# Patient Record
Sex: MALE | Race: WHITE | NOT HISPANIC OR LATINO | URBAN - METROPOLITAN AREA
[De-identification: names, ages, dates, MRNs, and addresses within clinical notes are randomized per-mention and may not be internally consistent; named-entity substitution may affect disease eponyms.]

---

## 2017-07-11 ENCOUNTER — ON CAMPUS - OUTPATIENT (AMBULATORY)
Dept: URBAN - METROPOLITAN AREA HOSPITAL 85 | Facility: HOSPITAL | Age: 57
End: 2017-07-11
Payer: COMMERCIAL

## 2017-07-11 ENCOUNTER — HOSPITAL ENCOUNTER (OUTPATIENT)
Dept: PREOP | Facility: HOSPITAL | Age: 57
Setting detail: HOSPITAL OUTPATIENT SURGERY
Discharge: HOME | End: 2017-07-11
Attending: INTERNAL MEDICINE | Admitting: INTERNAL MEDICINE

## 2017-07-11 DIAGNOSIS — D12.0 BENIGN NEOPLASM OF CECUM: ICD-10-CM

## 2017-07-11 DIAGNOSIS — Z12.11 ENCOUNTER FOR SCREENING FOR MALIGNANT NEOPLASM OF COLON: ICD-10-CM

## 2017-07-11 DIAGNOSIS — D12.2 BENIGN NEOPLASM OF ASCENDING COLON: ICD-10-CM

## 2017-07-11 DIAGNOSIS — K63.5 POLYP OF COLON: ICD-10-CM

## 2017-07-11 PROCEDURE — 45385 COLONOSCOPY W/LESION REMOVAL: CPT | Performed by: NURSE PRACTITIONER

## 2017-07-18 ENCOUNTER — HOSPITAL ENCOUNTER (OUTPATIENT)
Dept: GENERAL RADIOLOGY | Facility: HOSPITAL | Age: 57
Discharge: HOME | End: 2017-07-18
Attending: INTERNAL MEDICINE | Admitting: INTERNAL MEDICINE

## 2017-07-25 ENCOUNTER — HOSPITAL ENCOUNTER (OUTPATIENT)
Dept: CARDIOLOGY | Facility: HOSPITAL | Age: 57
Discharge: HOME | End: 2017-07-25
Attending: INTERNAL MEDICINE | Admitting: INTERNAL MEDICINE

## 2017-07-25 LAB
ABO + RH BLD: NORMAL
ABO + RH BLD: NORMAL
BILIRUB UR QL STRIP: NEGATIVE MG/DL
CASTS URNS QL MICRO: ABNORMAL /[LPF]
COLOR UR: YELLOW
CONV BACTERIA IN URINE MICRO: NEGATIVE
CONV CLARITY OF URINE: CLEAR
CONV HYALINE CASTS IN URINE MICRO: 0 /[LPF] (ref 0–5)
CONV PROTEIN IN URINE BY AUTOMATED TEST STRIP: 100 MG/DL
CONV SMALL ROUND CELLS: ABNORMAL /[HPF]
CONV UROBILINOGEN IN URINE BY AUTOMATED TEST STRIP: 0.2 MG/DL
CULTURE INDICATED?: ABNORMAL
GLUCOSE UR QL: NEGATIVE MG/DL
HGB UR QL STRIP: NEGATIVE
KETONES UR QL STRIP: NEGATIVE MG/DL
LEUKOCYTE ESTERASE UR QL STRIP: NEGATIVE
NITRITE UR QL STRIP: NEGATIVE
PH UR STRIP.AUTO: 8.5 [PH] (ref 4.5–8)
PSA SERPL-MCNC: 0.64 NG/ML (ref 0–4)
RBC #/AREA URNS HPF: 2 /[HPF] (ref 0–3)
SP GR UR: 1.01 (ref 1–1.03)
SPERM URNS QL MICRO: ABNORMAL /[HPF]
SQUAMOUS SPT QL MICRO: 0 /[HPF] (ref 0–5)
UNIDENT CRYS URNS QL MICRO: ABNORMAL /[HPF]
WBC #/AREA URNS HPF: 1 /[HPF] (ref 0–5)
YEAST SPEC QL WET PREP: ABNORMAL /[HPF]

## 2017-07-26 LAB
EBV AB TO VIRAL CAPSID AG, IGG: ABNORMAL
EBV AB TO VIRAL CAPSID AG, IGM: NORMAL
HBV SURFACE AB SER QL: REACTIVE
HBV SURFACE AB SER RIA-ACNC: ABNORMAL
HBV SURFACE AG SERPL QL IA: NONREACTIVE
HCV AB SER DONR QL: NORMAL
HCV AB SER DONR QL: NORMAL
HIV1+2 AB SERPL QL IA: NORMAL
T PALLIDUM IGG SER QL: NONREACTIVE

## 2017-07-27 ENCOUNTER — HOSPITAL ENCOUNTER (OUTPATIENT)
Dept: CARDIOLOGY | Facility: HOSPITAL | Age: 57
Discharge: HOME | End: 2017-07-27
Attending: INTERNAL MEDICINE | Admitting: INTERNAL MEDICINE

## 2017-07-28 LAB
M TB TUBERC IFN-G BLD QL: NORMAL

## 2017-07-30 LAB
ALPRAZ SERPL-MCNC: NORMAL NG/ML
AMOBARBITAL SERPL-MCNC: NORMAL UG/ML
AMPHETAMINES SERPL QL SCN: NEGATIVE
AMPHETAMINES SPEC QL: NORMAL
AMPHETAMINES UR QL: NORMAL
BARBITURATES SERPLBLD QL: NEGATIVE
BENZODIAZ SERPL SCN-MCNC: NEGATIVE NG/ML
BUTABARBITAL SERPL-MCNC: NORMAL UG/ML
BUTALBITAL SERPL-MCNC: NORMAL UG/ML
BZE SERPL-MCNC: NORMAL NG/ML
BZE UR QL: NEGATIVE
CANNABINOIDS SERPL-MCNC: NEGATIVE NG/ML
CARBOXYTHC SPEC-MCNC: NORMAL NG/ML
COCAETHYLENE SERPL-MCNC: NORMAL MG/ML
COCAINE SPEC-MCNC: NORMAL NG/ML
CODEINE UR QL: NORMAL
DESALKYLFLURAZ BLD CFM-MCNC: NORMAL NG/ML
EME SERPL-MCNC: NORMAL NG/ML
HYDROCODONE SERPL-MCNC: NORMAL NG/ML
HYDROMORPHONE SERPL-MCNC: NORMAL NG/ML
LORAZEPAM SPEC-MCNC: NORMAL NG/ML
MDA SERPLBLD-MCNC: NORMAL NG/ML
MDMA SERPLBLD-MCNC: NORMAL NG/ML
METHADONE BLD QL SCN: NEGATIVE
METHADONE SPEC QL: NORMAL
MORPHINE SERPLBLD-MCNC: NORMAL NG/ML
NORDIAZEPAM SERPL-MCNC: NORMAL NG/ML
NORPROPOXYPH SPEC-MCNC: NORMAL UG/ML
OPIATES UR QL SCN: NEGATIVE
OXAZEPAM SPEC-MCNC: NORMAL UG/ML
OXYCODONE SERPL-MCNC: NORMAL NG/ML
PCP SPEC QL: NORMAL
PCP SPEC-MCNC: NEGATIVE NG/ML
PENTOBARB SERPL-MCNC: NORMAL UG/ML
PHENOBARB SERPL-MCNC: NORMAL UG/ML
PROPOXYPHENE SCREEN, SER/BLD: NEGATIVE
SECOBARBITAL UR QL: NORMAL
THC CONFIRM: NORMAL

## 2017-08-31 ENCOUNTER — HOSPITAL ENCOUNTER (OUTPATIENT)
Dept: CT IMAGING | Facility: HOSPITAL | Age: 57
Discharge: HOME | End: 2017-08-31
Attending: INTERNAL MEDICINE | Admitting: INTERNAL MEDICINE

## 2017-08-31 LAB — CREAT BLDA-MCNC: 5.3 MG/DL (ref 0.6–1.3)

## 2017-10-24 ENCOUNTER — HOSPITAL ENCOUNTER (OUTPATIENT)
Dept: LAB | Facility: HOSPITAL | Age: 57
Discharge: HOME | End: 2017-10-24
Attending: INTERNAL MEDICINE | Admitting: INTERNAL MEDICINE

## 2018-01-25 ENCOUNTER — ON CAMPUS - OUTPATIENT (AMBULATORY)
Dept: URBAN - METROPOLITAN AREA HOSPITAL 85 | Facility: HOSPITAL | Age: 58
End: 2018-01-25
Payer: COMMERCIAL

## 2018-01-25 ENCOUNTER — HOSPITAL ENCOUNTER (OUTPATIENT)
Dept: PREOP | Facility: HOSPITAL | Age: 58
Setting detail: HOSPITAL OUTPATIENT SURGERY
Discharge: HOME | End: 2018-01-25
Attending: INTERNAL MEDICINE | Admitting: INTERNAL MEDICINE

## 2018-01-25 DIAGNOSIS — R13.10 DYSPHAGIA, UNSPECIFIED: ICD-10-CM

## 2018-01-25 DIAGNOSIS — R11.2 NAUSEA WITH VOMITING, UNSPECIFIED: ICD-10-CM

## 2018-01-25 DIAGNOSIS — K44.9 DIAPHRAGMATIC HERNIA WITHOUT OBSTRUCTION OR GANGRENE: ICD-10-CM

## 2018-01-25 PROCEDURE — 43450 DILATE ESOPHAGUS 1/MULT PASS: CPT | Performed by: INTERNAL MEDICINE

## 2018-01-25 PROCEDURE — 43235 EGD DIAGNOSTIC BRUSH WASH: CPT | Performed by: INTERNAL MEDICINE

## 2018-12-12 ENCOUNTER — HOSPITAL ENCOUNTER (OUTPATIENT)
Dept: GENERAL RADIOLOGY | Facility: HOSPITAL | Age: 58
Discharge: HOME | End: 2018-12-12
Attending: NURSE PRACTITIONER | Admitting: NURSE PRACTITIONER

## 2019-08-22 ENCOUNTER — HOSPITAL ENCOUNTER (INPATIENT)
Facility: HOSPITAL | Age: 59
LOS: 2 days | Discharge: HOME | End: 2019-08-24
Attending: INTERNAL MEDICINE | Admitting: HOSPITALIST

## 2019-08-22 ENCOUNTER — HOSPITAL ENCOUNTER (INPATIENT)
Dept: CARDIOLOGY | Facility: HOSPITAL | Age: 59
Discharge: HOME | End: 2019-08-22

## 2019-08-22 ENCOUNTER — APPOINTMENT (OUTPATIENT)
Dept: GENERAL RADIOLOGY | Facility: HOSPITAL | Age: 59
End: 2019-08-22

## 2019-08-22 VITALS
DIASTOLIC BLOOD PRESSURE: 77 MMHG | BODY MASS INDEX: 30.61 KG/M2 | HEIGHT: 72 IN | WEIGHT: 226 LBS | SYSTOLIC BLOOD PRESSURE: 116 MMHG

## 2019-08-22 DIAGNOSIS — I21.3 ST ELEVATION MYOCARDIAL INFARCTION (STEMI), UNSPECIFIED ARTERY (HCC): ICD-10-CM

## 2019-08-22 DIAGNOSIS — R07.9 CHEST PAIN IN ADULT: Primary | ICD-10-CM

## 2019-08-22 DIAGNOSIS — I21.3 ACUTE ST ELEVATION MYOCARDIAL INFARCTION (STEMI), UNSPECIFIED ARTERY (HCC): ICD-10-CM

## 2019-08-22 DIAGNOSIS — N18.6 ESRD (END STAGE RENAL DISEASE) (HCC): Chronic | ICD-10-CM

## 2019-08-22 PROBLEM — I10 HTN (HYPERTENSION): Chronic | Status: ACTIVE | Noted: 2019-08-22

## 2019-08-22 PROBLEM — Z72.0 TOBACCO ABUSE: Chronic | Status: ACTIVE | Noted: 2019-08-22

## 2019-08-22 PROBLEM — I73.9 PVD (PERIPHERAL VASCULAR DISEASE): Chronic | Status: ACTIVE | Noted: 2019-08-22

## 2019-08-22 LAB
ACT BLD: 186 SECONDS (ref 89–137)
ACT BLD: 257 SECONDS (ref 89–137)
BH CV ECHO MEAS - % IVS THICK: 58 %
BH CV ECHO MEAS - % LVPW THICK: 34.2 %
BH CV ECHO MEAS - AO MAX PG (FULL): 1.5 MMHG
BH CV ECHO MEAS - AO MAX PG: 8.6 MMHG
BH CV ECHO MEAS - AO MEAN PG (FULL): 1.6 MMHG
BH CV ECHO MEAS - AO MEAN PG: 5.3 MMHG
BH CV ECHO MEAS - AO ROOT AREA: 12.9 CM^2
BH CV ECHO MEAS - AO ROOT DIAM: 4.1 CM
BH CV ECHO MEAS - AO V2 MAX: 146.5 CM/SEC
BH CV ECHO MEAS - AO V2 MEAN: 110.5 CM/SEC
BH CV ECHO MEAS - AO V2 VTI: 27.1 CM
BH CV ECHO MEAS - AVA(I,A): 5.2 CM^2
BH CV ECHO MEAS - AVA(I,D): 5.2 CM^2
BH CV ECHO MEAS - AVA(V,A): 5 CM^2
BH CV ECHO MEAS - AVA(V,D): 5 CM^2
BH CV ECHO MEAS - EDV(CUBED): 255 ML
BH CV ECHO MEAS - EDV(MOD-SP2): 121.9 ML
BH CV ECHO MEAS - EDV(MOD-SP4): 114.9 ML
BH CV ECHO MEAS - EDV(TEICH): 204.2 ML
BH CV ECHO MEAS - EF(CUBED): 71.1 %
BH CV ECHO MEAS - EF(MOD-SP2): 75.8 %
BH CV ECHO MEAS - EF(MOD-SP4): 59.1 %
BH CV ECHO MEAS - EF(TEICH): 61.7 %
BH CV ECHO MEAS - ESV(CUBED): 73.7 ML
BH CV ECHO MEAS - ESV(MOD-SP2): 29.4 ML
BH CV ECHO MEAS - ESV(MOD-SP4): 47 ML
BH CV ECHO MEAS - ESV(TEICH): 78.3 ML
BH CV ECHO MEAS - FS: 33.9 %
BH CV ECHO MEAS - IVS/LVPW: 0.77
BH CV ECHO MEAS - IVSD: 1 CM
BH CV ECHO MEAS - IVSS: 1.6 CM
BH CV ECHO MEAS - LV MASS(C)D: 333.4 GRAMS
BH CV ECHO MEAS - LV MASS(C)S: 301.6 GRAMS
BH CV ECHO MEAS - LV MAX PG: 7.1 MMHG
BH CV ECHO MEAS - LV MEAN PG: 3.7 MMHG
BH CV ECHO MEAS - LV V1 MAX: 133 CM/SEC
BH CV ECHO MEAS - LV V1 MEAN: 86.5 CM/SEC
BH CV ECHO MEAS - LV V1 VTI: 25.5 CM
BH CV ECHO MEAS - LVIDD: 6.3 CM
BH CV ECHO MEAS - LVIDS: 4.2 CM
BH CV ECHO MEAS - LVOT AREA: 5.5 CM^2
BH CV ECHO MEAS - LVOT DIAM: 2.7 CM
BH CV ECHO MEAS - LVPWD: 1.3 CM
BH CV ECHO MEAS - LVPWS: 1.8 CM
BH CV ECHO MEAS - MV A MAX VEL: 83 CM/SEC
BH CV ECHO MEAS - MV DEC SLOPE: 398.7 CM/SEC^2
BH CV ECHO MEAS - MV DEC TIME: 0.23 SEC
BH CV ECHO MEAS - MV E MAX VEL: 45.8 CM/SEC
BH CV ECHO MEAS - MV E/A: 0.55
BH CV ECHO MEAS - MV MAX PG: 3.6 MMHG
BH CV ECHO MEAS - MV MEAN PG: 1.7 MMHG
BH CV ECHO MEAS - MV V2 MAX: 95.1 CM/SEC
BH CV ECHO MEAS - MV V2 MEAN: 62 CM/SEC
BH CV ECHO MEAS - MV V2 VTI: 22.5 CM
BH CV ECHO MEAS - MVA(VTI): 6.2 CM^2
BH CV ECHO MEAS - RVDD: 2.4 CM
BH CV ECHO MEAS - SV(AO): 350.9 ML
BH CV ECHO MEAS - SV(CUBED): 181.3 ML
BH CV ECHO MEAS - SV(LVOT): 140.4 ML
BH CV ECHO MEAS - SV(MOD-SP2): 92.5 ML
BH CV ECHO MEAS - SV(MOD-SP4): 67.9 ML
BH CV ECHO MEAS - SV(TEICH): 125.9 ML
CA-I BLDA-SCNC: 1.06 MMOL/L (ref 1.15–1.33)
CREAT BLDA-MCNC: 8.4 MG/DL (ref 0.6–1.3)
D-LACTATE SERPL-SCNC: 0.5 MMOL/L (ref 0.5–2)
GLUCOSE BLDC GLUCOMTR-MCNC: 130 MG/DL (ref 74–100)
HCT VFR BLDA CALC: 29 % (ref 38–51)
HGB BLDA-MCNC: 10 G/DL (ref 12–17)
POTASSIUM BLDA-SCNC: 3.9 MMOL/L (ref 3.5–4.5)
POTASSIUM BLDA-SCNC: <1.5 MMOL/L (ref 3.5–4.5)
SODIUM BLDA-SCNC: 140 MMOL/L (ref 138–146)
TROPONIN I SERPL-MCNC: 0.03 NG/ML (ref 0–0.03)

## 2019-08-22 PROCEDURE — 25010000002 HEPARIN (PORCINE) PER 1000 UNITS

## 2019-08-22 PROCEDURE — C1894 INTRO/SHEATH, NON-LASER: HCPCS | Performed by: INTERNAL MEDICINE

## 2019-08-22 PROCEDURE — 94799 UNLISTED PULMONARY SVC/PX: CPT

## 2019-08-22 PROCEDURE — C1887 CATHETER, GUIDING: HCPCS | Performed by: INTERNAL MEDICINE

## 2019-08-22 PROCEDURE — 85018 HEMOGLOBIN: CPT

## 2019-08-22 PROCEDURE — 84132 ASSAY OF SERUM POTASSIUM: CPT

## 2019-08-22 PROCEDURE — 25010000002 FENTANYL CITRATE (PF) 100 MCG/2ML SOLUTION: Performed by: EMERGENCY MEDICINE

## 2019-08-22 PROCEDURE — 84484 ASSAY OF TROPONIN QUANT: CPT | Performed by: NURSE PRACTITIONER

## 2019-08-22 PROCEDURE — 82565 ASSAY OF CREATININE: CPT

## 2019-08-22 PROCEDURE — 83605 ASSAY OF LACTIC ACID: CPT

## 2019-08-22 PROCEDURE — 94760 N-INVAS EAR/PLS OXIMETRY 1: CPT

## 2019-08-22 PROCEDURE — 4A023N7 MEASUREMENT OF CARDIAC SAMPLING AND PRESSURE, LEFT HEART, PERCUTANEOUS APPROACH: ICD-10-PCS | Performed by: INTERNAL MEDICINE

## 2019-08-22 PROCEDURE — 93005 ELECTROCARDIOGRAM TRACING: CPT

## 2019-08-22 PROCEDURE — 99223 1ST HOSP IP/OBS HIGH 75: CPT | Performed by: INTERNAL MEDICINE

## 2019-08-22 PROCEDURE — 93306 TTE W/DOPPLER COMPLETE: CPT | Performed by: INTERNAL MEDICINE

## 2019-08-22 PROCEDURE — 93567 NJX CAR CTH SPRVLV AORTGRPHY: CPT | Performed by: INTERNAL MEDICINE

## 2019-08-22 PROCEDURE — B2151ZZ FLUOROSCOPY OF LEFT HEART USING LOW OSMOLAR CONTRAST: ICD-10-PCS | Performed by: INTERNAL MEDICINE

## 2019-08-22 PROCEDURE — 25010000002 ONDANSETRON PER 1 MG: Performed by: EMERGENCY MEDICINE

## 2019-08-22 PROCEDURE — 25010000002 HYDROMORPHONE PER 4 MG: Performed by: INTERNAL MEDICINE

## 2019-08-22 PROCEDURE — C1769 GUIDE WIRE: HCPCS | Performed by: INTERNAL MEDICINE

## 2019-08-22 PROCEDURE — B3101ZZ FLUOROSCOPY OF THORACIC AORTA USING LOW OSMOLAR CONTRAST: ICD-10-PCS | Performed by: INTERNAL MEDICINE

## 2019-08-22 PROCEDURE — 25010000002 KETOROLAC TROMETHAMINE PER 15 MG: Performed by: INTERNAL MEDICINE

## 2019-08-22 PROCEDURE — 93458 L HRT ARTERY/VENTRICLE ANGIO: CPT | Performed by: INTERNAL MEDICINE

## 2019-08-22 PROCEDURE — 80051 ELECTROLYTE PANEL: CPT

## 2019-08-22 PROCEDURE — 99283 EMERGENCY DEPT VISIT LOW MDM: CPT

## 2019-08-22 PROCEDURE — 25010000002 MORPHINE PER 10 MG: Performed by: INTERNAL MEDICINE

## 2019-08-22 PROCEDURE — 71045 X-RAY EXAM CHEST 1 VIEW: CPT

## 2019-08-22 PROCEDURE — B2111ZZ FLUOROSCOPY OF MULTIPLE CORONARY ARTERIES USING LOW OSMOLAR CONTRAST: ICD-10-PCS | Performed by: INTERNAL MEDICINE

## 2019-08-22 PROCEDURE — 85347 COAGULATION TIME ACTIVATED: CPT

## 2019-08-22 PROCEDURE — 25010000002 EPTIFIBATIDE 20 MG/10ML SOLUTION: Performed by: EMERGENCY MEDICINE

## 2019-08-22 PROCEDURE — 93306 TTE W/DOPPLER COMPLETE: CPT

## 2019-08-22 PROCEDURE — 82330 ASSAY OF CALCIUM: CPT

## 2019-08-22 PROCEDURE — 25010000002 HEPARIN (PORCINE) PER 1000 UNITS: Performed by: INTERNAL MEDICINE

## 2019-08-22 PROCEDURE — 94640 AIRWAY INHALATION TREATMENT: CPT

## 2019-08-22 PROCEDURE — 25010000002 METHYLPREDNISOLONE PER 125 MG: Performed by: INTERNAL MEDICINE

## 2019-08-22 PROCEDURE — 25010000002 EPTIFIBATIDE PER 5 MG: Performed by: EMERGENCY MEDICINE

## 2019-08-22 RX ORDER — LIDOCAINE 50 MG/G
1 PATCH TOPICAL
Status: DISCONTINUED | OUTPATIENT
Start: 2019-08-22 | End: 2019-08-24 | Stop reason: HOSPADM

## 2019-08-22 RX ORDER — ONDANSETRON 2 MG/ML
INJECTION INTRAMUSCULAR; INTRAVENOUS
Status: DISPENSED
Start: 2019-08-22 | End: 2019-08-23

## 2019-08-22 RX ORDER — EPTIFIBATIDE 0.75 MG/ML
INJECTION, SOLUTION INTRAVENOUS
Status: DISPENSED
Start: 2019-08-22 | End: 2019-08-23

## 2019-08-22 RX ORDER — LIDOCAINE HYDROCHLORIDE 20 MG/ML
INJECTION, SOLUTION INFILTRATION; PERINEURAL AS NEEDED
Status: DISCONTINUED | OUTPATIENT
Start: 2019-08-22 | End: 2019-08-22 | Stop reason: HOSPADM

## 2019-08-22 RX ORDER — COLCHICINE 0.6 MG/1
1.2 TABLET ORAL DAILY
Status: DISCONTINUED | OUTPATIENT
Start: 2019-08-22 | End: 2019-08-22

## 2019-08-22 RX ORDER — SODIUM BICARBONATE 650 MG/1
650 TABLET ORAL 2 TIMES DAILY
COMMUNITY

## 2019-08-22 RX ORDER — SODIUM CHLORIDE 9 MG/ML
INJECTION, SOLUTION INTRAVENOUS
Status: COMPLETED | OUTPATIENT
Start: 2019-08-22 | End: 2019-08-22

## 2019-08-22 RX ORDER — IPRATROPIUM BROMIDE AND ALBUTEROL SULFATE 2.5; .5 MG/3ML; MG/3ML
3 SOLUTION RESPIRATORY (INHALATION)
Status: DISCONTINUED | OUTPATIENT
Start: 2019-08-22 | End: 2019-08-22 | Stop reason: SDUPTHER

## 2019-08-22 RX ORDER — FENTANYL CITRATE 50 UG/ML
INJECTION, SOLUTION INTRAMUSCULAR; INTRAVENOUS
Status: COMPLETED | OUTPATIENT
Start: 2019-08-22 | End: 2019-08-22

## 2019-08-22 RX ORDER — ATROPINE SULFATE 0.1 MG/ML
INJECTION INTRAVENOUS
Status: DISPENSED
Start: 2019-08-22 | End: 2019-08-23

## 2019-08-22 RX ORDER — EPTIFIBATIDE 20 MG/10ML
INJECTION INTRAVENOUS
Status: COMPLETED | OUTPATIENT
Start: 2019-08-22 | End: 2019-08-22

## 2019-08-22 RX ORDER — COLCHICINE 0.6 MG/1
1.2 TABLET ORAL ONCE
Status: COMPLETED | OUTPATIENT
Start: 2019-08-22 | End: 2019-08-22

## 2019-08-22 RX ORDER — SODIUM CHLORIDE 0.9 % (FLUSH) 0.9 %
3 SYRINGE (ML) INJECTION EVERY 12 HOURS SCHEDULED
Status: DISCONTINUED | OUTPATIENT
Start: 2019-08-22 | End: 2019-08-24 | Stop reason: HOSPADM

## 2019-08-22 RX ORDER — ATROPINE SULFATE 1 MG/ML
.5-1 INJECTION, SOLUTION INTRAMUSCULAR; INTRAVENOUS; SUBCUTANEOUS
Status: DISCONTINUED | OUTPATIENT
Start: 2019-08-22 | End: 2019-08-24 | Stop reason: HOSPADM

## 2019-08-22 RX ORDER — METHYLPREDNISOLONE SODIUM SUCCINATE 125 MG/2ML
INJECTION, POWDER, LYOPHILIZED, FOR SOLUTION INTRAMUSCULAR; INTRAVENOUS AS NEEDED
Status: DISCONTINUED | OUTPATIENT
Start: 2019-08-22 | End: 2019-08-22 | Stop reason: HOSPADM

## 2019-08-22 RX ORDER — HEPARIN SODIUM 1000 [USP'U]/ML
INJECTION, SOLUTION INTRAVENOUS; SUBCUTANEOUS
Status: COMPLETED
Start: 2019-08-22 | End: 2019-08-22

## 2019-08-22 RX ORDER — EPTIFIBATIDE 0.75 MG/ML
INJECTION, SOLUTION INTRAVENOUS CONTINUOUS PRN
Status: DISCONTINUED | OUTPATIENT
Start: 2019-08-22 | End: 2019-08-22 | Stop reason: HOSPADM

## 2019-08-22 RX ORDER — ASPIRIN 81 MG/1
TABLET, CHEWABLE ORAL
Status: COMPLETED
Start: 2019-08-22 | End: 2019-08-22

## 2019-08-22 RX ORDER — IPRATROPIUM BROMIDE AND ALBUTEROL SULFATE 2.5; .5 MG/3ML; MG/3ML
3 SOLUTION RESPIRATORY (INHALATION)
Status: DISCONTINUED | OUTPATIENT
Start: 2019-08-22 | End: 2019-08-23

## 2019-08-22 RX ORDER — HYDROMORPHONE HCL 110MG/55ML
1 PATIENT CONTROLLED ANALGESIA SYRINGE INTRAVENOUS ONCE
Status: COMPLETED | OUTPATIENT
Start: 2019-08-22 | End: 2019-08-22

## 2019-08-22 RX ORDER — NITROGLYCERIN 20 MG/100ML
INJECTION INTRAVENOUS
Status: DISPENSED
Start: 2019-08-22 | End: 2019-08-23

## 2019-08-22 RX ORDER — ONDANSETRON 2 MG/ML
4 INJECTION INTRAMUSCULAR; INTRAVENOUS EVERY 6 HOURS PRN
Status: DISCONTINUED | OUTPATIENT
Start: 2019-08-22 | End: 2019-08-24 | Stop reason: HOSPADM

## 2019-08-22 RX ORDER — SODIUM CHLORIDE 0.9 % (FLUSH) 0.9 %
3-10 SYRINGE (ML) INJECTION AS NEEDED
Status: DISCONTINUED | OUTPATIENT
Start: 2019-08-22 | End: 2019-08-24 | Stop reason: HOSPADM

## 2019-08-22 RX ORDER — HEPARIN SODIUM 5000 [USP'U]/ML
INJECTION, SOLUTION INTRAVENOUS; SUBCUTANEOUS
Status: COMPLETED | OUTPATIENT
Start: 2019-08-22 | End: 2019-08-22

## 2019-08-22 RX ORDER — ONDANSETRON 2 MG/ML
INJECTION INTRAMUSCULAR; INTRAVENOUS
Status: COMPLETED | OUTPATIENT
Start: 2019-08-22 | End: 2019-08-22

## 2019-08-22 RX ORDER — EPTIFIBATIDE 0.75 MG/ML
INJECTION, SOLUTION INTRAVENOUS
Status: COMPLETED | OUTPATIENT
Start: 2019-08-22 | End: 2019-08-22

## 2019-08-22 RX ORDER — NITROGLYCERIN 20 MG/100ML
INJECTION INTRAVENOUS
Status: COMPLETED | OUTPATIENT
Start: 2019-08-22 | End: 2019-08-22

## 2019-08-22 RX ORDER — MORPHINE SULFATE 4 MG/ML
INJECTION, SOLUTION INTRAMUSCULAR; INTRAVENOUS AS NEEDED
Status: DISCONTINUED | OUTPATIENT
Start: 2019-08-22 | End: 2019-08-22 | Stop reason: HOSPADM

## 2019-08-22 RX ORDER — KETOROLAC TROMETHAMINE 15 MG/ML
15 INJECTION, SOLUTION INTRAMUSCULAR; INTRAVENOUS ONCE
Status: COMPLETED | OUTPATIENT
Start: 2019-08-22 | End: 2019-08-22

## 2019-08-22 RX ORDER — FENTANYL CITRATE 50 UG/ML
INJECTION, SOLUTION INTRAMUSCULAR; INTRAVENOUS
Status: DISPENSED
Start: 2019-08-22 | End: 2019-08-23

## 2019-08-22 RX ORDER — ASPIRIN 81 MG/1
TABLET, CHEWABLE ORAL
Status: COMPLETED | OUTPATIENT
Start: 2019-08-22 | End: 2019-08-22

## 2019-08-22 RX ORDER — HEPARIN SODIUM 1000 [USP'U]/ML
INJECTION, SOLUTION INTRAVENOUS; SUBCUTANEOUS AS NEEDED
Status: DISCONTINUED | OUTPATIENT
Start: 2019-08-22 | End: 2019-08-22 | Stop reason: HOSPADM

## 2019-08-22 RX ORDER — HYDROMORPHONE HCL 110MG/55ML
PATIENT CONTROLLED ANALGESIA SYRINGE INTRAVENOUS
Status: DISPENSED
Start: 2019-08-22 | End: 2019-08-23

## 2019-08-22 RX ORDER — SODIUM BICARBONATE 650 MG/1
650 TABLET ORAL 2 TIMES DAILY
Status: DISCONTINUED | OUTPATIENT
Start: 2019-08-22 | End: 2019-08-24 | Stop reason: HOSPADM

## 2019-08-22 RX ORDER — SODIUM CHLORIDE 9 MG/ML
250 INJECTION, SOLUTION INTRAVENOUS ONCE AS NEEDED
Status: DISCONTINUED | OUTPATIENT
Start: 2019-08-22 | End: 2019-08-24 | Stop reason: HOSPADM

## 2019-08-22 RX ORDER — FOLIC ACID/VIT B COMPLEX AND C 0.8 MG
1 TABLET ORAL DAILY
COMMUNITY

## 2019-08-22 RX ORDER — SODIUM CHLORIDE 9 MG/ML
100 INJECTION, SOLUTION INTRAVENOUS CONTINUOUS
Status: DISCONTINUED | OUTPATIENT
Start: 2019-08-22 | End: 2019-08-23

## 2019-08-22 RX ADMIN — ASPIRIN 81 MG 324 MG: 81 TABLET ORAL at 13:22

## 2019-08-22 RX ADMIN — FENTANYL CITRATE 50 MCG: 50 INJECTION, SOLUTION INTRAMUSCULAR; INTRAVENOUS at 13:28

## 2019-08-22 RX ADMIN — SODIUM CHLORIDE 500 ML/HR: 0.9 INJECTION, SOLUTION INTRAVENOUS at 13:31

## 2019-08-22 RX ADMIN — COLCHICINE 1.2 MG: 0.6 TABLET, FILM COATED ORAL at 15:13

## 2019-08-22 RX ADMIN — HYDROMORPHONE HYDROCHLORIDE 1 MG: 2 INJECTION, SOLUTION INTRAMUSCULAR; INTRAVENOUS; SUBCUTANEOUS at 15:13

## 2019-08-22 RX ADMIN — IPRATROPIUM BROMIDE AND ALBUTEROL SULFATE 3 ML: .5; 3 SOLUTION RESPIRATORY (INHALATION) at 19:16

## 2019-08-22 RX ADMIN — SODIUM CHLORIDE 100 ML/HR: 900 INJECTION, SOLUTION INTRAVENOUS at 20:00

## 2019-08-22 RX ADMIN — EPTIFIBATIDE 18540 MCG: 2 INJECTION, SOLUTION INTRAVENOUS at 13:27

## 2019-08-22 RX ADMIN — LIDOCAINE 1 PATCH: 50 PATCH CUTANEOUS at 18:11

## 2019-08-22 RX ADMIN — NITROGLYCERIN 20 MCG/MIN: 20 INJECTION INTRAVENOUS at 13:25

## 2019-08-22 RX ADMIN — ONDANSETRON 4 MG: 2 INJECTION INTRAMUSCULAR; INTRAVENOUS at 13:23

## 2019-08-22 RX ADMIN — HEPARIN SODIUM 4000 ML: 5000 INJECTION, SOLUTION INTRAVENOUS; SUBCUTANEOUS at 13:25

## 2019-08-22 RX ADMIN — EPTIFIBATIDE 1 MCG/KG/MIN: 0.75 INJECTION, SOLUTION INTRAVENOUS at 13:28

## 2019-08-22 RX ADMIN — ASPIRIN 81 MG 324 MG: 81 TABLET ORAL at 13:25

## 2019-08-22 RX ADMIN — HEPARIN SODIUM 4000 UNITS: 5000 INJECTION, SOLUTION INTRAVENOUS; SUBCUTANEOUS at 13:25

## 2019-08-22 RX ADMIN — IPRATROPIUM BROMIDE AND ALBUTEROL SULFATE 3 ML: .5; 3 SOLUTION RESPIRATORY (INHALATION) at 15:26

## 2019-08-22 RX ADMIN — KETOROLAC TROMETHAMINE 15 MG: 15 INJECTION, SOLUTION INTRAMUSCULAR; INTRAVENOUS at 17:22

## 2019-08-22 RX ADMIN — HEPARIN SODIUM 4000 UNITS: 1000 INJECTION, SOLUTION INTRAVENOUS; SUBCUTANEOUS at 13:25

## 2019-08-22 RX ADMIN — Medication 3 ML: at 21:05

## 2019-08-22 RX ADMIN — SODIUM BICARBONATE 650 MG TABLET 650 MG: at 21:06

## 2019-08-22 NOTE — CONSULTS
Cardiology Consult Note    Patient Identification:  Name: Jose Miguel Martinez  Age: 59 y.o.  Sex: male  :  1960  MRN: [id number]             Requesting Physician : ER physician Dr. Tomas    Reason for Consultation / Chief Complaint : Chest pain, ST elevation    History of Present Illness:      This is a 59-year-old with PMH of    ESRD on hemodialysis, Dr. fernandez  Testicular cancer diagnosed at 44 and right testicle removal  Migraine headache  Hypertension  Peripheral vascular disease  Family history negative for premature CAD   Cigarette smoker    Presented to the emergency room with severe 10/10 substernal chest pain writhing in pain cannot lie down on the table was found to have ST elevation in multiple leads therefore interventional cardiology was consulted.  Patient is in severe distress and is a poor historian at the current time has been having severe 10/10 substernal chest pain occurring at rest, has shortness of breath.  Denies any recent trauma or surgery.  Patient recently had some exposure to chemical fumes in his car where he had Scotch cath placed.  Denies any fever chills.  Patient has history of fatigue and arthralgias.    Assessment:      Unstable angina with ST elevations history of possible acute ST elevation MI versus early repolarization versus pericarditis  ST elevation probably due to early repolarization  ESRD  Hypertension  Cigarette smoker    Recommendations / Plan:    Patient's ST elevation could be due to early repolarization versus pericarditis versus evolving MI, therefore he was taken emergently to cath possible PCI and found to have normal coronaries.  Patient also underwent aortic root injection there was no aneurysm or dissections visualized.  Will admit to intensivist  Discussed with Dr. Cerrato  Consult nephrology  Telemetry  We will check an echocardiogram for pericardial effusion  Monitor electrolytes and correct electrolytes  Discussed with family  We will follow          Diagnosis Plan   1. Chest pain in adult     2. Acute ST elevation myocardial infarction (STEMI), unspecified artery (CMS/[facility])                  Past Medical History:  Past Medical History:   Diagnosis Date   • CKD (chronic kidney disease) requiring chronic dialysis (CMS/[facility])    • COPD (chronic obstructive pulmonary disease) (CMS/[facility])    • Hypertension      Past Surgical History:  Past Surgical History:   Procedure Laterality Date   • ARTERIOVENOUS FISTULA REPAIR     • ARTERIOVENOUS FISTULA/SHUNT SURGERY     • LYMPH NODE BIOPSY  2002   • ORCHIECTOMY        Allergies:  No Known Allergies  Home Meds:  Medications Prior to Admission   Medication Sig Dispense Refill Last Dose   • b complex-vitamin c-folic acid (NEPHRO-MARK) 0.8 MG tablet tablet Take 1 tablet by mouth Daily.      • sodium bicarbonate 650 MG tablet Take 650 mg by mouth 2 (Two) Times a Day.        Current Meds:     Current Facility-Administered Medications:   •  atropine injection 0.5-1 mg, 0.5-1 mg, Intravenous, Q5 Min PRN, Juanpablo Garcia MD  •  Atropine Sulfate 1 MG/10ML injection  - ADS Override Pull, , , ,   •  enoxaparin (LOVENOX) syringe 30 mg, 30 mg, Subcutaneous, Daily, Evie Tijerina APRN  •  HYDROmorphone (DILAUDID) 2 MG/ML injection  - ADS Override Pull, , , ,   •  ipratropium-albuterol (DUO-NEB) nebulizer solution 3 mL, 3 mL, Nebulization, 4x Daily - RT, Evie Tijerina APRN, 3 mL at 08/22/19 1916  •  lidocaine (LIDODERM) 5 % 1 patch, 1 patch, Transdermal, Q24H, Antonio Garcia MD, 1 patch at 08/22/19 1811  •  ondansetron (ZOFRAN) injection 4 mg, 4 mg, Intravenous, Q6H PRN, Evie Tijerina APRN  •  Pharmacy to Dose enoxaparin (LOVENOX), , Does not apply, Continuous PRN, Evie Tijerina APRN  •  sodium bicarbonate tablet 650 mg, 650 mg, Oral, BID, Juanpablo Garcia MD  •  sodium chloride 0.9 % flush 3 mL, 3 mL, Intravenous, Q12H, Evie Tijerina APRN  •  sodium chloride 0.9 % flush 3-10 mL, 3-10 mL, Intravenous, PRN,  "Evie Tijernia, APRN  •  sodium chloride 0.9 % infusion 250 mL, 250 mL, Intravenous, Once PRN, Juanpablo Garcia MD  •  sodium chloride 0.9 % infusion, 100 mL/hr, Intravenous, Continuous, Juanpablo Garcia MD, Last Rate: 100 mL/hr at 19 172, 100 mL/hr at 19 172  Social History:   Social History     Tobacco Use   • Smoking status: Former Smoker     Packs/day: 1.00     Start date:      Last attempt to quit: 2019     Years since quittin.0   Substance Use Topics   • Alcohol use: No     Frequency: Never      Family History:  History reviewed. No pertinent family history.     Review of Systems : ROS  Comprehensive review of systems were obtained and found to be negative other than HPI      Constitutional:  Temp:  [98.2 °F (36.8 °C)] 98.2 °F (36.8 °C)  Heart Rate:  [65-82] 72  Resp:  [18-22] 18  BP: ()/(58-77) 116/77  Arterial Line BP: (119-158)/(61-80) 143/77    Physical Exam   /77   Pulse 72   Temp 98.2 °F (36.8 °C) (Oral)   Resp 18   Ht 182.9 cm (72\")   Wt 103 kg (226 lb 3.1 oz)   SpO2 100%   BMI 30.68 kg/m²   General:  Appears in no acute distress  Eyes: Conjunctivae is clear and sclera is anicteric  HEENT:  No JVD. Thyroid not visibly enlarged. No mucosal pallor or cyanosis  Respiratory: Nonlabored breathing, clear to auscultation  Cardiovascular: S1,S2, Regular rate and rhythm. No murmur, rub or gallop auscultated.   Gastrointestinal: Abdomen soft, flat, non tender.    Musculoskeletal:  No abnormal movements  Extremities: No digital clubbing or cyanosis  Skin: Color pink. Skin warm and dry to touch. No rashes  No xanthoma  Neuro: Alert and awake, no lateralizing deficits appreciated              Cardiographics  ECG: EKG tracing was  personally reviewed by me  ECG 12 Lead   Preliminary Result   HEART RATE= 63  bpm   RR Interval= 952  ms   MT Interval= 195  ms   P Horizontal Axis= 34  deg   P Front Axis= 47  deg   QRSD Interval= 92  ms   QT Interval= 454  " ms   QRS Axis= 22  deg   T Wave Axis= 37  deg   - ABNORMAL ECG -   Sinus rhythm   Probable inferior infarct, acute   Anterior infarct, possibly acute   Electronically Signed By:    Date and Time of Study: 2019-08-22 13:18:51          Telemetry:    Echocardiogram:   Results for orders placed during the hospital encounter of 08/22/19   Adult Transthoracic Echo Complete W/ Cont if Necessary Per Protocol    Narrative · The left ventricular cavity is mildly dilated.  · Left atrial cavity size is moderately dilated.  · Left ventricular wall thickness is consistent with severe concentric   hypertrophy.  · Mild aortic valve stenosis is present.  · Mild mitral valve regurgitation is present       Normal LV size and contractility EF of 60 to 65% severe concentric LVH  Normal RV size  Moderate left atrial enlargement  Aortic valve, mitral valve, tricuspid valve appears structurally normal,   mild aortic, mitral regurgitation seen.  Trace tricuspid regurgitation   seen no signs of increased right ventricular pressure  No pericardial effusion seen.  Proximal aorta appears normal in size.         Imaging  Chest X-ray:   Imaging Results (last 24 hours)     Procedure Component Value Units Date/Time    XR Chest 1 View [[id number]] Collected:  08/22/19 1338     Updated:  08/22/19 1340    Narrative:       DATE OF EXAM:  8/22/2019 1:24 PM     PROCEDURE:  XR CHEST 1 VW-     INDICATIONS:  Chest pain. Preoperative evaluation.       COMPARISON:  PA and lateral chest 12/12/2018.     TECHNIQUE:   Single radiographic view of the chest was obtained.     FINDINGS:  Pacer pads project over the right hemithorax and left lower chest. No  acute airspace disease. Heart size is within normal limits. Pulmonary  vasculature fusion is normal. No pleural effusion, pneumothorax or acute  osseous abnormalities.       Impression:       No acute chest findings.     Electronically Signed By-Dr. Latrice Sam MD On:8/22/2019 1:38 PM  This report was finalized  on [id number] by Dr. Latrice Sam MD.          Lab Review: I have reviewed the labs  Results from last 7 days   Lab Units 08/22/19  1753   TROPONIN I ng/mL 0.030         Results from last 7 days   Lab Units 08/22/19  1330   CREATININE mg/dL 8.40*     @[id number]@  Results from last 7 days   Lab Units 08/22/19  1345   HEMOGLOBIN, POC g/dL 10.0*   HEMATOCRIT POC % 29*                 Juanpablo Garcia MD  8/22/2019, 7:32 PM      EMR Dragon/Transcription:   Dictated utilizing Dragon dictation

## 2019-08-22 NOTE — CONSULTS
Chap intro'd self to pt's wife in ED. Pt being rushed to Cath lab and chap accompanied wife. Wife was upset because her father  here at hospital 5 yrs ago today and  rushed to hospital with chest pains on the same day. Chap sat with wife in cath waiting room and stepped away when she met someone she knew. She had no other needs and was thankful chap was nearby.

## 2019-08-22 NOTE — CONSULTS
NEPHROLOGY CONSULTATION-----[facility]    [facility]  [phone]  Mary Gonzalez MD    Patient Care Team:  Kiah Beltran MD as PCP - General    CC/REASON FOR CONSULTATION: ESRD S/P POSSIBLE STEMI  PHYSICIAN REQUESTING CONSULTATION: DR. HAMEED    History of Present Illness     HPI    Patient is a 59 y.o. WM, who is my outpatient HD patient, whom I was asked to see in consultation for evaluation and management of ESRD. Patient was admitted with  STEMI after presenting to ER with CP.    No NSAIDs or recent IV dye exposure, outside of heart cath today. No known h/o hepatitis, TB, rheumatic fever, jaundice, SLE, bleeding/bruising disorders.    No edema or fluid retention.  +Compliance with home meds. No herbal med use.      Review of Systems   Constitutional: Positive for activity change and fatigue. Negative for appetite change, chills, diaphoresis, fever and unexpected weight change.   HENT: Negative for congestion, dental problem, drooling, ear discharge, ear pain, facial swelling, hearing loss, mouth sores, nosebleeds, postnasal drip, rhinorrhea, sinus pressure, sinus pain, sneezing, sore throat, tinnitus, trouble swallowing and voice change.    Eyes: Negative for photophobia, pain, discharge, redness, itching and visual disturbance.   Respiratory: Negative for apnea, cough, choking, chest tightness, shortness of breath, wheezing and stridor.    Cardiovascular: Positive for chest pain. Negative for palpitations and leg swelling.   Gastrointestinal: Negative for abdominal distention, abdominal pain, anal bleeding, blood in stool, constipation, diarrhea, nausea, rectal pain and vomiting.   Endocrine: Negative for cold intolerance, heat intolerance, polydipsia, polyphagia and polyuria.   Genitourinary: Negative for decreased urine volume, difficulty urinating, dysuria, enuresis, flank pain, frequency, genital sores, hematuria and urgency.   Musculoskeletal: Positive  for arthralgias. Negative for back pain, gait problem, joint swelling, myalgias, neck pain and neck stiffness.   Skin: Negative for color change, pallor, rash and wound.   Allergic/Immunologic: Negative for environmental allergies, food allergies and immunocompromised state.   Neurological: Negative for dizziness, tremors, seizures, syncope, facial asymmetry, speech difficulty, weakness, light-headedness, numbness and headaches.   Hematological: Negative for adenopathy. Does not bruise/bleed easily.   Psychiatric/Behavioral: Negative for agitation, behavioral problems, confusion, decreased concentration, dysphoric mood, hallucinations, self-injury, sleep disturbance and suicidal ideas. The patient is not nervous/anxious and is not hyperactive.           Past Medical History:   Diagnosis Date   • CKD (chronic kidney disease) requiring chronic dialysis (CMS/[facility])    • COPD (chronic obstructive pulmonary disease) (CMS/[facility])    • Hypertension        Past Surgical History:   Procedure Laterality Date   • ARTERIOVENOUS FISTULA REPAIR     • ARTERIOVENOUS FISTULA/SHUNT SURGERY     • LYMPH NODE BIOPSY     • ORCHIECTOMY         History reviewed. No pertinent family history.    Social History     Tobacco Use   • Smoking status: Former Smoker     Packs/day: 1.00     Start date:      Last attempt to quit: 2019     Years since quittin.0   Substance Use Topics   • Alcohol use: No     Frequency: Never   • Drug use: No       Home Meds:   Medications Prior to Admission   Medication Sig Dispense Refill Last Dose   • b complex-vitamin c-folic acid (NEPHRO-MARK) 0.8 MG tablet tablet Take 1 tablet by mouth Daily.      • sodium bicarbonate 650 MG tablet Take 650 mg by mouth 2 (Two) Times a Day.          Scheduled Meds:    enoxaparin 30 mg Subcutaneous Daily   HYDROmorphone      ipratropium-albuterol 3 mL Nebulization 4x Daily - RT   lidocaine 1 patch Transdermal Q24H   sodium chloride 3 mL Intravenous Q12H       Continuous  Infusions:    Pharmacy to Dose enoxaparin (LOVENOX)        PRN Meds:  ondansetron  •  Pharmacy to Dose enoxaparin (LOVENOX)  •  sodium chloride    Allergies:  Patient has no known allergies.    OBJECTIVE    Vital Signs  Temp:  [98.2 °F (36.8 °C)] 98.2 °F (36.8 °C)  Heart Rate:  [65-82] 82  Resp:  [20-22] 20  BP: ()/(58-77) 116/77    No intake/output data recorded.  No intake/output data recorded.    Physical Exam:  General Appearance: alert, appears stated age and cooperative  Head: normocephalic, without obvious abnormality and atraumatic  Eyes: conjunctivae and sclerae normal and no icterus  Neck: supple and no JVD  Lungs: clear to auscultation and respirations regular  Heart: regular rhythm & normal rate and normal S1, S2 +EM  Chest Wall: no abnormalities observed  Abdomen: normal bowel sounds and soft non-tender  Extremities: moves extremities well, no edema, no cyanosis and no redness  Skin: no bleeding, bruising or rash  Neurologic: Alert, and oriented. No focal deficits    Results Review:    I reviewed the patient's new clinical results.    WBC No results found for: WBC   HGB Hemoglobin   Date Value Ref Range Status   08/22/2019 10.0 (L) 12.0 - 17.0 g/dL Final      HCT Hematocrit   Date Value Ref Range Status   08/22/2019 29 (L) 38 - 51 % Final      Platlets No results found for: LABPLAT   MCV No results found for: MCV       Sodium No results found for: NA   Potassium No results found for: K   Chloride No results found for: CL   CO2 No results found for: CO2   BUN No results found for: BUN   Creatinine Creatinine   Date Value Ref Range Status   08/22/2019 8.40 (H) 0.60 - 1.30 mg/dL Final     Comment:     Serial Number: [id number]:  [id number]      Calcium No results found for: CALCIUM   PO4 No results found for: CAPO4   Albumin No results found for: ALBUMIN   Magnesium No results found for: MG   Uric Acid No results found for: URICACID       Imaging Results (last 72 hours)     Procedure Component Value  Units Date/Time    XR Chest 1 View [[id number]] Collected:  08/22/19 1338     Updated:  08/22/19 1340    Narrative:       DATE OF EXAM:  8/22/2019 1:24 PM     PROCEDURE:  XR CHEST 1 VW-     INDICATIONS:  Chest pain. Preoperative evaluation.       COMPARISON:  PA and lateral chest 12/12/2018.     TECHNIQUE:   Single radiographic view of the chest was obtained.     FINDINGS:  Pacer pads project over the right hemithorax and left lower chest. No  acute airspace disease. Heart size is within normal limits. Pulmonary  vasculature fusion is normal. No pleural effusion, pneumothorax or acute  osseous abnormalities.       Impression:       No acute chest findings.     Electronically Signed By-Dr. Latrice Sam MD On:8/22/2019 1:38 PM  This report was finalized on [id number] by Dr. Latrice Sam MD.            Results for orders placed during the hospital encounter of 08/22/19   XR Chest 1 View    Narrative DATE OF EXAM:  8/22/2019 1:24 PM     PROCEDURE:  XR CHEST 1 VW-     INDICATIONS:  Chest pain. Preoperative evaluation.       COMPARISON:  PA and lateral chest 12/12/2018.     TECHNIQUE:   Single radiographic view of the chest was obtained.     FINDINGS:  Pacer pads project over the right hemithorax and left lower chest. No  acute airspace disease. Heart size is within normal limits. Pulmonary  vasculature fusion is normal. No pleural effusion, pneumothorax or acute  osseous abnormalities.       Impression No acute chest findings.     Electronically Signed By-Dr. Latrice Sam MD On:8/22/2019 1:38 PM  This report was finalized on [id number] by Dr. Latrice Sam MD.              ASSESSMENT / PLAN      Chest pain in adult    STEMI (ST elevation myocardial infarction) (CMS/[facility])    ESRD (end stage renal disease) (CMS/[facility])    HTN (hypertension)    Tobacco abuse      1. ESRD--------HD today to clear IV contrast. Usual HD on [facility]    2. CAD S/P STEMI------S/P heart cath today with no blockages. Likely pericarditis per      3. HTN WITH ESRD-------BP okay    4. ANEMIA OF ESRD-------No EPO given MI. H/H stable    5. SECONDARY HYPERPARATHYROIDISM-------Follow [facility]    6. RF ASSOCIATED HYPERPHOSPHATEMIA------Follow [facility]. Home binders    7. COPD/TOBACCO ABUSE------Oxygen, nebs. Counseled on smoking cessation      I discussed the patients findings and my recommendations with patient, family and nursing staff    Will follow along closely. Thank you for allowing us to see this patient in renal consultation.    [facility]  [phone]  MD Mary Herrera MD  08/22/19  4:11 PM

## 2019-08-22 NOTE — ED PROVIDER NOTES
Subjective   59-year-old complaining of chest pain of increasing severity since about 11:00.  The patient reports that he has had diaphoresis and shortness of breath.  He states that he thought sometimes it was worse when he took a deep breath.  He states he was exposed to chemicals from a new car that he bought 2 days ago.  He states he has not had much coughing.  He reports no fever or chills.  He reports no weakness in his legs.  He states that he had dialysis yesterday as regularly scheduled.  He reports no recent fever or chills.  He states that he had a stress test in the past that he thinks was 3 or 4 years ago that was negative he states he is never had a cardiac catheterization.  He denies pain between his shoulder blades in his upper back            Review of Systems   Constitutional: Positive for activity change and fatigue.   Cardiovascular: Positive for chest pain. Negative for palpitations and leg swelling.   Gastrointestinal: Negative for abdominal distention.   Genitourinary: Positive for decreased urine volume and difficulty urinating.   Hematological: Does not bruise/bleed easily.   All other systems reviewed and are negative.      No past medical history on file.    No Known Allergies    No past surgical history on file.    No family history on file.    Social History     Socioeconomic History   • Marital status: Single     Spouse name: Not on file   • Number of children: Not on file   • Years of education: Not on file   • Highest education level: Not on file           Objective   Physical Exam  Alert Elana Coma Scale 15 diaphoretic, in severe distress secondary to pain   HEENT: Pupils equal and reactive to light. Conjunctivae are not injected. normal tympanic membranes. Oropharynx and nares are normal.   Neck: Supple. Midline trachea. No JVD. No goiter.   Chest: Clear and equal breath sounds bilaterally regular rate and rhythm without murmur or rub.   Abdomen: Positive bowel sounds nontender  nondistended. No rebound or peritoneal signs. No CVA tenderness.   Extremities no clubbing cyanosis or edema motor sensory exam is normal the full range of motion is intact   skin: Warm and dry, no rashes or petechia.   Lymphatic: No regional lymphadenopathy. No calf pain, swelling or Delroy's sign    Procedures           ED Course          Labs Reviewed   POCT CREATININE - Abnormal; Notable for the following components:       Result Value    Creatinine 8.40 (*)     All other components within normal limits   ELECTROLYTES + H&H - Abnormal; Notable for the following components:    Ionized Calcium 1.06 (*)     Glucose 130 (*)     Hematocrit 29 (*)     Hemoglobin 10.0 (*)     All other components within normal limits   POC LACTATE - Normal   POCT CHEM 8   POCT POTASSIUM     Medications   heparin (porcine) injection (7,000 Units Intravenous Given 8/22/19 1345)   lidocaine (XYLOCAINE) 2% injection (10 mL Infiltration Given 8/22/19 1345)   eptifibatide (INTEGRILIN) 75 MG/100ML infusion (1 mcg/kg/min × 103 kg Intravenous Canceled Entry 8/22/19 1347)   heparin (porcine) 1000 UNIT/ML injection  - ADS Override Pull (4,000 Units Intravenous (Continuous Infusion) Given 8/22/19 1325)   aspirin 81 MG chewable tablet  - ADS Override Pull (324 mg Oral Given 8/22/19 1322)   ondansetron (ZOFRAN) injection (4 mg Intravenous Given 8/22/19 1323)   aspirin chewable tablet (324 mg Oral Given 8/22/19 1325)   heparin (porcine) 5000 UNIT/ML injection (4,000 mL Intravenous Given 8/22/19 1325)   fentaNYL citrate (PF) (SUBLIMAZE) injection (50 mcg  Given 8/22/19 1328)   sodium chloride 0.9 % infusion (500 mL/hr Intravenous New Bag 8/22/19 1331)   nitroglycerin 50 mg/250 mL (0.2 mg/mL) infusion (20 mcg/min Intravenous New Bag 8/22/19 1325)   Eptifibatide (INTEGRILIN) injection (18,540 mcg Intravenous Given 8/22/19 1327)   eptifibatide (INTEGRILIN) 75 MG/100ML infusion (1 mcg/kg/min × 103 kg Intravenous New Bag 8/22/19 1328)     Xr Chest 1  View    Result Date: 8/22/2019  No acute chest findings.  Electronically Signed By-Dr. Latrice Sam MD On:8/22/2019 1:38 PM This report was finalized on [id number] by Dr. Latrice Sam MD.            MDM  Number of Diagnoses or Management Options     Amount and/or Complexity of Data Reviewed  Clinical lab tests: reviewed  Tests in the radiology section of CPT®: reviewed  Tests in the medicine section of CPT®: reviewed  Obtain history from someone other than the patient: yes  Review and summarize past medical records: yes  Discuss the patient with other providers: yes  Independent visualization of images, tracings, or specimens: yes    Risk of Complications, Morbidity, and/or Mortality  Presenting problems: high  Diagnostic procedures: high  Management options: high  General comments: Patient had a EKG from the urgent care center that appeared to show inferolateral ST elevation.  Stat EKG was performed here which confirmed inferolateral ST segment elevation.  Patient was placed under STEMI protocol orders.  The patient's potassium was determined by i-STAT testing.  Initial value was incorrect and was not contaminated by IV fluids.  The cardiologist was notified within 5 minutes of arrival, with the procedure being done by back-up cardiologist Dr. Garcia    Patient Progress  Patient progress: improved        Final diagnoses:   Chest pain in adult   Acute ST elevation myocardial infarction (STEMI), unspecified artery (CMS/HCC)            Antoine Tomas MD  08/22/19 2016

## 2019-08-22 NOTE — H&P
"Pulmonary/ Critical Care/ sleep medicine ADMISSION H&P Note        Patient Name:  Jose Miguel Martinez    :  1960    Medical Record:  [id number]    Primary Care Physician     Kiah Beltran MD HOPI  Jose Miguel Martinez is a 59 y.o. male with a PMH sig for ESRD on HD, HTN, PVD, tobacco abuse, and past testicular cancer presented to the ED today with complaints of chest pain that started yesterday, but worsened today.  He also had associated SOA and diaphoresis.  EKG showed ST elevation in ED.  Code STEMI was initiated and cardiology was consulted.  He was taken to the cardiac catheterization lab where he was found to have no significant blockages.  He will be admitted to the ICU for additional care and monitoring.  Colchicine is added by cardiology for possible pericarditis.  The patient is hemodynamically stable.  He is on supplemental oxygen.  He reports continued \"sharp\" generalized chest pain.  He denies use of oxygen at home.  He is a current smoker of 1 pack per week and is trying to quit.  His last HD treatment was yesterday.      Review of Systems    Constitutional:  Denies fever or chills   Eyes:  Denies change in visual acuity   HENT:  Denies nasal congestion or sore throat   Respiratory:  Denies cough + shortness of breath   Cardiovascular:  + chest pain No edema   GI:  Denies abdominal pain, nausea, vomiting, bloody stools or diarrhea   :  Denies dysuria   Musculoskeletal:  Denies back pain or joint pain   Integument:  Denies rash   Neurologic:  Denies headache, focal weakness or sensory changes   Endocrine:  Denies polyuria or polydipsia   Lymphatic:  Denies swollen glands   Psychiatric:  Denies depression or anxiety     Medical History    As above listed in HPI    Surgical History    No past surgical history on file.     Family History    Non-contributory     Social History    Social History     Tobacco Use   • Smoking status: Not on file   Substance Use Topics   • Alcohol use: Not on file    "     Allergies    No Known Allergies    Medications    Scheduled Meds:   Continuous Infusions:  eptifibatide      PRN Meds:.•  eptifibatide  •  heparin (porcine)  •  lidocaine  •  methylPREDNISolone sodium succinate  •  Morphine sulfate (PF)      Physical Exam    tMax 24 hrs:  Temp (24hrs), Av.2 °F (36.8 °C), Min:98.2 °F (36.8 °C), Max:98.2 °F (36.8 °C)    Vitals Ranges:  Temp:  [98.2 °F (36.8 °C)] 98.2 °F (36.8 °C)  Heart Rate:  [65-66] 65  Resp:  [22] 22  BP: (96)/(58) 96/58  Intake and Output Last 3 Shifts:  No intake/output data recorded.    Constitutional:  Well developed, well nourished, no acute distress, non-toxic appearance   Eyes:  PERRL, conjunctiva normal   HENT:  Atraumatic, external ears normal, nose normal, oropharynx moist, no pharyngeal exudates. Neck- supple   Respiratory:  No respiratory distress, normal breath sounds, no rales, no wheezing   Cardiovascular:  Normal rate, normal rhythm, no murmurs, no gallops, no rubs   GI:  Soft, non-distended, normal bowel sounds, nontender, no organomegaly, no mass, no rebound, no guarding   :  No costovertebral angle tenderness   Musculoskeletal:  No edema, no tenderness, no deformities. Back- no tenderness  Integument:  Warm, dry, no rash.  Two band-aids on face  Lymphatic:  No lymphadenopathy noted   Neurologic:  Alert & oriented x 3, CN 2-12 normal, normal motor function, normal sensory function, no focal deficits noted   Psychiatric:  Speech and behavior appropriate     labs    Lab Results (last 24 hours)     Procedure Component Value Units Date/Time    POC Potassium [[id number]]  (Abnormal) Collected:  19 1333     Updated:  19 1411     Potassium <1.5 mmol/L      Comment: Serial Number: [id number]:  [id number]       POCT Electrolytes +HGB +HCT [[id number]]  (Abnormal) Collected:  19 1345    Specimen:  Blood Updated:  19 1352     Sodium 140 mmol/L      Potassium 3.9 mmol/L      Ionized Calcium 1.06 mmol/L      Comment: Serial  Number: [id number]:  [id number]        Glucose 130 mg/dL      Hematocrit 29 %      Hemoglobin 10.0 g/dL     POC Lactate [[id number]]  (Normal) Collected:  08/22/19 1345    Specimen:  Blood Updated:  08/22/19 1352     Lactate 0.5 mmol/L      Comment: Serial Number: [id number]:  [id number]       POC Creatinine [[id number]]  (Abnormal) Collected:  08/22/19 1330    Specimen:  Blood Updated:  08/22/19 1340     Creatinine 8.40 mg/dL      Comment: Serial Number: [id number]:  [id number]        GFR MDRD  --     Comment: Serial Number: [id number]:  [id number]        GFR MDRD Non  --     Comment: Serial Number: [id number]:  [id number]             Imaging & Other Studies    Imaging Results (last 72 hours)     Procedure Component Value Units Date/Time    XR Chest 1 View [[id number]] Collected:  08/22/19 1338     Updated:  08/22/19 1340    Narrative:       DATE OF EXAM:  8/22/2019 1:24 PM     PROCEDURE:  XR CHEST 1 VW-     INDICATIONS:  Chest pain. Preoperative evaluation.       COMPARISON:  PA and lateral chest 12/12/2018.     TECHNIQUE:   Single radiographic view of the chest was obtained.     FINDINGS:  Pacer pads project over the right hemithorax and left lower chest. No  acute airspace disease. Heart size is within normal limits. Pulmonary  vasculature fusion is normal. No pleural effusion, pneumothorax or acute  osseous abnormalities.       Impression:       No acute chest findings.     Electronically Signed By-Dr. Latrice Sam MD On:8/22/2019 1:38 PM  This report was finalized on [id number] by Dr. Latrice Sam MD.          Assessment      Chest pain in adult    STEMI (ST elevation myocardial infarction) (CMS/HCC)    ESRD (end stage renal disease) (CMS/[facility])    HTN (hypertension)    Tobacco abuse  Possible Pericarditis  Probable COPD    Plan    -admit to ICU  -cardiology consulted, s/p cardiac catheterization without need for intervention   -colchicine added by cardiology for pericarditis  As  well as IV Solumedrol  -consult to nephrology for management of HD  -serial troponin  -ECHO ordered  -labs in AM to include lipid panel  -cont with supplemental oxygen as needed for sats 92%  -encouraged continued efforts at smoking cessation   -follow up in outpatient pulmonary clinic for PFT  -add duo-nebs  -lidoderm patch   -dilaudid 1 mg x 1    DVT ppy:  -SCDs  -Lovenox     Diet - advance as tolerated    See orders. The plan was discussed with the patient and/or family.   Note scribed by me for Dr. Garcia    Attending physician statement:    Total critical care time spent is 32 minutes which does not include any time for procedures.  Critical care time is exclusive of time spent by the nurse practitioner.  Above note scribed by nurse practitioner for me and later reviewed by me for accuracy . I've examined the patient and reviewed all labs and images.  I have directly participated in the evaluation and management of this patient.  Antonio Garcia MD

## 2019-08-23 LAB
ACT BLD: 153 SECONDS (ref 89–137)
ALBUMIN SERPL-MCNC: 3.2 G/DL (ref 3.5–4.8)
ALBUMIN/GLOB SERPL: 0.9 G/DL (ref 1–1.7)
ALP SERPL-CCNC: 55 U/L (ref 32–91)
ALT SERPL W P-5'-P-CCNC: 18 U/L (ref 17–63)
ANION GAP SERPL CALCULATED.3IONS-SCNC: 19.4 MMOL/L (ref 5–15)
ARTICHOKE IGE QN: 86 MG/DL (ref 0–100)
AST SERPL-CCNC: 21 U/L (ref 15–41)
BILIRUB SERPL-MCNC: 0.6 MG/DL (ref 0.3–1.2)
BUN BLD-MCNC: 28 MG/DL (ref 8–20)
BUN/CREAT SERPL: 4.8 (ref 6.2–20.3)
CA-I SERPL ISE-MCNC: 1.23 MMOL/L (ref 1.2–1.3)
CALCIUM SPEC-SCNC: 9.2 MG/DL (ref 8.9–10.3)
CHLORIDE SERPL-SCNC: 99 MMOL/L (ref 101–111)
CHOLEST SERPL-MCNC: 141 MG/DL
CO2 SERPL-SCNC: 24 MMOL/L (ref 22–32)
CREAT BLD-MCNC: 5.8 MG/DL (ref 0.7–1.2)
D DIMER PPP FEU-MCNC: 0.61 MCGFEU/ML (ref 0.17–0.59)
DEPRECATED RDW RBC AUTO: 46.8 FL (ref 37–54)
ERYTHROCYTE [DISTWIDTH] IN BLOOD BY AUTOMATED COUNT: 14.5 % (ref 12.3–15.4)
GFR SERPL CREATININE-BSD FRML MDRD: 10 ML/MIN/1.73
GFR SERPL CREATININE-BSD FRML MDRD: ABNORMAL ML/MIN/{1.73_M2}
GLOBULIN UR ELPH-MCNC: 3.5 GM/DL (ref 2.5–3.8)
GLUCOSE BLD-MCNC: 144 MG/DL (ref 65–99)
GLUCOSE BLDC GLUCOMTR-MCNC: 93 MG/DL (ref 70–105)
HCT VFR BLD AUTO: 31.8 % (ref 37.5–51)
HDLC SERPL QL: 2.94
HDLC SERPL-MCNC: 48 MG/DL
HGB BLD-MCNC: 10.9 G/DL (ref 13–17.7)
LDLC/HDLC SERPL: 1.75 {RATIO}
LYMPHOCYTES # BLD MANUAL: 0.59 10*3/MM3 (ref 0.7–3.1)
LYMPHOCYTES NFR BLD MANUAL: 3 % (ref 19.6–45.3)
LYMPHOCYTES NFR BLD MANUAL: 8 % (ref 5–12)
MAGNESIUM SERPL-MCNC: 2.1 MG/DL (ref 1.8–2.5)
MCH RBC QN AUTO: 31.1 PG (ref 26.6–33)
MCHC RBC AUTO-ENTMCNC: 34.1 G/DL (ref 31.5–35.7)
MCV RBC AUTO: 91.2 FL (ref 79–97)
MONOCYTES # BLD AUTO: 1.57 10*3/MM3 (ref 0.1–0.9)
MYELOCYTES NFR BLD MANUAL: 1 % (ref 0–0)
NEUTROPHILS # BLD AUTO: 17.25 10*3/MM3 (ref 1.7–7)
NEUTROPHILS NFR BLD MANUAL: 74 % (ref 42.7–76)
NEUTS BAND NFR BLD MANUAL: 14 % (ref 0–5)
PHOSPHATE SERPL-MCNC: 4.3 MG/DL (ref 2.4–4.7)
PLAT MORPH BLD: NORMAL
PLATELET # BLD AUTO: 155 10*3/MM3 (ref 140–450)
PMV BLD AUTO: 7.5 FL (ref 6–12)
POTASSIUM BLD-SCNC: 5.4 MMOL/L (ref 3.6–5.1)
PROT SERPL-MCNC: 6.7 G/DL (ref 6.1–7.9)
RBC # BLD AUTO: 3.49 10*6/MM3 (ref 4.14–5.8)
RBC MORPH BLD: NORMAL
SCAN SLIDE: NORMAL
SODIUM BLD-SCNC: 137 MMOL/L (ref 136–144)
TRIGL SERPL-MCNC: 46 MG/DL
TROPONIN I SERPL-MCNC: 0.04 NG/ML (ref 0–0.03)
TROPONIN I SERPL-MCNC: 0.05 NG/ML (ref 0–0.03)
TSH SERPL DL<=0.05 MIU/L-ACNC: 1 MIU/ML (ref 0.34–5.6)
URATE SERPL-MCNC: 3.2 MG/DL (ref 4.8–8.7)
VLDLC SERPL-MCNC: 9.2 MG/DL
WBC MORPH BLD: NORMAL
WBC NRBC COR # BLD: 19.6 10*3/MM3 (ref 3.4–10.8)

## 2019-08-23 PROCEDURE — 83735 ASSAY OF MAGNESIUM: CPT | Performed by: NURSE PRACTITIONER

## 2019-08-23 PROCEDURE — 80053 COMPREHEN METABOLIC PANEL: CPT | Performed by: INTERNAL MEDICINE

## 2019-08-23 PROCEDURE — 84550 ASSAY OF BLOOD/URIC ACID: CPT | Performed by: INTERNAL MEDICINE

## 2019-08-23 PROCEDURE — 93005 ELECTROCARDIOGRAM TRACING: CPT | Performed by: INTERNAL MEDICINE

## 2019-08-23 PROCEDURE — 82330 ASSAY OF CALCIUM: CPT | Performed by: INTERNAL MEDICINE

## 2019-08-23 PROCEDURE — 85025 COMPLETE CBC W/AUTO DIFF WBC: CPT | Performed by: NURSE PRACTITIONER

## 2019-08-23 PROCEDURE — 84484 ASSAY OF TROPONIN QUANT: CPT | Performed by: NURSE PRACTITIONER

## 2019-08-23 PROCEDURE — 99232 SBSQ HOSP IP/OBS MODERATE 35: CPT | Performed by: INTERNAL MEDICINE

## 2019-08-23 PROCEDURE — 82962 GLUCOSE BLOOD TEST: CPT

## 2019-08-23 PROCEDURE — 86706 HEP B SURFACE ANTIBODY: CPT | Performed by: INTERNAL MEDICINE

## 2019-08-23 PROCEDURE — 94799 UNLISTED PULMONARY SVC/PX: CPT

## 2019-08-23 PROCEDURE — 80061 LIPID PANEL: CPT | Performed by: NURSE PRACTITIONER

## 2019-08-23 PROCEDURE — 87340 HEPATITIS B SURFACE AG IA: CPT | Performed by: INTERNAL MEDICINE

## 2019-08-23 PROCEDURE — 25010000002 ENOXAPARIN PER 10 MG: Performed by: NURSE PRACTITIONER

## 2019-08-23 PROCEDURE — 99221 1ST HOSP IP/OBS SF/LOW 40: CPT | Performed by: HOSPITALIST

## 2019-08-23 PROCEDURE — 84443 ASSAY THYROID STIM HORMONE: CPT | Performed by: INTERNAL MEDICINE

## 2019-08-23 PROCEDURE — 84100 ASSAY OF PHOSPHORUS: CPT | Performed by: NURSE PRACTITIONER

## 2019-08-23 PROCEDURE — 85007 BL SMEAR W/DIFF WBC COUNT: CPT | Performed by: NURSE PRACTITIONER

## 2019-08-23 PROCEDURE — 5A1D70Z PERFORMANCE OF URINARY FILTRATION, INTERMITTENT, LESS THAN 6 HOURS PER DAY: ICD-10-PCS | Performed by: HOSPITALIST

## 2019-08-23 PROCEDURE — 85379 FIBRIN DEGRADATION QUANT: CPT | Performed by: INTERNAL MEDICINE

## 2019-08-23 RX ORDER — CALCIUM ACETATE 667 MG/1
2001 CAPSULE ORAL
Status: DISCONTINUED | OUTPATIENT
Start: 2019-08-24 | End: 2019-08-24 | Stop reason: HOSPADM

## 2019-08-23 RX ORDER — IPRATROPIUM BROMIDE AND ALBUTEROL SULFATE 2.5; .5 MG/3ML; MG/3ML
3 SOLUTION RESPIRATORY (INHALATION)
Status: DISCONTINUED | OUTPATIENT
Start: 2019-08-23 | End: 2019-08-24 | Stop reason: HOSPADM

## 2019-08-23 RX ORDER — CLONIDINE HYDROCHLORIDE 0.1 MG/1
0.1 TABLET ORAL EVERY 12 HOURS SCHEDULED
Status: DISCONTINUED | OUTPATIENT
Start: 2019-08-24 | End: 2019-08-24 | Stop reason: HOSPADM

## 2019-08-23 RX ORDER — ALBUMIN (HUMAN) 12.5 G/50ML
12.5 SOLUTION INTRAVENOUS AS NEEDED
Status: DISCONTINUED | OUTPATIENT
Start: 2019-08-23 | End: 2019-08-24 | Stop reason: HOSPADM

## 2019-08-23 RX ORDER — HYDRALAZINE HYDROCHLORIDE 25 MG/1
100 TABLET, FILM COATED ORAL 2 TIMES DAILY
Status: DISCONTINUED | OUTPATIENT
Start: 2019-08-24 | End: 2019-08-24 | Stop reason: HOSPADM

## 2019-08-23 RX ORDER — ACETAMINOPHEN 325 MG/1
650 TABLET ORAL EVERY 6 HOURS PRN
Status: DISCONTINUED | OUTPATIENT
Start: 2019-08-23 | End: 2019-08-24 | Stop reason: HOSPADM

## 2019-08-23 RX ORDER — CALCIUM ACETATE 667 MG/1
1334 CAPSULE ORAL 2 TIMES DAILY WITH MEALS
Status: DISCONTINUED | OUTPATIENT
Start: 2019-08-24 | End: 2019-08-24 | Stop reason: HOSPADM

## 2019-08-23 RX ORDER — HYDRALAZINE HYDROCHLORIDE 50 MG/1
100 TABLET, FILM COATED ORAL 2 TIMES DAILY
COMMUNITY

## 2019-08-23 RX ORDER — IPRATROPIUM BROMIDE AND ALBUTEROL SULFATE 2.5; .5 MG/3ML; MG/3ML
3 SOLUTION RESPIRATORY (INHALATION) EVERY 4 HOURS PRN
Status: DISCONTINUED | OUTPATIENT
Start: 2019-08-23 | End: 2019-08-24 | Stop reason: HOSPADM

## 2019-08-23 RX ORDER — CLONIDINE HYDROCHLORIDE 0.1 MG/1
0.1 TABLET ORAL 2 TIMES DAILY
COMMUNITY

## 2019-08-23 RX ADMIN — ACETAMINOPHEN 650 MG: 325 TABLET ORAL at 22:43

## 2019-08-23 RX ADMIN — IPRATROPIUM BROMIDE AND ALBUTEROL SULFATE 3 ML: .5; 3 SOLUTION RESPIRATORY (INHALATION) at 12:06

## 2019-08-23 RX ADMIN — SODIUM BICARBONATE 650 MG TABLET 650 MG: at 21:24

## 2019-08-23 RX ADMIN — ENOXAPARIN SODIUM 30 MG: 30 INJECTION SUBCUTANEOUS at 16:27

## 2019-08-23 RX ADMIN — SODIUM BICARBONATE 650 MG TABLET 650 MG: at 08:08

## 2019-08-23 RX ADMIN — IPRATROPIUM BROMIDE AND ALBUTEROL SULFATE 3 ML: .5; 3 SOLUTION RESPIRATORY (INHALATION) at 07:48

## 2019-08-23 RX ADMIN — Medication 3 ML: at 21:24

## 2019-08-23 RX ADMIN — LIDOCAINE 1 PATCH: 50 PATCH CUTANEOUS at 08:08

## 2019-08-23 RX ADMIN — IPRATROPIUM BROMIDE AND ALBUTEROL SULFATE 3 ML: .5; 3 SOLUTION RESPIRATORY (INHALATION) at 19:56

## 2019-08-23 RX ADMIN — SODIUM CHLORIDE 100 ML/HR: 900 INJECTION, SOLUTION INTRAVENOUS at 05:18

## 2019-08-23 NOTE — PROGRESS NOTES
Cardiology Progress Note    Patient Identification:  Name: Jose Miguel Martinez  Age: 59 y.o.  Sex: male  :  1960  MRN: [id number]                 Follow Up / Chief Complaint:   Chief Complaint   Patient presents with   • Chest Pain       Interval History: Patient had cardiac cath 2019 which was normal.       Subjective: Chest pain is better but continues to have 3/10 pleuritic chest       Reason for Consultation / Chief Complaint : Chest pain, ST elevation     History of Present Illness:       This is a 59-year-old with PMH of     ESRD on hemodialysis, Dr. fernandez  Testicular cancer diagnosed at 44 and right testicle removal  Migraine headache  Hypertension  Peripheral vascular disease  Family history negative for premature CAD   Cigarette smoker     Presented to the emergency room with severe 10/10 substernal chest pain writhing in pain cannot lie down on the table was found to have ST elevation in multiple leads therefore interventional cardiology was consulted.  Patient is in severe distress and is a poor historian at the current time has been having severe 10/10 substernal chest pain occurring at rest, has shortness of breath.  Denies any recent trauma or surgery.  Patient recently had some exposure to chemical fumes in his car where he had Scotch cath placed.  Denies any fever chills.  Patient has history of fatigue and arthralgias.     Assessment:       Chest pain, normal cardiac cath 2019  ST elevation probably due to early repolarization  ESRD  Hypertension  Cigarette smoker     Recommendations / Plan:    Telemetry is revealing sinus rhythm  Patient continues to have pleuritic chest pain  Will check d-dimer  Discussed with nephrology  Discussed with Dr. Cerrato, intensivist  Echo reveals normal LV systolic function no pericardial effusion  Discussed with family  We will follow    Past Medical History:  Past Medical History:   Diagnosis Date   • CKD (chronic kidney disease) requiring chronic  "dialysis (CMS/[facility])    • COPD (chronic obstructive pulmonary disease) (CMS/[facility])    • Hypertension      Past Surgical History:  Past Surgical History:   Procedure Laterality Date   • ARTERIOVENOUS FISTULA REPAIR     • ARTERIOVENOUS FISTULA/SHUNT SURGERY     • LYMPH NODE BIOPSY     • ORCHIECTOMY          Social History:   Social History     Tobacco Use   • Smoking status: Former Smoker     Packs/day: 1.00     Start date:      Last attempt to quit: 2019     Years since quittin.0   Substance Use Topics   • Alcohol use: No     Frequency: Never      Family History:  History reviewed. No pertinent family history.       Allergies:  No Known Allergies  Scheduled Meds:    enoxaparin 30 mg Daily   ipratropium-albuterol 3 mL 4x Daily - RT   lidocaine 1 patch Q24H   sodium bicarbonate 650 mg BID   sodium chloride 3 mL Q12H           INTAKE AND OUTPUT:    Intake/Output Summary (Last 24 hours) at 2019 09  Last data filed at 2019 0518  Gross per 24 hour   Intake 2744 ml   Output 3000 ml   Net -256 ml       Review of Systems:   REVIEW OF SYSTEMS    Constitutional:  Denies fever or chills   Eyes:  Denies blurred vision  HENT:  Denies nasal congestion or sore throat   Respiratory: Denies any shortness of breath or cough or hemoptysis  Cardiovascular:  c/o chest pain , no edema   GI:  Denies abdominal pain, nausea, vomiting, bloody stools or diarrhea   : Denies any hematuria  Musculoskeletal:  Denies back pain or joint pain   Neurologic: Denies any dizziness, syncope. denies lateralizing deficits  Psychiatric:  Denies depression or anxiety         Constitutional:  Temp:  [98.1 °F (36.7 °C)-98.7 °F (37.1 °C)] 98.3 °F (36.8 °C)  Heart Rate:  [65-88] 71  Resp:  [13-25] 20  BP: ()/(58-84) 128/65  Arterial Line BP: (119-158)/(61-80) 143/77    Physical Exam   /65   Pulse 71   Temp 98.3 °F (36.8 °C) (Oral)   Resp 20   Ht 182.9 cm (72\")   Wt 103 kg (227 lb 1.2 oz)   SpO2 99%   BMI 30.80 kg/m² "   General:  Appears in no acute distress  Eyes: Sclera is anicteric,  conjunctiva is clear   HEENT:  No JVD.   Respiratory: Nonlabored breathing, CTA  Cardiovascular: S1,S2 Regular rate and rhythm. No murmur, rub or gallop auscultated.   Gastrointestinal: Abdomen soft, flat, non tender. Bowel sounds present.   Musculoskeletal:  No abnormal movements  Extremities: No digital clubbing or cyanosis  Skin: Color pink. Skin warm and dry to touch. No rashes  No xanthoma  Neuro: Alert and awake, no lateralizing deficits appreciated        Cardiographics  Telemetry: Sinus rhythm    ECG:   ECG 12 Lead   Final Result   HEART RATE= 63  bpm   RR Interval= 952  ms   VT Interval= 195  ms   P Horizontal Axis= 34  deg   P Front Axis= 47  deg   QRSD Interval= 92  ms   QT Interval= 454  ms   QRS Axis= 22  deg   T Wave Axis= 37  deg   - ABNORMAL ECG -   Sinus rhythm   Probable inferior infarct, acute   Anterior infarct, possibly acute   interolateral STEMI ACUTE   Electronically Signed By: Antoine Tomas ([facility]) 23-Aug-2019 06:43:22   Date and Time of Study: 2019-08-22 13:18:51      ECG 12 Lead   Preliminary Result   HEART RATE= 78  bpm   RR Interval= 768  ms   VT Interval= 170  ms   P Horizontal Axis= 61  deg   P Front Axis= 38  deg   QRSD Interval= 89  ms   QT Interval= 393  ms   QRS Axis= 13  deg   T Wave Axis= 30  deg   - ABNORMAL ECG -   Sinus rhythm   Left ventricular hypertrophy   Extensive anterior infarct, acute (LAD)   Electronically Signed By:    Date and Time of Study: 2019-08-23 03:34:33        I have personally reviewed EKG    Echocardiogram: Results for orders placed during the hospital encounter of 08/22/19   Adult Transthoracic Echo Complete W/ Cont if Necessary Per Protocol    Narrative · The left ventricular cavity is mildly dilated.  · Left atrial cavity size is moderately dilated.  · Left ventricular wall thickness is consistent with severe concentric   hypertrophy.  · Mild aortic valve stenosis is present.  · Mild  mitral valve regurgitation is present       Normal LV size and contractility EF of 60 to 65% severe concentric LVH  Normal RV size  Moderate left atrial enlargement  Aortic valve, mitral valve, tricuspid valve appears structurally normal,   mild aortic, mitral regurgitation seen.  Trace tricuspid regurgitation   seen no signs of increased right ventricular pressure  No pericardial effusion seen.  Proximal aorta appears normal in size.         Lab Review   I have reviewed the labs  Results from last 7 days   Lab Units 08/23/19  0603 08/22/19  2328 08/22/19  1753   TROPONIN I ng/mL 0.050* 0.040* 0.030     Results from last 7 days   Lab Units 08/23/19  0603   MAGNESIUM mg/dL 2.1     Results from last 7 days   Lab Units 08/23/19  0603   SODIUM mmol/L 137   POTASSIUM mmol/L 5.4*   BUN mg/dL 28*   CREATININE mg/dL 5.80*   CALCIUM mg/dL 9.2     @LABRCNTIPbnp@  Results from last 7 days   Lab Units 08/23/19  0603 08/22/19  1345   WBC 10*3/mm3 19.60*  --    HEMOGLOBIN g/dL 10.9*  --    HEMOGLOBIN, POC g/dL  --  10.0*   HEMATOCRIT % 31.8*  --    HEMATOCRIT POC %  --  29*   PLATELETS 10*3/mm3 155  --            RADIOLOGY:  Imaging Results (last 24 hours)     Procedure Component Value Units Date/Time    XR Chest 1 View [[id number]] Collected:  08/22/19 1338     Updated:  08/22/19 1340    Narrative:       DATE OF EXAM:  8/22/2019 1:24 PM     PROCEDURE:  XR CHEST 1 VW-     INDICATIONS:  Chest pain. Preoperative evaluation.       COMPARISON:  PA and lateral chest 12/12/2018.     TECHNIQUE:   Single radiographic view of the chest was obtained.     FINDINGS:  Pacer pads project over the right hemithorax and left lower chest. No  acute airspace disease. Heart size is within normal limits. Pulmonary  vasculature fusion is normal. No pleural effusion, pneumothorax or acute  osseous abnormalities.       Impression:       No acute chest findings.     Electronically Signed By-Dr. Latrice Sam MD On:8/22/2019 1:38 PM  This report was  "finalized on [id number] by Dr. Latrice Sam MD.                  )8/23/2019  Juanpablo Garcia MD      EMR [facility]/Transcription:   \"Dictated utilizing Dragon dictation\".   "

## 2019-08-23 NOTE — PROGRESS NOTES
"NEPHROLOGY PROGRESS NOTE    [facility]  [phone]  Mary Gonzalez MD      Patient Care Team:  Kiah Beltran MD as PCP - General      Provider:  Mary Gonzalez MD  Patient Name: Jose Miguel Martinez  :  1960    SUBJECTIVE:    Still with some pain on inspiration. No angina. No palpitations. Mild SOB    Medication:    enoxaparin 30 mg Subcutaneous Daily   ipratropium-albuterol 3 mL Nebulization 4x Daily - RT   lidocaine 1 patch Transdermal Q24H   sodium bicarbonate 650 mg Oral BID   sodium chloride 3 mL Intravenous Q12H       Pharmacy to Dose enoxaparin (LOVENOX)     sodium chloride 100 mL/hr Last Rate: 100 mL/hr (19)       OBJECTIVE    Vital Sign Min/Max for last 24 hours  Temp  Min: 98.1 °F (36.7 °C)  Max: 98.7 °F (37.1 °C)   BP  Min: 96/58  Max: 150/77   Pulse  Min: 65  Max: 88   Resp  Min: 13  Max: 24   SpO2  Min: 93 %  Max: 100 %   No Data Recorded   Weight  Min: 99.6 kg (219 lb 9.3 oz)  Max: 103 kg (228 lb)     Flowsheet Rows      First Filed Value   Admission Height  182.9 cm (72\") Documented at 2019 1312   Admission Weight  103 kg (228 lb) Documented at 2019 1312          I/O this shift:  In: 2744 [P.O.:240; I.V.:2504]  Out: 3000 [Other:3000]  No intake/output data recorded.    Physical Exam:  General Appearance: alert, appears stated age and cooperative  Head: normocephalic, without obvious abnormality and atraumatic  Eyes: conjunctivae and sclerae normal and no icterus  Neck: supple and no JVD  Lungs: +FEW SCATTERED RHONHCI  Heart: regular rhythm & normal rate and normal S1, S2 +JOSE A  Chest: Wall no abnormalities observed  Abdomen: normal bowel sounds and soft non-tender  Extremities: moves extremities well, +TRACE EDEMA, no cyanosis and no redness  Skin: no bleeding, bruising or rash, turgor normal, color normal and no leasions noted  Neurologic: Alert, and oriented. No focal deficits    Labs:    WBC WBC   Date Value Ref Range Status "   08/23/2019 19.60 (H) 3.40 - 10.80 10*3/mm3 Preliminary      HGB Hemoglobin   Date Value Ref Range Status   08/23/2019 10.9 (L) 13.0 - 17.7 g/dL Preliminary   08/22/2019 10.0 (L) 12.0 - 17.0 g/dL Final      HCT Hematocrit   Date Value Ref Range Status   08/23/2019 31.8 (L) 37.5 - 51.0 % Preliminary   08/22/2019 29 (L) 38 - 51 % Final      Platlets No results found for: LABPLAT   MCV MCV   Date Value Ref Range Status   08/23/2019 91.2 79.0 - 97.0 fL Preliminary          Sodium No results found for: NA   Potassium No results found for: K   Chloride No results found for: CL   CO2 No results found for: CO2   BUN No results found for: BUN   Creatinine Creatinine   Date Value Ref Range Status   08/22/2019 8.40 (H) 0.60 - 1.30 mg/dL Final     Comment:     Serial Number: [id number]:  [id number]      Calcium No results found for: CALCIUM   PO4 No components found for: PO4   Albumin No results found for: ALBUMIN   Magnesium No results found for: MG   Uric Acid No components found for: URIC ACID     Imaging Results (last 72 hours)     Procedure Component Value Units Date/Time    XR Chest 1 View [[id number]] Collected:  08/22/19 1338     Updated:  08/22/19 1340    Narrative:       DATE OF EXAM:  8/22/2019 1:24 PM     PROCEDURE:  XR CHEST 1 VW-     INDICATIONS:  Chest pain. Preoperative evaluation.       COMPARISON:  PA and lateral chest 12/12/2018.     TECHNIQUE:   Single radiographic view of the chest was obtained.     FINDINGS:  Pacer pads project over the right hemithorax and left lower chest. No  acute airspace disease. Heart size is within normal limits. Pulmonary  vasculature fusion is normal. No pleural effusion, pneumothorax or acute  osseous abnormalities.       Impression:       No acute chest findings.     Electronically Signed By-Dr. Latrice Sam MD On:8/22/2019 1:38 PM  This report was finalized on [id number] by Dr. Latrice Sam MD.          Results for orders placed during the hospital encounter of  08/22/19   XR Chest 1 View    Narrative DATE OF EXAM:  8/22/2019 1:24 PM     PROCEDURE:  XR CHEST 1 VW-     INDICATIONS:  Chest pain. Preoperative evaluation.       COMPARISON:  PA and lateral chest 12/12/2018.     TECHNIQUE:   Single radiographic view of the chest was obtained.     FINDINGS:  Pacer pads project over the right hemithorax and left lower chest. No  acute airspace disease. Heart size is within normal limits. Pulmonary  vasculature fusion is normal. No pleural effusion, pneumothorax or acute  osseous abnormalities.       Impression No acute chest findings.     Electronically Signed By-Dr. Latrice Sam MD On:8/22/2019 1:38 PM  This report was finalized on [id number] by Dr. Latrice Sam MD.              ASSESSMENT / PLAN      Chest pain in adult    STEMI (ST elevation myocardial infarction) (CMS/[facility])    ESRD (end stage renal disease) (CMS/[facility])    HTN (hypertension)    Tobacco abuse    1. ESRD--------HD today per usual M-W-F outpatient schedule     2. CAD S/P STEMI------S/P heart cath  with no blockages. Likely pericarditis vs. Pneumonitis, per .      3. HTN WITH ESRD-------BP okay     4. ANEMIA OF ESRD-------No EPO given MI. H/H stable     5. SECONDARY HYPERPARATHYROIDISM-------Follow [facility]     6. RF ASSOCIATED HYPERPHOSPHATEMIA------Follow [facility]os. Home binders     7. COPD/TOBACCO ABUSE------Oxygen, nebs. Counseled on smoking cessation        Mary Gonzalez MD  [facility]  [phone]  08/23/19  6:45 AM

## 2019-08-23 NOTE — PROGRESS NOTES
"KPA/PULM/CC PROGRESS NOTE       PATIENT IDENTIFICATION    Name: Jose Miguel Martinez Age: 59 y.o. Sex: male :  1960 MRN: [id number]    SUBJECTIVE      Feels much better today.  Chest pain has significantly improved.  Going to get hemodialysis today  OBJECTIVE    /65   Pulse 71   Temp 98.3 °F (36.8 °C) (Oral)   Resp 20   Ht 182.9 cm (72\")   Wt 103 kg (227 lb 1.2 oz)   SpO2 99%   BMI 30.80 kg/m²   Intake/Output last 3 shifts:  I/O last 3 completed shifts:  In: 2744 [P.O.:240; I.V.:2504]  Out: 3000 [Other:3000]  Intake/Output this shift:  No intake/output data recorded.    General Appearance:  Alert, cooperative, no distress, appears stated age  Head:  Normocephalic, without obvious abnormality, atraumatic  Eyes:  PERRL, conjunctiva/corneas clear, EOM's intact     Neck:  Supple,  no adenopathy;      Lungs:   Clear to auscultation bilaterally, respirations unlabored, no pleural rub  Chest wall:  No tenderness  Heart:  Regular rate and rhythm, S1 and S2 normal, no murmur, rub   or gallop  Abdomen:  Soft, non-tender, bowel sounds active all four quadrants,  no masses, no hepatomegaly, no splenomegaly  Extremities:  Extremities normal, no cyanosis or edema  Pulses: 2+ and symmetric all extremities  Skin:  No rashes or lesions  Neurologic:   Alert and oriented, no focal deficits    Scheduled Meds:  enoxaparin 30 mg Subcutaneous Daily   ipratropium-albuterol 3 mL Nebulization 4x Daily - RT   lidocaine 1 patch Transdermal Q24H   sodium bicarbonate 650 mg Oral BID   sodium chloride 3 mL Intravenous Q12H       Continuous Infusions:  Pharmacy to Dose enoxaparin (LOVENOX)     sodium chloride 100 mL/hr Last Rate: 100 mL/hr (19 0518)       PRN Meds:•  atropine  •  ondansetron  •  Pharmacy to Dose enoxaparin (LOVENOX)  •  sodium chloride  •  sodium chloride     Data Review:  Lab Results (last 24 hours)     Procedure Component Value Units Date/Time    Troponin [[id number]]  (Abnormal) Collected:  19 0603 "    Specimen:  Blood Updated:  08/23/19 0838     Troponin I 0.050 ng/mL     Narrative:       Troponin I Reference Range:    0.00-0.03  Negative.  Repeat testing in 4-6 hours if clinically indicated.    0.04-0.29  Suspicious for myocardial injury. Serial measurements and clinical  correlation may be necessary to confirm or exclude diagnosis of acute  coronary syndrome.  Repeat testing in 4-6 hours if indicated.     >0.29 Consistent with myocardial injury.  Recommend clinical and laboratory correlation.     Results my be falsely decreased if patient taking Biotin.     POC Activated Clotting Time [[id number]]  (Abnormal) Collected:  08/22/19 1723    Specimen:  Blood Updated:  08/23/19 0739     Activated Clotting Time  153 Seconds      Comment: Serial Number: [id number]:  [id number]       Calcium, Ionized [[id number]]  (Normal) Collected:  08/23/19 0603    Specimen:  Blood Updated:  08/23/19 0724     Ionized Calcium 1.23 mmol/L     Scan Slide [[id number]] Collected:  08/23/19 0603    Specimen:  Blood Updated:  08/23/19 0656     Scan Slide --     Comment: See Manual Differential Results       Manual Differential [[id number]]  (Abnormal) Collected:  08/23/19 0603    Specimen:  Blood Updated:  08/23/19 0656     Neutrophil % 74.0 %      Lymphocyte % 3.0 %      Monocyte % 8.0 %      Bands %  14.0 %      Myelocyte % 1.0 %      Neutrophils Absolute 17.25 10*3/mm3      Lymphocytes Absolute 0.59 10*3/mm3      Monocytes Absolute 1.57 10*3/mm3      RBC Morphology Normal     WBC Morphology Normal     Platelet Morphology Normal    Narrative:       Slide Reviewed    CBC & Differential [[id number]] Collected:  08/23/19 0603    Specimen:  Blood Updated:  08/23/19 0656    Narrative:       The following orders were created for panel order CBC & Differential.  Procedure                               Abnormality         Status                     ---------                               -----------         ------                     CBC Auto  Differential[[id number]]        Abnormal            Final result                 Please view results for these tests on the individual orders.    CBC Auto Differential [[id number]]  (Abnormal) Collected:  08/23/19 0603    Specimen:  Blood Updated:  08/23/19 0656     WBC 19.60 10*3/mm3      RBC 3.49 10*6/mm3      Hemoglobin 10.9 g/dL      Hematocrit 31.8 %      MCV 91.2 fL      MCH 31.1 pg      MCHC 34.1 g/dL      RDW 14.5 %      RDW-SD 46.8 fl      MPV 7.5 fL      Platelets 155 10*3/mm3     Narrative:       The previously reported component NRBC is no longer being reported.    Phosphorus [[id number]]  (Normal) Collected:  08/23/19 0603    Specimen:  Blood Updated:  08/23/19 0650     Phosphorus 4.3 mg/dL     Comprehensive Metabolic Panel [[id number]]  (Abnormal) Collected:  08/23/19 0603    Specimen:  Blood Updated:  08/23/19 0650     Glucose 144 mg/dL      BUN 28 mg/dL      Creatinine 5.80 mg/dL      Sodium 137 mmol/L      Potassium 5.4 mmol/L      Chloride 99 mmol/L      CO2 24.0 mmol/L      Calcium 9.2 mg/dL      Total Protein 6.7 g/dL      Albumin 3.20 g/dL      ALT (SGPT) 18 U/L      AST (SGOT) 21 U/L      Alkaline Phosphatase 55 U/L      Total Bilirubin 0.6 mg/dL      eGFR Non African Amer 10 mL/min/1.73      Comment: <15 Indicative of kidney failure.        eGFR   Amer --     Comment: <15 Indicative of kidney failure.        Globulin 3.5 gm/dL      A/G Ratio 0.9 g/dL      BUN/Creatinine Ratio 4.8     Anion Gap 19.4 mmol/L     TSH [[id number]]  (Normal) Collected:  08/23/19 0603    Specimen:  Blood Updated:  08/23/19 0650     TSH 1.000 mIU/mL      Comment: Results may be falsely decreased if patient taking Biotin.       Uric Acid [[id number]]  (Abnormal) Collected:  08/23/19 0603    Specimen:  Blood Updated:  08/23/19 0650     Uric Acid 3.2 mg/dL     Magnesium [[id number]]  (Normal) Collected:  08/23/19 0603    Specimen:  Blood Updated:  08/23/19 0650     Magnesium 2.1 mg/dL     Lipid Panel [[id number]]  Collected:  08/23/19 0603    Specimen:  Blood Updated:  08/23/19 0643     Total Cholesterol 141 mg/dL      Triglycerides 46 mg/dL      HDL Cholesterol 48 mg/dL      LDL Cholesterol  86 mg/dL      VLDL Cholesterol 9.2 mg/dL      LDL/HDL Ratio 1.75     Chol/HDL Ratio 2.94    Narrative:       The following guidelines have been recommended by the NCEP for Total Cholesterol, Total Triglycerides, LDL Cholesterol, and HDL Cholesterols    Total Cholesterol  Desirable:        <200 mg/dL  Borderline High:  200-239 mg/dL  High:             > or = 240 mg/dL    Total Triglyceride  Normal:           <150 mg/dL  Borderline High:  150-199 mg/dL  High:             200-499 mg/dL  Very High:        > or = 500 mg/dL    HDL Cholesterol  Low HDL:          <40 mg/dL  Normal:           40-60 mg/dL  Desirable:        >60 mg/dL    LDL Cholesterol  Optimal:          <100 mg/dL  Low Risk:         100-129 mg/dL  Borderline High:  130-159 mg/dL  High:             160-189 mg/dL  Very High:        > or = 190 mg/dL    The following ratios of LDL to HDL and Total cholesterol to HDL are for information only:    LDL/HDL Ratio  Desirable:        <5  Optimal:          < or = 3.5    Total Cholesterol/HDL Ratio  Low Risk:         3.3-4.4  Average Risk:     4.4-7.1  Medium Risk:      7.1-11  High Risk:        >11       Troponin [[id number]]  (Abnormal) Collected:  08/22/19 2328    Specimen:  Blood Updated:  08/23/19 0022     Troponin I 0.040 ng/mL     Narrative:       Troponin I Reference Range:    0.00-0.03  Negative.  Repeat testing in 4-6 hours if clinically indicated.    0.04-0.29  Suspicious for myocardial injury. Serial measurements and clinical  correlation may be necessary to confirm or exclude diagnosis of acute  coronary syndrome.  Repeat testing in 4-6 hours if indicated.     >0.29 Consistent with myocardial injury.  Recommend clinical and laboratory correlation.     Results my be falsely decreased if patient taking Biotin.     Troponin  [[id number]]  (Normal) Collected:  08/22/19 1753    Specimen:  Blood Updated:  08/22/19 1859     Troponin I 0.030 ng/mL     Narrative:       Troponin I Reference Range:    0.00-0.03  Negative.  Repeat testing in 4-6 hours if clinically indicated.    0.04-0.29  Suspicious for myocardial injury. Serial measurements and clinical  correlation may be necessary to confirm or exclude diagnosis of acute  coronary syndrome.  Repeat testing in 4-6 hours if indicated.     >0.29 Consistent with myocardial injury.  Recommend clinical and laboratory correlation.     Results my be falsely decreased if patient taking Biotin.     POC Activated Clotting Time [[id number]]  (Abnormal) Collected:  08/22/19 1550    Specimen:  Blood Updated:  08/22/19 1827     Activated Clotting Time  186 Seconds      Comment: Serial Number: [id number]:  [id number]       POC Activated Clotting Time [[id number]]  (Abnormal) Collected:  08/22/19 1401    Specimen:  Blood Updated:  08/22/19 1433     Activated Clotting Time  257 Seconds      Comment: Serial Number: [id number]:  [id number]       POC Potassium [[id number]]  (Abnormal) Collected:  08/22/19 1333     Updated:  08/22/19 1411     Potassium <1.5 mmol/L      Comment: Serial Number: [id number]:  [id number]       POCT Electrolytes +HGB +HCT [[id number]]  (Abnormal) Collected:  08/22/19 1345    Specimen:  Blood Updated:  08/22/19 1352     Sodium 140 mmol/L      Potassium 3.9 mmol/L      Ionized Calcium 1.06 mmol/L      Comment: Serial Number: [id number]:  [id number]        Glucose 130 mg/dL      Hematocrit 29 %      Hemoglobin 10.0 g/dL     POC Lactate [[id number]]  (Normal) Collected:  08/22/19 1345    Specimen:  Blood Updated:  08/22/19 1352     Lactate 0.5 mmol/L      Comment: Serial Number: [id number]:  [id number]       POC Creatinine [[id number]]  (Abnormal) Collected:  08/22/19 1330    Specimen:  Blood Updated:  08/22/19 1340     Creatinine 8.40 mg/dL      Comment: Serial Number: [id number]:  [id number]         GFR MDRD  --     Comment: Serial Number: [id number]:  [id number]        GFR MDRD Non  --     Comment: Serial Number: [id number]:  [id number]                Imaging:  Imaging Results (last 72 hours)     Procedure Component Value Units Date/Time    XR Chest 1 View [[id number]] Collected:  08/22/19 1338     Updated:  08/22/19 1340    Narrative:       DATE OF EXAM:  8/22/2019 1:24 PM     PROCEDURE:  XR CHEST 1 VW-     INDICATIONS:  Chest pain. Preoperative evaluation.       COMPARISON:  PA and lateral chest 12/12/2018.     TECHNIQUE:   Single radiographic view of the chest was obtained.     FINDINGS:  Pacer pads project over the right hemithorax and left lower chest. No  acute airspace disease. Heart size is within normal limits. Pulmonary  vasculature fusion is normal. No pleural effusion, pneumothorax or acute  osseous abnormalities.       Impression:       No acute chest findings.     Electronically Signed By-Dr. Latrice Sam MD On:8/22/2019 1:38 PM  This report was finalized on [id number] by Dr. Latrice Sam MD.            ASSESSMENT/PLAN:     Chest pain in adult    STEMI (ST elevation myocardial infarction) (CMS/HCC)    ESRD (end stage renal disease) (CMS/[facility])    HTN (hypertension)    Tobacco abuse  ASSESSMENT      Chest pain in adult    STEMI (ST elevation myocardial infarction) (CMS/HCC)-is post cardiac catheterization negative for any thrombi    ESRD (end stage renal disease) (CMS/HCC)    HTN (hypertension)    Tobacco abuse  Possible Pericarditis versus costochondritis  Probable COPD     PLAN      -cardiology consulted, s/p cardiac catheterization without need for intervention.   -colchicine added by cardiology for pericarditis  As well as IV Solumedrol.  Only single dose as needed and not on any maintenance medications  -consult to nephrology for management of HD  -serial troponin  -ECHO reviewed and no evidence of pericardial effusion.  -cont with supplemental oxygen as  needed for sats 92%  -encouraged continued efforts at smoking cessation   -follow up in outpatient pulmonary clinic for PFT  -add duo-nebs  -lidoderm patch   -dilaudid 1 mg x 1, Toradol 15 mg IV x1  -Check d-dimer.  If significantly elevated will order CT chest PE protocol even though clinically less likely for PE.  Wells score less than 2.      DVT ppy:  -SCDs  -Lovenox      Diet - advance as tolerated      Possible transfer out of ICU if okay with cardiology.  We will continue to follow.

## 2019-08-23 NOTE — PLAN OF CARE
Problem: Patient Care Overview  Goal: Plan of Care Review  Outcome: Ongoing (interventions implemented as appropriate)   08/23/19 1633   Coping/Psychosocial   Plan of Care Reviewed With patient;spouse   Plan of Care Review   Progress improving     Goal: Individualization and Mutuality  Outcome: Ongoing (interventions implemented as appropriate)   08/23/19 1633   Individualization   Patient Specific Goals (Include Timeframe) Chest pain to be resolved in the next 24 hours.   Patient Specific Interventions Slow deep breaths, repositioning, decreasing anxiety.     Goal: Discharge Needs Assessment  Outcome: Ongoing (interventions implemented as appropriate)   08/23/19 1633   Discharge Needs Assessment   Readmission Within the Last 30 Days no previous admission in last 30 days   Patient/Family Anticipates Transition to home   Patient/Family Anticipated Services at Transition none   Transportation Concerns car, none   Transportation Anticipated family or friend will provide   Anticipated Changes Related to Illness none   Equipment Needed After Discharge none   Disability   Equipment Currently Used at Home none     Goal: Interprofessional Rounds/Family Conf  Outcome: Ongoing (interventions implemented as appropriate)   08/23/19 1633   Interdisciplinary Rounds/Family Conf   Participants nursing;patient;physician       Problem: Skin Injury Risk (Adult)  Intervention: Promote/Optimize Nutrition   08/23/19 1633   Nutrition Interventions   Oral Nutrition Promotion physical activity promoted;rest periods promoted     Intervention: Prevent/Manage Excess Moisture   08/23/19 1633   Skin Interventions   Skin Protection pulse oximeter probe site changed;sacral silicone foam dressing in place   Hygiene Care   Perineal Care absorbent pad changed     Intervention: Maintain Head of Bed Elevation Less Than 30 Degrees as Tolerated   08/23/19 1633   Positioning   Head of Bed (HOB) HOB at 30-45 degrees     Intervention: Prevent/Minimize  Shear/Friction Injuries   08/23/19 1633   Positioning   Positioning/Transfer Devices in use;pillows   Skin Interventions   Pressure Reduction Devices pressure-redistributing mattress utilized     Intervention: Prevent or Minimize Pressure   08/23/19 1633   Positioning   Body Position turned   Skin Interventions   Pressure Reduction Techniques frequent weight shift encouraged       Goal: Identify Related Risk Factors and Signs and Symptoms  Outcome: Ongoing (interventions implemented as appropriate)   08/23/19 1633   Skin Injury Risk (Adult)   Related Risk Factors (Skin Injury Risk) critical care admission     Goal: Skin Health and Integrity  Outcome: Ongoing (interventions implemented as appropriate)   08/23/19 1633   Skin Injury Risk (Adult)   Skin Health and Integrity achieves outcome

## 2019-08-23 NOTE — CONSULTS
"    Referring Provider: Kiah Beltran MD  Reason for Consultation:  medical management    Patient Care Team:  Kiah Beltran MD as PCP - General    Chief complaint chest pain    Subjective .     History of present illness:    Patient is a 59-year-old gentleman with a history of end-stage renal disease on hemodialysis, hypertension of chronic kidney disease, peripheral vascular disease, tobacco abuse and past testicular cancer presented to the ED today with complaints of chest pain that started a day prior to admission, but worsened the day of admission associated with SOA and diaphoresis.  EKG showed ST elevation in ED.  Code STEMI was initiated and cardiology was consulted.  He was taken to the cardiac catheterization lab where he was found to have no significant blockages.  He was admitted to the ICU for additional care and monitoring.  Colchicine was added by cardiology for possible pericarditis.  The patient is hemodynamically stable.  He is on supplemental oxygen.  He reports continued \"sharp\" generalized chest pain.  He denies use of oxygen at home.  He is a current smoker of 1 pack per week and is trying to quit.  His last HD treatment was the day prior to admission.      Hospital course:  The patient has done well since his heart cath.  He is currently undergoing hemodialysis.  I spoke with his son at the bedside.  Review of Systems:  12 point review of systems was reviewed and was negative except as above, although the patient is drowsy.      History  Past Medical History:   Diagnosis Date   • CKD (chronic kidney disease) requiring chronic dialysis (CMS/[facility])    • COPD (chronic obstructive pulmonary disease) (CMS/[facility])    • Hypertension    ,   Past Surgical History:   Procedure Laterality Date   • ARTERIOVENOUS FISTULA REPAIR     • ARTERIOVENOUS FISTULA/SHUNT SURGERY     • CARDIAC CATHETERIZATION N/A 8/22/2019    Procedure: Left Heart Cath;  Surgeon: Juanpablo Garcia MD;  Location: [facility]" INVASIVE LOCATION;  Service: Cardiology   • CARDIAC CATHETERIZATION N/A 2019    Procedure: Aortic root aortogram;  Surgeon: Juanpablo Garcia MD;  Location: [facility] INVASIVE LOCATION;  Service: Cardiology   • LYMPH NODE BIOPSY     • ORCHIECTOMY     , History reviewed. No pertinent family history.,   Social History     Tobacco Use   • Smoking status: Former Smoker     Packs/day: 1.00     Start date:      Last attempt to quit: 2019     Years since quittin.0   Substance Use Topics   • Alcohol use: No     Frequency: Never   • Drug use: No    and Allergies:  Patient has no known allergies.    Objective     Vital Signs   Temp:  [98.1 °F (36.7 °C)-98.7 °F (37.1 °C)] 98.3 °F (36.8 °C)  Heart Rate:  [] 86  Resp:  [17-25] 21  BP: (112-168)/(65-97) 151/78  Arterial Line BP: (133-143)/(73-77) 143/77    Physical Exam:  Well-developed well-nourished gentleman lying in bed undergoing hemodialysis in no acute distress; lungs clear to auscultation bilaterally; CV regular rate and rhythm; abdomen soft and nontender; extremities with no edema or calf tenderness; right groin soft with no ecchymosis and clean dry dressing in place not removed.    Results Review:  Imaging Results (last 24 hours)     ** No results found for the last 24 hours. **        Lab Results (last 24 hours)     Procedure Component Value Units Date/Time    D-dimer, Quantitative [[id number]]  (Abnormal) Collected:  19 0955    Specimen:  Blood Updated:  19 1059     D-Dimer, Quantitative 0.61 MCGFEU/mL     Narrative:       Reference Range  --------------------------------------------------------------------     < 0.50   Negative Predictive Value  0.50-0.59   Indeterminate    >= 0.60   Probable VTE             A very low percentage of patients with DVT may yield D-Dimer results   below the cut-off of 0.50 MCGFEU/mL.  This is known to be more   prevalent in patients with distal DVT.             Results of this test should  always be interpreted in conjunction with   the patient's medical history, clinical presentation and other   findings.  Clinical diagnosis should not be based on the result of   [place] D-Dimer alone.    Troponin [[id number]]  (Abnormal) Collected:  08/23/19 0603    Specimen:  Blood Updated:  08/23/19 0838     Troponin I 0.050 ng/mL     Narrative:       Troponin I Reference Range:    0.00-0.03  Negative.  Repeat testing in 4-6 hours if clinically indicated.    0.04-0.29  Suspicious for myocardial injury. Serial measurements and clinical  correlation may be necessary to confirm or exclude diagnosis of acute  coronary syndrome.  Repeat testing in 4-6 hours if indicated.     >0.29 Consistent with myocardial injury.  Recommend clinical and laboratory correlation.     Results my be falsely decreased if patient taking Biotin.     POC Activated Clotting Time [[id number]]  (Abnormal) Collected:  08/22/19 1723    Specimen:  Blood Updated:  08/23/19 0739     Activated Clotting Time  153 Seconds      Comment: Serial Number: [id number]:  [id number]       Calcium, Ionized [[id number]]  (Normal) Collected:  08/23/19 0603    Specimen:  Blood Updated:  08/23/19 0724     Ionized Calcium 1.23 mmol/L     Scan Slide [[id number]] Collected:  08/23/19 0603    Specimen:  Blood Updated:  08/23/19 0656     Scan Slide --     Comment: See Manual Differential Results       Manual Differential [[id number]]  (Abnormal) Collected:  08/23/19 0603    Specimen:  Blood Updated:  08/23/19 0656     Neutrophil % 74.0 %      Lymphocyte % 3.0 %      Monocyte % 8.0 %      Bands %  14.0 %      Myelocyte % 1.0 %      Neutrophils Absolute 17.25 10*3/mm3      Lymphocytes Absolute 0.59 10*3/mm3      Monocytes Absolute 1.57 10*3/mm3      RBC Morphology Normal     WBC Morphology Normal     Platelet Morphology Normal    Narrative:       Slide Reviewed    CBC & Differential [[id number]] Collected:  08/23/19 0603    Specimen:  Blood Updated:  08/23/19 0656    Narrative:        The following orders were created for panel order CBC & Differential.  Procedure                               Abnormality         Status                     ---------                               -----------         ------                     CBC Auto Differential[[id number]]        Abnormal            Final result                 Please view results for these tests on the individual orders.    CBC Auto Differential [[id number]]  (Abnormal) Collected:  08/23/19 0603    Specimen:  Blood Updated:  08/23/19 0656     WBC 19.60 10*3/mm3      RBC 3.49 10*6/mm3      Hemoglobin 10.9 g/dL      Hematocrit 31.8 %      MCV 91.2 fL      MCH 31.1 pg      MCHC 34.1 g/dL      RDW 14.5 %      RDW-SD 46.8 fl      MPV 7.5 fL      Platelets 155 10*3/mm3     Narrative:       The previously reported component NRBC is no longer being reported.    Phosphorus [[id number]]  (Normal) Collected:  08/23/19 0603    Specimen:  Blood Updated:  08/23/19 0650     Phosphorus 4.3 mg/dL     Comprehensive Metabolic Panel [[id number]]  (Abnormal) Collected:  08/23/19 0603    Specimen:  Blood Updated:  08/23/19 0650     Glucose 144 mg/dL      BUN 28 mg/dL      Creatinine 5.80 mg/dL      Sodium 137 mmol/L      Potassium 5.4 mmol/L      Chloride 99 mmol/L      CO2 24.0 mmol/L      Calcium 9.2 mg/dL      Total Protein 6.7 g/dL      Albumin 3.20 g/dL      ALT (SGPT) 18 U/L      AST (SGOT) 21 U/L      Alkaline Phosphatase 55 U/L      Total Bilirubin 0.6 mg/dL      eGFR Non African Amer 10 mL/min/1.73      Comment: <15 Indicative of kidney failure.        eGFR   Amer --     Comment: <15 Indicative of kidney failure.        Globulin 3.5 gm/dL      A/G Ratio 0.9 g/dL      BUN/Creatinine Ratio 4.8     Anion Gap 19.4 mmol/L     TSH [[id number]]  (Normal) Collected:  08/23/19 0603    Specimen:  Blood Updated:  08/23/19 0650     TSH 1.000 mIU/mL      Comment: Results may be falsely decreased if patient taking Biotin.       Uric Acid [[id number]]  (Abnormal)  Collected:  08/23/19 0603    Specimen:  Blood Updated:  08/23/19 0650     Uric Acid 3.2 mg/dL     Magnesium [[id number]]  (Normal) Collected:  08/23/19 0603    Specimen:  Blood Updated:  08/23/19 0650     Magnesium 2.1 mg/dL     Lipid Panel [[id number]] Collected:  08/23/19 0603    Specimen:  Blood Updated:  08/23/19 0643     Total Cholesterol 141 mg/dL      Triglycerides 46 mg/dL      HDL Cholesterol 48 mg/dL      LDL Cholesterol  86 mg/dL      VLDL Cholesterol 9.2 mg/dL      LDL/HDL Ratio 1.75     Chol/HDL Ratio 2.94    Narrative:       The following guidelines have been recommended by the NCEP for Total Cholesterol, Total Triglycerides, LDL Cholesterol, and HDL Cholesterols    Total Cholesterol  Desirable:        <200 mg/dL  Borderline High:  200-239 mg/dL  High:             > or = 240 mg/dL    Total Triglyceride  Normal:           <150 mg/dL  Borderline High:  150-199 mg/dL  High:             200-499 mg/dL  Very High:        > or = 500 mg/dL    HDL Cholesterol  Low HDL:          <40 mg/dL  Normal:           40-60 mg/dL  Desirable:        >60 mg/dL    LDL Cholesterol  Optimal:          <100 mg/dL  Low Risk:         100-129 mg/dL  Borderline High:  130-159 mg/dL  High:             160-189 mg/dL  Very High:        > or = 190 mg/dL    The following ratios of LDL to HDL and Total cholesterol to HDL are for information only:    LDL/HDL Ratio  Desirable:        <5  Optimal:          < or = 3.5    Total Cholesterol/HDL Ratio  Low Risk:         3.3-4.4  Average Risk:     4.4-7.1  Medium Risk:      7.1-11  High Risk:        >11       Troponin [[id number]]  (Abnormal) Collected:  08/22/19 2328    Specimen:  Blood Updated:  08/23/19 0022     Troponin I 0.040 ng/mL     Narrative:       Troponin I Reference Range:    0.00-0.03  Negative.  Repeat testing in 4-6 hours if clinically indicated.    0.04-0.29  Suspicious for myocardial injury. Serial measurements and clinical  correlation may be necessary to confirm or exclude  diagnosis of acute  coronary syndrome.  Repeat testing in 4-6 hours if indicated.     >0.29 Consistent with myocardial injury.  Recommend clinical and laboratory correlation.     Results my be falsely decreased if patient taking Biotin.     Troponin [[id number]]  (Normal) Collected:  08/22/19 1753    Specimen:  Blood Updated:  08/22/19 1859     Troponin I 0.030 ng/mL     Narrative:       Troponin I Reference Range:    0.00-0.03  Negative.  Repeat testing in 4-6 hours if clinically indicated.    0.04-0.29  Suspicious for myocardial injury. Serial measurements and clinical  correlation may be necessary to confirm or exclude diagnosis of acute  coronary syndrome.  Repeat testing in 4-6 hours if indicated.     >0.29 Consistent with myocardial injury.  Recommend clinical and laboratory correlation.     Results my be falsely decreased if patient taking Biotin.     POC Activated Clotting Time [[id number]]  (Abnormal) Collected:  08/22/19 1550    Specimen:  Blood Updated:  08/22/19 1827     Activated Clotting Time  186 Seconds      Comment: Serial Number: [id number]:  [id number]           ECG 12 Lead   Final Result   HEART RATE= 63  bpm   RR Interval= 952  ms   TN Interval= 195  ms   P Horizontal Axis= 34  deg   P Front Axis= 47  deg   QRSD Interval= 92  ms   QT Interval= 454  ms   QRS Axis= 22  deg   T Wave Axis= 37  deg   - ABNORMAL ECG -   Sinus rhythm   Probable inferior infarct, acute   Anterior infarct, possibly acute   interolateral STEMI ACUTE   Electronically Signed By: Antoine Tomas ([place]) 23-Aug-2019 06:43:22   Date and Time of Study: 2019-08-22 13:18:51      ECG 12 Lead   Preliminary Result   HEART RATE= 78  bpm   RR Interval= 768  ms   TN Interval= 170  ms   P Horizontal Axis= 61  deg   P Front Axis= 38  deg   QRSD Interval= 89  ms   QT Interval= 393  ms   QRS Axis= 13  deg   T Wave Axis= 30  deg   - ABNORMAL ECG -   Sinus rhythm   Left ventricular hypertrophy   Extensive anterior infarct, acute (LAD)    Electronically Signed By:    Date and Time of Study: 2019-08-23 03:34:33            Assessment/Plan       Chest pain in adult    STEMI (ST elevation myocardial infarction) (CMS/[facility])    ESRD (end stage renal disease) (CMS/[facility])    HTN (hypertension)    Tobacco abuse      ST elevation myocardial infarction status post cardiac cath which showed no occlusive coronary disease  -Cardiology ordered steroids and colchicine for possible pericarditis    ESRD  -Nephrology consulting for dialysis    Hypertension of CKD  -No current home hypertensive medications     Tobacco abuse  -Smoking cessation counseling    DVT prophylaxis  -SCD's    Chantelle Her MD  08/23/19  5:00 PM

## 2019-08-23 NOTE — PLAN OF CARE
Problem: Patient Care Overview  Goal: Plan of Care Review  Outcome: Ongoing (interventions implemented as appropriate)      Problem: Skin Injury Risk (Adult)  Goal: Identify Related Risk Factors and Signs and Symptoms  Outcome: Ongoing (interventions implemented as appropriate)    Goal: Skin Health and Integrity  Outcome: Ongoing (interventions implemented as appropriate)

## 2019-08-24 VITALS
RESPIRATION RATE: 18 BRPM | WEIGHT: 225.53 LBS | SYSTOLIC BLOOD PRESSURE: 144 MMHG | TEMPERATURE: 98.6 F | HEART RATE: 89 BPM | OXYGEN SATURATION: 98 % | BODY MASS INDEX: 30.55 KG/M2 | DIASTOLIC BLOOD PRESSURE: 73 MMHG | HEIGHT: 72 IN

## 2019-08-24 PROBLEM — R07.9 CHEST PAIN IN ADULT: Status: RESOLVED | Noted: 2019-08-22 | Resolved: 2019-08-24

## 2019-08-24 LAB
ALBUMIN SERPL-MCNC: 2.9 G/DL (ref 3.5–4.8)
ALBUMIN/GLOB SERPL: 0.9 G/DL (ref 1–1.7)
ALP SERPL-CCNC: 53 U/L (ref 32–91)
ALT SERPL W P-5'-P-CCNC: 15 U/L (ref 17–63)
ANION GAP SERPL CALCULATED.3IONS-SCNC: 18.4 MMOL/L (ref 5–15)
AST SERPL-CCNC: 16 U/L (ref 15–41)
BASOPHILS # BLD AUTO: 0 10*3/MM3 (ref 0–0.2)
BASOPHILS NFR BLD AUTO: 0.4 % (ref 0–1.5)
BILIRUB SERPL-MCNC: 0.8 MG/DL (ref 0.3–1.2)
BUN BLD-MCNC: 33 MG/DL (ref 8–20)
BUN/CREAT SERPL: 5.7 (ref 6.2–20.3)
CA-I SERPL ISE-MCNC: 1.22 MMOL/L (ref 1.2–1.3)
CALCIUM SPEC-SCNC: 9.1 MG/DL (ref 8.9–10.3)
CHLORIDE SERPL-SCNC: 97 MMOL/L (ref 101–111)
CO2 SERPL-SCNC: 26 MMOL/L (ref 22–32)
CREAT BLD-MCNC: 5.8 MG/DL (ref 0.7–1.2)
DEPRECATED RDW RBC AUTO: 48.6 FL (ref 37–54)
EOSINOPHIL # BLD AUTO: 0 10*3/MM3 (ref 0–0.4)
EOSINOPHIL NFR BLD AUTO: 0.1 % (ref 0.3–6.2)
ERYTHROCYTE [DISTWIDTH] IN BLOOD BY AUTOMATED COUNT: 14.7 % (ref 12.3–15.4)
GFR SERPL CREATININE-BSD FRML MDRD: 10 ML/MIN/1.73
GFR SERPL CREATININE-BSD FRML MDRD: ABNORMAL ML/MIN/{1.73_M2}
GLOBULIN UR ELPH-MCNC: 3.1 GM/DL (ref 2.5–3.8)
GLUCOSE BLD-MCNC: 90 MG/DL (ref 65–99)
HCT VFR BLD AUTO: 29.7 % (ref 37.5–51)
HGB BLD-MCNC: 10.1 G/DL (ref 13–17.7)
LYMPHOCYTES # BLD AUTO: 1.2 10*3/MM3 (ref 0.7–3.1)
LYMPHOCYTES NFR BLD AUTO: 11.1 % (ref 19.6–45.3)
MAGNESIUM SERPL-MCNC: 2.1 MG/DL (ref 1.8–2.5)
MCH RBC QN AUTO: 31.6 PG (ref 26.6–33)
MCHC RBC AUTO-ENTMCNC: 33.9 G/DL (ref 31.5–35.7)
MCV RBC AUTO: 93.3 FL (ref 79–97)
MONOCYTES # BLD AUTO: 0.9 10*3/MM3 (ref 0.1–0.9)
MONOCYTES NFR BLD AUTO: 8.2 % (ref 5–12)
NEUTROPHILS # BLD AUTO: 8.9 10*3/MM3 (ref 1.7–7)
NEUTROPHILS NFR BLD AUTO: 80.2 % (ref 42.7–76)
NRBC BLD AUTO-RTO: 0 /100 WBC (ref 0–0.2)
PHOSPHATE SERPL-MCNC: 5.5 MG/DL (ref 2.4–4.7)
PLATELET # BLD AUTO: 154 10*3/MM3 (ref 140–450)
PMV BLD AUTO: 7.9 FL (ref 6–12)
POTASSIUM BLD-SCNC: 4.4 MMOL/L (ref 3.6–5.1)
PROT SERPL-MCNC: 6 G/DL (ref 6.1–7.9)
RBC # BLD AUTO: 3.18 10*6/MM3 (ref 4.14–5.8)
SODIUM BLD-SCNC: 137 MMOL/L (ref 136–144)
WBC NRBC COR # BLD: 11 10*3/MM3 (ref 3.4–10.8)

## 2019-08-24 PROCEDURE — 94799 UNLISTED PULMONARY SVC/PX: CPT

## 2019-08-24 PROCEDURE — 84100 ASSAY OF PHOSPHORUS: CPT | Performed by: NURSE PRACTITIONER

## 2019-08-24 PROCEDURE — 80053 COMPREHEN METABOLIC PANEL: CPT | Performed by: INTERNAL MEDICINE

## 2019-08-24 PROCEDURE — 82330 ASSAY OF CALCIUM: CPT | Performed by: INTERNAL MEDICINE

## 2019-08-24 PROCEDURE — 85025 COMPLETE CBC W/AUTO DIFF WBC: CPT | Performed by: NURSE PRACTITIONER

## 2019-08-24 PROCEDURE — 99238 HOSP IP/OBS DSCHRG MGMT 30/<: CPT | Performed by: HOSPITALIST

## 2019-08-24 PROCEDURE — 83735 ASSAY OF MAGNESIUM: CPT | Performed by: NURSE PRACTITIONER

## 2019-08-24 RX ADMIN — IPRATROPIUM BROMIDE AND ALBUTEROL SULFATE 3 ML: .5; 3 SOLUTION RESPIRATORY (INHALATION) at 06:27

## 2019-08-24 RX ADMIN — CLONIDINE HYDROCHLORIDE 0.1 MG: 0.1 TABLET ORAL at 09:23

## 2019-08-24 RX ADMIN — SODIUM BICARBONATE 650 MG TABLET 650 MG: at 09:23

## 2019-08-24 RX ADMIN — Medication 3 ML: at 09:23

## 2019-08-24 RX ADMIN — CLONIDINE HYDROCHLORIDE 0.1 MG: 0.1 TABLET ORAL at 00:05

## 2019-08-24 RX ADMIN — HYDRALAZINE HYDROCHLORIDE 100 MG: 25 TABLET, FILM COATED ORAL at 09:23

## 2019-08-24 RX ADMIN — CALCIUM ACETATE 1334 MG: 667 CAPSULE ORAL at 09:23

## 2019-08-24 RX ADMIN — HYDRALAZINE HYDROCHLORIDE 100 MG: 25 TABLET, FILM COATED ORAL at 00:05

## 2019-08-24 NOTE — PLAN OF CARE
Problem: Patient Care Overview  Goal: Plan of Care Review  Outcome: Ongoing (interventions implemented as appropriate)   08/24/19 0351   Coping/Psychosocial   Plan of Care Reviewed With patient;spouse   OTHER   Outcome Summary Patient was transferred to me from [facility]. Patient complained of neck pain when he was first transferred but a dose of tylenol took care of the pain. Patients meds had not been gone over so home meds were entered except for Sansipar. Patient could not recall doasge and wife was unavailible. Will have day shift nurse call [facility]. Patient keeps taking off O2 even though stats were in the 90's. Has been educated on keeping it on but he said he will put it on when he needs it. Will continue to monitor.       Problem: Skin Injury Risk (Adult)  Goal: Skin Health and Integrity  Outcome: Ongoing (interventions implemented as appropriate)   08/24/19 5161   Skin Injury Risk (Adult)   Skin Health and Integrity making progress toward outcome   Patient is very capable of moving himself from side to side and to his back.  He continuously moves.

## 2019-08-24 NOTE — DISCHARGE SUMMARY
"Date of Admission: 8/22/2019    Date of Discharge:  8/24/2019    Length of stay:  LOS: 2 days     Discharge Diagnosis:   Chest pain in adult    STEMI (ST elevation myocardial infarction) (CMS/[facility])    ESRD (end stage renal disease) (CMS/[facility])    HTN (hypertension)    Tobacco abuse        ST elevation myocardial infarction status post cardiac cath which showed no occlusive coronary disease  -Cardiology ordered steroids and colchicine for possible pericarditis     ESRD  -Nephrology consulting for dialysis     Hypertension of CKD  -No current home hypertensive medications      Tobacco abuse  -Smoking cessation counseling    Presenting Problem/History of Present Illness  Active Hospital Problems    Diagnosis  POA   • **STEMI (ST elevation myocardial infarction) (CMS/[facility]) [I21.3]  Yes     Priority: High   • ESRD (end stage renal disease) (CMS/[facility]) [N18.6]  Yes   • HTN (hypertension) [I10]  Yes   • Tobacco abuse [Z72.0]  Yes      Resolved Hospital Problems    Diagnosis Date Resolved POA   • Chest pain in adult [R07.9] 08/24/2019 Yes     Priority: High        History of present illness:    Patient is a 59-year-old gentleman with a history of end-stage renal disease on hemodialysis, hypertension of chronic kidney disease, peripheral vascular disease, tobacco abuse and past testicular cancer presented to the ED today with complaints of chest pain that started a day prior to admission, but worsened the day of admission associated with SOA and diaphoresis.  EKG showed ST elevation in ED.  Code STEMI was initiated and cardiology was consulted.  He was taken to the cardiac catheterization lab where he was found to have no significant blockages.  He was admitted to the ICU for additional care and monitoring.  Colchicine was added by cardiology for possible pericarditis.  The patient is hemodynamically stable.  He is on supplemental oxygen.  He reports continued \"sharp\" generalized chest pain.  He denies use of oxygen at home.  He " is a current smoker of 1 pack per week and is trying to quit.  His last HD treatment was the day prior to admission.       Hospital course:  The patient has done well since his heart cath.    He underwent hemodialysis the evening of 2019.  The patient reported no further complaints and was anxious to be discharged.   Past Medical History:     Past Medical History:   Diagnosis Date   • CKD (chronic kidney disease) requiring chronic dialysis (CMS/[facility])    • COPD (chronic obstructive pulmonary disease) (CMS/[facility])    • Hypertension        Past Surgical History:     Past Surgical History:   Procedure Laterality Date   • ARTERIOVENOUS FISTULA REPAIR     • ARTERIOVENOUS FISTULA/SHUNT SURGERY     • CARDIAC CATHETERIZATION N/A 2019    Procedure: Left Heart Cath;  Surgeon: Juanpablo Garcia MD;  Location:  [facility] CATH INVASIVE LOCATION;  Service: Cardiology   • CARDIAC CATHETERIZATION N/A 2019    Procedure: Aortic root aortogram;  Surgeon: Juanapblo Garcia MD;  Location: [facility] CATH INVASIVE LOCATION;  Service: Cardiology   • LYMPH NODE BIOPSY     • ORCHIECTOMY         Social History:   Social History     Socioeconomic History   • Marital status: Single     Spouse name: Not on file   • Number of children: Not on file   • Years of education: Not on file   • Highest education level: Not on file   Tobacco Use   • Smoking status: Former Smoker     Packs/day: 1.00     Start date:      Last attempt to quit: 2019     Years since quittin.0   Substance and Sexual Activity   • Alcohol use: No     Frequency: Never   • Drug use: No   • Sexual activity: Yes     Partners: Female       Procedures Performed    Procedure(s):  Left Heart Cath  Aortic root aortogram  -------------------       Consults:   Consults     Date and Time Order Name Status Description    2019 1429 Inpatient Nephrology Consult            Pertinent Test Results:     Lab Results (last 72 hours)     Procedure Component  Value Units Date/Time    Calcium, Ionized [[id number]]  (Normal) Collected:  08/24/19 0412    Specimen:  Blood Updated:  08/24/19 0535     Ionized Calcium 1.22 mmol/L     Phosphorus [[id number]]  (Abnormal) Collected:  08/24/19 0412    Specimen:  Blood Updated:  08/24/19 0523     Phosphorus 5.5 mg/dL     Comprehensive Metabolic Panel [[id number]]  (Abnormal) Collected:  08/24/19 0412    Specimen:  Blood Updated:  08/24/19 0522     Glucose 90 mg/dL      BUN 33 mg/dL      Creatinine 5.80 mg/dL      Sodium 137 mmol/L      Potassium 4.4 mmol/L      Chloride 97 mmol/L      CO2 26.0 mmol/L      Calcium 9.1 mg/dL      Total Protein 6.0 g/dL      Albumin 2.90 g/dL      ALT (SGPT) 15 U/L      AST (SGOT) 16 U/L      Alkaline Phosphatase 53 U/L      Total Bilirubin 0.8 mg/dL      eGFR Non African Amer 10 mL/min/1.73      Comment: <15 Indicative of kidney failure.        eGFR   Amer --     Comment: <15 Indicative of kidney failure.        Globulin 3.1 gm/dL      A/G Ratio 0.9 g/dL      BUN/Creatinine Ratio 5.7     Anion Gap 18.4 mmol/L     Magnesium [[id number]]  (Normal) Collected:  08/24/19 0412    Specimen:  Blood Updated:  08/24/19 0522     Magnesium 2.1 mg/dL     CBC & Differential [[id number]] Collected:  08/24/19 0412    Specimen:  Blood Updated:  08/24/19 0501    Narrative:       The following orders were created for panel order CBC & Differential.  Procedure                               Abnormality         Status                     ---------                               -----------         ------                     CBC Auto Differential[[id number]]        Abnormal            Final result                 Please view results for these tests on the individual orders.    CBC Auto Differential [[id number]]  (Abnormal) Collected:  08/24/19 0412    Specimen:  Blood Updated:  08/24/19 0501     WBC 11.00 10*3/mm3      RBC 3.18 10*6/mm3      Hemoglobin 10.1 g/dL      Hematocrit 29.7 %      MCV 93.3 fL      MCH 31.6 pg       MCHC 33.9 g/dL      RDW 14.7 %      RDW-SD 48.6 fl      MPV 7.9 fL      Platelets 154 10*3/mm3      Neutrophil % 80.2 %      Lymphocyte % 11.1 %      Monocyte % 8.2 %      Eosinophil % 0.1 %      Basophil % 0.4 %      Neutrophils, Absolute 8.90 10*3/mm3      Lymphocytes, Absolute 1.20 10*3/mm3      Monocytes, Absolute 0.90 10*3/mm3      Eosinophils, Absolute 0.00 10*3/mm3      Basophils, Absolute 0.00 10*3/mm3      nRBC 0.0 /100 WBC     Hepatitis B Surface Antibody [[id number]] Collected:  08/23/19 0603    Specimen:  Blood Updated:  08/23/19 1720    Hepatitis B Surface Antigen [[id number]] Collected:  08/23/19 0603    Specimen:  Blood Updated:  08/23/19 1720    POC Glucose Once [[id number]]  (Normal) Collected:  08/23/19 1715    Specimen:  Blood Updated:  08/23/19 1716     Glucose 93 mg/dL      Comment: Serial Number: [id number]:  [id number]       D-dimer, Quantitative [[id number]]  (Abnormal) Collected:  08/23/19 0955    Specimen:  Blood Updated:  08/23/19 1059     D-Dimer, Quantitative 0.61 MCGFEU/mL     Narrative:       Reference Range  --------------------------------------------------------------------     < 0.50   Negative Predictive Value  0.50-0.59   Indeterminate    >= 0.60   Probable VTE             A very low percentage of patients with DVT may yield D-Dimer results   below the cut-off of 0.50 MCGFEU/mL.  This is known to be more   prevalent in patients with distal DVT.             Results of this test should always be interpreted in conjunction with   the patient's medical history, clinical presentation and other   findings.  Clinical diagnosis should not be based on the result of   IN[place]OVANCE D-Dimer alone.    Troponin [[id number]]  (Abnormal) Collected:  08/23/19 0603    Specimen:  Blood Updated:  08/23/19 0838     Troponin I 0.050 ng/mL     Narrative:       Troponin I Reference Range:    0.00-0.03  Negative.  Repeat testing in 4-6 hours if clinically indicated.    0.04-0.29  Suspicious for myocardial  injury. Serial measurements and clinical  correlation may be necessary to confirm or exclude diagnosis of acute  coronary syndrome.  Repeat testing in 4-6 hours if indicated.     >0.29 Consistent with myocardial injury.  Recommend clinical and laboratory correlation.     Results my be falsely decreased if patient taking Biotin.     POC Activated Clotting Time [[id number]]  (Abnormal) Collected:  08/22/19 1723    Specimen:  Blood Updated:  08/23/19 0739     Activated Clotting Time  153 Seconds      Comment: Serial Number: [id number]:  [id number]       Calcium, Ionized [[id number]]  (Normal) Collected:  08/23/19 0603    Specimen:  Blood Updated:  08/23/19 0724     Ionized Calcium 1.23 mmol/L     Scan Slide [[id number]] Collected:  08/23/19 0603    Specimen:  Blood Updated:  08/23/19 0656     Scan Slide --     Comment: See Manual Differential Results       Manual Differential [[id number]]  (Abnormal) Collected:  08/23/19 0603    Specimen:  Blood Updated:  08/23/19 0656     Neutrophil % 74.0 %      Lymphocyte % 3.0 %      Monocyte % 8.0 %      Bands %  14.0 %      Myelocyte % 1.0 %      Neutrophils Absolute 17.25 10*3/mm3      Lymphocytes Absolute 0.59 10*3/mm3      Monocytes Absolute 1.57 10*3/mm3      RBC Morphology Normal     WBC Morphology Normal     Platelet Morphology Normal    Narrative:       Slide Reviewed    CBC & Differential [[id number]] Collected:  08/23/19 0603    Specimen:  Blood Updated:  08/23/19 0656    Narrative:       The following orders were created for panel order CBC & Differential.  Procedure                               Abnormality         Status                     ---------                               -----------         ------                     CBC Auto Differential[[id number]]        Abnormal            Final result                 Please view results for these tests on the individual orders.    CBC Auto Differential [[id number]]  (Abnormal) Collected:  08/23/19 0603    Specimen:  Blood  Updated:  08/23/19 0656     WBC 19.60 10*3/mm3      RBC 3.49 10*6/mm3      Hemoglobin 10.9 g/dL      Hematocrit 31.8 %      MCV 91.2 fL      MCH 31.1 pg      MCHC 34.1 g/dL      RDW 14.5 %      RDW-SD 46.8 fl      MPV 7.5 fL      Platelets 155 10*3/mm3     Narrative:       The previously reported component NRBC is no longer being reported.    Phosphorus [[id number]]  (Normal) Collected:  08/23/19 0603    Specimen:  Blood Updated:  08/23/19 0650     Phosphorus 4.3 mg/dL     Comprehensive Metabolic Panel [[id number]]  (Abnormal) Collected:  08/23/19 0603    Specimen:  Blood Updated:  08/23/19 0650     Glucose 144 mg/dL      BUN 28 mg/dL      Creatinine 5.80 mg/dL      Sodium 137 mmol/L      Potassium 5.4 mmol/L      Chloride 99 mmol/L      CO2 24.0 mmol/L      Calcium 9.2 mg/dL      Total Protein 6.7 g/dL      Albumin 3.20 g/dL      ALT (SGPT) 18 U/L      AST (SGOT) 21 U/L      Alkaline Phosphatase 55 U/L      Total Bilirubin 0.6 mg/dL      eGFR Non African Amer 10 mL/min/1.73      Comment: <15 Indicative of kidney failure.        eGFR   Amer --     Comment: <15 Indicative of kidney failure.        Globulin 3.5 gm/dL      A/G Ratio 0.9 g/dL      BUN/Creatinine Ratio 4.8     Anion Gap 19.4 mmol/L     TSH [[id number]]  (Normal) Collected:  08/23/19 0603    Specimen:  Blood Updated:  08/23/19 0650     TSH 1.000 mIU/mL      Comment: Results may be falsely decreased if patient taking Biotin.       Uric Acid [[id number]]  (Abnormal) Collected:  08/23/19 0603    Specimen:  Blood Updated:  08/23/19 0650     Uric Acid 3.2 mg/dL     Magnesium [[id number]]  (Normal) Collected:  08/23/19 0603    Specimen:  Blood Updated:  08/23/19 0650     Magnesium 2.1 mg/dL     Lipid Panel [[id number]] Collected:  08/23/19 0603    Specimen:  Blood Updated:  08/23/19 0643     Total Cholesterol 141 mg/dL      Triglycerides 46 mg/dL      HDL Cholesterol 48 mg/dL      LDL Cholesterol  86 mg/dL      VLDL Cholesterol 9.2 mg/dL      LDL/HDL Ratio  1.75     Chol/HDL Ratio 2.94    Narrative:       The following guidelines have been recommended by the NCEP for Total Cholesterol, Total Triglycerides, LDL Cholesterol, and HDL Cholesterols    Total Cholesterol  Desirable:        <200 mg/dL  Borderline High:  200-239 mg/dL  High:             > or = 240 mg/dL    Total Triglyceride  Normal:           <150 mg/dL  Borderline High:  150-199 mg/dL  High:             200-499 mg/dL  Very High:        > or = 500 mg/dL    HDL Cholesterol  Low HDL:          <40 mg/dL  Normal:           40-60 mg/dL  Desirable:        >60 mg/dL    LDL Cholesterol  Optimal:          <100 mg/dL  Low Risk:         100-129 mg/dL  Borderline High:  130-159 mg/dL  High:             160-189 mg/dL  Very High:        > or = 190 mg/dL    The following ratios of LDL to HDL and Total cholesterol to HDL are for information only:    LDL/HDL Ratio  Desirable:        <5  Optimal:          < or = 3.5    Total Cholesterol/HDL Ratio  Low Risk:         3.3-4.4  Average Risk:     4.4-7.1  Medium Risk:      7.1-11  High Risk:        >11       Troponin [[id number]]  (Abnormal) Collected:  08/22/19 2328    Specimen:  Blood Updated:  08/23/19 0022     Troponin I 0.040 ng/mL     Narrative:       Troponin I Reference Range:    0.00-0.03  Negative.  Repeat testing in 4-6 hours if clinically indicated.    0.04-0.29  Suspicious for myocardial injury. Serial measurements and clinical  correlation may be necessary to confirm or exclude diagnosis of acute  coronary syndrome.  Repeat testing in 4-6 hours if indicated.     >0.29 Consistent with myocardial injury.  Recommend clinical and laboratory correlation.     Results my be falsely decreased if patient taking Biotin.     Troponin [[id number]]  (Normal) Collected:  08/22/19 1753    Specimen:  Blood Updated:  08/22/19 1859     Troponin I 0.030 ng/mL     Narrative:       Troponin I Reference Range:    0.00-0.03  Negative.  Repeat testing in 4-6 hours if clinically  indicated.    0.04-0.29  Suspicious for myocardial injury. Serial measurements and clinical  correlation may be necessary to confirm or exclude diagnosis of acute  coronary syndrome.  Repeat testing in 4-6 hours if indicated.     >0.29 Consistent with myocardial injury.  Recommend clinical and laboratory correlation.     Results my be falsely decreased if patient taking Biotin.     POC Activated Clotting Time [[id number]]  (Abnormal) Collected:  08/22/19 1550    Specimen:  Blood Updated:  08/22/19 1827     Activated Clotting Time  186 Seconds      Comment: Serial Number: [id number]:  [id number]       POC Activated Clotting Time [[id number]]  (Abnormal) Collected:  08/22/19 1401    Specimen:  Blood Updated:  08/22/19 1433     Activated Clotting Time  257 Seconds      Comment: Serial Number: [id number]:  [id number]       POC Potassium [[id number]]  (Abnormal) Collected:  08/22/19 1333     Updated:  08/22/19 1411     Potassium <1.5 mmol/L      Comment: Serial Number: [id number]:  [id number]       POCT Electrolytes +HGB +HCT [[id number]]  (Abnormal) Collected:  08/22/19 1345    Specimen:  Blood Updated:  08/22/19 1352     Sodium 140 mmol/L      Potassium 3.9 mmol/L      Ionized Calcium 1.06 mmol/L      Comment: Serial Number: [id number]:  [id number]        Glucose 130 mg/dL      Hematocrit 29 %      Hemoglobin 10.0 g/dL     POC Lactate [[id number]]  (Normal) Collected:  08/22/19 1345    Specimen:  Blood Updated:  08/22/19 1352     Lactate 0.5 mmol/L      Comment: Serial Number: [id number]:  [id number]       POC Creatinine [[id number]]  (Abnormal) Collected:  08/22/19 1330    Specimen:  Blood Updated:  08/22/19 1340     Creatinine 8.40 mg/dL      Comment: Serial Number: [id number]:  [id number]        GFR MDRD  --     Comment: Serial Number: [id number]:  [id number]        GFR MDRD Non  --     Comment: Serial Number: [id number]:  [id number]                No results found for: BLOODCX   No results found  for: URINECX   No results found for: WOUNDCX   No results found for: RESPCX   No results found for: STOOLCX   No results found for: STOOLCXY   No results found for: MRSACX   No results found for: VRECX   No results found for: CRECX   No components found for: AFBSTAINCX   No results found for: AFBCX   No results found for: AFBCXBLD   No results found for: FUNGUSCX   No components found for: GMSSTAIN   No results found for: KOHPREP   No results found for: ANACX   No results found for: BODYFLDCX   No results found for: CSFCX   No results found for: CULTURE   No results found for: THROATCX   No results found for: THROATCXBS   No results found for: ICECX   No results found for: DICECX   No results found for: GCCX      Results for orders placed during the hospital encounter of 08/22/19   Adult Transthoracic Echo Complete W/ Cont if Necessary Per Protocol    Narrative · The left ventricular cavity is mildly dilated.  · Left atrial cavity size is moderately dilated.  · Left ventricular wall thickness is consistent with severe concentric   hypertrophy.  · Mild aortic valve stenosis is present.  · Mild mitral valve regurgitation is present       Normal LV size and contractility EF of 60 to 65% severe concentric LVH  Normal RV size  Moderate left atrial enlargement  Aortic valve, mitral valve, tricuspid valve appears structurally normal,   mild aortic, mitral regurgitation seen.  Trace tricuspid regurgitation   seen no signs of increased right ventricular pressure  No pericardial effusion seen.  Proximal aorta appears normal in size.         Imaging Results (all)     Procedure Component Value Units Date/Time    XR Chest 1 View [[id number]] Collected:  08/22/19 1338     Updated:  08/22/19 1340    Narrative:       DATE OF EXAM:  8/22/2019 1:24 PM     PROCEDURE:  XR CHEST 1 VW-     INDICATIONS:  Chest pain. Preoperative evaluation.       COMPARISON:  PA and lateral chest 12/12/2018.     TECHNIQUE:   Single radiographic view of the  chest was obtained.     FINDINGS:  Pacer pads project over the right hemithorax and left lower chest. No  acute airspace disease. Heart size is within normal limits. Pulmonary  vasculature fusion is normal. No pleural effusion, pneumothorax or acute  osseous abnormalities.       Impression:       No acute chest findings.     Electronically Signed By-Dr. Latrice Sam MD On:8/22/2019 1:38 PM  This report was finalized on [id number] by Dr. Latrice Sam MD.            Condition on Discharge:  stable    Vital Signs  Temp:  [98.3 °F (36.8 °C)-99.1 °F (37.3 °C)] 98.6 °F (37 °C)  Heart Rate:  [] 89  Resp:  [14-24] 18  BP: (112-168)/(73-97) 144/73    Physical Exam:  Well-developed well-nourished gentleman awake in no acute distress; lungs clear to auscultation bilaterally; CV regular rate and rhythm; abdomen soft and nontender; extremities with no edema or calf tenderness; right groin soft with no ecchymosis and clean dry dressing in place not removed.      Discharge Disposition  Home or Self Care    Discharge Medications     Discharge Medications      Continue These Medications      Instructions Start Date   b complex-vitamin c-folic acid 0.8 MG tablet tablet   1 tablet, Oral, Daily      calcium acetate 667 MG capsule  Commonly known as:  PHOSLO   1,334 mg, Oral, 2 Times Daily With Meals, Breakfast and lunch       calcium acetate 667 MG capsule  Commonly known as:  PHOSLO   2,001 mg, Oral, Daily With Dinner      CloNIDine 0.1 MG tablet  Commonly known as:  CATAPRES   0.1 mg, Oral, 2 Times Daily      hydrALAZINE 50 MG tablet  Commonly known as:  APRESOLINE   100 mg, Oral, 2 Times Daily      sodium bicarbonate 650 MG tablet   650 mg, Oral, 2 Times Daily             Discharge Diet:   Diet Instructions     Diet: Renal      Discharge Diet:  Renal          Activity at Discharge:   Activity Instructions     Activity as Tolerated            Follow-up Appointments  No future appointments.  Additional Instructions for  the Follow-ups that You Need to Schedule     Ambulatory Referral to Cardiac Rehab   As directed      Call MD With Problems / Concerns   As directed      Instructions: Call [phone] or email [email or web address] for problems or concerns.    Order Comments:  Instructions: Call [phone] or email [email or web address] for problems or concerns.          Discharge Follow-up with PCP   As directed       Currently Documented PCP:    Kiah Beltran MD    PCP Phone Number:    [phone]     Follow Up Details:  1 week               Test Results Pending at Discharge   Order Current Status    Hepatitis B Surface Antibody In process    Hepatitis B Surface Antigen In process           Risk for Readmission (LACE) Score: 9 (8/24/2019  6:00 AM)          Chantelle Her MD  08/24/19  11:40 AM    Time: Discharge time 30 minutes

## 2019-08-24 NOTE — PROGRESS NOTES
"NEPHROLOGY PROGRESS NOTE    [facility]  [phone]  Mary Gonzalez MD      Patient Care Team:  Kiah Beltran MD as PCP - General      Provider:  Mary Gonzalez MD  Patient Name: Jose Miguel Martinez  :  1960    SUBJECTIVE:    Still with some pain on inspiration but with significant improvement from yesterday. Anxious to go home.   Medication:    calcium acetate 1,334 mg Oral BID With Meals   calcium acetate 2,001 mg Oral Daily With Dinner   CloNIDine 0.1 mg Oral Q12H   enoxaparin 30 mg Subcutaneous Daily   hydrALAZINE 100 mg Oral BID   ipratropium-albuterol 3 mL Nebulization Q6H While Awake - RT   lidocaine 1 patch Transdermal Q24H   sodium bicarbonate 650 mg Oral BID   sodium chloride 3 mL Intravenous Q12H          OBJECTIVE    Vital Sign Min/Max for last 24 hours  Temp  Min: 98.2 °F (36.8 °C)  Max: 99.1 °F (37.3 °C)   BP  Min: 112/97  Max: 168/75   Pulse  Min: 71  Max: 110   Resp  Min: 14  Max: 24   SpO2  Min: 91 %  Max: 100 %   No Data Recorded   Weight  Min: 102 kg (225 lb 8.5 oz)  Max: 102 kg (225 lb 15.5 oz)     Flowsheet Rows      First Filed Value   Admission Height  182.9 cm (72\") Documented at 2019 1312   Admission Weight  103 kg (228 lb) Documented at 2019 1312          No intake/output data recorded.  I/O last 3 completed shifts:  In: 3464 [P.O.:960; I.V.:2504]  Out: 5000 [Other:5000]    Physical Exam:  General Appearance: alert, appears stated age and cooperative  Head: normocephalic, without obvious abnormality and atraumatic  Eyes: conjunctivae and sclerae normal and no icterus  Neck: supple and no JVD  Lungs: +FEW SCATTERED RHONHCI  Heart: regular rhythm & normal rate and normal S1, S2 +JOSE A  Chest: Wall no abnormalities observed  Abdomen: normal bowel sounds and soft non-tender  Extremities: moves extremities well, +TRACE EDEMA, no cyanosis and no redness  Skin: no bleeding, bruising or rash, turgor normal, color normal and no leasions " noted  Neurologic: Alert, and oriented. No focal deficits    Labs:    WBC WBC   Date Value Ref Range Status   08/24/2019 11.00 (H) 3.40 - 10.80 10*3/mm3 Final   08/23/2019 19.60 (H) 3.40 - 10.80 10*3/mm3 Final      HGB Hemoglobin   Date Value Ref Range Status   08/24/2019 10.1 (L) 13.0 - 17.7 g/dL Final   08/23/2019 10.9 (L) 13.0 - 17.7 g/dL Final   08/22/2019 10.0 (L) 12.0 - 17.0 g/dL Final      HCT Hematocrit   Date Value Ref Range Status   08/24/2019 29.7 (L) 37.5 - 51.0 % Final   08/23/2019 31.8 (L) 37.5 - 51.0 % Final   08/22/2019 29 (L) 38 - 51 % Final      Platlets No results found for: LABPLAT   MCV MCV   Date Value Ref Range Status   08/24/2019 93.3 79.0 - 97.0 fL Final   08/23/2019 91.2 79.0 - 97.0 fL Final          Sodium Sodium   Date Value Ref Range Status   08/24/2019 137 136 - 144 mmol/L Final   08/23/2019 137 136 - 144 mmol/L Final      Potassium Potassium   Date Value Ref Range Status   08/24/2019 4.4 3.6 - 5.1 mmol/L Final   08/23/2019 5.4 (H) 3.6 - 5.1 mmol/L Final      Chloride Chloride   Date Value Ref Range Status   08/24/2019 97 (L) 101 - 111 mmol/L Final   08/23/2019 99 (L) 101 - 111 mmol/L Final      CO2 CO2   Date Value Ref Range Status   08/24/2019 26.0 22.0 - 32.0 mmol/L Final   08/23/2019 24.0 22.0 - 32.0 mmol/L Final      BUN BUN   Date Value Ref Range Status   08/24/2019 33 (H) 8 - 20 mg/dL Final   08/23/2019 28 (H) 8 - 20 mg/dL Final      Creatinine Creatinine   Date Value Ref Range Status   08/24/2019 5.80 (H) 0.70 - 1.20 mg/dL Final   08/23/2019 5.80 (H) 0.70 - 1.20 mg/dL Final   08/22/2019 8.40 (H) 0.60 - 1.30 mg/dL Final     Comment:     Serial Number: [id number]:  [id number]      Calcium Calcium   Date Value Ref Range Status   08/24/2019 9.1 8.9 - 10.3 mg/dL Final   08/23/2019 9.2 8.9 - 10.3 mg/dL Final      PO4 No components found for: PO4   Albumin Albumin   Date Value Ref Range Status   08/24/2019 2.90 (L) 3.50 - 4.80 g/dL Final   08/23/2019 3.20 (L) 3.50 - 4.80 g/dL Final       Magnesium Magnesium   Date Value Ref Range Status   08/24/2019 2.1 1.8 - 2.5 mg/dL Final   08/23/2019 2.1 1.8 - 2.5 mg/dL Final      Uric Acid No components found for: URIC ACID     Imaging Results (last 72 hours)     Procedure Component Value Units Date/Time    XR Chest 1 View [[id number]] Collected:  08/22/19 1338     Updated:  08/22/19 1340    Narrative:       DATE OF EXAM:  8/22/2019 1:24 PM     PROCEDURE:  XR CHEST 1 VW-     INDICATIONS:  Chest pain. Preoperative evaluation.       COMPARISON:  PA and lateral chest 12/12/2018.     TECHNIQUE:   Single radiographic view of the chest was obtained.     FINDINGS:  Pacer pads project over the right hemithorax and left lower chest. No  acute airspace disease. Heart size is within normal limits. Pulmonary  vasculature fusion is normal. No pleural effusion, pneumothorax or acute  osseous abnormalities.       Impression:       No acute chest findings.     Electronically Signed By-Dr. Latrice Sam MD On:8/22/2019 1:38 PM  This report was finalized on [id number] by Dr. Latrice Sam MD.          Results for orders placed during the hospital encounter of 08/22/19   XR Chest 1 View    Narrative DATE OF EXAM:  8/22/2019 1:24 PM     PROCEDURE:  XR CHEST 1 VW-     INDICATIONS:  Chest pain. Preoperative evaluation.       COMPARISON:  PA and lateral chest 12/12/2018.     TECHNIQUE:   Single radiographic view of the chest was obtained.     FINDINGS:  Pacer pads project over the right hemithorax and left lower chest. No  acute airspace disease. Heart size is within normal limits. Pulmonary  vasculature fusion is normal. No pleural effusion, pneumothorax or acute  osseous abnormalities.       Impression No acute chest findings.     Electronically Signed By-Dr. Latrice Sam MD On:8/22/2019 1:38 PM  This report was finalized on [id number] by Dr. Latrice Sam MD.              ASSESSMENT / PLAN      Chest pain in adult    STEMI (ST elevation myocardial infarction)  (CMS/[facility])    ESRD (end stage renal disease) (CMS/[facility])    HTN (hypertension)    Tobacco abuse    1. ESRD--------HD per usual M-W-F outpatient schedule     2. CAD S/P STEMI------S/P heart cath  with no blockages. Likely pericarditis vs. Pneumonitis, per .      3. HTN WITH ESRD-------BP okay     4. ANEMIA OF ESRD-------No EPO given MI. H/H stable     5. SECONDARY HYPERPARATHYROIDISM-------Follow Phos     6. RF ASSOCIATED HYPERPHOSPHATEMIA------Follow Phos. Home binders     7. COPD/TOBACCO ABUSE------Oxygen, nebs. Counseled on smoking cessation      OK TO D/C FROM RENAL STANDPOINT. WE WILL FOLLOW HIM AT OUTPATIENT HEMODIALYSIS THIS WEEK.      Mary Gonzalez MD  [facility]  [phone]  08/24/19  7:47 AM

## 2019-08-25 ENCOUNTER — READMISSION MANAGEMENT (OUTPATIENT)
Dept: CALL CENTER | Facility: HOSPITAL | Age: 59
End: 2019-08-25

## 2019-08-25 NOTE — OUTREACH NOTE
Prep Survey      Responses   Facility patient discharged from?  [facility]   Is patient eligible?  Yes   Discharge diagnosis  STEMI,  end stage renal disease   Does the patient have one of the following disease processes/diagnoses(primary or secondary)?  Acute MI (STEMI,NSTEMI)   Does the patient have Home health ordered?  No   Is there a DME ordered?  No   Prep survey completed?  Yes          Maria Handy RN

## 2019-08-26 ENCOUNTER — READMISSION MANAGEMENT (OUTPATIENT)
Dept: CALL CENTER | Facility: HOSPITAL | Age: 59
End: 2019-08-26

## 2019-08-26 ENCOUNTER — TRANSCRIBE ORDERS (OUTPATIENT)
Dept: ADMINISTRATIVE | Facility: HOSPITAL | Age: 59
End: 2019-08-26

## 2019-08-26 ENCOUNTER — TELEPHONE (OUTPATIENT)
Dept: CARDIOLOGY | Facility: CLINIC | Age: 59
End: 2019-08-26

## 2019-08-26 DIAGNOSIS — R06.02 SHORTNESS OF BREATH: Primary | ICD-10-CM

## 2019-08-26 DIAGNOSIS — R79.89 ELEVATED D-DIMER: ICD-10-CM

## 2019-08-26 LAB
HBV SURFACE AB SER RIA-ACNC: ABNORMAL
HBV SURFACE AB SER RIA-ACNC: REACTIVE
HBV SURFACE AG SERPL QL IA: NORMAL

## 2019-08-26 NOTE — OUTREACH NOTE
AMI Week 1 Survey      Responses   Facility patient discharged from?  [facility]   Does the patient have one of the following disease processes/diagnoses(primary or secondary)?  Acute MI (STEMI,NSTEMI)   Is there a successful TCM telephone encounter documented?  No   Week 1 attempt successful?  Yes   Call start time  1016   Call end time  1033   Discharge diagnosis  STEMI,  end stage renal disease   Is patient permission given to speak with other caregiver?  Yes   List who call center can speak with  Louann, spouse   Person spoke with today (if not patient) and relationship  Louann, spouse   Meds reviewed with patient/caregiver?  Yes   Is the patient having any side effects they believe may be caused by any medication additions or changes?  No   Does the patient have all prescriptions related to this admission filled (includes statins,anticoagulants,HTN meds,anti-arrhythmia meds)  N/A   Is the patient taking all medications as directed (includes completed medication regime)?  Yes   Does the patient have a primary care provider?   Yes   Does the patient have an appointment with their PCP,cardiologist,or clinic within 7 days of discharge?  Yes   Comments regarding PCP  Wife states that patient will see  PCP NP tomorrow. Wife states that patient no longer see Dr Beltran.    Has the patient kept scheduled appointments due by today?  N/A   Has home health visited the patient within 72 hours of discharge?  N/A   Psychosocial issues?  No   Did the patient receive a copy of their discharge instructions?  Yes   Nursing interventions  Reviewed instructions with patient   What is the patient's perception of their health status since discharge?  Same   Nursing interventions  Nurse provided patient education   Is the patient/caregiver able to teach back signs and symptoms of when to call for help immediately:  Sudden chest discomfort, Sudden discomfort in arms, back, neck or jaw, Shortness of breath at any time, Sudden sweating or  clammy skin, Nausea or vomiting, Dizziness or lightheadedness, Irregular or rapid heart rate   Nursing interventions  Nurse provided patient education   Is the pateint /caregiver able to teach back the importance of cardiac rehab?  Yes   Nursing interventions  Provided education on importance of cardiac rehab   Is the patient/caregiver able to teach back lifestyle changes to help prevent MIs  Heart healthy diet, Maintaining a healthy weight, Reducing stress   Is the patient/caregiver able to teach back ways to prevent a second heart attack:  Take medications, Follow up with MD, Manage risk factors, Participate in Cardiac Rehab   Is the patient/caregiver able to teach back the hierarchy of who to call/visit for symptoms/problems? PCP, Specialist, Home health nurse, Urgent Care, ED, 911  Yes   Week 1 call completed?  Yes          Richa Lagos RN

## 2019-08-26 NOTE — TELEPHONE ENCOUNTER
Pt called in and left a message on the [employer] @ 2186.    Seen in ER.   Had questions.    Called and spoke with wife. She said Dr. CAMPBELL ordered a chest CT to rule out PE.     She will call tomorrow.

## 2019-08-27 ENCOUNTER — HOSPITAL ENCOUNTER (OUTPATIENT)
Dept: CT IMAGING | Facility: HOSPITAL | Age: 59
Discharge: HOME | End: 2019-08-27
Admitting: INTERNAL MEDICINE

## 2019-08-27 ENCOUNTER — HOSPITAL ENCOUNTER (OUTPATIENT)
Dept: CARDIOLOGY | Facility: HOSPITAL | Age: 59
Discharge: HOME | End: 2019-08-27

## 2019-08-27 DIAGNOSIS — R79.89 ELEVATED D-DIMER: ICD-10-CM

## 2019-08-27 DIAGNOSIS — R06.02 SHORTNESS OF BREATH: ICD-10-CM

## 2019-08-27 LAB

## 2019-08-27 PROCEDURE — 0 IOPAMIDOL PER 1 ML: Performed by: INTERNAL MEDICINE

## 2019-08-27 PROCEDURE — 93970 EXTREMITY STUDY: CPT

## 2019-08-27 PROCEDURE — 71275 CT ANGIOGRAPHY CHEST: CPT

## 2019-08-27 RX ADMIN — IOPAMIDOL 75 ML: 755 INJECTION, SOLUTION INTRAVENOUS at 17:45

## 2019-08-28 ENCOUNTER — TELEPHONE (OUTPATIENT)
Dept: CARDIOLOGY | Facility: CLINIC | Age: 59
End: 2019-08-28

## 2019-08-28 ENCOUNTER — TELEPHONE (OUTPATIENT)
Dept: CARDIAC REHAB | Facility: HOSPITAL | Age: 59
End: 2019-08-28

## 2019-08-30 ENCOUNTER — TELEPHONE (OUTPATIENT)
Dept: CARDIAC REHAB | Facility: HOSPITAL | Age: 59
End: 2019-08-30

## 2019-09-03 NOTE — TELEPHONE ENCOUNTER
[facility]ann from [facility] called back , she is pt's wife.   I told her the message and she said yes she got the clearance.

## 2019-09-04 ENCOUNTER — READMISSION MANAGEMENT (OUTPATIENT)
Dept: CALL CENTER | Facility: HOSPITAL | Age: 59
End: 2019-09-04

## 2019-09-04 NOTE — OUTREACH NOTE
AMI Week 2 Survey      Responses   Facility patient discharged from?  [facility]   Does the patient have one of the following disease processes/diagnoses(primary or secondary)?  Acute MI (STEMI,NSTEMI)   Week 2 attempt successful?  No   Rescheduled  Revoked   Revoke  Decline to participate [NO ANSWER, LEFT VM]          Shivani Lee LPN

## 2019-09-05 ENCOUNTER — TRANSCRIBE ORDERS (OUTPATIENT)
Dept: ADMINISTRATIVE | Facility: HOSPITAL | Age: 59
End: 2019-09-05

## 2019-09-05 ENCOUNTER — HOSPITAL ENCOUNTER (OUTPATIENT)
Dept: RESPIRATORY THERAPY | Facility: HOSPITAL | Age: 59
Discharge: HOME | End: 2019-09-05
Admitting: INTERNAL MEDICINE

## 2019-09-05 ENCOUNTER — LAB (OUTPATIENT)
Dept: LAB | Facility: HOSPITAL | Age: 59
End: 2019-09-05

## 2019-09-05 DIAGNOSIS — I10 ESSENTIAL HYPERTENSION, BENIGN: ICD-10-CM

## 2019-09-05 DIAGNOSIS — Z00.00 ROUTINE GENERAL MEDICAL EXAMINATION AT A HEALTH CARE FACILITY: ICD-10-CM

## 2019-09-05 DIAGNOSIS — Z01.818 PRE-TRANSPLANT EVALUATION FOR CHRONIC KIDNEY DISEASE: Primary | ICD-10-CM

## 2019-09-05 DIAGNOSIS — Z01.818 PRE-TRANSPLANT EVALUATION FOR CHRONIC KIDNEY DISEASE: ICD-10-CM

## 2019-09-05 DIAGNOSIS — Z01.818 PREOP EXAMINATION: ICD-10-CM

## 2019-09-05 DIAGNOSIS — I10 ESSENTIAL HYPERTENSION, BENIGN: Primary | ICD-10-CM

## 2019-09-05 DIAGNOSIS — Z76.82 KIDNEY TRANSPLANT CANDIDATE: ICD-10-CM

## 2019-09-05 LAB
BACTERIA UR QL AUTO: ABNORMAL /HPF
BILIRUB UR QL STRIP: NEGATIVE
CLARITY UR: CLEAR
COHGB MFR BLD: 5.2 % (ref 0–3)
COLOR UR: YELLOW
CREAT UR-MCNC: 103.9 MG/DL
GLUCOSE UR STRIP-MCNC: NEGATIVE MG/DL
HGB UR QL STRIP.AUTO: NEGATIVE
HYALINE CASTS UR QL AUTO: ABNORMAL /LPF
KETONES UR QL STRIP: NEGATIVE
LEUKOCYTE ESTERASE UR QL STRIP.AUTO: NEGATIVE
NITRITE UR QL STRIP: NEGATIVE
PH UR STRIP.AUTO: 8.5 [PH] (ref 5–8)
PROT UR QL STRIP: ABNORMAL
PROT UR-MCNC: <2 MG/DL
PROT/CREAT UR: NORMAL MG/G{CREAT}
RBC # UR: ABNORMAL /HPF
REF LAB TEST METHOD: ABNORMAL
SP GR UR STRIP: 1.01 (ref 1–1.03)
SQUAMOUS #/AREA URNS HPF: ABNORMAL /HPF
UROBILINOGEN UR QL STRIP: ABNORMAL
WBC UR QL AUTO: ABNORMAL /HPF

## 2019-09-05 PROCEDURE — 94060 EVALUATION OF WHEEZING: CPT

## 2019-09-05 PROCEDURE — 82570 ASSAY OF URINE CREATININE: CPT

## 2019-09-05 PROCEDURE — 82375 ASSAY CARBOXYHB QUANT: CPT

## 2019-09-05 PROCEDURE — 81001 URINALYSIS AUTO W/SCOPE: CPT

## 2019-09-05 PROCEDURE — 94729 DIFFUSING CAPACITY: CPT

## 2019-09-05 PROCEDURE — 36415 COLL VENOUS BLD VENIPUNCTURE: CPT

## 2019-09-05 PROCEDURE — 94727 GAS DIL/WSHOT DETER LNG VOL: CPT

## 2019-09-05 PROCEDURE — 84156 ASSAY OF PROTEIN URINE: CPT

## 2019-09-05 RX ORDER — ALBUTEROL SULFATE 2.5 MG/3ML
2.5 SOLUTION RESPIRATORY (INHALATION) ONCE
Status: COMPLETED | OUTPATIENT
Start: 2019-09-05 | End: 2019-09-05

## 2019-09-05 RX ADMIN — ALBUTEROL SULFATE 2.5 MG: 2.5 SOLUTION RESPIRATORY (INHALATION) at 16:55

## 2019-09-09 ENCOUNTER — APPOINTMENT (OUTPATIENT)
Dept: GENERAL RADIOLOGY | Facility: HOSPITAL | Age: 59
End: 2019-09-09

## 2019-09-09 ENCOUNTER — HOSPITAL ENCOUNTER (OUTPATIENT)
Facility: HOSPITAL | Age: 59
Setting detail: OBSERVATION
Discharge: HOME | End: 2019-09-10
Attending: EMERGENCY MEDICINE | Admitting: INTERNAL MEDICINE

## 2019-09-09 DIAGNOSIS — R07.1 CHEST PAIN ON BREATHING: Primary | ICD-10-CM

## 2019-09-09 DIAGNOSIS — N18.5 CHRONIC RENAL FAILURE, STAGE 5 (HCC): ICD-10-CM

## 2019-09-09 LAB
ALBUMIN SERPL-MCNC: 3.1 G/DL (ref 3.5–4.8)
ALBUMIN/GLOB SERPL: 0.7 G/DL (ref 1–1.7)
ALP SERPL-CCNC: 65 U/L (ref 32–91)
ALT SERPL W P-5'-P-CCNC: 38 U/L (ref 17–63)
ANION GAP SERPL CALCULATED.3IONS-SCNC: 19.4 MMOL/L (ref 5–15)
AST SERPL-CCNC: 25 U/L (ref 15–41)
BILIRUB SERPL-MCNC: 0.8 MG/DL (ref 0.3–1.2)
BUN BLD-MCNC: 37 MG/DL (ref 8–20)
BUN/CREAT SERPL: 5.4 (ref 6.2–20.3)
CALCIUM SPEC-SCNC: 8.5 MG/DL (ref 8.9–10.3)
CHLORIDE SERPL-SCNC: 95 MMOL/L (ref 101–111)
CO2 SERPL-SCNC: 28 MMOL/L (ref 22–32)
CREAT BLD-MCNC: 6.8 MG/DL (ref 0.7–1.2)
DEPRECATED RDW RBC AUTO: 47.7 FL (ref 37–54)
ERYTHROCYTE [DISTWIDTH] IN BLOOD BY AUTOMATED COUNT: 14.6 % (ref 12.3–15.4)
GFR SERPL CREATININE-BSD FRML MDRD: 8 ML/MIN/1.73
GFR SERPL CREATININE-BSD FRML MDRD: ABNORMAL ML/MIN/{1.73_M2}
GLOBULIN UR ELPH-MCNC: 4.4 GM/DL (ref 2.5–3.8)
GLUCOSE BLD-MCNC: 129 MG/DL (ref 65–99)
HCT VFR BLD AUTO: 30.7 % (ref 37.5–51)
HGB BLD-MCNC: 10.3 G/DL (ref 13–17.7)
LIPASE SERPL-CCNC: 30 U/L (ref 22–51)
LYMPHOCYTES # BLD MANUAL: 0.61 10*3/MM3 (ref 0.7–3.1)
LYMPHOCYTES NFR BLD MANUAL: 5 % (ref 19.6–45.3)
LYMPHOCYTES NFR BLD MANUAL: 6 % (ref 5–12)
MCH RBC QN AUTO: 30.8 PG (ref 26.6–33)
MCHC RBC AUTO-ENTMCNC: 33.4 G/DL (ref 31.5–35.7)
MCV RBC AUTO: 92.2 FL (ref 79–97)
METAMYELOCYTES NFR BLD MANUAL: 3 % (ref 0–0)
MONOCYTES # BLD AUTO: 0.73 10*3/MM3 (ref 0.1–0.9)
NEUTROPHILS # BLD AUTO: 10.49 10*3/MM3 (ref 1.7–7)
NEUTROPHILS NFR BLD MANUAL: 83 % (ref 42.7–76)
NEUTS BAND NFR BLD MANUAL: 3 % (ref 0–5)
PLAT MORPH BLD: NORMAL
PLATELET # BLD AUTO: 342 10*3/MM3 (ref 140–450)
PMV BLD AUTO: 7 FL (ref 6–12)
POTASSIUM BLD-SCNC: 5.4 MMOL/L (ref 3.6–5.1)
PROT SERPL-MCNC: 7.5 G/DL (ref 6.1–7.9)
RBC # BLD AUTO: 3.33 10*6/MM3 (ref 4.14–5.8)
RBC MORPH BLD: NORMAL
SCAN SLIDE: NORMAL
SODIUM BLD-SCNC: 137 MMOL/L (ref 136–144)
TROPONIN I SERPL-MCNC: 0.05 NG/ML (ref 0–0.03)
WBC MORPH BLD: NORMAL
WBC NRBC COR # BLD: 12.2 10*3/MM3 (ref 3.4–10.8)

## 2019-09-09 PROCEDURE — 83690 ASSAY OF LIPASE: CPT | Performed by: EMERGENCY MEDICINE

## 2019-09-09 PROCEDURE — 80053 COMPREHEN METABOLIC PANEL: CPT | Performed by: EMERGENCY MEDICINE

## 2019-09-09 PROCEDURE — 25010000002 KETOROLAC TROMETHAMINE PER 15 MG: Performed by: EMERGENCY MEDICINE

## 2019-09-09 PROCEDURE — 85025 COMPLETE CBC W/AUTO DIFF WBC: CPT | Performed by: EMERGENCY MEDICINE

## 2019-09-09 PROCEDURE — 94640 AIRWAY INHALATION TREATMENT: CPT

## 2019-09-09 PROCEDURE — 94799 UNLISTED PULMONARY SVC/PX: CPT

## 2019-09-09 PROCEDURE — 71045 X-RAY EXAM CHEST 1 VIEW: CPT

## 2019-09-09 PROCEDURE — 99285 EMERGENCY DEPT VISIT HI MDM: CPT

## 2019-09-09 PROCEDURE — 84484 ASSAY OF TROPONIN QUANT: CPT | Performed by: EMERGENCY MEDICINE

## 2019-09-09 PROCEDURE — 85007 BL SMEAR W/DIFF WBC COUNT: CPT | Performed by: EMERGENCY MEDICINE

## 2019-09-09 PROCEDURE — 93005 ELECTROCARDIOGRAM TRACING: CPT | Performed by: EMERGENCY MEDICINE

## 2019-09-09 PROCEDURE — 94760 N-INVAS EAR/PLS OXIMETRY 1: CPT

## 2019-09-09 PROCEDURE — G0378 HOSPITAL OBSERVATION PER HR: HCPCS

## 2019-09-09 PROCEDURE — 99220 PR INITIAL OBSERVATION CARE/DAY 70 MINUTES: CPT | Performed by: NURSE PRACTITIONER

## 2019-09-09 PROCEDURE — 96374 THER/PROPH/DIAG INJ IV PUSH: CPT

## 2019-09-09 PROCEDURE — 93005 ELECTROCARDIOGRAM TRACING: CPT

## 2019-09-09 RX ORDER — LIDOCAINE 50 MG/G
1 PATCH TOPICAL
Status: DISCONTINUED | OUTPATIENT
Start: 2019-09-09 | End: 2019-09-10 | Stop reason: HOSPADM

## 2019-09-09 RX ORDER — KETOROLAC TROMETHAMINE 15 MG/ML
7.5 INJECTION, SOLUTION INTRAMUSCULAR; INTRAVENOUS ONCE
Status: COMPLETED | OUTPATIENT
Start: 2019-09-09 | End: 2019-09-09

## 2019-09-09 RX ORDER — IPRATROPIUM BROMIDE AND ALBUTEROL SULFATE 2.5; .5 MG/3ML; MG/3ML
3 SOLUTION RESPIRATORY (INHALATION) ONCE
Status: COMPLETED | OUTPATIENT
Start: 2019-09-09 | End: 2019-09-09

## 2019-09-09 RX ORDER — ALBUTEROL SULFATE 90 UG/1
2 AEROSOL, METERED RESPIRATORY (INHALATION) EVERY 4 HOURS PRN
COMMUNITY

## 2019-09-09 RX ORDER — CINACALCET 30 MG/1
30 TABLET, FILM COATED ORAL DAILY
COMMUNITY

## 2019-09-09 RX ADMIN — LIDOCAINE 1 PATCH: 50 PATCH CUTANEOUS at 19:32

## 2019-09-09 RX ADMIN — KETOROLAC TROMETHAMINE 7.5 MG: 15 INJECTION, SOLUTION INTRAMUSCULAR; INTRAVENOUS at 21:12

## 2019-09-09 RX ADMIN — IPRATROPIUM BROMIDE AND ALBUTEROL SULFATE 3 ML: .5; 3 SOLUTION RESPIRATORY (INHALATION) at 20:24

## 2019-09-09 NOTE — ED NOTES
Patient with inspiratory and expiratory wheezes noted and complaining of SOB. Placed on 2 LPM O2 NC. MD updated. New order given for a neb treatment. Respiratory notified at 1956.      Chastity Mattson RN  09/09/19 1957

## 2019-09-09 NOTE — ED NOTES
Patient was here two weeks ago. He had a heart cath and chest xray were negative. Patient's SOA has increased over past day. Unable to carry on a conversation.  Hurts to cough.  When he does cough it is not productive.      Jena Henriquez RN  09/09/19 2008

## 2019-09-10 VITALS
HEIGHT: 72 IN | HEART RATE: 96 BPM | SYSTOLIC BLOOD PRESSURE: 158 MMHG | BODY MASS INDEX: 30.52 KG/M2 | TEMPERATURE: 98.3 F | RESPIRATION RATE: 17 BRPM | DIASTOLIC BLOOD PRESSURE: 94 MMHG | WEIGHT: 225.31 LBS | OXYGEN SATURATION: 93 %

## 2019-09-10 LAB
ANION GAP SERPL CALCULATED.3IONS-SCNC: 17.6 MMOL/L (ref 5–15)
BASOPHILS # BLD AUTO: 0 10*3/MM3 (ref 0–0.2)
BASOPHILS NFR BLD AUTO: 0.2 % (ref 0–1.5)
BUN BLD-MCNC: 49 MG/DL (ref 8–20)
BUN/CREAT SERPL: 6.3 (ref 6.2–20.3)
CALCIUM SPEC-SCNC: 8.5 MG/DL (ref 8.9–10.3)
CHLORIDE SERPL-SCNC: 98 MMOL/L (ref 101–111)
CO2 SERPL-SCNC: 28 MMOL/L (ref 22–32)
CREAT BLD-MCNC: 7.8 MG/DL (ref 0.7–1.2)
DEPRECATED RDW RBC AUTO: 48.6 FL (ref 37–54)
EOSINOPHIL # BLD AUTO: 0 10*3/MM3 (ref 0–0.4)
EOSINOPHIL NFR BLD AUTO: 0.1 % (ref 0.3–6.2)
ERYTHROCYTE [DISTWIDTH] IN BLOOD BY AUTOMATED COUNT: 14.5 % (ref 12.3–15.4)
GFR SERPL CREATININE-BSD FRML MDRD: 7 ML/MIN/1.73
GFR SERPL CREATININE-BSD FRML MDRD: ABNORMAL ML/MIN/{1.73_M2}
GLUCOSE BLD-MCNC: 128 MG/DL (ref 65–99)
HCT VFR BLD AUTO: 28.5 % (ref 37.5–51)
HGB BLD-MCNC: 9.6 G/DL (ref 13–17.7)
LYMPHOCYTES # BLD AUTO: 0.9 10*3/MM3 (ref 0.7–3.1)
LYMPHOCYTES NFR BLD AUTO: 8.8 % (ref 19.6–45.3)
MAGNESIUM SERPL-MCNC: 2.1 MG/DL (ref 1.8–2.5)
MCH RBC QN AUTO: 31.7 PG (ref 26.6–33)
MCHC RBC AUTO-ENTMCNC: 33.5 G/DL (ref 31.5–35.7)
MCV RBC AUTO: 94.5 FL (ref 79–97)
MONOCYTES # BLD AUTO: 1.3 10*3/MM3 (ref 0.1–0.9)
MONOCYTES NFR BLD AUTO: 12.7 % (ref 5–12)
NEUTROPHILS # BLD AUTO: 8 10*3/MM3 (ref 1.7–7)
NEUTROPHILS NFR BLD AUTO: 78.2 % (ref 42.7–76)
NRBC BLD AUTO-RTO: 0 /100 WBC (ref 0–0.2)
PHOSPHATE SERPL-MCNC: 6.3 MG/DL (ref 2.4–4.7)
PLATELET # BLD AUTO: 286 10*3/MM3 (ref 140–450)
PMV BLD AUTO: 6.8 FL (ref 6–12)
POTASSIUM BLD-SCNC: 4.6 MMOL/L (ref 3.6–5.1)
RBC # BLD AUTO: 3.02 10*6/MM3 (ref 4.14–5.8)
SODIUM BLD-SCNC: 139 MMOL/L (ref 136–144)
TROPONIN I SERPL-MCNC: 0.04 NG/ML (ref 0–0.03)
WBC NRBC COR # BLD: 10.2 10*3/MM3 (ref 3.4–10.8)

## 2019-09-10 PROCEDURE — 84484 ASSAY OF TROPONIN QUANT: CPT | Performed by: NURSE PRACTITIONER

## 2019-09-10 PROCEDURE — G0378 HOSPITAL OBSERVATION PER HR: HCPCS

## 2019-09-10 PROCEDURE — 85025 COMPLETE CBC W/AUTO DIFF WBC: CPT | Performed by: NURSE PRACTITIONER

## 2019-09-10 PROCEDURE — 80048 BASIC METABOLIC PNL TOTAL CA: CPT | Performed by: NURSE PRACTITIONER

## 2019-09-10 PROCEDURE — 96376 TX/PRO/DX INJ SAME DRUG ADON: CPT

## 2019-09-10 PROCEDURE — 84100 ASSAY OF PHOSPHORUS: CPT | Performed by: NURSE PRACTITIONER

## 2019-09-10 PROCEDURE — 99217 PR OBSERVATION CARE DISCHARGE MANAGEMENT: CPT | Performed by: INTERNAL MEDICINE

## 2019-09-10 PROCEDURE — 25010000002 KETOROLAC TROMETHAMINE PER 15 MG: Performed by: NURSE PRACTITIONER

## 2019-09-10 PROCEDURE — 83735 ASSAY OF MAGNESIUM: CPT | Performed by: NURSE PRACTITIONER

## 2019-09-10 RX ORDER — ACETAMINOPHEN 325 MG/1
650 TABLET ORAL EVERY 4 HOURS PRN
Status: DISCONTINUED | OUTPATIENT
Start: 2019-09-10 | End: 2019-09-10 | Stop reason: HOSPADM

## 2019-09-10 RX ORDER — CALCIUM ACETATE 667 MG/1
1334 CAPSULE ORAL 2 TIMES DAILY WITH MEALS
Status: DISCONTINUED | OUTPATIENT
Start: 2019-09-10 | End: 2019-09-10

## 2019-09-10 RX ORDER — CALCIUM ACETATE 667 MG/1
2001 CAPSULE ORAL
Status: DISCONTINUED | OUTPATIENT
Start: 2019-09-10 | End: 2019-09-10 | Stop reason: HOSPADM

## 2019-09-10 RX ORDER — ALBUTEROL SULFATE 2.5 MG/3ML
2.5 SOLUTION RESPIRATORY (INHALATION) EVERY 4 HOURS PRN
Status: DISCONTINUED | OUTPATIENT
Start: 2019-09-10 | End: 2019-09-10 | Stop reason: HOSPADM

## 2019-09-10 RX ORDER — CINACALCET 30 MG/1
30 TABLET, FILM COATED ORAL DAILY
Status: DISCONTINUED | OUTPATIENT
Start: 2019-09-10 | End: 2019-09-10 | Stop reason: HOSPADM

## 2019-09-10 RX ORDER — ACETAMINOPHEN 160 MG/5ML
650 SOLUTION ORAL EVERY 4 HOURS PRN
Status: DISCONTINUED | OUTPATIENT
Start: 2019-09-10 | End: 2019-09-10 | Stop reason: HOSPADM

## 2019-09-10 RX ORDER — HYDRALAZINE HYDROCHLORIDE 25 MG/1
100 TABLET, FILM COATED ORAL 2 TIMES DAILY
Status: DISCONTINUED | OUTPATIENT
Start: 2019-09-10 | End: 2019-09-10 | Stop reason: HOSPADM

## 2019-09-10 RX ORDER — SODIUM BICARBONATE 650 MG/1
650 TABLET ORAL 2 TIMES DAILY
Status: DISCONTINUED | OUTPATIENT
Start: 2019-09-10 | End: 2019-09-10 | Stop reason: HOSPADM

## 2019-09-10 RX ORDER — SODIUM CHLORIDE 0.9 % (FLUSH) 0.9 %
10 SYRINGE (ML) INJECTION AS NEEDED
Status: DISCONTINUED | OUTPATIENT
Start: 2019-09-10 | End: 2019-09-10 | Stop reason: HOSPADM

## 2019-09-10 RX ORDER — ALBUMIN (HUMAN) 12.5 G/50ML
12.5 SOLUTION INTRAVENOUS AS NEEDED
Status: CANCELLED | OUTPATIENT
Start: 2019-09-10 | End: 2019-09-11

## 2019-09-10 RX ORDER — CLONIDINE HYDROCHLORIDE 0.1 MG/1
0.1 TABLET ORAL EVERY 12 HOURS SCHEDULED
Status: DISCONTINUED | OUTPATIENT
Start: 2019-09-10 | End: 2019-09-10 | Stop reason: HOSPADM

## 2019-09-10 RX ORDER — MELOXICAM 7.5 MG/1
7.5 TABLET ORAL
Qty: 9 TABLET | Refills: 0 | Status: SHIPPED | OUTPATIENT
Start: 2019-09-11 | End: 2019-09-20

## 2019-09-10 RX ORDER — KETOROLAC TROMETHAMINE 15 MG/ML
7.5 INJECTION, SOLUTION INTRAMUSCULAR; INTRAVENOUS ONCE
Status: COMPLETED | OUTPATIENT
Start: 2019-09-10 | End: 2019-09-10

## 2019-09-10 RX ORDER — ACETAMINOPHEN 650 MG/1
650 SUPPOSITORY RECTAL EVERY 4 HOURS PRN
Status: DISCONTINUED | OUTPATIENT
Start: 2019-09-10 | End: 2019-09-10 | Stop reason: HOSPADM

## 2019-09-10 RX ORDER — SODIUM CHLORIDE 0.9 % (FLUSH) 0.9 %
10 SYRINGE (ML) INJECTION EVERY 12 HOURS SCHEDULED
Status: DISCONTINUED | OUTPATIENT
Start: 2019-09-10 | End: 2019-09-10 | Stop reason: HOSPADM

## 2019-09-10 RX ORDER — MELOXICAM 15 MG/1
7.5 TABLET ORAL
Status: DISCONTINUED | OUTPATIENT
Start: 2019-09-10 | End: 2019-09-10 | Stop reason: HOSPADM

## 2019-09-10 RX ADMIN — Medication 10 ML: at 09:32

## 2019-09-10 RX ADMIN — HYDRALAZINE HYDROCHLORIDE 50 MG: 25 TABLET, FILM COATED ORAL at 09:30

## 2019-09-10 RX ADMIN — CLONIDINE HYDROCHLORIDE 0.1 MG: 0.1 TABLET ORAL at 09:27

## 2019-09-10 RX ADMIN — MELOXICAM 7.5 MG: 15 TABLET ORAL at 09:27

## 2019-09-10 RX ADMIN — KETOROLAC TROMETHAMINE 7.5 MG: 15 INJECTION, SOLUTION INTRAMUSCULAR; INTRAVENOUS at 03:01

## 2019-09-10 RX ADMIN — SODIUM BICARBONATE 650 MG TABLET 650 MG: at 09:27

## 2019-09-10 NOTE — PLAN OF CARE
Problem: Patient Care Overview  Goal: Plan of Care Review  Outcome: Ongoing (interventions implemented as appropriate)   09/09/19 2340 09/10/19 8013   Plan of Care Review   Progress --  no change   Coping/Psychosocial   Plan of Care Reviewed With patient --

## 2019-09-10 NOTE — ED NOTES
Patient reports he finished dialysis around 1100 today.      Chastity Mattson, LASHELL  09/09/19 2000

## 2019-09-10 NOTE — PLAN OF CARE
Problem: Patient Care Overview  Goal: Discharge Needs Assessment  Outcome: Ongoing (interventions implemented as appropriate)   09/10/19 0017 09/10/19 0426   Discharge Needs Assessment   Patient/Family Anticipates Transition to --  home   Disability   Equipment Currently Used at Home none --

## 2019-09-10 NOTE — DISCHARGE SUMMARY
Date of Admission: 9/9/2019    Date of Discharge:  9/10/2019    Length of stay:  LOS: 0 days     Admission Diagnosis:   Chest pain     ESRD    Discharge Diagnosis:   Chest pain likely pleuritic   - initial troponin 0.05-> 0.04, unchanged from previous   - ok for mobic x10 days per nephrology     ST elevation previous admission likely d/t early repolarization  - normal cardiac cath 8/22/19     ESRD on HD MWF  - resume regular schedule  - Scr 6.8  - cot home Phoslo, sodium bicarbonate, and sensipar     Hypertension of CKD  - cont home hydralazine     Asthma not in exacerbation  - cont home inhaler  - pt reports recent PFT as outpatient   - encouraged continued smoking cessation     Hospital Course:  Patient is a 59 y.o. male presented to the ED 9/9/19 with complaints of chest pain and dyspnea which he reports has been going on for 2 weeks.  Denies radiation of pain or syncope.  The patient was recently discharged 8/24/19 following evaluation for ? STEMI. Patient underwent cardiac catheterization at that time which was normal. He was treated for likely pleurisy. He was doing well on steroids. He will be started on mobic for 10 days per nephrology. He reports resolution of pain with toradol given earlier. No further pain at this time.   Encouraged close follow up with Dr. Beltran and Dr. Gonzalez.        Procedures Performed:  Consults:   Consults     Date and Time Order Name Status Description    9/10/2019 0222 Inpatient Nephrology Consult Completed           Vital Signs:  Temp:  [97.9 °F (36.6 °C)-100.1 °F (37.8 °C)] 98.3 °F (36.8 °C)  Heart Rate:  [] 96  Resp:  [16-27] 17  BP: (100-162)/(53-94) 158/94      Physical Exam:  Physical Exam   Cardiovascular: Normal rate, regular rhythm and normal heart sounds.   No murmur heard.  Pulmonary/Chest: Effort normal. No respiratory distress. He has wheezes. He has no rales. He exhibits no tenderness.   Abdominal: Soft. Bowel sounds are normal.             Pertinent Test  Results:   Lab Results (last 72 hours)     Procedure Component Value Units Date/Time    Troponin [[id number]]  (Abnormal) Collected:  09/10/19 0522    Specimen:  Blood Updated:  09/10/19 0713     Troponin I 0.040 ng/mL     Narrative:       Troponin I Reference Range:    0.00-0.03  Negative.  Repeat testing in 4-6 hours if clinically indicated.    0.04-0.29  Suspicious for myocardial injury. Serial measurements and clinical  correlation may be necessary to confirm or exclude diagnosis of acute  coronary syndrome.  Repeat testing in 4-6 hours if indicated.     >0.29 Consistent with myocardial injury.  Recommend clinical and laboratory correlation.     Results my be falsely decreased if patient taking Biotin.     Basic Metabolic Panel [[id number]]  (Abnormal) Collected:  09/10/19 0448    Specimen:  Blood Updated:  09/10/19 0551     Glucose 128 mg/dL      BUN 49 mg/dL      Creatinine 7.80 mg/dL      Sodium 139 mmol/L      Potassium 4.6 mmol/L      Chloride 98 mmol/L      CO2 28.0 mmol/L      Calcium 8.5 mg/dL      eGFR   Amer --     Comment: <15 Indicative of kidney failure.        eGFR Non African Amer 7 mL/min/1.73      Comment: <15 Indicative of kidney failure.        BUN/Creatinine Ratio 6.3     Anion Gap 17.6 mmol/L     Magnesium [[id number]]  (Normal) Collected:  09/10/19 0448    Specimen:  Blood Updated:  09/10/19 0551     Magnesium 2.1 mg/dL     Phosphorus [[id number]]  (Abnormal) Collected:  09/10/19 0448    Specimen:  Blood Updated:  09/10/19 0551     Phosphorus 6.3 mg/dL     CBC & Differential [[id number]] Collected:  09/10/19 0448    Specimen:  Blood Updated:  09/10/19 0529    Narrative:       The following orders were created for panel order CBC & Differential.  Procedure                               Abnormality         Status                     ---------                               -----------         ------                     CBC Auto Differential[[id number]]        Abnormal            Final result                  Please view results for these tests on the individual orders.    CBC Auto Differential [[id number]]  (Abnormal) Collected:  09/10/19 0448    Specimen:  Blood Updated:  09/10/19 0529     WBC 10.20 10*3/mm3      RBC 3.02 10*6/mm3      Hemoglobin 9.6 g/dL      Hematocrit 28.5 %      MCV 94.5 fL      MCH 31.7 pg      MCHC 33.5 g/dL      RDW 14.5 %      RDW-SD 48.6 fl      MPV 6.8 fL      Platelets 286 10*3/mm3      Neutrophil % 78.2 %      Lymphocyte % 8.8 %      Monocyte % 12.7 %      Eosinophil % 0.1 %      Basophil % 0.2 %      Neutrophils, Absolute 8.00 10*3/mm3      Lymphocytes, Absolute 0.90 10*3/mm3      Monocytes, Absolute 1.30 10*3/mm3      Eosinophils, Absolute 0.00 10*3/mm3      Basophils, Absolute 0.00 10*3/mm3      nRBC 0.0 /100 WBC     Troponin [[id number]]  (Abnormal) Collected:  09/09/19 1915    Specimen:  Blood Updated:  09/09/19 2019     Troponin I 0.050 ng/mL     Narrative:       Troponin I Reference Range:    0.00-0.03  Negative.  Repeat testing in 4-6 hours if clinically indicated.    0.04-0.29  Suspicious for myocardial injury. Serial measurements and clinical  correlation may be necessary to confirm or exclude diagnosis of acute  coronary syndrome.  Repeat testing in 4-6 hours if indicated.     >0.29 Consistent with myocardial injury.  Recommend clinical and laboratory correlation.     Results my be falsely decreased if patient taking Biotin.     Scan Slide [[id number]] Collected:  09/09/19 1915    Specimen:  Blood Updated:  09/09/19 1958     Scan Slide --     Comment: See Manual Differential Results       Manual Differential [[id number]]  (Abnormal) Collected:  09/09/19 1915    Specimen:  Blood Updated:  09/09/19 1958     Neutrophil % 83.0 %      Lymphocyte % 5.0 %      Monocyte % 6.0 %      Bands %  3.0 %      Metamyelocyte % 3.0 %      Neutrophils Absolute 10.49 10*3/mm3      Lymphocytes Absolute 0.61 10*3/mm3      Monocytes Absolute 0.73 10*3/mm3      RBC Morphology Normal     WBC  Morphology Normal     Platelet Morphology Normal    Narrative:       Slide Reviewed    CBC & Differential [[id number]] Collected:  09/09/19 1915    Specimen:  Blood Updated:  09/09/19 1958    Narrative:       The following orders were created for panel order CBC & Differential.  Procedure                               Abnormality         Status                     ---------                               -----------         ------                     CBC Auto Differential[[id number]]        Abnormal            Final result                 Please view results for these tests on the individual orders.    CBC Auto Differential [[id number]]  (Abnormal) Collected:  09/09/19 1915    Specimen:  Blood Updated:  09/09/19 1958     WBC 12.20 10*3/mm3      RBC 3.33 10*6/mm3      Hemoglobin 10.3 g/dL      Hematocrit 30.7 %      MCV 92.2 fL      MCH 30.8 pg      MCHC 33.4 g/dL      RDW 14.6 %      RDW-SD 47.7 fl      MPV 7.0 fL      Platelets 342 10*3/mm3     Comprehensive Metabolic Panel [[id number]]  (Abnormal) Collected:  09/09/19 1915    Specimen:  Blood Updated:  09/09/19 1950     Glucose 129 mg/dL      BUN 37 mg/dL      Creatinine 6.80 mg/dL      Sodium 137 mmol/L      Potassium 5.4 mmol/L      Chloride 95 mmol/L      CO2 28.0 mmol/L      Calcium 8.5 mg/dL      Total Protein 7.5 g/dL      Albumin 3.10 g/dL      ALT (SGPT) 38 U/L      AST (SGOT) 25 U/L      Alkaline Phosphatase 65 U/L      Total Bilirubin 0.8 mg/dL      eGFR Non African Amer 8 mL/min/1.73      Comment: <15 Indicative of kidney failure.        eGFR   Amer --     Comment: <15 Indicative of kidney failure.        Globulin 4.4 gm/dL      A/G Ratio 0.7 g/dL      BUN/Creatinine Ratio 5.4     Anion Gap 19.4 mmol/L     Lipase [[id number]]  (Normal) Collected:  09/09/19 1915    Specimen:  Blood Updated:  09/09/19 1948     Lipase 30 U/L               Results for orders placed during the hospital encounter of 08/22/19   Adult Transthoracic Echo Complete W/ Cont if  Necessary Per Protocol    Narrative · The left ventricular cavity is mildly dilated.  · Left atrial cavity size is moderately dilated.  · Left ventricular wall thickness is consistent with severe concentric   hypertrophy.  · Mild aortic valve stenosis is present.  · Mild mitral valve regurgitation is present       Normal LV size and contractility EF of 60 to 65% severe concentric LVH  Normal RV size  Moderate left atrial enlargement  Aortic valve, mitral valve, tricuspid valve appears structurally normal,   mild aortic, mitral regurgitation seen.  Trace tricuspid regurgitation   seen no signs of increased right ventricular pressure  No pericardial effusion seen.  Proximal aorta appears normal in size.         Imaging Results (all)     Procedure Component Value Units Date/Time    XR Chest 1 View [[id number]] Collected:  09/09/19 2006     Updated:  09/09/19 2009    Narrative:       Examination: XR CHEST 1 VW-     Date of Exam: 9/9/2019 7:31 PM     Indication: pain.       Comparison: None available.     Technique: 08/22/2019     FINDINGS:  There is cardiomegaly. Mediastinal and hilar contours appear stable.  There is no vascular congestion, airspace consolidation, or pleural  effusion. No bone lesion is seen.       Impression:          1. Cardiomegaly, with no evidence of active lung disease.     Electronically Signed By-Sophia Ross On:9/9/2019 8:06 PM  This report was finalized on [id number] by  Sophia Ross, .                Discharge Disposition:  Home or Self Care    Discharge Medications:     Discharge Medications      New Medications      Instructions Start Date   meloxicam 7.5 MG tablet  Commonly known as:  MOBIC   7.5 mg, Oral, Daily With Breakfast   Start Date:  9/11/2019        Continue These Medications      Instructions Start Date   albuterol sulfate  (90 Base) MCG/ACT inhaler  Commonly known as:  PROVENTIL HFA;VENTOLIN HFA;PROAIR HFA   2 puffs, Inhalation, Every 4 Hours PRN      b  complex-vitamin c-folic acid 0.8 MG tablet tablet   1 tablet, Oral, Daily      calcium acetate 667 MG capsule  Commonly known as:  PHOSLO   1,334 mg, Oral, 2 Times Daily With Meals, Breakfast and lunch       calcium acetate 667 MG capsule  Commonly known as:  PHOSLO   2,001 mg, Oral, Daily With Dinner      CloNIDine 0.1 MG tablet  Commonly known as:  CATAPRES   0.1 mg, Oral, 2 Times Daily      hydrALAZINE 50 MG tablet  Commonly known as:  APRESOLINE   100 mg, Oral, 2 Times Daily      SENSIPAR 30 MG tablet  Generic drug:  cinacalcet   30 mg, Oral, Daily      sodium bicarbonate 650 MG tablet   650 mg, Oral, 2 Times Daily             Discharge Diet:       Activity at Discharge:       Follow-up Appointments:  Future Appointments   Date Time Provider Department Center   9/12/2019  2:40 PM Juanpablo Garcia MD [facility]   9/17/2019 11:00 AM Kiah Beltran MD [facility] None         Test Results Pending at Discharge:  none    Condition on Discharge:    Stable      Risk for Readmission (LACE): Score: 5 (9/10/2019  6:01 AM)          Ho Malin DO  09/10/19  1:44 PM

## 2019-09-10 NOTE — PLAN OF CARE
Problem: Patient Care Overview  Goal: Individualization and Mutuality  Outcome: Ongoing (interventions implemented as appropriate)   09/10/19 5224   Individualization   Patient Specific Interventions patient was SOB, patient would not keep O2 on, patient has pain in chest

## 2019-09-10 NOTE — DISCHARGE INSTR - APPOINTMENTS
Follow up with Dr. Beltran - PCP  Tuesday, September 17 at 11am   [id number], [place] [address] [phone]

## 2019-09-10 NOTE — H&P
[facility] HOSPITALIST     Kiah Beltran MD    CHIEF COMPLAINT:     Chief Complaint   Patient presents with   • Shortness of Breath           HISTORY OF PRESENT ILLNESS:       The patient is a 59-year-old male with a PMH of end-stage renal disease on hemodialysis, HTN of chronic kidney disease, peripheral vascular disease, tobacco abuse and past testicular cancer.  The patient presented to the ED 9/9/19 with complaints of chest pain and dyspnea which he reports has been going on for 2 weeks.  Denies radiation of pain or syncope.  The patient was recently discharged 8/24/19 following treatment for a STEMI.  EKG in the ED negative for acute changes.  The patient is pain free and stable at this time.     In the ED the patient received a lidoderm patch, prednisone, duonebs, and toradol.  Initial troponin 0.05, WBC 12.2, K 5.4, BUN 37, Scr 6.8.  The patient had dialysis on 9/9/19.    SH: Former smoker.  Denies use of alcohol or recreation drugs    Nephrology: Lisa   Cardiology: Jose          Past Medical History:   Diagnosis Date   • CKD (chronic kidney disease) requiring chronic dialysis (CMS/[facility])    • COPD (chronic obstructive pulmonary disease) (CMS/[facility])    • Hypertension      Past Surgical History:   Procedure Laterality Date   • ARTERIOVENOUS FISTULA REPAIR     • ARTERIOVENOUS FISTULA/SHUNT SURGERY     • CARDIAC CATHETERIZATION N/A 8/22/2019    Procedure: Left Heart Cath;  Surgeon: Juanpablo Garcia MD;  Location: [facility] CATH INVASIVE LOCATION;  Service: Cardiology   • CARDIAC CATHETERIZATION N/A 8/22/2019    Procedure: Aortic root aortogram;  Surgeon: Juanpablo Garcia MD;  Location: [facility] CATH INVASIVE LOCATION;  Service: Cardiology   • LYMPH NODE BIOPSY  2002   • ORCHIECTOMY       History reviewed. No pertinent family history.  Social History     Tobacco Use   • Smoking status: Former Smoker     Packs/day: 1.00     Start date: 1975     Last attempt to quit: 8/8/2019  "    Years since quittin.0   • Smokeless tobacco: Never Used   Substance Use Topics   • Alcohol use: No     Frequency: Never   • Drug use: No       (Not in a hospital admission)  Allergies:  Patient has no known allergies.      There is no immunization history on file for this patient.        REVIEW OF SYSTEMS:     Review of Systems   Cardiovascular: Positive for chest pain. Negative for palpitations and syncope.   Respiratory: Positive for shortness of breath.    Gastrointestinal: Negative for nausea and vomiting.   All other systems reviewed and are negative.      Vital Signs  Temp:  [100.1 °F (37.8 °C)] 100.1 °F (37.8 °C)  Heart Rate:  [] 98  Resp:  [20-27] 23  BP: (100-127)/(53-72) 121/67    Flowsheet Rows      First Filed Value   Admission Height  182.9 cm (72\") Documented at 2019   Admission Weight  102 kg (225 lb 5 oz) Documented at 2019           Physical Exam:    Physical Exam   Constitutional: He is oriented to person, place, and time. He appears well-developed.   HENT:   Head: Normocephalic.   Eyes: Conjunctivae are normal. Pupils are equal, round, and reactive to light.   Neck: Normal range of motion. Neck supple.   Cardiovascular: Normal rate, regular rhythm and normal heart sounds.   Pulmonary/Chest: Effort normal and breath sounds normal.   Abdominal: Soft. Bowel sounds are normal.   Musculoskeletal: Normal range of motion.   Neurological: He is alert and oriented to person, place, and time.   Skin: Skin is warm and dry. Capillary refill takes less than 2 seconds.   Psychiatric: He has a normal mood and affect. His behavior is normal. Thought content normal.          Debilities:  Age appropriate      Results Review:    I reviewed the patient's new clinical results.  Lab Results (most recent)     Procedure Component Value Units Date/Time    Troponin [[id number]]  (Abnormal) Collected:  19    Specimen:  Blood Updated:  19     Troponin I 0.050 ng/mL     " Narrative:       Troponin I Reference Range:    0.00-0.03  Negative.  Repeat testing in 4-6 hours if clinically indicated.    0.04-0.29  Suspicious for myocardial injury. Serial measurements and clinical  correlation may be necessary to confirm or exclude diagnosis of acute  coronary syndrome.  Repeat testing in 4-6 hours if indicated.     >0.29 Consistent with myocardial injury.  Recommend clinical and laboratory correlation.     Results my be falsely decreased if patient taking Biotin.     Scan Slide [[id number]] Collected:  09/09/19 1915    Specimen:  Blood Updated:  09/09/19 1958     Scan Slide --     Comment: See Manual Differential Results       Manual Differential [[id number]]  (Abnormal) Collected:  09/09/19 1915    Specimen:  Blood Updated:  09/09/19 1958     Neutrophil % 83.0 %      Lymphocyte % 5.0 %      Monocyte % 6.0 %      Bands %  3.0 %      Metamyelocyte % 3.0 %      Neutrophils Absolute 10.49 10*3/mm3      Lymphocytes Absolute 0.61 10*3/mm3      Monocytes Absolute 0.73 10*3/mm3      RBC Morphology Normal     WBC Morphology Normal     Platelet Morphology Normal    Narrative:       Slide Reviewed    CBC & Differential [[id number]] Collected:  09/09/19 1915    Specimen:  Blood Updated:  09/09/19 1958    Narrative:       The following orders were created for panel order CBC & Differential.  Procedure                               Abnormality         Status                     ---------                               -----------         ------                     CBC Auto Differential[[id number]]        Abnormal            Final result                 Please view results for these tests on the individual orders.    CBC Auto Differential [[id number]]  (Abnormal) Collected:  09/09/19 1915    Specimen:  Blood Updated:  09/09/19 1958     WBC 12.20 10*3/mm3      RBC 3.33 10*6/mm3      Hemoglobin 10.3 g/dL      Hematocrit 30.7 %      MCV 92.2 fL      MCH 30.8 pg      MCHC 33.4 g/dL      RDW 14.6 %      RDW-SD 47.7  fl      MPV 7.0 fL      Platelets 342 10*3/mm3     Comprehensive Metabolic Panel [[id number]]  (Abnormal) Collected:  09/09/19 1915    Specimen:  Blood Updated:  09/09/19 1950     Glucose 129 mg/dL      BUN 37 mg/dL      Creatinine 6.80 mg/dL      Sodium 137 mmol/L      Potassium 5.4 mmol/L      Chloride 95 mmol/L      CO2 28.0 mmol/L      Calcium 8.5 mg/dL      Total Protein 7.5 g/dL      Albumin 3.10 g/dL      ALT (SGPT) 38 U/L      AST (SGOT) 25 U/L      Alkaline Phosphatase 65 U/L      Total Bilirubin 0.8 mg/dL      eGFR Non African Amer 8 mL/min/1.73      Comment: <15 Indicative of kidney failure.        eGFR   Amer --     Comment: <15 Indicative of kidney failure.        Globulin 4.4 gm/dL      A/G Ratio 0.7 g/dL      BUN/Creatinine Ratio 5.4     Anion Gap 19.4 mmol/L     Lipase [[id number]]  (Normal) Collected:  09/09/19 1915    Specimen:  Blood Updated:  09/09/19 1948     Lipase 30 U/L           Imaging Results (most recent)     Procedure Component Value Units Date/Time    XR Chest 1 View [[id number]] Collected:  09/09/19 2006     Updated:  09/09/19 2009    Narrative:       Examination: XR CHEST 1 VW-     Date of Exam: 9/9/2019 7:31 PM     Indication: pain.       Comparison: None available.     Technique: 08/22/2019     FINDINGS:  There is cardiomegaly. Mediastinal and hilar contours appear stable.  There is no vascular congestion, airspace consolidation, or pleural  effusion. No bone lesion is seen.       Impression:          1. Cardiomegaly, with no evidence of active lung disease.     Electronically Signed By-Sophia Ross On:9/9/2019 8:06 PM  This report was finalized on [id number] by  Sophia Ross, .        reviewed    ECG/EMG Results (most recent)     Procedure Component Value Units Date/Time    ECG 12 Lead [[id number]] Collected:  09/09/19 1755     Updated:  09/09/19 1801    Narrative:       HEART RATE= 113  bpm  RR Interval= 532  ms  MD Interval= 157  ms  P Horizontal Axis= -2  deg  P Front  Axis= 25  deg  QRSD Interval= 80  ms  QT Interval= 310  ms  QRS Axis= 35  deg  T Wave Axis= 20  deg  - ABNORMAL ECG -  Sinus tachycardia  Nonspecific T abnormalities, lateral leads  Electronically Signed By:   Date and Time of Study: 2019-09-09 17:55:18        reviewed    Results for orders placed during the hospital encounter of 08/27/19   Duplex venous lower extremity bilateral CAR    Narrative · Normal bilateral lower extremity venous duplex scan.          Results for orders placed during the hospital encounter of 08/22/19   Adult Transthoracic Echo Complete W/ Cont if Necessary Per Protocol    Narrative · The left ventricular cavity is mildly dilated.  · Left atrial cavity size is moderately dilated.  · Left ventricular wall thickness is consistent with severe concentric   hypertrophy.  · Mild aortic valve stenosis is present.  · Mild mitral valve regurgitation is present       Normal LV size and contractility EF of 60 to 65% severe concentric LVH  Normal RV size  Moderate left atrial enlargement  Aortic valve, mitral valve, tricuspid valve appears structurally normal,   mild aortic, mitral regurgitation seen.  Trace tricuspid regurgitation   seen no signs of increased right ventricular pressure  No pericardial effusion seen.  Proximal aorta appears normal in size.         XR Chest 1 View  Narrative: Examination: XR CHEST 1 VW-     Date of Exam: 9/9/2019 7:31 PM     Indication: pain.       Comparison: None available.     Technique: 08/22/2019     FINDINGS:  There is cardiomegaly. Mediastinal and hilar contours appear stable.  There is no vascular congestion, airspace consolidation, or pleural  effusion. No bone lesion is seen.     Impression:    1. Cardiomegaly, with no evidence of active lung disease.     Electronically Signed By-Sophia Ross On:9/9/2019 8:06 PM  This report was finalized on [id number] by  Sophia Ross, .      Assessment/Plan       Chest pain on breathing      Plan    Chest pain  - initial  troponin 0.05  - serial troponin  - toradol  - NTG prn for chest pain  - lidoderm patch and prednisone per ED  - consider cardiology consult    CAD s/p STEMI 8/2019    ESRD  -Nephrology consulting for dialysis  - HD MWF  - Scr 6.8  - cot home Phoslo, sodium bicarbonate, and sensipar  - monitor bmp     Hypertension of CKD  - cont home hydralazine    Asthma not in exacerbation  - cont home inhaler     DVT prophylaxis  -SCD's         Disposition     The patient is stable at this time     I discussed the patients findings and my recommendations with patient and he is agreeable with the plan.     Jazmine Penny, APRN  09/09/19  11:29 PM

## 2019-09-10 NOTE — ED PROVIDER NOTES
Subjective   History of Present Illness  Context: 59-year-old male presents with chest pain.  He describes as anterior chest radiating towards his neck.  He states it became more severe last night.  He has had admission last month for similar pain was felt to have possible pericarditis.  He was placed on prednisone by his nephrologist for 4 days ago.  He complains of increased pain with cough or deep breath.  He states is better if he is sitting up.  He has had no fever.  No vomiting diarrhea.  Location: Chest  Quality: Sharp  Duration: 2 days  Timing: Constant  Severity: Moderate severe   Modifying factors: Worse with cough deep breath  Associated signs and symptoms:    Review of Systems   Constitutional: Negative for fever and unexpected weight change.   HENT: Negative for congestion and sore throat.    Eyes: Negative for pain.   Respiratory: Positive for cough. Negative for shortness of breath.    Cardiovascular: Positive for chest pain and leg swelling.   Gastrointestinal: Negative for abdominal pain, diarrhea and vomiting.   Endocrine: Negative for polydipsia.   Genitourinary:        Chronic renal failure had dialysis earlier today   Musculoskeletal: Negative for back pain.   Skin: Negative for rash.   Neurological: Negative for dizziness, weakness and headaches.   Psychiatric/Behavioral: Negative for confusion.       Past Medical History:   Diagnosis Date   • CKD (chronic kidney disease) requiring chronic dialysis (CMS/[facility])    • COPD (chronic obstructive pulmonary disease) (CMS/[facility])    • Hypertension        No Known Allergies    Past Surgical History:   Procedure Laterality Date   • ARTERIOVENOUS FISTULA REPAIR     • ARTERIOVENOUS FISTULA/SHUNT SURGERY     • CARDIAC CATHETERIZATION N/A 8/22/2019    Procedure: Left Heart Cath;  Surgeon: Juanpablo Garcia MD;  Location: [facility] CATH INVASIVE LOCATION;  Service: Cardiology   • CARDIAC CATHETERIZATION N/A 8/22/2019    Procedure: Aortic root aortogram;   Surgeon: Juanpablo Garcia MD;  Location: [facility] INVASIVE LOCATION;  Service: Cardiology   • LYMPH NODE BIOPSY     • ORCHIECTOMY         History reviewed. No pertinent family history.    Social History     Socioeconomic History   • Marital status: Single     Spouse name: Not on file   • Number of children: Not on file   • Years of education: Not on file   • Highest education level: Not on file   Tobacco Use   • Smoking status: Former Smoker     Packs/day: 1.00     Start date:      Last attempt to quit: 2019     Years since quittin.0   Substance and Sexual Activity   • Alcohol use: No     Frequency: Never   • Drug use: No   • Sexual activity: Yes     Partners: Female     Prior to Admission medications    Medication Sig Start Date End Date Taking? Authorizing Provider   albuterol sulfate  (90 Base) MCG/ACT inhaler Inhale 2 puffs Every 4 (Four) Hours As Needed for Wheezing.   Yes Gunnar Pineda MD   cinacalcet (SENSIPAR) 30 MG tablet Take 30 mg by mouth Daily.   Yes Gunnar Pineda MD   b complex-vitamin c-folic acid (NEPHRO-MARK) 0.8 MG tablet tablet Take 1 tablet by mouth Daily.    Gunnar Pineda MD   calcium acetate (PHOSLO) 667 MG capsule Take 1,334 mg by mouth 2 (Two) Times a Day With Meals. Breakfast and lunch    Gunnar Pineda MD   calcium acetate (PHOSLO) 667 MG capsule Take 2,001 mg by mouth Daily With Dinner.    Gunnar Pineda MD   CloNIDine (CATAPRES) 0.1 MG tablet Take 0.1 mg by mouth 2 (Two) Times a Day.    Gunnar Pineda MD   hydrALAZINE (APRESOLINE) 50 MG tablet Take 100 mg by mouth 2 (Two) Times a Day.    Gunnar Pineda MD   sodium bicarbonate 650 MG tablet Take 650 mg by mouth 2 (Two) Times a Day.    Gunnar Pineda MD           Objective   Physical Exam  59 y.o. male Generally well-developed well-nourished,  Pupils equal round react to light.  Extraocular muscles intact, sclera nonicteric  Oropharynx clear,  mucous membranes moist,   neck supple no masses or JVD  Cardiovascular regular rate and rhythm without murmur gallop rub appreciated,  Respiratory lungs clear with equal breath sounds bilaterally he complains of pain started in the lower sternal region radiating towards his neck.  He does not complain of pain with palpation he does complain of pain with movement deep breath or cough though  Abdomen soft and nontender without mass rebound or guarding  Extremities without tenderness trace symmetric edema  Neurologic without obvious focal findings noted motor sensory grossly intact extremities  Psychiatric cooperative  Dermatologic no significant rash or bruising noted    Procedures           ED Course      Results for orders placed or performed during the hospital encounter of 09/09/19   Comprehensive Metabolic Panel   Result Value Ref Range    Glucose 129 (H) 65 - 99 mg/dL    BUN 37 (H) 8 - 20 mg/dL    Creatinine 6.80 (H) 0.70 - 1.20 mg/dL    Sodium 137 136 - 144 mmol/L    Potassium 5.4 (H) 3.6 - 5.1 mmol/L    Chloride 95 (L) 101 - 111 mmol/L    CO2 28.0 22.0 - 32.0 mmol/L    Calcium 8.5 (L) 8.9 - 10.3 mg/dL    Total Protein 7.5 6.1 - 7.9 g/dL    Albumin 3.10 (L) 3.50 - 4.80 g/dL    ALT (SGPT) 38 17 - 63 U/L    AST (SGOT) 25 15 - 41 U/L    Alkaline Phosphatase 65 32 - 91 U/L    Total Bilirubin 0.8 0.3 - 1.2 mg/dL    eGFR Non African Amer 8 (L) >60 mL/min/1.73    eGFR  African Amer      Globulin 4.4 (H) 2.5 - 3.8 gm/dL    A/G Ratio 0.7 (L) 1.0 - 1.7 g/dL    BUN/Creatinine Ratio 5.4 (L) 6.2 - 20.3    Anion Gap 19.4 (H) 5.0 - 15.0 mmol/L   Lipase   Result Value Ref Range    Lipase 30 22 - 51 U/L   Troponin   Result Value Ref Range    Troponin I 0.050 (H) 0.000 - 0.030 ng/mL   CBC Auto Differential   Result Value Ref Range    WBC 12.20 (H) 3.40 - 10.80 10*3/mm3    RBC 3.33 (L) 4.14 - 5.80 10*6/mm3    Hemoglobin 10.3 (L) 13.0 - 17.7 g/dL    Hematocrit 30.7 (L) 37.5 - 51.0 %    MCV 92.2 79.0 - 97.0 fL    MCH 30.8 26.6 -  "33.0 pg    MCHC 33.4 31.5 - 35.7 g/dL    RDW 14.6 12.3 - 15.4 %    RDW-SD 47.7 37.0 - 54.0 fl    MPV 7.0 6.0 - 12.0 fL    Platelets 342 140 - 450 10*3/mm3   Scan Slide   Result Value Ref Range    Scan Slide     Manual Differential   Result Value Ref Range    Neutrophil % 83.0 (H) 42.7 - 76.0 %    Lymphocyte % 5.0 (L) 19.6 - 45.3 %    Monocyte % 6.0 5.0 - 12.0 %    Bands %  3.0 0.0 - 5.0 %    Metamyelocyte % 3.0 (H) 0.0 - 0.0 %    Neutrophils Absolute 10.49 (H) 1.70 - 7.00 10*3/mm3    Lymphocytes Absolute 0.61 (L) 0.70 - 3.10 10*3/mm3    Monocytes Absolute 0.73 0.10 - 0.90 10*3/mm3    RBC Morphology Normal Normal    WBC Morphology Normal Normal    Platelet Morphology Normal Normal     Xr Chest 1 View    Result Date: 9/9/2019   1. Cardiomegaly, with no evidence of active lung disease.  Electronically Signed By-Sophia Ross On:9/9/2019 8:06 PM This report was finalized on [id number] by  Sophia Ross, .    Medications   lidocaine (LIDODERM) 5 % 1 patch (1 patch Transdermal Medication Applied 9/9/19 1932)   ipratropium-albuterol (DUO-NEB) nebulizer solution 3 mL (3 mL Nebulization Given 9/9/19 2024)   ketorolac (TORADOL) injection 7.5 mg (7.5 mg Intravenous Given 9/9/19 2112)     /66   Pulse 102   Temp 100.1 °F (37.8 °C) (Oral)   Resp 23   Ht 182.9 cm (72\")   Wt 102 kg (225 lb 5 oz)   SpO2 92%   BMI 30.56 kg/m²               [facility]  Chart review: Patient cardiac catheterization August 22 that   was normal.  He had unremarkable echocardiogram also.  Comorbidity: As per past history presumed pericarditis versus coronary spasm  Differential: Chest wall pain, pleurisy, pericarditis  My EKG interpretation: Sinus rhythm LVH with nonspecific T wave abnormality laterally.  Compared to previous marked ST elevation has resolved  Lab: Troponin 0.05, white count 12.2 with hemoglobin 10.3, comprehensive metabolic panel BUN 37 creatinine 6.8 potassium 5.4.  Radiology: I reviewed chest x-ray.  No acute cardiopulmonary " abnormality noted stable cardiomegaly  Discussion/treatment: Patient had lidocaine patch placed.  He states this gave him the best relief previously.  He was still having discomfort with this.  He was given Toradol 7.5 mg.  He has gotten relief with this previously.  He states previously narcotics gave him no improvement.  At this time his pain appears to be chest wall or pleuritic in nature.  It is worse with cough or movement.  This could be pericarditis also.  He does not have any EKG changes at this time though.  Patient's findings were discussed with him and his wife.  He was discussed with the nurse practitioner the hospice.  He will brought in for observation repeat enzymes.    Final diagnoses:   Chest pain on breathing   Chronic renal failure, stage 5 (CMS/HCC)              Clifford Ryan MD  09/09/19 0818

## 2019-09-10 NOTE — CONSULTS
NEPHROLOGY CONSULTATION-----[facility]    [facility]  [phone]  Mary Gonzalez MD    Patient Care Team:  Kiah Beltran MD as PCP - General    CC/REASON FOR CONSULTATION: ESRD  PHYSICIAN REQUESTING CONSULTATION:     History of Present Illness     HPI    Patient is a 59 y.o. WM, who is my outpatient HD patient, whom I was asked to see in consultation for evaluation and management of ESRD. Patient was admitted with SOB and CP.   Recently admitted with extensive work up and thought to have a little pneumonitis vs. Pericarditis. S/P Medrol DosePak as an outpatient, but as soon as that was done pain on inspiration returned. No NSAIDs or recent IV dye expsoure outside of recent CT PE protocol (negative) and cath. No known h/o hepatitis, TB, rheumatic fever, jaundice, SLE, bleeding/bruising disorders.  No urinary sx. No edema or fluid retention.  +Compliance with home meds.  No herbal med use.      Review of Systems   Constitutional: Positive for activity change, appetite change and fatigue. Negative for chills, diaphoresis, fever and unexpected weight change.   HENT: Positive for congestion. Negative for dental problem, drooling, ear discharge, ear pain, facial swelling, hearing loss, mouth sores, nosebleeds, postnasal drip, rhinorrhea, sinus pressure, sinus pain, sneezing, sore throat, tinnitus, trouble swallowing and voice change.    Eyes: Negative for photophobia, pain, discharge, redness, itching and visual disturbance.   Respiratory: Positive for cough and shortness of breath. Negative for apnea, choking, chest tightness, wheezing and stridor.    Cardiovascular: Positive for chest pain. Negative for palpitations and leg swelling.   Gastrointestinal: Negative for abdominal distention, abdominal pain, anal bleeding, blood in stool, constipation, diarrhea, nausea, rectal pain and vomiting.   Endocrine: Negative for cold intolerance, heat intolerance,  polydipsia, polyphagia and polyuria.   Genitourinary: Negative for decreased urine volume, difficulty urinating, dysuria, enuresis, flank pain, frequency, genital sores, hematuria and urgency.   Musculoskeletal: Negative for arthralgias, back pain, gait problem, joint swelling, myalgias, neck pain and neck stiffness.   Skin: Negative for color change, pallor, rash and wound.   Allergic/Immunologic: Negative for environmental allergies, food allergies and immunocompromised state.   Neurological: Positive for weakness. Negative for dizziness, tremors, seizures, syncope, facial asymmetry, speech difficulty, light-headedness, numbness and headaches.   Hematological: Negative for adenopathy. Does not bruise/bleed easily.   Psychiatric/Behavioral: Negative for agitation, behavioral problems, confusion, decreased concentration, dysphoric mood, hallucinations, self-injury, sleep disturbance and suicidal ideas. The patient is not nervous/anxious and is not hyperactive.           Past Medical History:   Diagnosis Date   • CKD (chronic kidney disease) requiring chronic dialysis (CMS/[facility])    • COPD (chronic obstructive pulmonary disease) (CMS/[facility])    • Hypertension        Past Surgical History:   Procedure Laterality Date   • ARTERIOVENOUS FISTULA REPAIR     • ARTERIOVENOUS FISTULA/SHUNT SURGERY     • CARDIAC CATHETERIZATION N/A 2019    Procedure: Left Heart Cath;  Surgeon: Juanpablo Garcia MD;  Location: [facility] CATH INVASIVE LOCATION;  Service: Cardiology   • CARDIAC CATHETERIZATION N/A 2019    Procedure: Aortic root aortogram;  Surgeon: Juanpablo Garcia MD;  Location: [facility] CATH INVASIVE LOCATION;  Service: Cardiology   • LYMPH NODE BIOPSY     • ORCHIECTOMY         History reviewed. No pertinent family history.    Social History     Tobacco Use   • Smoking status: Former Smoker     Packs/day: 1.00     Start date:      Last attempt to quit: 2019     Years since quittin.0   •  Smokeless tobacco: Never Used   Substance Use Topics   • Alcohol use: No     Frequency: Never   • Drug use: No       Home Meds:   Medications Prior to Admission   Medication Sig Dispense Refill Last Dose   • albuterol sulfate  (90 Base) MCG/ACT inhaler Inhale 2 puffs Every 4 (Four) Hours As Needed for Wheezing.      • cinacalcet (SENSIPAR) 30 MG tablet Take 30 mg by mouth Daily.      • b complex-vitamin c-folic acid (NEPHRO-MARK) 0.8 MG tablet tablet Take 1 tablet by mouth Daily.      • calcium acetate (PHOSLO) 667 MG capsule Take 1,334 mg by mouth 2 (Two) Times a Day With Meals. Breakfast and lunch      • calcium acetate (PHOSLO) 667 MG capsule Take 2,001 mg by mouth Daily With Dinner.      • CloNIDine (CATAPRES) 0.1 MG tablet Take 0.1 mg by mouth 2 (Two) Times a Day.      • hydrALAZINE (APRESOLINE) 50 MG tablet Take 100 mg by mouth 2 (Two) Times a Day.      • sodium bicarbonate 650 MG tablet Take 650 mg by mouth 2 (Two) Times a Day.          Scheduled Meds:    calcium acetate 1,334 mg Oral BID With Meals   calcium acetate 2,001 mg Oral Daily With Dinner   cinacalcet 30 mg Oral Daily   CloNIDine 0.1 mg Oral Q12H   hydrALAZINE 100 mg Oral BID   lidocaine 1 patch Transdermal Q24H   sodium bicarbonate 650 mg Oral BID   sodium chloride 10 mL Intravenous Q12H       Continuous Infusions:       PRN Meds:  •  acetaminophen **OR** acetaminophen **OR** acetaminophen  •  albuterol  •  sodium chloride    Allergies:  Patient has no known allergies.    OBJECTIVE    Vital Signs  Temp:  [98.6 °F (37 °C)-100.1 °F (37.8 °C)] 99 °F (37.2 °C)  Heart Rate:  [] 89  Resp:  [16-27] 18  BP: (100-133)/(53-77) 133/77    No intake/output data recorded.  No intake/output data recorded.    Physical Exam:  General Appearance: alert, appears stated age and cooperative  Head: normocephalic, without obvious abnormality and atraumatic  Eyes: conjunctivae and sclerae normal and no icterus  Neck: supple and no JVD  Lungs: +FEW SCATTERED  RHONCHI  Heart: regular rhythm & normal rate and normal S1, S2 +JOSE A  Chest Wall: no abnormalities observed  Abdomen: normal bowel sounds and soft non-tender  Extremities: moves extremities well, no edema, no cyanosis and no redness  Skin: no bleeding, bruising or rash  Neurologic: Alert, and oriented. No focal deficits    Results Review:    I reviewed the patient's new clinical results.    WBC WBC   Date Value Ref Range Status   09/10/2019 10.20 3.40 - 10.80 10*3/mm3 Final   09/09/2019 12.20 (H) 3.40 - 10.80 10*3/mm3 Final      HGB Hemoglobin   Date Value Ref Range Status   09/10/2019 9.6 (L) 13.0 - 17.7 g/dL Final   09/09/2019 10.3 (L) 13.0 - 17.7 g/dL Final      HCT Hematocrit   Date Value Ref Range Status   09/10/2019 28.5 (L) 37.5 - 51.0 % Final   09/09/2019 30.7 (L) 37.5 - 51.0 % Final      Platlets No results found for: LABPLAT   MCV MCV   Date Value Ref Range Status   09/10/2019 94.5 79.0 - 97.0 fL Final   09/09/2019 92.2 79.0 - 97.0 fL Final          Sodium Sodium   Date Value Ref Range Status   09/10/2019 139 136 - 144 mmol/L Final   09/09/2019 137 136 - 144 mmol/L Final      Potassium Potassium   Date Value Ref Range Status   09/10/2019 4.6 3.6 - 5.1 mmol/L Final   09/09/2019 5.4 (H) 3.6 - 5.1 mmol/L Final      Chloride Chloride   Date Value Ref Range Status   09/10/2019 98 (L) 101 - 111 mmol/L Final   09/09/2019 95 (L) 101 - 111 mmol/L Final      CO2 CO2   Date Value Ref Range Status   09/10/2019 28.0 22.0 - 32.0 mmol/L Final   09/09/2019 28.0 22.0 - 32.0 mmol/L Final      BUN BUN   Date Value Ref Range Status   09/10/2019 49 (H) 8 - 20 mg/dL Final   09/09/2019 37 (H) 8 - 20 mg/dL Final      Creatinine Creatinine   Date Value Ref Range Status   09/10/2019 7.80 (H) 0.70 - 1.20 mg/dL Final   09/09/2019 6.80 (H) 0.70 - 1.20 mg/dL Final      Calcium Calcium   Date Value Ref Range Status   09/10/2019 8.5 (L) 8.9 - 10.3 mg/dL Final   09/09/2019 8.5 (L) 8.9 - 10.3 mg/dL Final      PO4 No results found for: CAPO4    Albumin Albumin   Date Value Ref Range Status   09/09/2019 3.10 (L) 3.50 - 4.80 g/dL Final      Magnesium Magnesium   Date Value Ref Range Status   09/10/2019 2.1 1.8 - 2.5 mg/dL Final      Uric Acid No results found for: URICACID       Imaging Results (last 72 hours)     Procedure Component Value Units Date/Time    XR Chest 1 View [[id number]] Collected:  09/09/19 2006     Updated:  09/09/19 2009    Narrative:       Examination: XR CHEST 1 VW-     Date of Exam: 9/9/2019 7:31 PM     Indication: pain.       Comparison: None available.     Technique: 08/22/2019     FINDINGS:  There is cardiomegaly. Mediastinal and hilar contours appear stable.  There is no vascular congestion, airspace consolidation, or pleural  effusion. No bone lesion is seen.       Impression:          1. Cardiomegaly, with no evidence of active lung disease.     Electronically Signed ByIvory Ross On:9/9/2019 8:06 PM  This report was finalized on [id number] by  Sophia Ross .            Results for orders placed during the hospital encounter of 09/09/19   XR Chest 1 View    Narrative Examination: XR CHEST 1 VW-     Date of Exam: 9/9/2019 7:31 PM     Indication: pain.       Comparison: None available.     Technique: 08/22/2019     FINDINGS:  There is cardiomegaly. Mediastinal and hilar contours appear stable.  There is no vascular congestion, airspace consolidation, or pleural  effusion. No bone lesion is seen.       Impression    1. Cardiomegaly, with no evidence of active lung disease.     Electronically Signed ByIvory Ross On:9/9/2019 8:06 PM  This report was finalized on [id number] by  Sophia Ross .         Results for orders placed during the hospital encounter of 08/27/19   Duplex venous lower extremity bilateral CAR    Narrative · Normal bilateral lower extremity venous duplex scan.          ASSESSMENT / PLAN      Chest pain on breathing      1. ESRD-------S/P HD yesterday per usual MWF outpatient schedule    2.  HYPERKALEMIA------Resolved    3. PLEURITIC PAIN------Likely pleurisy vs. Pericarditis. Will given 10 days of daily Mobic at low dose. Counseled patient on importance of taking with food and to watch for dark, tarry stools. Recently negative CT PE protocol, cardiac cath, and bilateral LE venous dopplers. Patient seems to get relief of pain from steroids and NSAIDs    4. HTN WITH ESRD-----Avoid hypotension. Back down BP meds    5. ANEMIA OF ESRD------H/H stable. EPO/Venofer with HD as needed    6. SECONDARY HYPERPARATHYROIDISM------Follow Phos    7. RF ASSOCIATED HYPERPHOSPHATEMIA------Recent increase in Phoslo. Follow Phos    8. COPD/TOBACCO USE      I discussed the patients findings and my recommendations with patient and nursing staff    Will follow along closely. Thank you for allowing us to see this patient in renal consultation.    [facility]  [phone]  MD Mary Herrera MD  09/10/19  7:04 AM

## 2019-09-10 NOTE — PROGRESS NOTES
Discharge Planning Assessment   Peter     Patient Name: Jose Miguel Martinez  MRN: [id number]  Today's Date: 9/10/2019    Admit Date: 9/9/2019    Discharge Needs Assessment     Row Name 09/10/19 1635       Living Environment    Lives With  spouse;child(froylan), dependent    Current Living Arrangements  home/apartment/condo    Primary Care Provided by  self    Provides Primary Care For  child(froylan)    Family Caregiver if Needed  none    Able to Return to Prior Arrangements  yes       Resource/Environmental Concerns    Resource/Environmental Concerns  none       Transition Planning    Patient/Family Anticipates Transition to  home with family    Transportation Anticipated  car, drives self;family or friend will provide       Discharge Needs Assessment    Concerns to be Addressed  no discharge needs identified    Equipment Currently Used at Home  none    Anticipated Changes Related to Illness  none    Equipment Needed After Discharge  none    Patient's Choice of Community Agency(s)  [facility] current    Discharge Coordination/Progress  Home with family        Discharge Plan     Row Name 09/10/19 1637       Plan    Plan  Home with family    Final Discharge Disposition Code  01 - home or self-care             Expected Discharge Date and Time     Expected Discharge Date Expected Discharge Time    Sep 10, 2019                    Faustino Montero RN

## 2019-09-10 NOTE — NURSING NOTE
Patient wife called and wanted Dr. Ngerete to be consulted to see patient before discharge. RN placed a call out to Dr. Malin to see if we can get an order. Patient walked up to nurses station, RN discussed situation with patient. Patient states he has an appointment with him in 2 weeks. RN told patient if a consult did get put in, we would not know specific time of doctor coming to see patient. Patient decided to go ahead and go home.

## 2019-09-12 ENCOUNTER — OFFICE VISIT (OUTPATIENT)
Dept: CARDIOLOGY | Facility: CLINIC | Age: 59
End: 2019-09-12

## 2019-09-12 VITALS
BODY MASS INDEX: 31.15 KG/M2 | DIASTOLIC BLOOD PRESSURE: 77 MMHG | HEIGHT: 72 IN | WEIGHT: 230 LBS | SYSTOLIC BLOOD PRESSURE: 106 MMHG | OXYGEN SATURATION: 96 % | HEART RATE: 97 BPM

## 2019-09-12 DIAGNOSIS — N18.6 ESRD (END STAGE RENAL DISEASE) (HCC): ICD-10-CM

## 2019-09-12 DIAGNOSIS — N28.89 HYPERTENSION SECONDARY TO OTHER RENAL DISORDERS: ICD-10-CM

## 2019-09-12 DIAGNOSIS — Z01.810 PREOPERATIVE CARDIOVASCULAR EXAMINATION: Primary | ICD-10-CM

## 2019-09-12 DIAGNOSIS — I15.1 HYPERTENSION SECONDARY TO OTHER RENAL DISORDERS: ICD-10-CM

## 2019-09-12 PROCEDURE — 99213 OFFICE O/P EST LOW 20 MIN: CPT | Performed by: INTERNAL MEDICINE

## 2019-09-12 NOTE — PROGRESS NOTES
Subjective:     Encounter Date:09/12/2019      Patient ID: Jose Miguel Martinez is a 59 y.o. male.    Chief Complaint: Hospital follow-up, needs cardiovascular evaluation for kidney transplant surgery  History of Present Illness See below      Past Medical History:  Past Medical History:   Diagnosis Date   • CKD (chronic kidney disease) requiring chronic dialysis (CMS/[facility])    • COPD (chronic obstructive pulmonary disease) (CMS/[facility])    • Hypertension    • Kidney failure      Past Surgical History:  Past Surgical History:   Procedure Laterality Date   • ARTERIOVENOUS FISTULA REPAIR     • ARTERIOVENOUS FISTULA/SHUNT SURGERY     • CARDIAC CATHETERIZATION N/A 8/22/2019    Procedure: Left Heart Cath;  Surgeon: Juanpablo Garcia MD;  Location:  [facility] CATH INVASIVE LOCATION;  Service: Cardiology   • CARDIAC CATHETERIZATION N/A 8/22/2019    Procedure: Aortic root aortogram;  Surgeon: Juanpablo Garcia MD;  Location:  [facility] CATH INVASIVE LOCATION;  Service: Cardiology   • LYMPH NODE BIOPSY  2002   • ORCHIECTOMY        Allergies:  No Known Allergies  Home Meds:  Current Meds:     Current Outpatient Medications:   •  albuterol sulfate  (90 Base) MCG/ACT inhaler, Inhale 2 puffs Every 4 (Four) Hours As Needed for Wheezing., Disp: , Rfl:   •  b complex-vitamin c-folic acid (NEPHRO-MARK) 0.8 MG tablet tablet, Take 1 tablet by mouth Daily., Disp: , Rfl:   •  calcium acetate (PHOSLO) 667 MG capsule, Take 1,334 mg by mouth 2 (Two) Times a Day With Meals. Breakfast and lunch, Disp: , Rfl:   •  calcium acetate (PHOSLO) 667 MG capsule, Take 2,001 mg by mouth Daily With Dinner., Disp: , Rfl:   •  cinacalcet (SENSIPAR) 30 MG tablet, Take 30 mg by mouth Daily., Disp: , Rfl:   •  CloNIDine (CATAPRES) 0.1 MG tablet, Take 0.1 mg by mouth 2 (Two) Times a Day., Disp: , Rfl:   •  hydrALAZINE (APRESOLINE) 50 MG tablet, Take 100 mg by mouth 2 (Two) Times a Day., Disp: , Rfl:   •  meloxicam (MOBIC) 7.5 MG tablet, Take 1 tablet  "by mouth Daily With Breakfast for 9 doses., Disp: 9 tablet, Rfl: 0  •  sodium bicarbonate 650 MG tablet, Take 650 mg by mouth 2 (Two) Times a Day., Disp: , Rfl:   Social History:   Social History     Tobacco Use   • Smoking status: Former Smoker     Packs/day: 1.00     Start date:      Last attempt to quit: 2019     Years since quittin.0   • Smokeless tobacco: Never Used   Substance Use Topics   • Alcohol use: No     Frequency: Never      Family History:  Family History   Problem Relation Age of Onset   • Heart attack Mother         The following portions of the patient's history were reviewed and updated as appropriate: allergies, current medications, past family history, past medical history, past social history, past surgical history and problem list.    Review of Systems   Cardiovascular: Positive for palpitations. Negative for chest pain and leg swelling.   Respiratory: Positive for shortness of breath.    Neurological: Positive for dizziness. Negative for numbness.       Procedures       Objective:     Physical Exam   /77 (BP Location: Right arm, Patient Position: Sitting, Cuff Size: Adult)   Pulse 97   Ht 182.9 cm (72\")   Wt 104 kg (230 lb)   SpO2 96%   BMI 31.19 kg/m²   General:  Appears in no acute distress  Eyes: Sclera is anicteric,  conjunctiva is clear   HEENT:  No JVD. Thyroid not visibly enlarged. No mucosal pallor or cyanosis  Respiratory: Respirations regular and unlabored at rest.  Bilaterally good breath sounds, with good air entry in all fields. No crackles, rubs or wheezes auscultated  Cardiovascular: S1,S2 Regular rate and rhythm. No murmur, rub or gallop auscultated.No pretibial pitting edema  Gastrointestinal: Abdomen soft, flat, non tender. Bowel sounds present. No hepatosplenomegaly. No ascites  Musculoskeletal:  No abnormal movements  Extremities: No digital clubbing or cyanosis  Skin: Color pink. Skin warm and dry to touch. No rashes  No xanthoma  Neuro: Alert and " awake, no lateralizing deficits appreciated    Lab Review:       Assessment:         No diagnosis found.     Interval History: Patient had cardiac cath 8/22/2019 which was normal.       Chief Complaint : Preop clearance for kidney transplant     History of Present Illness:       This is a 59-year-old with PMH of     CAD, chest pain, abnormal EKG, normal cardiac cath 8/22/2019  ESRD on hemodialysis, Dr. fernadnez  Testicular cancer diagnosed at 44 and right testicle removal  Migraine headache  Hypertension  Peripheral vascular disease  Family history negative for premature CAD   Cigarette smoker     Here for hospital follow-up.  Patient needs preoperative cardia vascular evaluation for kidney transplant surgery.  Patient recently presented to ER with severe substernal chest pain was found to have ST elevation in multiple leads underwent cardiac cath which was normal he is here for follow-up.  Patient is having some shortness of breath intermittently which he thinks is due to exposure to fumes from chemicals used in cleaning his car.  Patient recently had some exposure to chemical fumes in his car where he had Scotch cath placed.  Denies any fever chills.  Patient has history of fatigue and arthralgias.     Assessment:       Preoperative cardiovascular evaluation for kidney test  Chest pain, normal cardiac cath 8/22/2019  ST elevation probably due to early repolarization  ESRD  Hypertension  Cigarette smoker     Recommendations / Plan:      Reviewed normal cardiac cath results with patient  Okay from cardiovascular standpoint for kidney transfer  Follow-up with nephrology  Follow-up with me as needed  Echo reveals normal LV systolic function no pericardial effusion        Plan:

## 2019-09-25 PROCEDURE — 93010 ELECTROCARDIOGRAM REPORT: CPT | Performed by: INTERNAL MEDICINE

## 2019-12-31 ENCOUNTER — APPOINTMENT (OUTPATIENT)
Dept: INTERVENTIONAL RADIOLOGY/VASCULAR | Facility: HOSPITAL | Age: 59
End: 2019-12-31

## 2020-01-02 ENCOUNTER — HOSPITAL ENCOUNTER (OUTPATIENT)
Dept: INTERVENTIONAL RADIOLOGY/VASCULAR | Facility: HOSPITAL | Age: 60
Discharge: HOME | End: 2020-01-02
Admitting: RADIOLOGY

## 2020-01-02 VITALS
RESPIRATION RATE: 11 BRPM | WEIGHT: 228 LBS | DIASTOLIC BLOOD PRESSURE: 94 MMHG | SYSTOLIC BLOOD PRESSURE: 151 MMHG | HEART RATE: 65 BPM | OXYGEN SATURATION: 99 % | HEIGHT: 72 IN | BODY MASS INDEX: 30.88 KG/M2

## 2020-01-02 DIAGNOSIS — R94.4 NONSPECIFIC ABNORMAL RESULTS OF KIDNEY FUNCTION STUDY: ICD-10-CM

## 2020-01-02 PROCEDURE — C1894 INTRO/SHEATH, NON-LASER: HCPCS

## 2020-01-02 PROCEDURE — C1769 GUIDE WIRE: HCPCS

## 2020-01-02 PROCEDURE — C1725 CATH, TRANSLUMIN NON-LASER: HCPCS

## 2020-01-02 PROCEDURE — C1887 CATHETER, GUIDING: HCPCS

## 2020-01-02 PROCEDURE — 25010000002 FENTANYL CITRATE (PF) 100 MCG/2ML SOLUTION: Performed by: RADIOLOGY

## 2020-01-02 PROCEDURE — 0 IOPAMIDOL PER 1 ML: Performed by: INTERNAL MEDICINE

## 2020-01-02 RX ORDER — FENTANYL CITRATE 50 UG/ML
INJECTION, SOLUTION INTRAMUSCULAR; INTRAVENOUS
Status: COMPLETED | OUTPATIENT
Start: 2020-01-02 | End: 2020-01-02

## 2020-01-02 RX ADMIN — IOPAMIDOL 32 ML: 755 INJECTION, SOLUTION INTRAVENOUS at 15:02

## 2020-01-02 RX ADMIN — FENTANYL CITRATE 100 MCG: 50 INJECTION, SOLUTION INTRAMUSCULAR; INTRAVENOUS at 14:53

## 2020-01-02 NOTE — NURSING NOTE
Hemostasis achieved and local dressing of 4x4, betadine, and tegaderm applied to LUE fistula. Dressing is dry and intact.

## 2020-01-02 NOTE — NURSING NOTE
LUE fistula prepped in sterile fashion using chlorhexidine scrub and then draped in sterile fashion.

## 2020-01-02 NOTE — NURSING NOTE
Pt discharged home in stable condition on room air. Pt is alert and oriented x3. Pt dressing to LUE is dry and intact. Pt educated on discharge instructions and provided paper copy to take home. Wife will be providing transportation home due to pt receiving narcotic pain medication during procedure. Pt ambulated from dept without complication.

## 2020-01-02 NOTE — NURSING NOTE
Pt ambulated with staff into IR procedure room and placed self onto IR exam table in supine position. Pt. Provided warm blanket for comfort.

## 2020-01-02 NOTE — NURSING NOTE
Dr. Cannon locates narrowing in fistula that will require angioplasty. Pt updated by Dr. Cannon. 1438 Butterfly needle access discontinued and local light pressure being held at site for hemostasis by TIM Dawn. 1440 Hemostasis achieved.

## 2020-01-02 NOTE — NURSING NOTE
Dr. Cannon has obtained successful access to pt's LUE fistula; procedure of fistula intervention with angioplasty to begin.

## 2020-01-02 NOTE — NURSING NOTE
Dr. Cannon successfully obtained access, using butterfly needle, to pt's LUE fistula; imaging for fistulagram beginning.

## 2020-01-21 ENCOUNTER — LAB (OUTPATIENT)
Dept: LAB | Facility: HOSPITAL | Age: 60
End: 2020-01-21

## 2020-01-21 ENCOUNTER — TRANSCRIBE ORDERS (OUTPATIENT)
Dept: ADMINISTRATIVE | Facility: HOSPITAL | Age: 60
End: 2020-01-21

## 2020-01-21 DIAGNOSIS — Z01.83 ENCOUNTER FOR BLOOD TYPING: ICD-10-CM

## 2020-01-21 DIAGNOSIS — Z01.83 ENCOUNTER FOR BLOOD TYPING: Primary | ICD-10-CM

## 2020-01-21 LAB
ABO GROUP BLD: NORMAL
BLD GP AB SCN SERPL QL: NEGATIVE
RH BLD: NEGATIVE
T&S EXPIRATION DATE: NORMAL

## 2020-01-21 PROCEDURE — 86901 BLOOD TYPING SEROLOGIC RH(D): CPT | Performed by: TRANSPLANT SURGERY

## 2020-01-21 PROCEDURE — 86900 BLOOD TYPING SEROLOGIC ABO: CPT

## 2020-01-21 PROCEDURE — 36415 COLL VENOUS BLD VENIPUNCTURE: CPT

## 2020-01-21 PROCEDURE — 86901 BLOOD TYPING SEROLOGIC RH(D): CPT

## 2020-01-21 PROCEDURE — 86900 BLOOD TYPING SEROLOGIC ABO: CPT | Performed by: TRANSPLANT SURGERY

## 2020-01-21 PROCEDURE — 86850 RBC ANTIBODY SCREEN: CPT | Performed by: TRANSPLANT SURGERY

## 2020-04-01 ENCOUNTER — TRANSCRIBE ORDERS (OUTPATIENT)
Dept: ADMINISTRATIVE | Facility: HOSPITAL | Age: 60
End: 2020-04-01

## 2020-04-01 DIAGNOSIS — R06.00 DYSPNEA, UNSPECIFIED TYPE: Primary | ICD-10-CM

## 2020-06-15 ENCOUNTER — APPOINTMENT (OUTPATIENT)
Dept: RESPIRATORY THERAPY | Facility: HOSPITAL | Age: 60
End: 2020-06-15

## 2020-06-16 ENCOUNTER — APPOINTMENT (OUTPATIENT)
Dept: RESPIRATORY THERAPY | Facility: HOSPITAL | Age: 60
End: 2020-06-16

## 2020-07-13 ENCOUNTER — APPOINTMENT (OUTPATIENT)
Dept: RESPIRATORY THERAPY | Facility: HOSPITAL | Age: 60
End: 2020-07-13

## 2020-07-15 ENCOUNTER — APPOINTMENT (OUTPATIENT)
Dept: RESPIRATORY THERAPY | Facility: HOSPITAL | Age: 60
End: 2020-07-15

## 2020-07-21 ENCOUNTER — APPOINTMENT (OUTPATIENT)
Dept: RESPIRATORY THERAPY | Facility: HOSPITAL | Age: 60
End: 2020-07-21

## 2020-08-27 ENCOUNTER — TRANSCRIBE ORDERS (OUTPATIENT)
Dept: LAB | Facility: HOSPITAL | Age: 60
End: 2020-08-27

## 2020-08-27 DIAGNOSIS — Z01.818 OTHER SPECIFIED PRE-OPERATIVE EXAMINATION: Primary | ICD-10-CM

## 2020-08-29 ENCOUNTER — LAB (OUTPATIENT)
Dept: LAB | Facility: HOSPITAL | Age: 60
End: 2020-08-29

## 2020-08-29 DIAGNOSIS — Z01.818 OTHER SPECIFIED PRE-OPERATIVE EXAMINATION: ICD-10-CM

## 2020-08-29 PROCEDURE — U0004 COV-19 TEST NON-CDC HGH THRU: HCPCS

## 2020-08-29 PROCEDURE — U0002 COVID-19 LAB TEST NON-CDC: HCPCS

## 2020-08-29 PROCEDURE — C9803 HOPD COVID-19 SPEC COLLECT: HCPCS

## 2020-08-31 LAB
REF LAB TEST METHOD: NORMAL
SARS-COV-2 RNA RESP QL NAA+PROBE: NOT DETECTED

## 2020-09-01 ENCOUNTER — HOSPITAL ENCOUNTER (OUTPATIENT)
Dept: RESPIRATORY THERAPY | Facility: HOSPITAL | Age: 60
Discharge: HOME | End: 2020-09-01
Admitting: INTERNAL MEDICINE

## 2020-09-01 DIAGNOSIS — R06.00 DYSPNEA, UNSPECIFIED TYPE: ICD-10-CM

## 2020-09-01 PROCEDURE — 94726 PLETHYSMOGRAPHY LUNG VOLUMES: CPT

## 2020-09-01 PROCEDURE — A9270 NON-COVERED ITEM OR SERVICE: HCPCS | Performed by: INTERNAL MEDICINE

## 2020-09-01 PROCEDURE — 94729 DIFFUSING CAPACITY: CPT

## 2020-09-01 PROCEDURE — 94060 EVALUATION OF WHEEZING: CPT

## 2020-09-01 PROCEDURE — 63710000001 ALBUTEROL SULFATE HFA 108 (90 BASE) MCG/ACT AEROSOL SOLUTION: Performed by: INTERNAL MEDICINE

## 2020-09-01 RX ORDER — ALBUTEROL SULFATE 90 UG/1
2 AEROSOL, METERED RESPIRATORY (INHALATION) ONCE
Status: COMPLETED | OUTPATIENT
Start: 2020-09-01 | End: 2020-09-01

## 2020-09-01 RX ADMIN — ALBUTEROL SULFATE 2 PUFF: 90 AEROSOL, METERED RESPIRATORY (INHALATION) at 10:58

## 2020-10-22 ENCOUNTER — TRANSCRIBE ORDERS (OUTPATIENT)
Dept: ADMINISTRATIVE | Facility: HOSPITAL | Age: 60
End: 2020-10-22

## 2020-10-22 ENCOUNTER — LAB (OUTPATIENT)
Dept: LAB | Facility: HOSPITAL | Age: 60
End: 2020-10-22

## 2020-10-22 DIAGNOSIS — Z01.818 PREOP TESTING: ICD-10-CM

## 2020-10-22 DIAGNOSIS — Z01.818 PREOP TESTING: Primary | ICD-10-CM

## 2020-10-22 PROCEDURE — U0004 COV-19 TEST NON-CDC HGH THRU: HCPCS

## 2020-10-22 PROCEDURE — C9803 HOPD COVID-19 SPEC COLLECT: HCPCS

## 2020-10-23 LAB — SARS-COV-2 RNA RESP QL NAA+PROBE: NOT DETECTED

## 2020-10-31 ENCOUNTER — HOSPITAL ENCOUNTER (EMERGENCY)
Facility: HOSPITAL | Age: 60
End: 2020-10-31

## 2020-10-31 ENCOUNTER — TRANSCRIBE ORDERS (OUTPATIENT)
Dept: ADMINISTRATIVE | Facility: HOSPITAL | Age: 60
End: 2020-10-31

## 2020-10-31 DIAGNOSIS — I10 ESSENTIAL HYPERTENSION: Primary | ICD-10-CM

## 2020-10-31 DIAGNOSIS — J44.9 OBSTRUCTIVE CHRONIC BRONCHITIS WITHOUT EXACERBATION (HCC): ICD-10-CM

## 2020-10-31 DIAGNOSIS — N18.6 END STAGE RENAL DISEASE (HCC): ICD-10-CM

## 2020-10-31 DIAGNOSIS — F17.210 CIGARETTE SMOKER: ICD-10-CM

## 2020-10-31 DIAGNOSIS — Z76.82 AWAITING ORGAN TRANSPLANT: ICD-10-CM

## 2020-11-03 ENCOUNTER — HOSPITAL ENCOUNTER (OUTPATIENT)
Dept: CARDIOLOGY | Facility: HOSPITAL | Age: 60
Discharge: HOME | End: 2020-11-03
Admitting: NURSE PRACTITIONER

## 2020-11-03 DIAGNOSIS — N18.6 END STAGE RENAL DISEASE (HCC): ICD-10-CM

## 2020-11-03 DIAGNOSIS — I10 ESSENTIAL HYPERTENSION: ICD-10-CM

## 2020-11-03 DIAGNOSIS — F17.210 CIGARETTE SMOKER: ICD-10-CM

## 2020-11-03 DIAGNOSIS — J44.9 OBSTRUCTIVE CHRONIC BRONCHITIS WITHOUT EXACERBATION (HCC): ICD-10-CM

## 2020-11-03 DIAGNOSIS — Z76.82 AWAITING ORGAN TRANSPLANT: ICD-10-CM

## 2020-11-03 PROCEDURE — 93005 ELECTROCARDIOGRAM TRACING: CPT | Performed by: NURSE PRACTITIONER

## 2020-11-03 PROCEDURE — 93010 ELECTROCARDIOGRAM REPORT: CPT | Performed by: INTERNAL MEDICINE

## 2020-11-05 ENCOUNTER — ON CAMPUS - OUTPATIENT (AMBULATORY)
Dept: URBAN - METROPOLITAN AREA HOSPITAL 2 | Facility: HOSPITAL | Age: 60
End: 2020-11-05

## 2020-11-05 ENCOUNTER — OFFICE (AMBULATORY)
Dept: URBAN - METROPOLITAN AREA PATHOLOGY 4 | Facility: PATHOLOGY | Age: 60
End: 2020-11-05
Payer: MEDICARE

## 2020-11-05 VITALS
OXYGEN SATURATION: 98 % | DIASTOLIC BLOOD PRESSURE: 90 MMHG | RESPIRATION RATE: 16 BRPM | SYSTOLIC BLOOD PRESSURE: 145 MMHG | RESPIRATION RATE: 17 BRPM | WEIGHT: 231 LBS | OXYGEN SATURATION: 97 % | HEART RATE: 62 BPM | SYSTOLIC BLOOD PRESSURE: 128 MMHG | HEART RATE: 68 BPM | SYSTOLIC BLOOD PRESSURE: 115 MMHG | RESPIRATION RATE: 18 BRPM | SYSTOLIC BLOOD PRESSURE: 151 MMHG | HEART RATE: 69 BPM | HEIGHT: 72 IN | DIASTOLIC BLOOD PRESSURE: 83 MMHG | HEART RATE: 67 BPM | DIASTOLIC BLOOD PRESSURE: 75 MMHG | OXYGEN SATURATION: 99 % | SYSTOLIC BLOOD PRESSURE: 143 MMHG | HEART RATE: 64 BPM | HEART RATE: 66 BPM | TEMPERATURE: 97.7 F | DIASTOLIC BLOOD PRESSURE: 69 MMHG | RESPIRATION RATE: 19 BRPM | OXYGEN SATURATION: 100 % | DIASTOLIC BLOOD PRESSURE: 58 MMHG | SYSTOLIC BLOOD PRESSURE: 146 MMHG | SYSTOLIC BLOOD PRESSURE: 144 MMHG | HEART RATE: 72 BPM | SYSTOLIC BLOOD PRESSURE: 147 MMHG | HEART RATE: 71 BPM

## 2020-11-05 DIAGNOSIS — K64.4 RESIDUAL HEMORRHOIDAL SKIN TAGS: ICD-10-CM

## 2020-11-05 DIAGNOSIS — Z86.010 PERSONAL HISTORY OF COLONIC POLYPS: ICD-10-CM

## 2020-11-05 DIAGNOSIS — K63.5 POLYP OF COLON: ICD-10-CM

## 2020-11-05 DIAGNOSIS — K57.30 DIVERTICULOSIS OF LARGE INTESTINE WITHOUT PERFORATION OR ABS: ICD-10-CM

## 2020-11-05 LAB
GI HISTOLOGY: A. UNSPECIFIED: (no result)
GI HISTOLOGY: PDF REPORT: (no result)

## 2020-11-05 PROCEDURE — 88305 TISSUE EXAM BY PATHOLOGIST: CPT | Mod: 33 | Performed by: INTERNAL MEDICINE

## 2020-11-05 PROCEDURE — 45380 COLONOSCOPY AND BIOPSY: CPT | Mod: PT | Performed by: INTERNAL MEDICINE

## 2020-11-08 ENCOUNTER — APPOINTMENT (OUTPATIENT)
Dept: GENERAL RADIOLOGY | Facility: HOSPITAL | Age: 60
End: 2020-11-08

## 2020-11-08 ENCOUNTER — HOSPITAL ENCOUNTER (EMERGENCY)
Facility: HOSPITAL | Age: 60
Discharge: HOME | End: 2020-11-08
Admitting: EMERGENCY MEDICINE

## 2020-11-08 VITALS
TEMPERATURE: 98 F | WEIGHT: 227.51 LBS | HEART RATE: 70 BPM | DIASTOLIC BLOOD PRESSURE: 74 MMHG | RESPIRATION RATE: 18 BRPM | HEIGHT: 72 IN | OXYGEN SATURATION: 98 % | BODY MASS INDEX: 30.82 KG/M2 | SYSTOLIC BLOOD PRESSURE: 136 MMHG

## 2020-11-08 DIAGNOSIS — M25.562 LEFT KNEE PAIN, UNSPECIFIED CHRONICITY: Primary | ICD-10-CM

## 2020-11-08 LAB — QT INTERVAL: 412 MS

## 2020-11-08 PROCEDURE — 73562 X-RAY EXAM OF KNEE 3: CPT

## 2020-11-08 PROCEDURE — 99283 EMERGENCY DEPT VISIT LOW MDM: CPT

## 2020-11-08 RX ORDER — HYDROCODONE BITARTRATE AND ACETAMINOPHEN 5; 325 MG/1; MG/1
1 TABLET ORAL EVERY 6 HOURS PRN
Qty: 12 TABLET | Refills: 0 | Status: SHIPPED | OUTPATIENT
Start: 2020-11-08

## 2020-11-08 RX ORDER — HYDROCODONE BITARTRATE AND ACETAMINOPHEN 7.5; 325 MG/1; MG/1
1 TABLET ORAL ONCE
Status: COMPLETED | OUTPATIENT
Start: 2020-11-08 | End: 2020-11-08

## 2020-11-08 RX ADMIN — HYDROCODONE BITARTRATE AND ACETAMINOPHEN 1 TABLET: 7.5; 325 TABLET ORAL at 07:27

## 2020-11-08 NOTE — DISCHARGE INSTRUCTIONS
Take Norco as needed for pain.  Do not mix with other pain medication.  Rest, elevate, apply ice to left knee as needed for pain and swelling.  Apply 20 minutes every 2-3 hours while awake  Wear knee brace as needed for stability and comfort.  Use crutches for ambulation.    Follow-up with primary care as needed for new or worsening concerns as well as blood pressure check in the next 4 weeks    Return to the ER for new or worsening symptoms.

## 2020-11-08 NOTE — ED PROVIDER NOTES
Subjective   Chief Complaint: Knee pain    Patient is a 60-year-old  male with history of CKD, COPD, hypertension presents the ER with chief complaint of left knee pain for 2 days.  Patient states he is currently admitted to house doing electrical work, states that he felt his knee pop multiple times.  Patient states that he was ambulatory after crawling out from under the house and able to bear weight.  Patient states as the day went on yesterday the pain slowly became worse and he developed some swelling of the superior aspect of the left knee.  Patient states he had increased pain with range of motion and bending his left knee, describes the pain as a constant sharp abdominal pain that he rates 10/10.  He denies any palliative factors.  Patient states he has taken no medication at home for pain relief.  Patient has no other complaints.    Location: Left knee    Quality: Throbbing, stabbing    Duration: 2 days    Timing: Constant    Severity: Moderate    Associated Symptoms: Swelling    PCP: Kiah Beltran      History provided by:  Patient      Review of Systems   Constitutional: Negative for fever.   HENT: Negative for sore throat and trouble swallowing.    Respiratory: Negative for shortness of breath and wheezing.    Cardiovascular: Negative for chest pain.   Gastrointestinal: Negative for abdominal pain.   Genitourinary: Negative for dysuria.   Musculoskeletal: Positive for arthralgias and joint swelling.        Knee pain   Skin: Negative for rash.   Neurological: Negative for headaches.   Psychiatric/Behavioral: Negative for behavioral problems.   All other systems reviewed and are negative.      Past Medical History:   Diagnosis Date   • CKD (chronic kidney disease) requiring chronic dialysis (CMS/[facility])    • COPD (chronic obstructive pulmonary disease) (CMS/[facility])    • Hypertension    • Kidney failure        No Known Allergies    Past Surgical History:   Procedure Laterality Date   • ARTERIOVENOUS  FISTULA REPAIR     • ARTERIOVENOUS FISTULA/SHUNT SURGERY     • CARDIAC CATHETERIZATION N/A 2019    Procedure: Left Heart Cath;  Surgeon: Juanpablo Garcia MD;  Location: [facility] CATH INVASIVE LOCATION;  Service: Cardiology   • CARDIAC CATHETERIZATION N/A 2019    Procedure: Aortic root aortogram;  Surgeon: Juanpablo Garcia MD;  Location: [facility] CATH INVASIVE LOCATION;  Service: Cardiology   • LYMPH NODE BIOPSY     • ORCHIECTOMY         Family History   Problem Relation Age of Onset   • Heart attack Mother        Social History     Socioeconomic History   • Marital status:      Spouse name: Not on file   • Number of children: Not on file   • Years of education: Not on file   • Highest education level: Not on file   Tobacco Use   • Smoking status: Former Smoker     Packs/day: 1.00     Start date:      Quit date: 2019     Years since quittin.2   • Smokeless tobacco: Never Used   Substance and Sexual Activity   • Alcohol use: No     Frequency: Never   • Drug use: No   • Sexual activity: Yes     Partners: Female           Objective   Physical Exam  Vitals signs and nursing note reviewed.   Constitutional:       General: He is not in acute distress.     Appearance: He is not diaphoretic.   HENT:      Head: Normocephalic and atraumatic.   Cardiovascular:      Rate and Rhythm: Normal rate and regular rhythm.      Pulses: Normal pulses.      Heart sounds: Normal heart sounds. No murmur.   Pulmonary:      Effort: Pulmonary effort is normal.      Breath sounds: Normal breath sounds. No wheezing.   Musculoskeletal:         General: Swelling and tenderness present.      Right lower leg: No edema.      Left lower leg: No edema.      Comments: Distal pulses present 2+ bilaterally  Sensation to soft touch intact  Motor strength 5/5 bilateral lower extremities  Tenderness palpation in the superior aspect of left knee with obvious soft tissue swelling  No erythema, no warmth  Patient  "refuses to flex left knee due to pain  No tenderness to outpatient left thigh, left lower leg or ankle   Skin:     General: Skin is warm.      Capillary Refill: Capillary refill takes less than 2 seconds.      Findings: No erythema or lesion.   Neurological:      General: No focal deficit present.      Mental Status: He is alert and oriented to person, place, and time.   Psychiatric:         Mood and Affect: Mood normal.         Behavior: Behavior normal.         Procedures           ED Course  ED Course as of Nov 08 0851   Sun Nov 08, 2020   0840 Patient was reevaluated, reports some improvement of pain, XR negative for acute osseous injury. Patient given knee immobilizer and cructches    [MM]      ED Course User Index  [MM] Richa Pool PA    /82 (BP Location: Left arm, Patient Position: Sitting)   Pulse 67   Temp 98.1 °F (36.7 °C) (Oral)   Resp 15   Ht 182.9 cm (72\")   Wt 103 kg (227 lb 8.2 oz)   SpO2 99%   BMI 30.86 kg/m²   Labs Reviewed - No data to display  Medications   HYDROcodone-acetaminophen (NORCO) 7.5-325 MG per tablet 1 tablet (1 tablet Oral Given 11/8/20 0727)     Xr Knee 3 View Left    Result Date: 11/8/2020  1.No acute fracture or traumatic malalignment identified. 2.Suprapatellar joint effusion.  Electronically Signed By-DR. Gt Al MD On:11/8/2020 8:07 AM This report was finalized on [id number] by DR. Gt Al MD.                                           MDM  Number of Diagnoses or Management Options  Left knee pain, unspecified chronicity:   Diagnosis management comments: MEDICAL DECISION  Epic Chart Review:  Comorbidities: HTN, CKD, COPD  Differentials: Fracture, contusion, septic arthritis, strain, sprain; this list is not all inclusive and does not constitute the entirety of considered causes  Radiology interpretation:  Images reviewed by me and interpreted by radiologist,   X-ray left knee shows no acute fracture or traumatic malalignment, suprapatellar " "joint effusion  Lab interpretation: Not warranted    While in the ED appropriate PPE was worn during exam and throughout all encounters with the patient.  Patient had the above evaluation.  Patient was given Norco for pain.  Patient has mild to moderate swelling superior aspect of left knee with no overlying erythema or warmth.  Patient reports pain with flexion of the left knee, patient refuses to allow me to fully flex or manipulate knee due to pain.  X-ray negative for any acute osseous abnormalities.  Patient patient's history of trauma, crawling around on his knee and hearing a \"pop\" suspect possible ligamentous injury.  Recommended follow-up with orthopedic specialist for further management and evaluation, possible MRI of the left knee.  Patient was given contact information for multiple orthopedic specialist in the area.  Patient was placed in knee immobilizer and crutches, patient was neurovascularly intact distally post knee immobilizer placement.  Inspect is queried.  Patient discharged with prescription for Norco, patient will follow up with PCP and orthopedic specialist.    Discharge plan and instructions were discussed with the patient who verbalized understanding and is in agreement with the plan, all questions were answered at this time.  Patient is aware of signs symptoms that would require immediate return to the emergency room.  Patient understands importance of following up with primary care provider for further evaluation and worsening concerns as well as blood pressure recheck in the next 4 weeks.    Patient remained afebrile, nontoxic-appearing, no acute respiratory distress throughout entire emergency room stay.  Patient was discharged in improved stable condition.         Amount and/or Complexity of Data Reviewed  Tests in the radiology section of CPT®: reviewed and ordered    Patient Progress  Patient progress: stable      Final diagnoses:   Left knee pain, unspecified chronicity          "   Richa Pool PA  11/08/20 0880

## 2020-11-08 NOTE — ED NOTES
Pt c/o left knee pain.  Pt sts he twisted it yesterday.       Kierra Noriega, LAYLAN  11/08/20 0717

## 2021-05-12 ENCOUNTER — LAB (OUTPATIENT)
Dept: LAB | Facility: HOSPITAL | Age: 61
End: 2021-05-12

## 2021-05-12 ENCOUNTER — TRANSCRIBE ORDERS (OUTPATIENT)
Dept: LAB | Facility: HOSPITAL | Age: 61
End: 2021-05-12

## 2021-05-12 DIAGNOSIS — Z94.0 KIDNEY REPLACED BY TRANSPLANT: ICD-10-CM

## 2021-05-12 DIAGNOSIS — D64.9 ANEMIA, UNSPECIFIED TYPE: ICD-10-CM

## 2021-05-12 DIAGNOSIS — R39.9 GENITOURINARY SYMPTOMS: ICD-10-CM

## 2021-05-12 DIAGNOSIS — R79.89 HYPOURICEMIA: Primary | ICD-10-CM

## 2021-05-12 DIAGNOSIS — R79.89 HYPOURICEMIA: ICD-10-CM

## 2021-05-12 LAB
ALBUMIN SERPL-MCNC: 4.2 G/DL (ref 3.5–5.2)
ANION GAP SERPL CALCULATED.3IONS-SCNC: 9.4 MMOL/L (ref 5–15)
BILIRUB UR QL STRIP: NEGATIVE
BUN SERPL-MCNC: 18 MG/DL (ref 6–20)
BUN/CREAT SERPL: 8.3 (ref 7–25)
CALCIUM SPEC-SCNC: 9.8 MG/DL (ref 8.6–10.5)
CHLORIDE SERPL-SCNC: 111 MMOL/L (ref 98–107)
CLARITY UR: ABNORMAL
CO2 SERPL-SCNC: 17.6 MMOL/L (ref 22–29)
COLOR UR: YELLOW
CREAT SERPL-MCNC: 2.17 MG/DL (ref 0.76–1.27)
CREAT UR-MCNC: 152.9 MG/DL
DEPRECATED RDW RBC AUTO: 45.2 FL (ref 37–54)
EOSINOPHIL # BLD MANUAL: 0.05 10*3/MM3 (ref 0–0.4)
EOSINOPHIL NFR BLD MANUAL: 3 % (ref 0.3–6.2)
ERYTHROCYTE [DISTWIDTH] IN BLOOD BY AUTOMATED COUNT: 13 % (ref 12.3–15.4)
GFR SERPL CREATININE-BSD FRML MDRD: 31 ML/MIN/1.73
GLUCOSE SERPL-MCNC: 121 MG/DL (ref 65–99)
GLUCOSE UR STRIP-MCNC: ABNORMAL MG/DL
HCT VFR BLD AUTO: 35.5 % (ref 37.5–51)
HGB BLD-MCNC: 11.2 G/DL (ref 13–17.7)
HGB UR QL STRIP.AUTO: NEGATIVE
KETONES UR QL STRIP: NEGATIVE
LEUKOCYTE ESTERASE UR QL STRIP.AUTO: NEGATIVE
LYMPHOCYTES # BLD MANUAL: 0.39 10*3/MM3 (ref 0.7–3.1)
LYMPHOCYTES NFR BLD MANUAL: 23 % (ref 19.6–45.3)
LYMPHOCYTES NFR BLD MANUAL: 25 % (ref 5–12)
MAGNESIUM SERPL-MCNC: 1.7 MG/DL (ref 1.6–2.6)
MCH RBC QN AUTO: 30.2 PG (ref 26.6–33)
MCHC RBC AUTO-ENTMCNC: 31.5 G/DL (ref 31.5–35.7)
MCV RBC AUTO: 95.7 FL (ref 79–97)
MONOCYTES # BLD AUTO: 0.42 10*3/MM3 (ref 0.1–0.9)
NEUTROPHILS # BLD AUTO: 0.83 10*3/MM3 (ref 1.7–7)
NEUTROPHILS NFR BLD MANUAL: 49 % (ref 42.7–76)
NITRITE UR QL STRIP: NEGATIVE
PH UR STRIP.AUTO: 5.5 [PH] (ref 5–8)
PHOSPHATE SERPL-MCNC: 1.6 MG/DL (ref 2.5–4.5)
PLAT MORPH BLD: NORMAL
PLATELET # BLD AUTO: 197 10*3/MM3 (ref 140–450)
PMV BLD AUTO: 10.1 FL (ref 6–12)
POTASSIUM SERPL-SCNC: 4.8 MMOL/L (ref 3.5–5.2)
PROT UR QL STRIP: ABNORMAL
PROT UR-MCNC: 26 MG/DL
PROT/CREAT UR: 170 MG/G CREA (ref 0–200)
RBC # BLD AUTO: 3.71 10*6/MM3 (ref 4.14–5.8)
RBC MORPH BLD: NORMAL
SODIUM SERPL-SCNC: 138 MMOL/L (ref 136–145)
SP GR UR STRIP: 1.02 (ref 1–1.03)
UROBILINOGEN UR QL STRIP: ABNORMAL
WBC # BLD AUTO: 1.69 10*3/MM3 (ref 3.4–10.8)
WBC MORPH BLD: NORMAL

## 2021-05-12 PROCEDURE — 83735 ASSAY OF MAGNESIUM: CPT

## 2021-05-12 PROCEDURE — 80069 RENAL FUNCTION PANEL: CPT

## 2021-05-12 PROCEDURE — 87799 DETECT AGENT NOS DNA QUANT: CPT

## 2021-05-12 PROCEDURE — 80197 ASSAY OF TACROLIMUS: CPT

## 2021-05-12 PROCEDURE — 36415 COLL VENOUS BLD VENIPUNCTURE: CPT

## 2021-05-12 PROCEDURE — 85007 BL SMEAR W/DIFF WBC COUNT: CPT

## 2021-05-12 PROCEDURE — 81003 URINALYSIS AUTO W/O SCOPE: CPT

## 2021-05-12 PROCEDURE — 84156 ASSAY OF PROTEIN URINE: CPT

## 2021-05-12 PROCEDURE — 82570 ASSAY OF URINE CREATININE: CPT

## 2021-05-12 PROCEDURE — 85025 COMPLETE CBC W/AUTO DIFF WBC: CPT

## 2021-05-15 LAB
CMV DNA SERPL NAA+PROBE-ACNC: NEGATIVE IU/ML
CMV DNA SERPL NAA+PROBE-LOG IU: NORMAL {LOG_IU}/ML
TACROLIMUS BLD LC/MS/MS-MCNC: 13.4 NG/ML (ref 2–20)

## 2021-05-17 ENCOUNTER — LAB (OUTPATIENT)
Dept: LAB | Facility: HOSPITAL | Age: 61
End: 2021-05-17

## 2021-05-17 DIAGNOSIS — D64.9 ANEMIA, UNSPECIFIED TYPE: ICD-10-CM

## 2021-05-17 DIAGNOSIS — R39.9 GENITOURINARY SYMPTOMS: ICD-10-CM

## 2021-05-17 DIAGNOSIS — R79.89 HYPOURICEMIA: ICD-10-CM

## 2021-05-17 DIAGNOSIS — Z94.0 KIDNEY REPLACED BY TRANSPLANT: ICD-10-CM

## 2021-05-17 LAB
ALBUMIN SERPL-MCNC: 3.8 G/DL (ref 3.5–5.2)
ANION GAP SERPL CALCULATED.3IONS-SCNC: 10.1 MMOL/L (ref 5–15)
BILIRUB UR QL STRIP: NEGATIVE
BKV DNA # SERPL NAA+PROBE: NEGATIVE COPIES/ML
BKV DNA SERPL NAA+PROBE-LOG#: NORMAL {LOG_COPIES}/ML
BUN SERPL-MCNC: 24 MG/DL (ref 6–20)
BUN/CREAT SERPL: 10.1 (ref 7–25)
CALCIUM SPEC-SCNC: 9.6 MG/DL (ref 8.6–10.5)
CHLORIDE SERPL-SCNC: 110 MMOL/L (ref 98–107)
CLARITY UR: CLEAR
CO2 SERPL-SCNC: 17.9 MMOL/L (ref 22–29)
COLOR UR: YELLOW
CREAT SERPL-MCNC: 2.37 MG/DL (ref 0.76–1.27)
CREAT UR-MCNC: 99.5 MG/DL
DEPRECATED RDW RBC AUTO: 42.7 FL (ref 37–54)
ERYTHROCYTE [DISTWIDTH] IN BLOOD BY AUTOMATED COUNT: 12.7 % (ref 12.3–15.4)
GFR SERPL CREATININE-BSD FRML MDRD: 28 ML/MIN/1.73
GLUCOSE SERPL-MCNC: 149 MG/DL (ref 65–99)
GLUCOSE UR STRIP-MCNC: ABNORMAL MG/DL
HCT VFR BLD AUTO: 36.4 % (ref 37.5–51)
HGB BLD-MCNC: 11.9 G/DL (ref 13–17.7)
HGB UR QL STRIP.AUTO: NEGATIVE
KETONES UR QL STRIP: NEGATIVE
LEUKOCYTE ESTERASE UR QL STRIP.AUTO: NEGATIVE
LYMPHOCYTES # BLD MANUAL: 0.24 10*3/MM3 (ref 0.7–3.1)
LYMPHOCYTES NFR BLD MANUAL: 18.1 % (ref 5–12)
LYMPHOCYTES NFR BLD MANUAL: 8.5 % (ref 19.6–45.3)
MAGNESIUM SERPL-MCNC: 1.8 MG/DL (ref 1.6–2.6)
MCH RBC QN AUTO: 30.5 PG (ref 26.6–33)
MCHC RBC AUTO-ENTMCNC: 32.7 G/DL (ref 31.5–35.7)
MCV RBC AUTO: 93.3 FL (ref 79–97)
MONOCYTES # BLD AUTO: 0.51 10*3/MM3 (ref 0.1–0.9)
NEUTROPHILS # BLD AUTO: 2.07 10*3/MM3 (ref 1.7–7)
NEUTROPHILS NFR BLD MANUAL: 73.4 % (ref 42.7–76)
NITRITE UR QL STRIP: NEGATIVE
NRBC BLD AUTO-RTO: 0 /100 WBC (ref 0–0.2)
PH UR STRIP.AUTO: 6 [PH] (ref 5–8)
PHOSPHATE SERPL-MCNC: 2.2 MG/DL (ref 2.5–4.5)
PLAT MORPH BLD: NORMAL
PLATELET # BLD AUTO: 166 10*3/MM3 (ref 140–450)
PMV BLD AUTO: 10.3 FL (ref 6–12)
POTASSIUM SERPL-SCNC: 5 MMOL/L (ref 3.5–5.2)
PROT UR QL STRIP: ABNORMAL
PROT UR-MCNC: 19 MG/DL
PROT/CREAT UR: 191 MG/G CREA (ref 0–200)
RBC # BLD AUTO: 3.9 10*6/MM3 (ref 4.14–5.8)
RBC MORPH BLD: NORMAL
SODIUM SERPL-SCNC: 138 MMOL/L (ref 136–145)
SP GR UR STRIP: 1.02 (ref 1–1.03)
UROBILINOGEN UR QL STRIP: ABNORMAL
WBC # BLD AUTO: 2.82 10*3/MM3 (ref 3.4–10.8)
WBC MORPH BLD: NORMAL

## 2021-05-17 PROCEDURE — 82570 ASSAY OF URINE CREATININE: CPT

## 2021-05-17 PROCEDURE — 85025 COMPLETE CBC W/AUTO DIFF WBC: CPT

## 2021-05-17 PROCEDURE — 85007 BL SMEAR W/DIFF WBC COUNT: CPT

## 2021-05-17 PROCEDURE — 80069 RENAL FUNCTION PANEL: CPT

## 2021-05-17 PROCEDURE — 84156 ASSAY OF PROTEIN URINE: CPT

## 2021-05-17 PROCEDURE — 36415 COLL VENOUS BLD VENIPUNCTURE: CPT

## 2021-05-17 PROCEDURE — 80197 ASSAY OF TACROLIMUS: CPT

## 2021-05-17 PROCEDURE — 83735 ASSAY OF MAGNESIUM: CPT

## 2021-05-17 PROCEDURE — 81003 URINALYSIS AUTO W/O SCOPE: CPT

## 2021-05-17 PROCEDURE — 87799 DETECT AGENT NOS DNA QUANT: CPT

## 2021-05-19 LAB — TACROLIMUS BLD LC/MS/MS-MCNC: 17.9 NG/ML (ref 2–20)

## 2021-05-20 LAB
BKV DNA # SERPL NAA+PROBE: NEGATIVE COPIES/ML
BKV DNA SERPL NAA+PROBE-LOG#: NORMAL {LOG_COPIES}/ML
CMV DNA SERPL NAA+PROBE-ACNC: NEGATIVE IU/ML
CMV DNA SERPL NAA+PROBE-LOG IU: NORMAL {LOG_IU}/ML

## 2021-06-23 ENCOUNTER — LAB (OUTPATIENT)
Dept: LAB | Facility: HOSPITAL | Age: 61
End: 2021-06-23

## 2021-06-23 DIAGNOSIS — R79.89 HYPOURICEMIA: ICD-10-CM

## 2021-06-23 DIAGNOSIS — Z94.0 KIDNEY REPLACED BY TRANSPLANT: ICD-10-CM

## 2021-06-23 DIAGNOSIS — R39.9 GENITOURINARY SYMPTOMS: ICD-10-CM

## 2021-06-23 DIAGNOSIS — D64.9 ANEMIA, UNSPECIFIED TYPE: ICD-10-CM

## 2021-06-23 LAB
ALBUMIN SERPL-MCNC: 3.6 G/DL (ref 3.5–5.2)
ANION GAP SERPL CALCULATED.3IONS-SCNC: 8.4 MMOL/L (ref 5–15)
BACTERIA UR QL AUTO: ABNORMAL /HPF
BASOPHILS # BLD AUTO: 0.02 10*3/MM3 (ref 0–0.2)
BASOPHILS NFR BLD AUTO: 0.5 % (ref 0–1.5)
BILIRUB UR QL STRIP: NEGATIVE
BUN SERPL-MCNC: 28 MG/DL (ref 6–20)
BUN/CREAT SERPL: 14.3 (ref 7–25)
CALCIUM SPEC-SCNC: 9.4 MG/DL (ref 8.6–10.5)
CHLORIDE SERPL-SCNC: 112 MMOL/L (ref 98–107)
CLARITY UR: CLEAR
CO2 SERPL-SCNC: 18.6 MMOL/L (ref 22–29)
COLOR UR: ABNORMAL
CREAT SERPL-MCNC: 1.96 MG/DL (ref 0.76–1.27)
CREAT UR-MCNC: 189.5 MG/DL
DEPRECATED RDW RBC AUTO: 46.3 FL (ref 37–54)
EOSINOPHIL # BLD AUTO: 0.05 10*3/MM3 (ref 0–0.4)
EOSINOPHIL NFR BLD AUTO: 1.3 % (ref 0.3–6.2)
ERYTHROCYTE [DISTWIDTH] IN BLOOD BY AUTOMATED COUNT: 13.9 % (ref 12.3–15.4)
GFR SERPL CREATININE-BSD FRML MDRD: 35 ML/MIN/1.73
GLUCOSE SERPL-MCNC: 89 MG/DL (ref 65–99)
GLUCOSE UR STRIP-MCNC: NEGATIVE MG/DL
HCT VFR BLD AUTO: 36 % (ref 37.5–51)
HGB BLD-MCNC: 11.6 G/DL (ref 13–17.7)
HGB UR QL STRIP.AUTO: NEGATIVE
HYALINE CASTS UR QL AUTO: ABNORMAL /LPF
IMM GRANULOCYTES # BLD AUTO: 0.04 10*3/MM3 (ref 0–0.05)
IMM GRANULOCYTES NFR BLD AUTO: 1 % (ref 0–0.5)
KETONES UR QL STRIP: NEGATIVE
LEUKOCYTE ESTERASE UR QL STRIP.AUTO: NEGATIVE
LYMPHOCYTES # BLD AUTO: 0.82 10*3/MM3 (ref 0.7–3.1)
LYMPHOCYTES NFR BLD AUTO: 20.7 % (ref 19.6–45.3)
MAGNESIUM SERPL-MCNC: 1.6 MG/DL (ref 1.6–2.6)
MCH RBC QN AUTO: 29.1 PG (ref 26.6–33)
MCHC RBC AUTO-ENTMCNC: 32.2 G/DL (ref 31.5–35.7)
MCV RBC AUTO: 90.5 FL (ref 79–97)
MONOCYTES # BLD AUTO: 0.26 10*3/MM3 (ref 0.1–0.9)
MONOCYTES NFR BLD AUTO: 6.6 % (ref 5–12)
NEUTROPHILS NFR BLD AUTO: 2.77 10*3/MM3 (ref 1.7–7)
NEUTROPHILS NFR BLD AUTO: 69.9 % (ref 42.7–76)
NITRITE UR QL STRIP: NEGATIVE
NRBC BLD AUTO-RTO: 0.3 /100 WBC (ref 0–0.2)
PH UR STRIP.AUTO: 5.5 [PH] (ref 5–8)
PHOSPHATE SERPL-MCNC: 2.3 MG/DL (ref 2.5–4.5)
PLATELET # BLD AUTO: 136 10*3/MM3 (ref 140–450)
PMV BLD AUTO: 10.7 FL (ref 6–12)
POTASSIUM SERPL-SCNC: 4.8 MMOL/L (ref 3.5–5.2)
PROT UR QL STRIP: ABNORMAL
PROT UR-MCNC: 39 MG/DL
PROT/CREAT UR: 205.8 MG/G CREA (ref 0–200)
RBC # BLD AUTO: 3.98 10*6/MM3 (ref 4.14–5.8)
RBC # UR: ABNORMAL /HPF
REF LAB TEST METHOD: ABNORMAL
SODIUM SERPL-SCNC: 139 MMOL/L (ref 136–145)
SP GR UR STRIP: 1.02 (ref 1–1.03)
SQUAMOUS #/AREA URNS HPF: ABNORMAL /HPF
UROBILINOGEN UR QL STRIP: ABNORMAL
WBC # BLD AUTO: 3.96 10*3/MM3 (ref 3.4–10.8)
WBC UR QL AUTO: ABNORMAL /HPF

## 2021-06-23 PROCEDURE — 83735 ASSAY OF MAGNESIUM: CPT

## 2021-06-23 PROCEDURE — 85025 COMPLETE CBC W/AUTO DIFF WBC: CPT

## 2021-06-23 PROCEDURE — 82570 ASSAY OF URINE CREATININE: CPT

## 2021-06-23 PROCEDURE — 87799 DETECT AGENT NOS DNA QUANT: CPT

## 2021-06-23 PROCEDURE — 80197 ASSAY OF TACROLIMUS: CPT

## 2021-06-23 PROCEDURE — 36415 COLL VENOUS BLD VENIPUNCTURE: CPT

## 2021-06-23 PROCEDURE — 80069 RENAL FUNCTION PANEL: CPT

## 2021-06-23 PROCEDURE — 81001 URINALYSIS AUTO W/SCOPE: CPT

## 2021-06-23 PROCEDURE — 84156 ASSAY OF PROTEIN URINE: CPT

## 2021-06-25 LAB
BKV DNA # SERPL NAA+PROBE: NEGATIVE COPIES/ML
BKV DNA SERPL NAA+PROBE-LOG#: NORMAL {LOG_COPIES}/ML
CMV DNA SERPL NAA+PROBE-ACNC: NORMAL IU/ML
CMV DNA SERPL NAA+PROBE-LOG IU: 4.94 LOG10 IU/ML

## 2021-06-27 LAB — TACROLIMUS BLD LC/MS/MS-MCNC: 10.7 NG/ML (ref 2–20)

## 2021-07-07 ENCOUNTER — LAB (OUTPATIENT)
Dept: LAB | Facility: HOSPITAL | Age: 61
End: 2021-07-07

## 2021-07-07 ENCOUNTER — TRANSCRIBE ORDERS (OUTPATIENT)
Dept: LAB | Facility: HOSPITAL | Age: 61
End: 2021-07-07

## 2021-07-07 DIAGNOSIS — Z94.0 KIDNEY REPLACED BY TRANSPLANT: ICD-10-CM

## 2021-07-07 DIAGNOSIS — Z51.81 ENCOUNTER FOR THERAPEUTIC DRUG MONITORING: ICD-10-CM

## 2021-07-07 DIAGNOSIS — R39.9 GENITOURINARY SYMPTOMS: ICD-10-CM

## 2021-07-07 DIAGNOSIS — R39.9 GENITOURINARY SYMPTOMS: Primary | ICD-10-CM

## 2021-07-07 DIAGNOSIS — R79.89 HYPOURICEMIA: ICD-10-CM

## 2021-07-07 DIAGNOSIS — R79.89 HYPOURICEMIA: Primary | ICD-10-CM

## 2021-07-07 DIAGNOSIS — D64.9 ANEMIA, UNSPECIFIED TYPE: ICD-10-CM

## 2021-07-07 DIAGNOSIS — Z79.899 ENCOUNTER FOR LONG-TERM (CURRENT) USE OF OTHER MEDICATIONS: ICD-10-CM

## 2021-07-07 LAB
ALBUMIN SERPL-MCNC: 3.8 G/DL (ref 3.5–5.2)
ANION GAP SERPL CALCULATED.3IONS-SCNC: 6.7 MMOL/L (ref 5–15)
BASOPHILS # BLD AUTO: 0.03 10*3/MM3 (ref 0–0.2)
BASOPHILS NFR BLD AUTO: 0.6 % (ref 0–1.5)
BILIRUB UR QL STRIP: NEGATIVE
BUN SERPL-MCNC: 33 MG/DL (ref 6–20)
BUN/CREAT SERPL: 17.4 (ref 7–25)
CALCIUM SPEC-SCNC: 9.9 MG/DL (ref 8.6–10.5)
CHLORIDE SERPL-SCNC: 112 MMOL/L (ref 98–107)
CLARITY UR: CLEAR
CO2 SERPL-SCNC: 19.3 MMOL/L (ref 22–29)
COLOR UR: YELLOW
CREAT SERPL-MCNC: 1.9 MG/DL (ref 0.76–1.27)
CREAT UR-MCNC: 129.4 MG/DL
DEPRECATED RDW RBC AUTO: 64.9 FL (ref 37–54)
EOSINOPHIL # BLD AUTO: 0.06 10*3/MM3 (ref 0–0.4)
EOSINOPHIL NFR BLD AUTO: 1.2 % (ref 0.3–6.2)
ERYTHROCYTE [DISTWIDTH] IN BLOOD BY AUTOMATED COUNT: 18.2 % (ref 12.3–15.4)
GFR SERPL CREATININE-BSD FRML MDRD: 36 ML/MIN/1.73
GLUCOSE SERPL-MCNC: 90 MG/DL (ref 65–99)
GLUCOSE UR STRIP-MCNC: NEGATIVE MG/DL
HCT VFR BLD AUTO: 36.4 % (ref 37.5–51)
HGB BLD-MCNC: 11.4 G/DL (ref 13–17.7)
HGB UR QL STRIP.AUTO: NEGATIVE
IMM GRANULOCYTES # BLD AUTO: 0.03 10*3/MM3 (ref 0–0.05)
IMM GRANULOCYTES NFR BLD AUTO: 0.6 % (ref 0–0.5)
KETONES UR QL STRIP: NEGATIVE
LEUKOCYTE ESTERASE UR QL STRIP.AUTO: NEGATIVE
LYMPHOCYTES # BLD AUTO: 0.76 10*3/MM3 (ref 0.7–3.1)
LYMPHOCYTES NFR BLD AUTO: 15.1 % (ref 19.6–45.3)
MAGNESIUM SERPL-MCNC: 1.8 MG/DL (ref 1.6–2.6)
MCH RBC QN AUTO: 30.6 PG (ref 26.6–33)
MCHC RBC AUTO-ENTMCNC: 31.3 G/DL (ref 31.5–35.7)
MCV RBC AUTO: 97.8 FL (ref 79–97)
MONOCYTES # BLD AUTO: 0.13 10*3/MM3 (ref 0.1–0.9)
MONOCYTES NFR BLD AUTO: 2.6 % (ref 5–12)
NEUTROPHILS NFR BLD AUTO: 4.01 10*3/MM3 (ref 1.7–7)
NEUTROPHILS NFR BLD AUTO: 79.9 % (ref 42.7–76)
NITRITE UR QL STRIP: NEGATIVE
NRBC BLD AUTO-RTO: 0.2 /100 WBC (ref 0–0.2)
PH UR STRIP.AUTO: 5.5 [PH] (ref 5–8)
PHOSPHATE SERPL-MCNC: 2.1 MG/DL (ref 2.5–4.5)
PLATELET # BLD AUTO: 184 10*3/MM3 (ref 140–450)
PMV BLD AUTO: 9.7 FL (ref 6–12)
POTASSIUM SERPL-SCNC: 5.9 MMOL/L (ref 3.5–5.2)
PROT UR QL STRIP: ABNORMAL
PROT UR-MCNC: 22 MG/DL
PROT/CREAT UR: 170 MG/G CREA (ref 0–200)
RBC # BLD AUTO: 3.72 10*6/MM3 (ref 4.14–5.8)
SODIUM SERPL-SCNC: 138 MMOL/L (ref 136–145)
SP GR UR STRIP: 1.02 (ref 1–1.03)
UROBILINOGEN UR QL STRIP: ABNORMAL
WBC # BLD AUTO: 5.02 10*3/MM3 (ref 3.4–10.8)

## 2021-07-07 PROCEDURE — 83735 ASSAY OF MAGNESIUM: CPT

## 2021-07-07 PROCEDURE — 85025 COMPLETE CBC W/AUTO DIFF WBC: CPT

## 2021-07-07 PROCEDURE — 82570 ASSAY OF URINE CREATININE: CPT

## 2021-07-07 PROCEDURE — 81003 URINALYSIS AUTO W/O SCOPE: CPT

## 2021-07-07 PROCEDURE — 80197 ASSAY OF TACROLIMUS: CPT

## 2021-07-07 PROCEDURE — 80069 RENAL FUNCTION PANEL: CPT

## 2021-07-07 PROCEDURE — 36415 COLL VENOUS BLD VENIPUNCTURE: CPT

## 2021-07-07 PROCEDURE — 84156 ASSAY OF PROTEIN URINE: CPT

## 2021-07-09 LAB
CMV DNA SERPL NAA+PROBE-ACNC: NORMAL IU/ML
CMV DNA SERPL NAA+PROBE-LOG IU: 4.28 LOG10 IU/ML

## 2021-07-12 LAB — TACROLIMUS BLD LC/MS/MS-MCNC: 7.6 NG/ML (ref 2–20)

## 2021-07-14 ENCOUNTER — LAB (OUTPATIENT)
Dept: LAB | Facility: HOSPITAL | Age: 61
End: 2021-07-14

## 2021-07-14 DIAGNOSIS — Z51.81 ENCOUNTER FOR THERAPEUTIC DRUG MONITORING: ICD-10-CM

## 2021-07-14 DIAGNOSIS — D64.9 ANEMIA, UNSPECIFIED TYPE: ICD-10-CM

## 2021-07-14 DIAGNOSIS — Z79.899 ENCOUNTER FOR LONG-TERM (CURRENT) USE OF OTHER MEDICATIONS: ICD-10-CM

## 2021-07-14 DIAGNOSIS — R79.89 HYPOURICEMIA: ICD-10-CM

## 2021-07-14 DIAGNOSIS — R39.9 GENITOURINARY SYMPTOMS: ICD-10-CM

## 2021-07-14 DIAGNOSIS — Z94.0 KIDNEY REPLACED BY TRANSPLANT: ICD-10-CM

## 2021-07-14 LAB
ALBUMIN SERPL-MCNC: 3.7 G/DL (ref 3.5–5.2)
ANION GAP SERPL CALCULATED.3IONS-SCNC: 8.1 MMOL/L (ref 5–15)
BILIRUB UR QL STRIP: NEGATIVE
BUN SERPL-MCNC: 26 MG/DL (ref 6–20)
BUN/CREAT SERPL: 14.8 (ref 7–25)
CALCIUM SPEC-SCNC: 9.3 MG/DL (ref 8.6–10.5)
CHLORIDE SERPL-SCNC: 111 MMOL/L (ref 98–107)
CLARITY UR: CLEAR
CO2 SERPL-SCNC: 17.9 MMOL/L (ref 22–29)
COLOR UR: ABNORMAL
CREAT SERPL-MCNC: 1.76 MG/DL (ref 0.76–1.27)
CREAT UR-MCNC: 144.4 MG/DL
DEPRECATED RDW RBC AUTO: 68.5 FL (ref 37–54)
ERYTHROCYTE [DISTWIDTH] IN BLOOD BY AUTOMATED COUNT: 18.7 % (ref 12.3–15.4)
GFR SERPL CREATININE-BSD FRML MDRD: 40 ML/MIN/1.73
GLUCOSE SERPL-MCNC: 88 MG/DL (ref 65–99)
GLUCOSE UR STRIP-MCNC: NEGATIVE MG/DL
HCT VFR BLD AUTO: 34.1 % (ref 37.5–51)
HGB BLD-MCNC: 11 G/DL (ref 13–17.7)
HGB UR QL STRIP.AUTO: NEGATIVE
KETONES UR QL STRIP: NEGATIVE
LEUKOCYTE ESTERASE UR QL STRIP.AUTO: NEGATIVE
LYMPHOCYTES # BLD MANUAL: 1.02 10*3/MM3 (ref 0.7–3.1)
LYMPHOCYTES NFR BLD MANUAL: 22 % (ref 19.6–45.3)
LYMPHOCYTES NFR BLD MANUAL: 6 % (ref 5–12)
MAGNESIUM SERPL-MCNC: 1.6 MG/DL (ref 1.6–2.6)
MCH RBC QN AUTO: 32.2 PG (ref 26.6–33)
MCHC RBC AUTO-ENTMCNC: 32.3 G/DL (ref 31.5–35.7)
MCV RBC AUTO: 99.7 FL (ref 79–97)
MONOCYTES # BLD AUTO: 0.28 10*3/MM3 (ref 0.1–0.9)
NEUTROPHILS # BLD AUTO: 3.33 10*3/MM3 (ref 1.7–7)
NEUTROPHILS NFR BLD MANUAL: 72 % (ref 42.7–76)
NITRITE UR QL STRIP: NEGATIVE
NRBC BLD AUTO-RTO: 0 /100 WBC (ref 0–0.2)
PH UR STRIP.AUTO: 5.5 [PH] (ref 5–8)
PHOSPHATE SERPL-MCNC: 2.1 MG/DL (ref 2.5–4.5)
PLAT MORPH BLD: NORMAL
PLATELET # BLD AUTO: 171 10*3/MM3 (ref 140–450)
PMV BLD AUTO: 9.9 FL (ref 6–12)
POTASSIUM SERPL-SCNC: 4.9 MMOL/L (ref 3.5–5.2)
PROT UR QL STRIP: ABNORMAL
PROT UR-MCNC: 20 MG/DL
PROT/CREAT UR: 138.5 MG/G CREA (ref 0–200)
RBC # BLD AUTO: 3.42 10*6/MM3 (ref 4.14–5.8)
RBC MORPH BLD: NORMAL
SODIUM SERPL-SCNC: 137 MMOL/L (ref 136–145)
SP GR UR STRIP: 1.02 (ref 1–1.03)
UROBILINOGEN UR QL STRIP: ABNORMAL
WBC # BLD AUTO: 4.63 10*3/MM3 (ref 3.4–10.8)
WBC MORPH BLD: NORMAL

## 2021-07-14 PROCEDURE — 85025 COMPLETE CBC W/AUTO DIFF WBC: CPT

## 2021-07-14 PROCEDURE — 83735 ASSAY OF MAGNESIUM: CPT

## 2021-07-14 PROCEDURE — 80197 ASSAY OF TACROLIMUS: CPT

## 2021-07-14 PROCEDURE — 81003 URINALYSIS AUTO W/O SCOPE: CPT

## 2021-07-14 PROCEDURE — 80069 RENAL FUNCTION PANEL: CPT

## 2021-07-14 PROCEDURE — 36415 COLL VENOUS BLD VENIPUNCTURE: CPT

## 2021-07-14 PROCEDURE — 84156 ASSAY OF PROTEIN URINE: CPT

## 2021-07-14 PROCEDURE — 85007 BL SMEAR W/DIFF WBC COUNT: CPT

## 2021-07-14 PROCEDURE — 82570 ASSAY OF URINE CREATININE: CPT

## 2021-07-16 LAB
CMV DNA SERPL NAA+PROBE-ACNC: 6360 IU/ML
CMV DNA SERPL NAA+PROBE-LOG IU: 3.8 LOG10 IU/ML

## 2021-07-18 LAB — TACROLIMUS BLD LC/MS/MS-MCNC: 4.7 NG/ML (ref 2–20)

## 2021-07-20 ENCOUNTER — LAB (OUTPATIENT)
Dept: LAB | Facility: HOSPITAL | Age: 61
End: 2021-07-20

## 2021-07-20 DIAGNOSIS — R79.89 HYPOURICEMIA: ICD-10-CM

## 2021-07-20 DIAGNOSIS — Z51.81 ENCOUNTER FOR THERAPEUTIC DRUG MONITORING: ICD-10-CM

## 2021-07-20 DIAGNOSIS — Z94.0 KIDNEY REPLACED BY TRANSPLANT: ICD-10-CM

## 2021-07-20 DIAGNOSIS — D64.9 ANEMIA, UNSPECIFIED TYPE: ICD-10-CM

## 2021-07-20 DIAGNOSIS — Z79.899 ENCOUNTER FOR LONG-TERM (CURRENT) USE OF OTHER MEDICATIONS: ICD-10-CM

## 2021-07-20 DIAGNOSIS — R39.9 GENITOURINARY SYMPTOMS: ICD-10-CM

## 2021-07-20 LAB
ALBUMIN SERPL-MCNC: 4 G/DL (ref 3.5–5.2)
ANION GAP SERPL CALCULATED.3IONS-SCNC: 6.6 MMOL/L (ref 5–15)
BASOPHILS # BLD AUTO: 0.04 10*3/MM3 (ref 0–0.2)
BASOPHILS NFR BLD AUTO: 0.9 % (ref 0–1.5)
BILIRUB UR QL STRIP: NEGATIVE
BUN SERPL-MCNC: 22 MG/DL (ref 6–20)
BUN/CREAT SERPL: 12.6 (ref 7–25)
CALCIUM SPEC-SCNC: 9.3 MG/DL (ref 8.6–10.5)
CHLORIDE SERPL-SCNC: 111 MMOL/L (ref 98–107)
CLARITY UR: CLEAR
CO2 SERPL-SCNC: 20.4 MMOL/L (ref 22–29)
COLOR UR: YELLOW
CREAT SERPL-MCNC: 1.74 MG/DL (ref 0.76–1.27)
CREAT UR-MCNC: 116.7 MG/DL
DEPRECATED RDW RBC AUTO: 66.2 FL (ref 37–54)
EOSINOPHIL # BLD AUTO: 0.03 10*3/MM3 (ref 0–0.4)
EOSINOPHIL NFR BLD AUTO: 0.7 % (ref 0.3–6.2)
ERYTHROCYTE [DISTWIDTH] IN BLOOD BY AUTOMATED COUNT: 17.3 % (ref 12.3–15.4)
GFR SERPL CREATININE-BSD FRML MDRD: 40 ML/MIN/1.73
GLUCOSE SERPL-MCNC: 118 MG/DL (ref 65–99)
GLUCOSE UR STRIP-MCNC: NEGATIVE MG/DL
HCT VFR BLD AUTO: 35.6 % (ref 37.5–51)
HGB BLD-MCNC: 11.4 G/DL (ref 13–17.7)
HGB UR QL STRIP.AUTO: NEGATIVE
IMM GRANULOCYTES # BLD AUTO: 0.03 10*3/MM3 (ref 0–0.05)
IMM GRANULOCYTES NFR BLD AUTO: 0.7 % (ref 0–0.5)
KETONES UR QL STRIP: NEGATIVE
LEUKOCYTE ESTERASE UR QL STRIP.AUTO: NEGATIVE
LYMPHOCYTES # BLD AUTO: 0.89 10*3/MM3 (ref 0.7–3.1)
LYMPHOCYTES NFR BLD AUTO: 19.4 % (ref 19.6–45.3)
MAGNESIUM SERPL-MCNC: 1.8 MG/DL (ref 1.6–2.6)
MCH RBC QN AUTO: 33.4 PG (ref 26.6–33)
MCHC RBC AUTO-ENTMCNC: 32 G/DL (ref 31.5–35.7)
MCV RBC AUTO: 104.4 FL (ref 79–97)
MONOCYTES # BLD AUTO: 0.21 10*3/MM3 (ref 0.1–0.9)
MONOCYTES NFR BLD AUTO: 4.6 % (ref 5–12)
NEUTROPHILS NFR BLD AUTO: 3.39 10*3/MM3 (ref 1.7–7)
NEUTROPHILS NFR BLD AUTO: 73.7 % (ref 42.7–76)
NITRITE UR QL STRIP: NEGATIVE
NRBC BLD AUTO-RTO: 0 /100 WBC (ref 0–0.2)
PH UR STRIP.AUTO: 6.5 [PH] (ref 5–8)
PHOSPHATE SERPL-MCNC: 2.2 MG/DL (ref 2.5–4.5)
PLATELET # BLD AUTO: 153 10*3/MM3 (ref 140–450)
PMV BLD AUTO: 9.9 FL (ref 6–12)
POTASSIUM SERPL-SCNC: 5.2 MMOL/L (ref 3.5–5.2)
PROT UR QL STRIP: ABNORMAL
PROT UR-MCNC: 19 MG/DL
PROT/CREAT UR: 162.8 MG/G CREA (ref 0–200)
RBC # BLD AUTO: 3.41 10*6/MM3 (ref 4.14–5.8)
SODIUM SERPL-SCNC: 138 MMOL/L (ref 136–145)
SP GR UR STRIP: 1.02 (ref 1–1.03)
UROBILINOGEN UR QL STRIP: ABNORMAL
WBC # BLD AUTO: 4.59 10*3/MM3 (ref 3.4–10.8)

## 2021-07-20 PROCEDURE — 36415 COLL VENOUS BLD VENIPUNCTURE: CPT

## 2021-07-20 PROCEDURE — 85025 COMPLETE CBC W/AUTO DIFF WBC: CPT

## 2021-07-20 PROCEDURE — 82570 ASSAY OF URINE CREATININE: CPT

## 2021-07-20 PROCEDURE — 80069 RENAL FUNCTION PANEL: CPT

## 2021-07-20 PROCEDURE — 84156 ASSAY OF PROTEIN URINE: CPT

## 2021-07-20 PROCEDURE — 81003 URINALYSIS AUTO W/O SCOPE: CPT

## 2021-07-20 PROCEDURE — 80197 ASSAY OF TACROLIMUS: CPT

## 2021-07-20 PROCEDURE — 83735 ASSAY OF MAGNESIUM: CPT

## 2021-07-22 LAB
CMV DNA SERPL NAA+PROBE-ACNC: 1050 IU/ML
CMV DNA SERPL NAA+PROBE-LOG IU: 3.02 LOG10 IU/ML

## 2021-07-23 LAB — TACROLIMUS BLD LC/MS/MS-MCNC: 9.2 NG/ML (ref 2–20)

## 2021-08-10 ENCOUNTER — TRANSCRIBE ORDERS (OUTPATIENT)
Dept: LAB | Facility: HOSPITAL | Age: 61
End: 2021-08-10

## 2021-08-10 ENCOUNTER — LAB (OUTPATIENT)
Dept: LAB | Facility: HOSPITAL | Age: 61
End: 2021-08-10

## 2021-08-10 DIAGNOSIS — R39.9 GENITOURINARY SYMPTOMS: ICD-10-CM

## 2021-08-10 DIAGNOSIS — Z94.0 KIDNEY REPLACED BY TRANSPLANT: ICD-10-CM

## 2021-08-10 DIAGNOSIS — R79.89 HYPOURICEMIA: ICD-10-CM

## 2021-08-10 DIAGNOSIS — Z79.899 ENCOUNTER FOR LONG-TERM (CURRENT) USE OF OTHER MEDICATIONS: ICD-10-CM

## 2021-08-10 DIAGNOSIS — B25.9 CYTOMEGALOVIRUS INFECTION, UNSPECIFIED CYTOMEGALOVIRAL INFECTION TYPE (HCC): Primary | ICD-10-CM

## 2021-08-10 DIAGNOSIS — Z51.81 ENCOUNTER FOR THERAPEUTIC DRUG MONITORING: ICD-10-CM

## 2021-08-10 DIAGNOSIS — D64.9 ANEMIA, UNSPECIFIED TYPE: ICD-10-CM

## 2021-08-10 LAB
ALBUMIN SERPL-MCNC: 3.6 G/DL (ref 3.5–5.2)
ANION GAP SERPL CALCULATED.3IONS-SCNC: 7.7 MMOL/L (ref 5–15)
BASOPHILS # BLD AUTO: 0.05 10*3/MM3 (ref 0–0.2)
BASOPHILS NFR BLD AUTO: 1 % (ref 0–1.5)
BILIRUB UR QL STRIP: NEGATIVE
BUN SERPL-MCNC: 23 MG/DL (ref 6–20)
BUN/CREAT SERPL: 11.4 (ref 7–25)
CALCIUM SPEC-SCNC: 9.6 MG/DL (ref 8.6–10.5)
CHLORIDE SERPL-SCNC: 110 MMOL/L (ref 98–107)
CLARITY UR: CLEAR
CO2 SERPL-SCNC: 19.3 MMOL/L (ref 22–29)
COLOR UR: YELLOW
CREAT SERPL-MCNC: 2.01 MG/DL (ref 0.76–1.27)
CREAT UR-MCNC: 99.4 MG/DL
DEPRECATED RDW RBC AUTO: 55.5 FL (ref 37–54)
EOSINOPHIL # BLD AUTO: 0.06 10*3/MM3 (ref 0–0.4)
EOSINOPHIL NFR BLD AUTO: 1.1 % (ref 0.3–6.2)
ERYTHROCYTE [DISTWIDTH] IN BLOOD BY AUTOMATED COUNT: 13.8 % (ref 12.3–15.4)
GFR SERPL CREATININE-BSD FRML MDRD: 34 ML/MIN/1.73
GLUCOSE SERPL-MCNC: 87 MG/DL (ref 65–99)
GLUCOSE UR STRIP-MCNC: NEGATIVE MG/DL
HCT VFR BLD AUTO: 41.8 % (ref 37.5–51)
HGB BLD-MCNC: 13.3 G/DL (ref 13–17.7)
HGB UR QL STRIP.AUTO: NEGATIVE
KETONES UR QL STRIP: NEGATIVE
LEUKOCYTE ESTERASE UR QL STRIP.AUTO: NEGATIVE
LYMPHOCYTES # BLD AUTO: 1.08 10*3/MM3 (ref 0.7–3.1)
LYMPHOCYTES NFR BLD AUTO: 20.7 % (ref 19.6–45.3)
MAGNESIUM SERPL-MCNC: 1.6 MG/DL (ref 1.6–2.6)
MCH RBC QN AUTO: 34.5 PG (ref 26.6–33)
MCHC RBC AUTO-ENTMCNC: 31.8 G/DL (ref 31.5–35.7)
MCV RBC AUTO: 108.6 FL (ref 79–97)
MONOCYTES # BLD AUTO: 0.18 10*3/MM3 (ref 0.1–0.9)
MONOCYTES NFR BLD AUTO: 3.4 % (ref 5–12)
NEUTROPHILS NFR BLD AUTO: 3.84 10*3/MM3 (ref 1.7–7)
NEUTROPHILS NFR BLD AUTO: 73.4 % (ref 42.7–76)
NITRITE UR QL STRIP: NEGATIVE
PH UR STRIP.AUTO: 5.5 [PH] (ref 5–8)
PHOSPHATE SERPL-MCNC: 2 MG/DL (ref 2.5–4.5)
PLATELET # BLD AUTO: 116 10*3/MM3 (ref 140–450)
PMV BLD AUTO: 10.5 FL (ref 6–12)
POTASSIUM SERPL-SCNC: 5.4 MMOL/L (ref 3.5–5.2)
PROT UR QL STRIP: ABNORMAL
PROT UR-MCNC: 13 MG/DL
PROT/CREAT UR: 130.8 MG/G CREA (ref 0–200)
RBC # BLD AUTO: 3.85 10*6/MM3 (ref 4.14–5.8)
SODIUM SERPL-SCNC: 137 MMOL/L (ref 136–145)
SP GR UR STRIP: 1.02 (ref 1–1.03)
UROBILINOGEN UR QL STRIP: ABNORMAL
WBC # BLD AUTO: 5.23 10*3/MM3 (ref 3.4–10.8)

## 2021-08-10 PROCEDURE — 83735 ASSAY OF MAGNESIUM: CPT

## 2021-08-10 PROCEDURE — 85025 COMPLETE CBC W/AUTO DIFF WBC: CPT

## 2021-08-10 PROCEDURE — 80069 RENAL FUNCTION PANEL: CPT

## 2021-08-10 PROCEDURE — 81003 URINALYSIS AUTO W/O SCOPE: CPT

## 2021-08-10 PROCEDURE — 82570 ASSAY OF URINE CREATININE: CPT

## 2021-08-10 PROCEDURE — 84156 ASSAY OF PROTEIN URINE: CPT

## 2021-08-10 PROCEDURE — 36415 COLL VENOUS BLD VENIPUNCTURE: CPT

## 2021-08-10 PROCEDURE — 80197 ASSAY OF TACROLIMUS: CPT

## 2021-08-12 LAB
CMV DNA SERPL NAA+PROBE-ACNC: 892 IU/ML
CMV DNA SERPL NAA+PROBE-LOG IU: 2.95 LOG10 IU/ML
TACROLIMUS BLD LC/MS/MS-MCNC: 12.4 NG/ML (ref 2–20)

## 2021-08-17 ENCOUNTER — LAB (OUTPATIENT)
Dept: LAB | Facility: HOSPITAL | Age: 61
End: 2021-08-17

## 2021-08-17 DIAGNOSIS — R39.9 GENITOURINARY SYMPTOMS: ICD-10-CM

## 2021-08-17 DIAGNOSIS — Z51.81 ENCOUNTER FOR THERAPEUTIC DRUG MONITORING: ICD-10-CM

## 2021-08-17 DIAGNOSIS — R79.89 HYPOURICEMIA: ICD-10-CM

## 2021-08-17 DIAGNOSIS — Z94.0 KIDNEY REPLACED BY TRANSPLANT: ICD-10-CM

## 2021-08-17 DIAGNOSIS — Z79.899 ENCOUNTER FOR LONG-TERM (CURRENT) USE OF OTHER MEDICATIONS: ICD-10-CM

## 2021-08-17 DIAGNOSIS — D64.9 ANEMIA, UNSPECIFIED TYPE: ICD-10-CM

## 2021-08-17 LAB
ALBUMIN SERPL-MCNC: 3.6 G/DL (ref 3.5–5.2)
ANION GAP SERPL CALCULATED.3IONS-SCNC: 4.3 MMOL/L (ref 5–15)
ANISOCYTOSIS BLD QL: ABNORMAL
BILIRUB UR QL STRIP: NEGATIVE
BUN SERPL-MCNC: 29 MG/DL (ref 6–20)
BUN/CREAT SERPL: 12.8 (ref 7–25)
CALCIUM SPEC-SCNC: 9 MG/DL (ref 8.6–10.5)
CHLORIDE SERPL-SCNC: 114 MMOL/L (ref 98–107)
CLARITY UR: CLEAR
CO2 SERPL-SCNC: 20.7 MMOL/L (ref 22–29)
COLOR UR: YELLOW
CREAT SERPL-MCNC: 2.27 MG/DL (ref 0.76–1.27)
CREAT UR-MCNC: 141.3 MG/DL
DEPRECATED RDW RBC AUTO: 48.8 FL (ref 37–54)
ERYTHROCYTE [DISTWIDTH] IN BLOOD BY AUTOMATED COUNT: 12.7 % (ref 12.3–15.4)
GFR SERPL CREATININE-BSD FRML MDRD: 30 ML/MIN/1.73
GLUCOSE SERPL-MCNC: 91 MG/DL (ref 65–99)
GLUCOSE UR STRIP-MCNC: ABNORMAL MG/DL
HCT VFR BLD AUTO: 38.2 % (ref 37.5–51)
HGB BLD-MCNC: 12.7 G/DL (ref 13–17.7)
HGB UR QL STRIP.AUTO: NEGATIVE
KETONES UR QL STRIP: NEGATIVE
LEUKOCYTE ESTERASE UR QL STRIP.AUTO: NEGATIVE
LYMPHOCYTES # BLD MANUAL: 0.63 10*3/MM3 (ref 0.7–3.1)
LYMPHOCYTES NFR BLD MANUAL: 13 % (ref 19.6–45.3)
LYMPHOCYTES NFR BLD MANUAL: 4 % (ref 5–12)
MACROCYTES BLD QL SMEAR: ABNORMAL
MAGNESIUM SERPL-MCNC: 2 MG/DL (ref 1.6–2.6)
MCH RBC QN AUTO: 35.1 PG (ref 26.6–33)
MCHC RBC AUTO-ENTMCNC: 33.2 G/DL (ref 31.5–35.7)
MCV RBC AUTO: 105.5 FL (ref 79–97)
MONOCYTES # BLD AUTO: 0.19 10*3/MM3 (ref 0.1–0.9)
NEUTROPHILS # BLD AUTO: 3.99 10*3/MM3 (ref 1.7–7)
NEUTROPHILS NFR BLD MANUAL: 83 % (ref 42.7–76)
NITRITE UR QL STRIP: NEGATIVE
PH UR STRIP.AUTO: 5.5 [PH] (ref 5–8)
PHOSPHATE SERPL-MCNC: 2.5 MG/DL (ref 2.5–4.5)
PLAT MORPH BLD: NORMAL
PLATELET # BLD AUTO: 115 10*3/MM3 (ref 140–450)
PMV BLD AUTO: 10 FL (ref 6–12)
POIKILOCYTOSIS BLD QL SMEAR: ABNORMAL
POLYCHROMASIA BLD QL SMEAR: ABNORMAL
POTASSIUM SERPL-SCNC: 5.5 MMOL/L (ref 3.5–5.2)
PROT UR QL STRIP: ABNORMAL
PROT UR-MCNC: 21 MG/DL
PROT/CREAT UR: 148.6 MG/G CREA (ref 0–200)
RBC # BLD AUTO: 3.62 10*6/MM3 (ref 4.14–5.8)
SODIUM SERPL-SCNC: 139 MMOL/L (ref 136–145)
SP GR UR STRIP: 1.02 (ref 1–1.03)
UROBILINOGEN UR QL STRIP: ABNORMAL
WBC # BLD AUTO: 4.81 10*3/MM3 (ref 3.4–10.8)
WBC MORPH BLD: NORMAL

## 2021-08-17 PROCEDURE — 81003 URINALYSIS AUTO W/O SCOPE: CPT

## 2021-08-17 PROCEDURE — 84156 ASSAY OF PROTEIN URINE: CPT

## 2021-08-17 PROCEDURE — 82570 ASSAY OF URINE CREATININE: CPT

## 2021-08-17 PROCEDURE — 80197 ASSAY OF TACROLIMUS: CPT

## 2021-08-17 PROCEDURE — 85025 COMPLETE CBC W/AUTO DIFF WBC: CPT

## 2021-08-17 PROCEDURE — 85007 BL SMEAR W/DIFF WBC COUNT: CPT

## 2021-08-17 PROCEDURE — 80069 RENAL FUNCTION PANEL: CPT

## 2021-08-17 PROCEDURE — 83735 ASSAY OF MAGNESIUM: CPT

## 2021-08-17 PROCEDURE — 36415 COLL VENOUS BLD VENIPUNCTURE: CPT

## 2021-08-19 LAB
CMV DNA SERPL NAA+PROBE-ACNC: 1170 IU/ML
CMV DNA SERPL NAA+PROBE-LOG IU: 3.07 LOG10 IU/ML
TACROLIMUS BLD LC/MS/MS-MCNC: 10.1 NG/ML (ref 2–20)

## 2021-08-24 ENCOUNTER — TRANSCRIBE ORDERS (OUTPATIENT)
Dept: ADMINISTRATIVE | Facility: HOSPITAL | Age: 61
End: 2021-08-24

## 2021-08-24 ENCOUNTER — LAB (OUTPATIENT)
Dept: LAB | Facility: HOSPITAL | Age: 61
End: 2021-08-24

## 2021-08-24 DIAGNOSIS — R39.9 GENITOURINARY SYMPTOMS: ICD-10-CM

## 2021-08-24 DIAGNOSIS — Z51.81 ENCOUNTER FOR THERAPEUTIC DRUG MONITORING: ICD-10-CM

## 2021-08-24 DIAGNOSIS — R73.9 BLOOD GLUCOSE ELEVATED: Primary | ICD-10-CM

## 2021-08-24 DIAGNOSIS — D64.9 ANEMIA, UNSPECIFIED TYPE: ICD-10-CM

## 2021-08-24 DIAGNOSIS — Z94.0 KIDNEY REPLACED BY TRANSPLANT: ICD-10-CM

## 2021-08-24 DIAGNOSIS — Z79.899 ENCOUNTER FOR LONG-TERM (CURRENT) USE OF OTHER MEDICATIONS: ICD-10-CM

## 2021-08-24 DIAGNOSIS — R73.9 BLOOD GLUCOSE ELEVATED: ICD-10-CM

## 2021-08-24 DIAGNOSIS — R79.89 HYPOURICEMIA: ICD-10-CM

## 2021-08-24 LAB
ALBUMIN SERPL-MCNC: 3.6 G/DL (ref 3.5–5.2)
ANION GAP SERPL CALCULATED.3IONS-SCNC: 9.2 MMOL/L (ref 5–15)
ANISOCYTOSIS BLD QL: ABNORMAL
BASOPHILS # BLD MANUAL: 0.09 10*3/MM3 (ref 0–0.2)
BASOPHILS NFR BLD AUTO: 2 % (ref 0–1.5)
BILIRUB UR QL STRIP: NEGATIVE
BUN SERPL-MCNC: 24 MG/DL (ref 6–20)
BUN/CREAT SERPL: 13 (ref 7–25)
BURR CELLS BLD QL SMEAR: ABNORMAL
CALCIUM SPEC-SCNC: 9.1 MG/DL (ref 8.6–10.5)
CHLORIDE SERPL-SCNC: 114 MMOL/L (ref 98–107)
CLARITY UR: CLEAR
CO2 SERPL-SCNC: 17.8 MMOL/L (ref 22–29)
COLOR UR: YELLOW
CREAT SERPL-MCNC: 1.84 MG/DL (ref 0.76–1.27)
CREAT UR-MCNC: 92.2 MG/DL
DEPRECATED RDW RBC AUTO: 47.9 FL (ref 37–54)
ERYTHROCYTE [DISTWIDTH] IN BLOOD BY AUTOMATED COUNT: 12.3 % (ref 12.3–15.4)
GFR SERPL CREATININE-BSD FRML MDRD: 38 ML/MIN/1.73
GLUCOSE SERPL-MCNC: 74 MG/DL (ref 65–99)
GLUCOSE UR STRIP-MCNC: ABNORMAL MG/DL
HBA1C MFR BLD: 4.1 % (ref 3.5–5.6)
HCT VFR BLD AUTO: 39 % (ref 37.5–51)
HGB BLD-MCNC: 12.6 G/DL (ref 13–17.7)
HGB UR QL STRIP.AUTO: NEGATIVE
KETONES UR QL STRIP: NEGATIVE
LEUKOCYTE ESTERASE UR QL STRIP.AUTO: NEGATIVE
LYMPHOCYTES # BLD MANUAL: 0.48 10*3/MM3 (ref 0.7–3.1)
LYMPHOCYTES NFR BLD MANUAL: 10.1 % (ref 19.6–45.3)
LYMPHOCYTES NFR BLD MANUAL: 3 % (ref 5–12)
MACROCYTES BLD QL SMEAR: ABNORMAL
MAGNESIUM SERPL-MCNC: 2 MG/DL (ref 1.6–2.6)
MCH RBC QN AUTO: 34.7 PG (ref 26.6–33)
MCHC RBC AUTO-ENTMCNC: 32.3 G/DL (ref 31.5–35.7)
MCV RBC AUTO: 107.4 FL (ref 79–97)
MONOCYTES # BLD AUTO: 0.14 10*3/MM3 (ref 0.1–0.9)
NEUTROPHILS # BLD AUTO: 4 10*3/MM3 (ref 1.7–7)
NEUTROPHILS NFR BLD MANUAL: 84.8 % (ref 42.7–76)
NITRITE UR QL STRIP: NEGATIVE
PH UR STRIP.AUTO: 6 [PH] (ref 5–8)
PHOSPHATE SERPL-MCNC: 2 MG/DL (ref 2.5–4.5)
PLAT MORPH BLD: NORMAL
PLATELET # BLD AUTO: 106 10*3/MM3 (ref 140–450)
PMV BLD AUTO: 9.7 FL (ref 6–12)
POIKILOCYTOSIS BLD QL SMEAR: ABNORMAL
POTASSIUM SERPL-SCNC: 5.1 MMOL/L (ref 3.5–5.2)
PROT UR QL STRIP: ABNORMAL
PROT UR-MCNC: 17 MG/DL
PROT/CREAT UR: 184.4 MG/G CREA (ref 0–200)
RBC # BLD AUTO: 3.63 10*6/MM3 (ref 4.14–5.8)
SODIUM SERPL-SCNC: 141 MMOL/L (ref 136–145)
SP GR UR STRIP: 1.02 (ref 1–1.03)
UROBILINOGEN UR QL STRIP: ABNORMAL
WBC # BLD AUTO: 4.72 10*3/MM3 (ref 3.4–10.8)
WBC MORPH BLD: NORMAL

## 2021-08-24 PROCEDURE — 85007 BL SMEAR W/DIFF WBC COUNT: CPT

## 2021-08-24 PROCEDURE — 81003 URINALYSIS AUTO W/O SCOPE: CPT

## 2021-08-24 PROCEDURE — 83036 HEMOGLOBIN GLYCOSYLATED A1C: CPT

## 2021-08-24 PROCEDURE — 84156 ASSAY OF PROTEIN URINE: CPT

## 2021-08-24 PROCEDURE — 36415 COLL VENOUS BLD VENIPUNCTURE: CPT

## 2021-08-24 PROCEDURE — 80197 ASSAY OF TACROLIMUS: CPT

## 2021-08-24 PROCEDURE — 82570 ASSAY OF URINE CREATININE: CPT

## 2021-08-24 PROCEDURE — 80069 RENAL FUNCTION PANEL: CPT

## 2021-08-24 PROCEDURE — 85025 COMPLETE CBC W/AUTO DIFF WBC: CPT

## 2021-08-24 PROCEDURE — 83735 ASSAY OF MAGNESIUM: CPT

## 2021-08-26 LAB
CMV DNA SERPL NAA+PROBE-ACNC: 1250 IU/ML
CMV DNA SERPL NAA+PROBE-LOG IU: 3.1 LOG10 IU/ML
TACROLIMUS BLD LC/MS/MS-MCNC: 12.5 NG/ML (ref 2–20)

## 2021-08-31 ENCOUNTER — LAB (OUTPATIENT)
Dept: LAB | Facility: HOSPITAL | Age: 61
End: 2021-08-31

## 2021-08-31 DIAGNOSIS — R39.9 GENITOURINARY SYMPTOMS: ICD-10-CM

## 2021-08-31 DIAGNOSIS — D64.9 ANEMIA, UNSPECIFIED TYPE: ICD-10-CM

## 2021-08-31 DIAGNOSIS — Z94.0 KIDNEY REPLACED BY TRANSPLANT: ICD-10-CM

## 2021-08-31 DIAGNOSIS — Z79.899 ENCOUNTER FOR LONG-TERM (CURRENT) USE OF OTHER MEDICATIONS: ICD-10-CM

## 2021-08-31 DIAGNOSIS — Z51.81 ENCOUNTER FOR THERAPEUTIC DRUG MONITORING: ICD-10-CM

## 2021-08-31 DIAGNOSIS — R79.89 HYPOURICEMIA: ICD-10-CM

## 2021-08-31 LAB
ALBUMIN SERPL-MCNC: 3.7 G/DL (ref 3.5–5.2)
ANION GAP SERPL CALCULATED.3IONS-SCNC: 7.3 MMOL/L (ref 5–15)
BILIRUB UR QL STRIP: NEGATIVE
BUN SERPL-MCNC: 26 MG/DL (ref 6–20)
BUN/CREAT SERPL: 12.8 (ref 7–25)
CALCIUM SPEC-SCNC: 9.7 MG/DL (ref 8.6–10.5)
CHLORIDE SERPL-SCNC: 114 MMOL/L (ref 98–107)
CLARITY UR: CLEAR
CO2 SERPL-SCNC: 20.7 MMOL/L (ref 22–29)
COLOR UR: YELLOW
CREAT SERPL-MCNC: 2.03 MG/DL (ref 0.76–1.27)
CREAT UR-MCNC: 131.2 MG/DL
DEPRECATED RDW RBC AUTO: 48.8 FL (ref 37–54)
EOSINOPHIL # BLD MANUAL: 0.05 10*3/MM3 (ref 0–0.4)
EOSINOPHIL NFR BLD MANUAL: 1 % (ref 0.3–6.2)
ERYTHROCYTE [DISTWIDTH] IN BLOOD BY AUTOMATED COUNT: 12 % (ref 12.3–15.4)
GFR SERPL CREATININE-BSD FRML MDRD: 34 ML/MIN/1.73
GLUCOSE SERPL-MCNC: 100 MG/DL (ref 65–99)
GLUCOSE UR STRIP-MCNC: NEGATIVE MG/DL
HCT VFR BLD AUTO: 39.6 % (ref 37.5–51)
HGB BLD-MCNC: 12.7 G/DL (ref 13–17.7)
HGB UR QL STRIP.AUTO: NEGATIVE
KETONES UR QL STRIP: NEGATIVE
LEUKOCYTE ESTERASE UR QL STRIP.AUTO: NEGATIVE
LYMPHOCYTES # BLD MANUAL: 0.5 10*3/MM3 (ref 0.7–3.1)
LYMPHOCYTES NFR BLD MANUAL: 1 % (ref 5–12)
LYMPHOCYTES NFR BLD MANUAL: 11.1 % (ref 19.6–45.3)
MACROCYTES BLD QL SMEAR: ABNORMAL
MAGNESIUM SERPL-MCNC: 1.7 MG/DL (ref 1.6–2.6)
MCH RBC QN AUTO: 35.1 PG (ref 26.6–33)
MCHC RBC AUTO-ENTMCNC: 32.1 G/DL (ref 31.5–35.7)
MCV RBC AUTO: 109.4 FL (ref 79–97)
MONOCYTES # BLD AUTO: 0.05 10*3/MM3 (ref 0.1–0.9)
NEUTROPHILS # BLD AUTO: 3.93 10*3/MM3 (ref 1.7–7)
NEUTROPHILS NFR BLD MANUAL: 86.9 % (ref 42.7–76)
NITRITE UR QL STRIP: NEGATIVE
PH UR STRIP.AUTO: 5.5 [PH] (ref 5–8)
PHOSPHATE SERPL-MCNC: 2.1 MG/DL (ref 2.5–4.5)
PLAT MORPH BLD: NORMAL
PLATELET # BLD AUTO: 108 10*3/MM3 (ref 140–450)
PMV BLD AUTO: 10 FL (ref 6–12)
POTASSIUM SERPL-SCNC: 5.4 MMOL/L (ref 3.5–5.2)
PROT UR QL STRIP: ABNORMAL
PROT UR-MCNC: 16 MG/DL
PROT/CREAT UR: 122 MG/G CREA (ref 0–200)
RBC # BLD AUTO: 3.62 10*6/MM3 (ref 4.14–5.8)
SODIUM SERPL-SCNC: 142 MMOL/L (ref 136–145)
SP GR UR STRIP: 1.02 (ref 1–1.03)
UROBILINOGEN UR QL STRIP: ABNORMAL
WBC # BLD AUTO: 4.52 10*3/MM3 (ref 3.4–10.8)
WBC MORPH BLD: NORMAL

## 2021-08-31 PROCEDURE — 83735 ASSAY OF MAGNESIUM: CPT

## 2021-08-31 PROCEDURE — 84156 ASSAY OF PROTEIN URINE: CPT

## 2021-08-31 PROCEDURE — 82570 ASSAY OF URINE CREATININE: CPT

## 2021-08-31 PROCEDURE — 80069 RENAL FUNCTION PANEL: CPT

## 2021-08-31 PROCEDURE — 81003 URINALYSIS AUTO W/O SCOPE: CPT

## 2021-08-31 PROCEDURE — 85025 COMPLETE CBC W/AUTO DIFF WBC: CPT

## 2021-08-31 PROCEDURE — 85007 BL SMEAR W/DIFF WBC COUNT: CPT

## 2021-08-31 PROCEDURE — 80197 ASSAY OF TACROLIMUS: CPT

## 2021-08-31 PROCEDURE — 36415 COLL VENOUS BLD VENIPUNCTURE: CPT

## 2021-09-01 LAB
CMV DNA SERPL NAA+PROBE-ACNC: 476 IU/ML
CMV DNA SERPL NAA+PROBE-LOG IU: 2.68 LOG10 IU/ML

## 2021-09-04 LAB — TACROLIMUS BLD LC/MS/MS-MCNC: 9.4 NG/ML (ref 2–20)

## 2021-09-09 ENCOUNTER — LAB (OUTPATIENT)
Dept: LAB | Facility: HOSPITAL | Age: 61
End: 2021-09-09

## 2021-09-09 DIAGNOSIS — Z94.0 KIDNEY REPLACED BY TRANSPLANT: ICD-10-CM

## 2021-09-09 DIAGNOSIS — R79.89 HYPOURICEMIA: ICD-10-CM

## 2021-09-09 DIAGNOSIS — R39.9 GENITOURINARY SYMPTOMS: ICD-10-CM

## 2021-09-09 DIAGNOSIS — D64.9 ANEMIA, UNSPECIFIED TYPE: ICD-10-CM

## 2021-09-09 LAB
ALBUMIN SERPL-MCNC: 3.9 G/DL (ref 3.5–5.2)
ANION GAP SERPL CALCULATED.3IONS-SCNC: 6.5 MMOL/L (ref 5–15)
BILIRUB UR QL STRIP: NEGATIVE
BUN SERPL-MCNC: 27 MG/DL (ref 6–20)
BUN/CREAT SERPL: 13.6 (ref 7–25)
CALCIUM SPEC-SCNC: 9.8 MG/DL (ref 8.6–10.5)
CHLORIDE SERPL-SCNC: 111 MMOL/L (ref 98–107)
CLARITY UR: CLEAR
CO2 SERPL-SCNC: 20.5 MMOL/L (ref 22–29)
COLOR UR: YELLOW
CREAT SERPL-MCNC: 1.98 MG/DL (ref 0.76–1.27)
CREAT UR-MCNC: 118.8 MG/DL
DEPRECATED RDW RBC AUTO: 45.6 FL (ref 37–54)
EOSINOPHIL # BLD MANUAL: 0.05 10*3/MM3 (ref 0–0.4)
EOSINOPHIL NFR BLD MANUAL: 1 % (ref 0.3–6.2)
ERYTHROCYTE [DISTWIDTH] IN BLOOD BY AUTOMATED COUNT: 11.6 % (ref 12.3–15.4)
GFR SERPL CREATININE-BSD FRML MDRD: 35 ML/MIN/1.73
GLUCOSE SERPL-MCNC: 98 MG/DL (ref 65–99)
GLUCOSE UR STRIP-MCNC: NEGATIVE MG/DL
HCT VFR BLD AUTO: 40.7 % (ref 37.5–51)
HGB BLD-MCNC: 13.4 G/DL (ref 13–17.7)
HGB UR QL STRIP.AUTO: NEGATIVE
KETONES UR QL STRIP: NEGATIVE
LEUKOCYTE ESTERASE UR QL STRIP.AUTO: NEGATIVE
LYMPHOCYTES # BLD MANUAL: 0.89 10*3/MM3 (ref 0.7–3.1)
LYMPHOCYTES NFR BLD MANUAL: 19 % (ref 19.6–45.3)
LYMPHOCYTES NFR BLD MANUAL: 4 % (ref 5–12)
MACROCYTES BLD QL SMEAR: ABNORMAL
MAGNESIUM SERPL-MCNC: 1.8 MG/DL (ref 1.6–2.6)
MCH RBC QN AUTO: 35.5 PG (ref 26.6–33)
MCHC RBC AUTO-ENTMCNC: 32.9 G/DL (ref 31.5–35.7)
MCV RBC AUTO: 108 FL (ref 79–97)
MONOCYTES # BLD AUTO: 0.19 10*3/MM3 (ref 0.1–0.9)
NEUTROPHILS # BLD AUTO: 3.57 10*3/MM3 (ref 1.7–7)
NEUTROPHILS NFR BLD MANUAL: 76 % (ref 42.7–76)
NITRITE UR QL STRIP: NEGATIVE
NRBC BLD AUTO-RTO: 0 /100 WBC (ref 0–0.2)
PH UR STRIP.AUTO: 5.5 [PH] (ref 5–8)
PHOSPHATE SERPL-MCNC: 2.5 MG/DL (ref 2.5–4.5)
PLAT MORPH BLD: NORMAL
PLATELET # BLD AUTO: 104 10*3/MM3 (ref 140–450)
PMV BLD AUTO: 9.9 FL (ref 6–12)
POTASSIUM SERPL-SCNC: 5.3 MMOL/L (ref 3.5–5.2)
PROT UR QL STRIP: NEGATIVE
PROT UR-MCNC: 14 MG/DL
PROT/CREAT UR: 117.8 MG/G CREA (ref 0–200)
RBC # BLD AUTO: 3.77 10*6/MM3 (ref 4.14–5.8)
SODIUM SERPL-SCNC: 138 MMOL/L (ref 136–145)
SP GR UR STRIP: 1.02 (ref 1–1.03)
UROBILINOGEN UR QL STRIP: NORMAL
WBC # BLD AUTO: 4.7 10*3/MM3 (ref 3.4–10.8)
WBC MORPH BLD: NORMAL

## 2021-09-09 PROCEDURE — 83735 ASSAY OF MAGNESIUM: CPT

## 2021-09-09 PROCEDURE — 84156 ASSAY OF PROTEIN URINE: CPT

## 2021-09-09 PROCEDURE — 81003 URINALYSIS AUTO W/O SCOPE: CPT

## 2021-09-09 PROCEDURE — 82570 ASSAY OF URINE CREATININE: CPT

## 2021-09-09 PROCEDURE — 85007 BL SMEAR W/DIFF WBC COUNT: CPT

## 2021-09-09 PROCEDURE — 36415 COLL VENOUS BLD VENIPUNCTURE: CPT

## 2021-09-09 PROCEDURE — 80197 ASSAY OF TACROLIMUS: CPT

## 2021-09-09 PROCEDURE — 80069 RENAL FUNCTION PANEL: CPT

## 2021-09-09 PROCEDURE — 85025 COMPLETE CBC W/AUTO DIFF WBC: CPT

## 2021-09-12 LAB — TACROLIMUS BLD LC/MS/MS-MCNC: 7.1 NG/ML (ref 2–20)

## 2021-09-13 LAB
CMV DNA SERPL NAA+PROBE-ACNC: 473 IU/ML
CMV DNA SERPL NAA+PROBE-LOG IU: 2.67 LOG10 IU/ML

## 2021-09-14 ENCOUNTER — LAB (OUTPATIENT)
Dept: LAB | Facility: HOSPITAL | Age: 61
End: 2021-09-14

## 2021-09-14 DIAGNOSIS — R39.9 GENITOURINARY SYMPTOMS: ICD-10-CM

## 2021-09-14 DIAGNOSIS — Z94.0 KIDNEY REPLACED BY TRANSPLANT: ICD-10-CM

## 2021-09-14 DIAGNOSIS — R79.89 HYPOURICEMIA: ICD-10-CM

## 2021-09-14 DIAGNOSIS — D64.9 ANEMIA, UNSPECIFIED TYPE: ICD-10-CM

## 2021-09-14 DIAGNOSIS — Z79.899 ENCOUNTER FOR LONG-TERM (CURRENT) USE OF OTHER MEDICATIONS: ICD-10-CM

## 2021-09-14 DIAGNOSIS — Z51.81 ENCOUNTER FOR THERAPEUTIC DRUG MONITORING: ICD-10-CM

## 2021-09-14 LAB
ALBUMIN SERPL-MCNC: 3.9 G/DL (ref 3.5–5.2)
ANION GAP SERPL CALCULATED.3IONS-SCNC: 7.7 MMOL/L (ref 5–15)
BILIRUB UR QL STRIP: NEGATIVE
BUN SERPL-MCNC: 28 MG/DL (ref 6–20)
BUN/CREAT SERPL: 13.3 (ref 7–25)
CALCIUM SPEC-SCNC: 9.8 MG/DL (ref 8.6–10.5)
CHLORIDE SERPL-SCNC: 112 MMOL/L (ref 98–107)
CLARITY UR: CLEAR
CO2 SERPL-SCNC: 20.3 MMOL/L (ref 22–29)
COLOR UR: YELLOW
CREAT SERPL-MCNC: 2.1 MG/DL (ref 0.76–1.27)
CREAT UR-MCNC: 124.4 MG/DL
DEPRECATED RDW RBC AUTO: 45.2 FL (ref 37–54)
EOSINOPHIL # BLD MANUAL: 0.05 10*3/MM3 (ref 0–0.4)
EOSINOPHIL NFR BLD MANUAL: 1 % (ref 0.3–6.2)
ERYTHROCYTE [DISTWIDTH] IN BLOOD BY AUTOMATED COUNT: 11.4 % (ref 12.3–15.4)
GFR SERPL CREATININE-BSD FRML MDRD: 32 ML/MIN/1.73
GLUCOSE SERPL-MCNC: 95 MG/DL (ref 65–99)
GLUCOSE UR STRIP-MCNC: NEGATIVE MG/DL
HCT VFR BLD AUTO: 40 % (ref 37.5–51)
HGB BLD-MCNC: 13.4 G/DL (ref 13–17.7)
HGB UR QL STRIP.AUTO: NEGATIVE
KETONES UR QL STRIP: NEGATIVE
LEUKOCYTE ESTERASE UR QL STRIP.AUTO: NEGATIVE
LYMPHOCYTES # BLD MANUAL: 0.56 10*3/MM3 (ref 0.7–3.1)
LYMPHOCYTES NFR BLD MANUAL: 10.2 % (ref 19.6–45.3)
LYMPHOCYTES NFR BLD MANUAL: 4.1 % (ref 5–12)
MAGNESIUM SERPL-MCNC: 1.7 MG/DL (ref 1.6–2.6)
MCH RBC QN AUTO: 35.9 PG (ref 26.6–33)
MCHC RBC AUTO-ENTMCNC: 33.5 G/DL (ref 31.5–35.7)
MCV RBC AUTO: 107.2 FL (ref 79–97)
MONOCYTES # BLD AUTO: 0.19 10*3/MM3 (ref 0.1–0.9)
NEUTROPHILS # BLD AUTO: 3.79 10*3/MM3 (ref 1.7–7)
NEUTROPHILS NFR BLD MANUAL: 82.7 % (ref 42.7–76)
NITRITE UR QL STRIP: NEGATIVE
PH UR STRIP.AUTO: 5.5 [PH] (ref 5–8)
PHOSPHATE SERPL-MCNC: 2.1 MG/DL (ref 2.5–4.5)
PLAT MORPH BLD: NORMAL
PLATELET # BLD AUTO: 100 10*3/MM3 (ref 140–450)
PMV BLD AUTO: 10 FL (ref 6–12)
POTASSIUM SERPL-SCNC: 5.5 MMOL/L (ref 3.5–5.2)
PROT UR QL STRIP: ABNORMAL
PROT UR-MCNC: 15 MG/DL
PROT/CREAT UR: 120.6 MG/G CREA (ref 0–200)
RBC # BLD AUTO: 3.73 10*6/MM3 (ref 4.14–5.8)
RBC MORPH BLD: NORMAL
SODIUM SERPL-SCNC: 140 MMOL/L (ref 136–145)
SP GR UR STRIP: 1.02 (ref 1–1.03)
UROBILINOGEN UR QL STRIP: ABNORMAL
VARIANT LYMPHS NFR BLD MANUAL: 2 % (ref 0–5)
WBC # BLD AUTO: 4.58 10*3/MM3 (ref 3.4–10.8)
WBC MORPH BLD: NORMAL

## 2021-09-14 PROCEDURE — 80069 RENAL FUNCTION PANEL: CPT

## 2021-09-14 PROCEDURE — 80197 ASSAY OF TACROLIMUS: CPT

## 2021-09-14 PROCEDURE — 83735 ASSAY OF MAGNESIUM: CPT

## 2021-09-14 PROCEDURE — 85025 COMPLETE CBC W/AUTO DIFF WBC: CPT

## 2021-09-14 PROCEDURE — 36415 COLL VENOUS BLD VENIPUNCTURE: CPT

## 2021-09-14 PROCEDURE — 84156 ASSAY OF PROTEIN URINE: CPT

## 2021-09-14 PROCEDURE — 85007 BL SMEAR W/DIFF WBC COUNT: CPT

## 2021-09-14 PROCEDURE — 82570 ASSAY OF URINE CREATININE: CPT

## 2021-09-14 PROCEDURE — 81003 URINALYSIS AUTO W/O SCOPE: CPT

## 2021-09-16 LAB
CMV DNA SERPL NAA+PROBE-ACNC: 401 IU/ML
CMV DNA SERPL NAA+PROBE-LOG IU: 2.6 LOG10 IU/ML
TACROLIMUS BLD LC/MS/MS-MCNC: 7.5 NG/ML (ref 2–20)

## 2021-09-22 ENCOUNTER — LAB (OUTPATIENT)
Dept: LAB | Facility: HOSPITAL | Age: 61
End: 2021-09-22

## 2021-09-22 DIAGNOSIS — Z94.0 KIDNEY REPLACED BY TRANSPLANT: ICD-10-CM

## 2021-09-22 DIAGNOSIS — R39.9 GENITOURINARY SYMPTOMS: ICD-10-CM

## 2021-09-22 DIAGNOSIS — Z79.899 ENCOUNTER FOR LONG-TERM (CURRENT) USE OF OTHER MEDICATIONS: ICD-10-CM

## 2021-09-22 DIAGNOSIS — Z51.81 ENCOUNTER FOR THERAPEUTIC DRUG MONITORING: ICD-10-CM

## 2021-09-22 DIAGNOSIS — R79.89 HYPOURICEMIA: ICD-10-CM

## 2021-09-22 DIAGNOSIS — D64.9 ANEMIA, UNSPECIFIED TYPE: ICD-10-CM

## 2021-09-22 LAB
ALBUMIN SERPL-MCNC: 4 G/DL (ref 3.5–5.2)
ANION GAP SERPL CALCULATED.3IONS-SCNC: 8.1 MMOL/L (ref 5–15)
BASOPHILS # BLD AUTO: 0.02 10*3/MM3 (ref 0–0.2)
BASOPHILS NFR BLD AUTO: 0.4 % (ref 0–1.5)
BILIRUB UR QL STRIP: NEGATIVE
BUN SERPL-MCNC: 27 MG/DL (ref 6–20)
BUN/CREAT SERPL: 12 (ref 7–25)
CALCIUM SPEC-SCNC: 10.2 MG/DL (ref 8.6–10.5)
CHLORIDE SERPL-SCNC: 110 MMOL/L (ref 98–107)
CLARITY UR: CLEAR
CO2 SERPL-SCNC: 21.9 MMOL/L (ref 22–29)
COLOR UR: YELLOW
CREAT SERPL-MCNC: 2.25 MG/DL (ref 0.76–1.27)
CREAT UR-MCNC: 142.9 MG/DL
DEPRECATED RDW RBC AUTO: 42.4 FL (ref 37–54)
EOSINOPHIL # BLD AUTO: 0.04 10*3/MM3 (ref 0–0.4)
EOSINOPHIL NFR BLD AUTO: 0.9 % (ref 0.3–6.2)
ERYTHROCYTE [DISTWIDTH] IN BLOOD BY AUTOMATED COUNT: 11.2 % (ref 12.3–15.4)
GFR SERPL CREATININE-BSD FRML MDRD: 30 ML/MIN/1.73
GLUCOSE SERPL-MCNC: 94 MG/DL (ref 65–99)
GLUCOSE UR STRIP-MCNC: NEGATIVE MG/DL
HCT VFR BLD AUTO: 44.3 % (ref 37.5–51)
HGB BLD-MCNC: 15 G/DL (ref 13–17.7)
HGB UR QL STRIP.AUTO: NEGATIVE
IMM GRANULOCYTES # BLD AUTO: 0.03 10*3/MM3 (ref 0–0.05)
IMM GRANULOCYTES NFR BLD AUTO: 0.6 % (ref 0–0.5)
KETONES UR QL STRIP: NEGATIVE
LEUKOCYTE ESTERASE UR QL STRIP.AUTO: NEGATIVE
LYMPHOCYTES # BLD AUTO: 0.97 10*3/MM3 (ref 0.7–3.1)
LYMPHOCYTES NFR BLD AUTO: 20.9 % (ref 19.6–45.3)
MAGNESIUM SERPL-MCNC: 1.8 MG/DL (ref 1.6–2.6)
MCH RBC QN AUTO: 35.3 PG (ref 26.6–33)
MCHC RBC AUTO-ENTMCNC: 33.9 G/DL (ref 31.5–35.7)
MCV RBC AUTO: 104.2 FL (ref 79–97)
MONOCYTES # BLD AUTO: 0.26 10*3/MM3 (ref 0.1–0.9)
MONOCYTES NFR BLD AUTO: 5.6 % (ref 5–12)
NEUTROPHILS NFR BLD AUTO: 3.32 10*3/MM3 (ref 1.7–7)
NEUTROPHILS NFR BLD AUTO: 71.6 % (ref 42.7–76)
NITRITE UR QL STRIP: NEGATIVE
NRBC BLD AUTO-RTO: 0 /100 WBC (ref 0–0.2)
PH UR STRIP.AUTO: 5.5 [PH] (ref 5–8)
PHOSPHATE SERPL-MCNC: 2.1 MG/DL (ref 2.5–4.5)
PLATELET # BLD AUTO: 100 10*3/MM3 (ref 140–450)
PMV BLD AUTO: 10 FL (ref 6–12)
POTASSIUM SERPL-SCNC: 5.4 MMOL/L (ref 3.5–5.2)
PROT UR QL STRIP: ABNORMAL
PROT UR-MCNC: 21 MG/DL
PROT/CREAT UR: 147 MG/G CREA (ref 0–200)
RBC # BLD AUTO: 4.25 10*6/MM3 (ref 4.14–5.8)
SODIUM SERPL-SCNC: 140 MMOL/L (ref 136–145)
SP GR UR STRIP: 1.02 (ref 1–1.03)
UROBILINOGEN UR QL STRIP: ABNORMAL
WBC # BLD AUTO: 4.64 10*3/MM3 (ref 3.4–10.8)

## 2021-09-22 PROCEDURE — 83735 ASSAY OF MAGNESIUM: CPT

## 2021-09-22 PROCEDURE — 36415 COLL VENOUS BLD VENIPUNCTURE: CPT

## 2021-09-22 PROCEDURE — 84156 ASSAY OF PROTEIN URINE: CPT

## 2021-09-22 PROCEDURE — 81003 URINALYSIS AUTO W/O SCOPE: CPT

## 2021-09-22 PROCEDURE — 80197 ASSAY OF TACROLIMUS: CPT

## 2021-09-22 PROCEDURE — 85025 COMPLETE CBC W/AUTO DIFF WBC: CPT

## 2021-09-22 PROCEDURE — 80069 RENAL FUNCTION PANEL: CPT

## 2021-09-22 PROCEDURE — 82570 ASSAY OF URINE CREATININE: CPT

## 2021-09-24 LAB
CMV DNA SERPL NAA+PROBE-ACNC: 275 IU/ML
CMV DNA SERPL NAA+PROBE-LOG IU: 2.44 LOG10 IU/ML
TACROLIMUS BLD LC/MS/MS-MCNC: 7 NG/ML (ref 2–20)

## 2021-09-28 ENCOUNTER — LAB (OUTPATIENT)
Dept: LAB | Facility: HOSPITAL | Age: 61
End: 2021-09-28

## 2021-09-28 DIAGNOSIS — Z51.81 ENCOUNTER FOR THERAPEUTIC DRUG MONITORING: ICD-10-CM

## 2021-09-28 DIAGNOSIS — D64.9 ANEMIA, UNSPECIFIED TYPE: ICD-10-CM

## 2021-09-28 DIAGNOSIS — R79.89 HYPOURICEMIA: ICD-10-CM

## 2021-09-28 DIAGNOSIS — R39.9 GENITOURINARY SYMPTOMS: ICD-10-CM

## 2021-09-28 DIAGNOSIS — Z94.0 KIDNEY REPLACED BY TRANSPLANT: ICD-10-CM

## 2021-09-28 DIAGNOSIS — Z79.899 ENCOUNTER FOR LONG-TERM (CURRENT) USE OF OTHER MEDICATIONS: ICD-10-CM

## 2021-09-28 LAB
ALBUMIN SERPL-MCNC: 4 G/DL (ref 3.5–5.2)
ANION GAP SERPL CALCULATED.3IONS-SCNC: 8.2 MMOL/L (ref 5–15)
BASOPHILS # BLD MANUAL: 0.04 10*3/MM3 (ref 0–0.2)
BASOPHILS NFR BLD AUTO: 1 % (ref 0–1.5)
BILIRUB UR QL STRIP: NEGATIVE
BUN SERPL-MCNC: 26 MG/DL (ref 6–20)
BUN/CREAT SERPL: 12.8 (ref 7–25)
CALCIUM SPEC-SCNC: 9.8 MG/DL (ref 8.6–10.5)
CHLORIDE SERPL-SCNC: 111 MMOL/L (ref 98–107)
CLARITY UR: CLEAR
CO2 SERPL-SCNC: 19.8 MMOL/L (ref 22–29)
COLOR UR: YELLOW
CREAT SERPL-MCNC: 2.03 MG/DL (ref 0.76–1.27)
CREAT UR-MCNC: 93.8 MG/DL
DEPRECATED RDW RBC AUTO: 45.2 FL (ref 37–54)
EOSINOPHIL # BLD MANUAL: 0.04 10*3/MM3 (ref 0–0.4)
EOSINOPHIL NFR BLD MANUAL: 1 % (ref 0.3–6.2)
ERYTHROCYTE [DISTWIDTH] IN BLOOD BY AUTOMATED COUNT: 11.4 % (ref 12.3–15.4)
GFR SERPL CREATININE-BSD FRML MDRD: 34 ML/MIN/1.73
GLUCOSE SERPL-MCNC: 88 MG/DL (ref 65–99)
GLUCOSE UR STRIP-MCNC: NEGATIVE MG/DL
HCT VFR BLD AUTO: 43.9 % (ref 37.5–51)
HGB BLD-MCNC: 14.2 G/DL (ref 13–17.7)
HGB UR QL STRIP.AUTO: NEGATIVE
KETONES UR QL STRIP: NEGATIVE
LEUKOCYTE ESTERASE UR QL STRIP.AUTO: NEGATIVE
LYMPHOCYTES # BLD MANUAL: 0.57 10*3/MM3 (ref 0.7–3.1)
LYMPHOCYTES NFR BLD MANUAL: 13.9 % (ref 19.6–45.3)
LYMPHOCYTES NFR BLD MANUAL: 3 % (ref 5–12)
MAGNESIUM SERPL-MCNC: 1.6 MG/DL (ref 1.6–2.6)
MCH RBC QN AUTO: 34.9 PG (ref 26.6–33)
MCHC RBC AUTO-ENTMCNC: 32.3 G/DL (ref 31.5–35.7)
MCV RBC AUTO: 107.9 FL (ref 79–97)
MONOCYTES # BLD AUTO: 0.12 10*3/MM3 (ref 0.1–0.9)
NEUTROPHILS # BLD AUTO: 3.3 10*3/MM3 (ref 1.7–7)
NEUTROPHILS NFR BLD MANUAL: 81.2 % (ref 42.7–76)
NITRITE UR QL STRIP: NEGATIVE
NRBC BLD AUTO-RTO: 0 /100 WBC (ref 0–0.2)
PH UR STRIP.AUTO: 6 [PH] (ref 5–8)
PHOSPHATE SERPL-MCNC: 2.6 MG/DL (ref 2.5–4.5)
PLAT MORPH BLD: NORMAL
PLATELET # BLD AUTO: 87 10*3/MM3 (ref 140–450)
PMV BLD AUTO: 9.9 FL (ref 6–12)
POTASSIUM SERPL-SCNC: 5.2 MMOL/L (ref 3.5–5.2)
PROT UR QL STRIP: NEGATIVE
PROT UR-MCNC: 11 MG/DL
PROT/CREAT UR: 117.3 MG/G CREA (ref 0–200)
RBC # BLD AUTO: 4.07 10*6/MM3 (ref 4.14–5.8)
RBC MORPH BLD: NORMAL
SODIUM SERPL-SCNC: 139 MMOL/L (ref 136–145)
SP GR UR STRIP: 1.02 (ref 1–1.03)
UROBILINOGEN UR QL STRIP: NORMAL
WBC # BLD AUTO: 4.07 10*3/MM3 (ref 3.4–10.8)
WBC MORPH BLD: NORMAL

## 2021-09-28 PROCEDURE — 80069 RENAL FUNCTION PANEL: CPT

## 2021-09-28 PROCEDURE — 85025 COMPLETE CBC W/AUTO DIFF WBC: CPT

## 2021-09-28 PROCEDURE — 36415 COLL VENOUS BLD VENIPUNCTURE: CPT

## 2021-09-28 PROCEDURE — 80197 ASSAY OF TACROLIMUS: CPT

## 2021-09-28 PROCEDURE — 85007 BL SMEAR W/DIFF WBC COUNT: CPT

## 2021-09-28 PROCEDURE — 82570 ASSAY OF URINE CREATININE: CPT

## 2021-09-28 PROCEDURE — 84156 ASSAY OF PROTEIN URINE: CPT

## 2021-09-28 PROCEDURE — 83735 ASSAY OF MAGNESIUM: CPT

## 2021-09-28 PROCEDURE — 81003 URINALYSIS AUTO W/O SCOPE: CPT

## 2021-09-30 LAB
CMV DNA SERPL NAA+PROBE-ACNC: NORMAL IU/ML
CMV DNA SERPL NAA+PROBE-LOG IU: NORMAL {LOG_IU}/ML
TACROLIMUS BLD LC/MS/MS-MCNC: 6.3 NG/ML (ref 2–20)

## 2021-10-06 ENCOUNTER — LAB (OUTPATIENT)
Dept: LAB | Facility: HOSPITAL | Age: 61
End: 2021-10-06

## 2021-10-06 DIAGNOSIS — Z51.81 ENCOUNTER FOR THERAPEUTIC DRUG MONITORING: ICD-10-CM

## 2021-10-06 DIAGNOSIS — R39.9 GENITOURINARY SYMPTOMS: ICD-10-CM

## 2021-10-06 DIAGNOSIS — Z79.899 ENCOUNTER FOR LONG-TERM (CURRENT) USE OF OTHER MEDICATIONS: ICD-10-CM

## 2021-10-06 DIAGNOSIS — D64.9 ANEMIA, UNSPECIFIED TYPE: ICD-10-CM

## 2021-10-06 DIAGNOSIS — Z94.0 KIDNEY REPLACED BY TRANSPLANT: ICD-10-CM

## 2021-10-06 DIAGNOSIS — R79.89 HYPOURICEMIA: ICD-10-CM

## 2021-10-06 LAB
ALBUMIN SERPL-MCNC: 4 G/DL (ref 3.5–5.2)
ANION GAP SERPL CALCULATED.3IONS-SCNC: 7 MMOL/L (ref 5–15)
BASOPHILS # BLD AUTO: 0.02 10*3/MM3 (ref 0–0.2)
BASOPHILS NFR BLD AUTO: 0.4 % (ref 0–1.5)
BILIRUB UR QL STRIP: NEGATIVE
BUN SERPL-MCNC: 31 MG/DL (ref 6–20)
BUN/CREAT SERPL: 15.2 (ref 7–25)
CALCIUM SPEC-SCNC: 10.1 MG/DL (ref 8.6–10.5)
CHLORIDE SERPL-SCNC: 109 MMOL/L (ref 98–107)
CLARITY UR: CLEAR
CO2 SERPL-SCNC: 23 MMOL/L (ref 22–29)
COLOR UR: YELLOW
CREAT SERPL-MCNC: 2.04 MG/DL (ref 0.76–1.27)
CREAT UR-MCNC: 120.3 MG/DL
DEPRECATED RDW RBC AUTO: 43.6 FL (ref 37–54)
EOSINOPHIL # BLD AUTO: 0.07 10*3/MM3 (ref 0–0.4)
EOSINOPHIL NFR BLD AUTO: 1.4 % (ref 0.3–6.2)
ERYTHROCYTE [DISTWIDTH] IN BLOOD BY AUTOMATED COUNT: 11.3 % (ref 12.3–15.4)
GFR SERPL CREATININE-BSD FRML MDRD: 34 ML/MIN/1.73
GLUCOSE SERPL-MCNC: 84 MG/DL (ref 65–99)
GLUCOSE UR STRIP-MCNC: NEGATIVE MG/DL
HCT VFR BLD AUTO: 44.5 % (ref 37.5–51)
HGB BLD-MCNC: 15.1 G/DL (ref 13–17.7)
HGB UR QL STRIP.AUTO: NEGATIVE
IMM GRANULOCYTES # BLD AUTO: 0.01 10*3/MM3 (ref 0–0.05)
IMM GRANULOCYTES NFR BLD AUTO: 0.2 % (ref 0–0.5)
KETONES UR QL STRIP: NEGATIVE
LEUKOCYTE ESTERASE UR QL STRIP.AUTO: NEGATIVE
LYMPHOCYTES # BLD AUTO: 0.99 10*3/MM3 (ref 0.7–3.1)
LYMPHOCYTES NFR BLD AUTO: 19.7 % (ref 19.6–45.3)
MAGNESIUM SERPL-MCNC: 1.5 MG/DL (ref 1.6–2.6)
MCH RBC QN AUTO: 35.4 PG (ref 26.6–33)
MCHC RBC AUTO-ENTMCNC: 33.9 G/DL (ref 31.5–35.7)
MCV RBC AUTO: 104.5 FL (ref 79–97)
MONOCYTES # BLD AUTO: 0.24 10*3/MM3 (ref 0.1–0.9)
MONOCYTES NFR BLD AUTO: 4.8 % (ref 5–12)
NEUTROPHILS NFR BLD AUTO: 3.69 10*3/MM3 (ref 1.7–7)
NEUTROPHILS NFR BLD AUTO: 73.5 % (ref 42.7–76)
NITRITE UR QL STRIP: NEGATIVE
NRBC BLD AUTO-RTO: 0 /100 WBC (ref 0–0.2)
PH UR STRIP.AUTO: 5.5 [PH] (ref 5–8)
PHOSPHATE SERPL-MCNC: 2.4 MG/DL (ref 2.5–4.5)
PLATELET # BLD AUTO: 94 10*3/MM3 (ref 140–450)
PMV BLD AUTO: 10.1 FL (ref 6–12)
POTASSIUM SERPL-SCNC: 5.3 MMOL/L (ref 3.5–5.2)
PROT UR QL STRIP: NEGATIVE
PROT UR-MCNC: 12 MG/DL
PROT/CREAT UR: 99.8 MG/G CREA (ref 0–200)
RBC # BLD AUTO: 4.26 10*6/MM3 (ref 4.14–5.8)
SODIUM SERPL-SCNC: 139 MMOL/L (ref 136–145)
SP GR UR STRIP: 1.02 (ref 1–1.03)
UROBILINOGEN UR QL STRIP: NORMAL
WBC # BLD AUTO: 5.02 10*3/MM3 (ref 3.4–10.8)

## 2021-10-06 PROCEDURE — 36415 COLL VENOUS BLD VENIPUNCTURE: CPT

## 2021-10-06 PROCEDURE — 81003 URINALYSIS AUTO W/O SCOPE: CPT

## 2021-10-06 PROCEDURE — 80069 RENAL FUNCTION PANEL: CPT

## 2021-10-06 PROCEDURE — 85025 COMPLETE CBC W/AUTO DIFF WBC: CPT

## 2021-10-06 PROCEDURE — 83735 ASSAY OF MAGNESIUM: CPT

## 2021-10-06 PROCEDURE — 84156 ASSAY OF PROTEIN URINE: CPT

## 2021-10-06 PROCEDURE — 82570 ASSAY OF URINE CREATININE: CPT

## 2021-10-06 PROCEDURE — 80197 ASSAY OF TACROLIMUS: CPT

## 2021-10-10 LAB — TACROLIMUS BLD LC/MS/MS-MCNC: 12.4 NG/ML (ref 2–20)

## 2021-10-14 LAB
CMV DNA SERPL NAA+PROBE-ACNC: NORMAL IU/ML
CMV DNA SERPL NAA+PROBE-LOG IU: NORMAL {LOG_IU}/ML

## 2021-10-26 ENCOUNTER — LAB (OUTPATIENT)
Dept: LAB | Facility: HOSPITAL | Age: 61
End: 2021-10-26

## 2021-10-26 DIAGNOSIS — D64.9 ANEMIA, UNSPECIFIED TYPE: ICD-10-CM

## 2021-10-26 DIAGNOSIS — Z79.899 ENCOUNTER FOR LONG-TERM (CURRENT) USE OF OTHER MEDICATIONS: ICD-10-CM

## 2021-10-26 DIAGNOSIS — Z51.81 ENCOUNTER FOR THERAPEUTIC DRUG MONITORING: ICD-10-CM

## 2021-10-26 DIAGNOSIS — R39.9 GENITOURINARY SYMPTOMS: ICD-10-CM

## 2021-10-26 DIAGNOSIS — R79.89 HYPOURICEMIA: ICD-10-CM

## 2021-10-26 DIAGNOSIS — Z94.0 KIDNEY REPLACED BY TRANSPLANT: ICD-10-CM

## 2021-10-26 LAB
ALBUMIN SERPL-MCNC: 3.7 G/DL (ref 3.5–5.2)
ANION GAP SERPL CALCULATED.3IONS-SCNC: 9.7 MMOL/L (ref 5–15)
BASOPHILS # BLD AUTO: 0.02 10*3/MM3 (ref 0–0.2)
BASOPHILS NFR BLD AUTO: 0.4 % (ref 0–1.5)
BILIRUB UR QL STRIP: NEGATIVE
BUN SERPL-MCNC: 31 MG/DL (ref 6–20)
BUN/CREAT SERPL: 13.2 (ref 7–25)
CALCIUM SPEC-SCNC: 10.1 MG/DL (ref 8.6–10.5)
CHLORIDE SERPL-SCNC: 106 MMOL/L (ref 98–107)
CLARITY UR: CLEAR
CO2 SERPL-SCNC: 22.3 MMOL/L (ref 22–29)
COLOR UR: YELLOW
CREAT SERPL-MCNC: 2.35 MG/DL (ref 0.76–1.27)
CREAT UR-MCNC: 124.2 MG/DL
DEPRECATED RDW RBC AUTO: 44.4 FL (ref 37–54)
EOSINOPHIL # BLD AUTO: 0.05 10*3/MM3 (ref 0–0.4)
EOSINOPHIL NFR BLD AUTO: 0.9 % (ref 0.3–6.2)
ERYTHROCYTE [DISTWIDTH] IN BLOOD BY AUTOMATED COUNT: 11.7 % (ref 12.3–15.4)
GFR SERPL CREATININE-BSD FRML MDRD: 29 ML/MIN/1.73
GLUCOSE SERPL-MCNC: 93 MG/DL (ref 65–99)
GLUCOSE UR STRIP-MCNC: NEGATIVE MG/DL
HCT VFR BLD AUTO: 45.1 % (ref 37.5–51)
HGB BLD-MCNC: 15.2 G/DL (ref 13–17.7)
HGB UR QL STRIP.AUTO: NEGATIVE
IMM GRANULOCYTES # BLD AUTO: 0.03 10*3/MM3 (ref 0–0.05)
IMM GRANULOCYTES NFR BLD AUTO: 0.6 % (ref 0–0.5)
KETONES UR QL STRIP: NEGATIVE
LEUKOCYTE ESTERASE UR QL STRIP.AUTO: NEGATIVE
LYMPHOCYTES # BLD AUTO: 0.81 10*3/MM3 (ref 0.7–3.1)
LYMPHOCYTES NFR BLD AUTO: 15 % (ref 19.6–45.3)
MAGNESIUM SERPL-MCNC: 1.8 MG/DL (ref 1.6–2.6)
MCH RBC QN AUTO: 34.6 PG (ref 26.6–33)
MCHC RBC AUTO-ENTMCNC: 33.7 G/DL (ref 31.5–35.7)
MCV RBC AUTO: 102.7 FL (ref 79–97)
MONOCYTES # BLD AUTO: 0.46 10*3/MM3 (ref 0.1–0.9)
MONOCYTES NFR BLD AUTO: 8.5 % (ref 5–12)
NEUTROPHILS NFR BLD AUTO: 4.04 10*3/MM3 (ref 1.7–7)
NEUTROPHILS NFR BLD AUTO: 74.6 % (ref 42.7–76)
NITRITE UR QL STRIP: NEGATIVE
NRBC BLD AUTO-RTO: 0 /100 WBC (ref 0–0.2)
PH UR STRIP.AUTO: 5.5 [PH] (ref 5–8)
PHOSPHATE SERPL-MCNC: 2.5 MG/DL (ref 2.5–4.5)
PLATELET # BLD AUTO: 115 10*3/MM3 (ref 140–450)
PMV BLD AUTO: 10.3 FL (ref 6–12)
POTASSIUM SERPL-SCNC: 5 MMOL/L (ref 3.5–5.2)
PROT UR QL STRIP: ABNORMAL
PROT UR-MCNC: 13 MG/DL
PROT/CREAT UR: 104.7 MG/G CREA (ref 0–200)
RBC # BLD AUTO: 4.39 10*6/MM3 (ref 4.14–5.8)
SODIUM SERPL-SCNC: 138 MMOL/L (ref 136–145)
SP GR UR STRIP: 1.02 (ref 1–1.03)
UROBILINOGEN UR QL STRIP: ABNORMAL
WBC # BLD AUTO: 5.41 10*3/MM3 (ref 3.4–10.8)

## 2021-10-26 PROCEDURE — 81003 URINALYSIS AUTO W/O SCOPE: CPT

## 2021-10-26 PROCEDURE — 85025 COMPLETE CBC W/AUTO DIFF WBC: CPT

## 2021-10-26 PROCEDURE — 80197 ASSAY OF TACROLIMUS: CPT

## 2021-10-26 PROCEDURE — 36415 COLL VENOUS BLD VENIPUNCTURE: CPT

## 2021-10-26 PROCEDURE — 80069 RENAL FUNCTION PANEL: CPT

## 2021-10-26 PROCEDURE — 83735 ASSAY OF MAGNESIUM: CPT

## 2021-10-26 PROCEDURE — 84156 ASSAY OF PROTEIN URINE: CPT

## 2021-10-26 PROCEDURE — 82570 ASSAY OF URINE CREATININE: CPT

## 2021-10-28 LAB
CMV DNA SERPL NAA+PROBE-ACNC: NORMAL IU/ML
CMV DNA SERPL NAA+PROBE-LOG IU: NORMAL {LOG_IU}/ML

## 2021-10-30 LAB — TACROLIMUS BLD LC/MS/MS-MCNC: 6.2 NG/ML (ref 2–20)

## 2021-11-02 ENCOUNTER — LAB (OUTPATIENT)
Dept: LAB | Facility: HOSPITAL | Age: 61
End: 2021-11-02

## 2021-11-02 DIAGNOSIS — R39.9 GENITOURINARY SYMPTOMS: ICD-10-CM

## 2021-11-02 DIAGNOSIS — R79.89 HYPOURICEMIA: ICD-10-CM

## 2021-11-02 DIAGNOSIS — Z94.0 KIDNEY REPLACED BY TRANSPLANT: ICD-10-CM

## 2021-11-02 DIAGNOSIS — D64.9 ANEMIA, UNSPECIFIED TYPE: ICD-10-CM

## 2021-11-02 LAB
ALBUMIN SERPL-MCNC: 3.7 G/DL (ref 3.5–5.2)
ANION GAP SERPL CALCULATED.3IONS-SCNC: 8.9 MMOL/L (ref 5–15)
BASOPHILS # BLD AUTO: 0.02 10*3/MM3 (ref 0–0.2)
BASOPHILS NFR BLD AUTO: 0.4 % (ref 0–1.5)
BILIRUB UR QL STRIP: NEGATIVE
BUN SERPL-MCNC: 31 MG/DL (ref 6–20)
BUN/CREAT SERPL: 16.6 (ref 7–25)
CALCIUM SPEC-SCNC: 9.9 MG/DL (ref 8.6–10.5)
CHLORIDE SERPL-SCNC: 108 MMOL/L (ref 98–107)
CLARITY UR: CLEAR
CO2 SERPL-SCNC: 23.1 MMOL/L (ref 22–29)
COLOR UR: YELLOW
CREAT SERPL-MCNC: 1.87 MG/DL (ref 0.76–1.27)
CREAT UR-MCNC: 92 MG/DL
DEPRECATED RDW RBC AUTO: 41.1 FL (ref 37–54)
EOSINOPHIL # BLD AUTO: 0.05 10*3/MM3 (ref 0–0.4)
EOSINOPHIL NFR BLD AUTO: 1 % (ref 0.3–6.2)
ERYTHROCYTE [DISTWIDTH] IN BLOOD BY AUTOMATED COUNT: 11.1 % (ref 12.3–15.4)
GFR SERPL CREATININE-BSD FRML MDRD: 37 ML/MIN/1.73
GLUCOSE SERPL-MCNC: 91 MG/DL (ref 65–99)
GLUCOSE UR STRIP-MCNC: NEGATIVE MG/DL
HCT VFR BLD AUTO: 45.6 % (ref 37.5–51)
HGB BLD-MCNC: 15.4 G/DL (ref 13–17.7)
HGB UR QL STRIP.AUTO: NEGATIVE
IMM GRANULOCYTES # BLD AUTO: 0.03 10*3/MM3 (ref 0–0.05)
IMM GRANULOCYTES NFR BLD AUTO: 0.6 % (ref 0–0.5)
KETONES UR QL STRIP: NEGATIVE
LEUKOCYTE ESTERASE UR QL STRIP.AUTO: NEGATIVE
LYMPHOCYTES # BLD AUTO: 0.77 10*3/MM3 (ref 0.7–3.1)
LYMPHOCYTES NFR BLD AUTO: 15.5 % (ref 19.6–45.3)
MAGNESIUM SERPL-MCNC: 1.8 MG/DL (ref 1.6–2.6)
MCH RBC QN AUTO: 34.1 PG (ref 26.6–33)
MCHC RBC AUTO-ENTMCNC: 33.8 G/DL (ref 31.5–35.7)
MCV RBC AUTO: 101.1 FL (ref 79–97)
MONOCYTES # BLD AUTO: 0.36 10*3/MM3 (ref 0.1–0.9)
MONOCYTES NFR BLD AUTO: 7.2 % (ref 5–12)
NEUTROPHILS NFR BLD AUTO: 3.75 10*3/MM3 (ref 1.7–7)
NEUTROPHILS NFR BLD AUTO: 75.3 % (ref 42.7–76)
NITRITE UR QL STRIP: NEGATIVE
NRBC BLD AUTO-RTO: 0 /100 WBC (ref 0–0.2)
PH UR STRIP.AUTO: 5.5 [PH] (ref 5–8)
PHOSPHATE SERPL-MCNC: 2.2 MG/DL (ref 2.5–4.5)
PLATELET # BLD AUTO: 96 10*3/MM3 (ref 140–450)
PMV BLD AUTO: 10.4 FL (ref 6–12)
POTASSIUM SERPL-SCNC: 5.3 MMOL/L (ref 3.5–5.2)
PROT UR QL STRIP: NEGATIVE
PROT UR-MCNC: 10 MG/DL
PROT/CREAT UR: 108.7 MG/G CREA (ref 0–200)
RBC # BLD AUTO: 4.51 10*6/MM3 (ref 4.14–5.8)
SODIUM SERPL-SCNC: 140 MMOL/L (ref 136–145)
SP GR UR STRIP: 1.02 (ref 1–1.03)
UROBILINOGEN UR QL STRIP: NORMAL
WBC # BLD AUTO: 4.98 10*3/MM3 (ref 3.4–10.8)

## 2021-11-02 PROCEDURE — 85025 COMPLETE CBC W/AUTO DIFF WBC: CPT

## 2021-11-02 PROCEDURE — 80069 RENAL FUNCTION PANEL: CPT

## 2021-11-02 PROCEDURE — 80197 ASSAY OF TACROLIMUS: CPT

## 2021-11-02 PROCEDURE — 83735 ASSAY OF MAGNESIUM: CPT

## 2021-11-02 PROCEDURE — 81003 URINALYSIS AUTO W/O SCOPE: CPT

## 2021-11-02 PROCEDURE — 36415 COLL VENOUS BLD VENIPUNCTURE: CPT

## 2021-11-02 PROCEDURE — 82570 ASSAY OF URINE CREATININE: CPT

## 2021-11-02 PROCEDURE — 84156 ASSAY OF PROTEIN URINE: CPT

## 2021-11-03 LAB
CMV DNA SERPL NAA+PROBE-ACNC: NORMAL IU/ML
CMV DNA SERPL NAA+PROBE-LOG IU: NORMAL {LOG_IU}/ML

## 2021-11-04 LAB — TACROLIMUS BLD LC/MS/MS-MCNC: 7.1 NG/ML (ref 2–20)

## 2021-11-10 ENCOUNTER — LAB (OUTPATIENT)
Dept: LAB | Facility: HOSPITAL | Age: 61
End: 2021-11-10

## 2021-11-10 DIAGNOSIS — R39.9 GENITOURINARY SYMPTOMS: ICD-10-CM

## 2021-11-10 DIAGNOSIS — D64.9 ANEMIA, UNSPECIFIED TYPE: ICD-10-CM

## 2021-11-10 DIAGNOSIS — Z51.81 ENCOUNTER FOR THERAPEUTIC DRUG MONITORING: ICD-10-CM

## 2021-11-10 DIAGNOSIS — R79.89 HYPOURICEMIA: ICD-10-CM

## 2021-11-10 DIAGNOSIS — Z94.0 KIDNEY REPLACED BY TRANSPLANT: ICD-10-CM

## 2021-11-10 DIAGNOSIS — Z79.899 ENCOUNTER FOR LONG-TERM (CURRENT) USE OF OTHER MEDICATIONS: ICD-10-CM

## 2021-11-10 LAB
ALBUMIN SERPL-MCNC: 3.9 G/DL (ref 3.5–5.2)
ANION GAP SERPL CALCULATED.3IONS-SCNC: 5.8 MMOL/L (ref 5–15)
BASOPHILS # BLD AUTO: 0.01 10*3/MM3 (ref 0–0.2)
BASOPHILS NFR BLD AUTO: 0.2 % (ref 0–1.5)
BILIRUB UR QL STRIP: NEGATIVE
BUN SERPL-MCNC: 26 MG/DL (ref 6–20)
BUN/CREAT SERPL: 16.6 (ref 7–25)
CALCIUM SPEC-SCNC: 9.8 MG/DL (ref 8.6–10.5)
CHLORIDE SERPL-SCNC: 109 MMOL/L (ref 98–107)
CLARITY UR: CLEAR
CO2 SERPL-SCNC: 23.2 MMOL/L (ref 22–29)
COLOR UR: YELLOW
CREAT SERPL-MCNC: 1.57 MG/DL (ref 0.76–1.27)
CREAT UR-MCNC: 111 MG/DL
DEPRECATED RDW RBC AUTO: 43.5 FL (ref 37–54)
EOSINOPHIL # BLD AUTO: 0.04 10*3/MM3 (ref 0–0.4)
EOSINOPHIL NFR BLD AUTO: 1 % (ref 0.3–6.2)
ERYTHROCYTE [DISTWIDTH] IN BLOOD BY AUTOMATED COUNT: 11.4 % (ref 12.3–15.4)
GFR SERPL CREATININE-BSD FRML MDRD: 45 ML/MIN/1.73
GLUCOSE SERPL-MCNC: 92 MG/DL (ref 65–99)
GLUCOSE UR STRIP-MCNC: ABNORMAL MG/DL
HCT VFR BLD AUTO: 47.4 % (ref 37.5–51)
HGB BLD-MCNC: 15.5 G/DL (ref 13–17.7)
HGB UR QL STRIP.AUTO: NEGATIVE
IMM GRANULOCYTES # BLD AUTO: 0.01 10*3/MM3 (ref 0–0.05)
IMM GRANULOCYTES NFR BLD AUTO: 0.2 % (ref 0–0.5)
KETONES UR QL STRIP: NEGATIVE
LEUKOCYTE ESTERASE UR QL STRIP.AUTO: NEGATIVE
LYMPHOCYTES # BLD AUTO: 0.65 10*3/MM3 (ref 0.7–3.1)
LYMPHOCYTES NFR BLD AUTO: 16.2 % (ref 19.6–45.3)
MAGNESIUM SERPL-MCNC: 1.8 MG/DL (ref 1.6–2.6)
MCH RBC QN AUTO: 33.8 PG (ref 26.6–33)
MCHC RBC AUTO-ENTMCNC: 32.7 G/DL (ref 31.5–35.7)
MCV RBC AUTO: 103.3 FL (ref 79–97)
MONOCYTES # BLD AUTO: 0.32 10*3/MM3 (ref 0.1–0.9)
MONOCYTES NFR BLD AUTO: 8 % (ref 5–12)
NEUTROPHILS NFR BLD AUTO: 2.99 10*3/MM3 (ref 1.7–7)
NEUTROPHILS NFR BLD AUTO: 74.4 % (ref 42.7–76)
NITRITE UR QL STRIP: NEGATIVE
NRBC BLD AUTO-RTO: 0 /100 WBC (ref 0–0.2)
PH UR STRIP.AUTO: 5.5 [PH] (ref 5–8)
PHOSPHATE SERPL-MCNC: 2.7 MG/DL (ref 2.5–4.5)
PLATELET # BLD AUTO: 93 10*3/MM3 (ref 140–450)
PMV BLD AUTO: 10 FL (ref 6–12)
POTASSIUM SERPL-SCNC: 4.8 MMOL/L (ref 3.5–5.2)
PROT UR QL STRIP: NEGATIVE
PROT UR-MCNC: 15 MG/DL
PROT/CREAT UR: 135.1 MG/G CREA (ref 0–200)
RBC # BLD AUTO: 4.59 10*6/MM3 (ref 4.14–5.8)
SODIUM SERPL-SCNC: 138 MMOL/L (ref 136–145)
SP GR UR STRIP: 1.02 (ref 1–1.03)
UROBILINOGEN UR QL STRIP: ABNORMAL
WBC # BLD AUTO: 4.02 10*3/MM3 (ref 3.4–10.8)

## 2021-11-10 PROCEDURE — 83735 ASSAY OF MAGNESIUM: CPT

## 2021-11-10 PROCEDURE — 36415 COLL VENOUS BLD VENIPUNCTURE: CPT

## 2021-11-10 PROCEDURE — 85025 COMPLETE CBC W/AUTO DIFF WBC: CPT

## 2021-11-10 PROCEDURE — 80069 RENAL FUNCTION PANEL: CPT

## 2021-11-10 PROCEDURE — 82570 ASSAY OF URINE CREATININE: CPT

## 2021-11-10 PROCEDURE — 80197 ASSAY OF TACROLIMUS: CPT

## 2021-11-10 PROCEDURE — 84156 ASSAY OF PROTEIN URINE: CPT

## 2021-11-10 PROCEDURE — 81003 URINALYSIS AUTO W/O SCOPE: CPT

## 2021-11-11 LAB
CMV DNA SERPL NAA+PROBE-ACNC: NORMAL IU/ML
CMV DNA SERPL NAA+PROBE-LOG IU: NORMAL {LOG_IU}/ML

## 2021-11-15 LAB — TACROLIMUS BLD LC/MS/MS-MCNC: 5.4 NG/ML (ref 2–20)

## 2021-11-17 ENCOUNTER — LAB (OUTPATIENT)
Dept: LAB | Facility: HOSPITAL | Age: 61
End: 2021-11-17

## 2021-11-17 DIAGNOSIS — R79.89 HYPOURICEMIA: ICD-10-CM

## 2021-11-17 DIAGNOSIS — Z79.899 ENCOUNTER FOR LONG-TERM (CURRENT) USE OF OTHER MEDICATIONS: ICD-10-CM

## 2021-11-17 DIAGNOSIS — Z51.81 ENCOUNTER FOR THERAPEUTIC DRUG MONITORING: ICD-10-CM

## 2021-11-17 DIAGNOSIS — R39.9 GENITOURINARY SYMPTOMS: ICD-10-CM

## 2021-11-17 DIAGNOSIS — D64.9 ANEMIA, UNSPECIFIED TYPE: ICD-10-CM

## 2021-11-17 DIAGNOSIS — Z94.0 KIDNEY REPLACED BY TRANSPLANT: ICD-10-CM

## 2021-11-17 LAB
ALBUMIN SERPL-MCNC: 3.7 G/DL (ref 3.5–5.2)
ANION GAP SERPL CALCULATED.3IONS-SCNC: 7.8 MMOL/L (ref 5–15)
BASOPHILS # BLD AUTO: 0.01 10*3/MM3 (ref 0–0.2)
BASOPHILS NFR BLD AUTO: 0.2 % (ref 0–1.5)
BILIRUB UR QL STRIP: NEGATIVE
BUN SERPL-MCNC: 25 MG/DL (ref 6–20)
BUN/CREAT SERPL: 13.7 (ref 7–25)
CALCIUM SPEC-SCNC: 9.9 MG/DL (ref 8.6–10.5)
CHLORIDE SERPL-SCNC: 109 MMOL/L (ref 98–107)
CLARITY UR: CLEAR
CO2 SERPL-SCNC: 21.2 MMOL/L (ref 22–29)
COLOR UR: YELLOW
CREAT SERPL-MCNC: 1.82 MG/DL (ref 0.76–1.27)
CREAT UR-MCNC: 97.7 MG/DL
DEPRECATED RDW RBC AUTO: 41.6 FL (ref 37–54)
EOSINOPHIL # BLD AUTO: 0.04 10*3/MM3 (ref 0–0.4)
EOSINOPHIL NFR BLD AUTO: 1 % (ref 0.3–6.2)
ERYTHROCYTE [DISTWIDTH] IN BLOOD BY AUTOMATED COUNT: 11.3 % (ref 12.3–15.4)
GFR SERPL CREATININE-BSD FRML MDRD: 38 ML/MIN/1.73
GLUCOSE SERPL-MCNC: 78 MG/DL (ref 65–99)
GLUCOSE UR STRIP-MCNC: ABNORMAL MG/DL
HCT VFR BLD AUTO: 44.6 % (ref 37.5–51)
HGB BLD-MCNC: 15.3 G/DL (ref 13–17.7)
HGB UR QL STRIP.AUTO: NEGATIVE
IMM GRANULOCYTES # BLD AUTO: 0.01 10*3/MM3 (ref 0–0.05)
IMM GRANULOCYTES NFR BLD AUTO: 0.2 % (ref 0–0.5)
KETONES UR QL STRIP: NEGATIVE
LEUKOCYTE ESTERASE UR QL STRIP.AUTO: NEGATIVE
LYMPHOCYTES # BLD AUTO: 0.96 10*3/MM3 (ref 0.7–3.1)
LYMPHOCYTES NFR BLD AUTO: 22.9 % (ref 19.6–45.3)
MAGNESIUM SERPL-MCNC: 1.7 MG/DL (ref 1.6–2.6)
MCH RBC QN AUTO: 34.2 PG (ref 26.6–33)
MCHC RBC AUTO-ENTMCNC: 34.3 G/DL (ref 31.5–35.7)
MCV RBC AUTO: 99.6 FL (ref 79–97)
MONOCYTES # BLD AUTO: 0.35 10*3/MM3 (ref 0.1–0.9)
MONOCYTES NFR BLD AUTO: 8.3 % (ref 5–12)
NEUTROPHILS NFR BLD AUTO: 2.83 10*3/MM3 (ref 1.7–7)
NEUTROPHILS NFR BLD AUTO: 67.4 % (ref 42.7–76)
NITRITE UR QL STRIP: NEGATIVE
NRBC BLD AUTO-RTO: 0 /100 WBC (ref 0–0.2)
PH UR STRIP.AUTO: 5.5 [PH] (ref 5–8)
PHOSPHATE SERPL-MCNC: 2.1 MG/DL (ref 2.5–4.5)
PLATELET # BLD AUTO: 91 10*3/MM3 (ref 140–450)
PMV BLD AUTO: 9.8 FL (ref 6–12)
POTASSIUM SERPL-SCNC: 4.5 MMOL/L (ref 3.5–5.2)
PROT ?TM UR-MCNC: 10 MG/DL
PROT UR QL STRIP: NEGATIVE
PROT/CREAT UR: 102.4 MG/G CREA (ref 0–200)
RBC # BLD AUTO: 4.48 10*6/MM3 (ref 4.14–5.8)
SODIUM SERPL-SCNC: 138 MMOL/L (ref 136–145)
SP GR UR STRIP: 1.02 (ref 1–1.03)
UROBILINOGEN UR QL STRIP: ABNORMAL
WBC NRBC COR # BLD: 4.2 10*3/MM3 (ref 3.4–10.8)

## 2021-11-17 PROCEDURE — 84156 ASSAY OF PROTEIN URINE: CPT

## 2021-11-17 PROCEDURE — 80197 ASSAY OF TACROLIMUS: CPT

## 2021-11-17 PROCEDURE — 81003 URINALYSIS AUTO W/O SCOPE: CPT

## 2021-11-17 PROCEDURE — 82570 ASSAY OF URINE CREATININE: CPT

## 2021-11-17 PROCEDURE — 80069 RENAL FUNCTION PANEL: CPT

## 2021-11-17 PROCEDURE — 85025 COMPLETE CBC W/AUTO DIFF WBC: CPT

## 2021-11-17 PROCEDURE — 83735 ASSAY OF MAGNESIUM: CPT

## 2021-11-17 PROCEDURE — 36415 COLL VENOUS BLD VENIPUNCTURE: CPT

## 2021-11-19 LAB
CMV DNA SERPL NAA+PROBE-ACNC: NORMAL IU/ML
CMV DNA SERPL NAA+PROBE-LOG IU: NORMAL {LOG_IU}/ML

## 2021-11-20 LAB — TACROLIMUS BLD LC/MS/MS-MCNC: 5.4 NG/ML (ref 2–20)

## 2021-11-24 ENCOUNTER — LAB (OUTPATIENT)
Dept: LAB | Facility: HOSPITAL | Age: 61
End: 2021-11-24

## 2021-11-24 DIAGNOSIS — Z51.81 ENCOUNTER FOR THERAPEUTIC DRUG MONITORING: ICD-10-CM

## 2021-11-24 DIAGNOSIS — Z79.899 ENCOUNTER FOR LONG-TERM (CURRENT) USE OF OTHER MEDICATIONS: ICD-10-CM

## 2021-11-24 DIAGNOSIS — Z94.0 KIDNEY REPLACED BY TRANSPLANT: ICD-10-CM

## 2021-11-24 DIAGNOSIS — R39.9 GENITOURINARY SYMPTOMS: ICD-10-CM

## 2021-11-24 DIAGNOSIS — D64.9 ANEMIA, UNSPECIFIED TYPE: ICD-10-CM

## 2021-11-24 DIAGNOSIS — R79.89 HYPOURICEMIA: ICD-10-CM

## 2021-11-24 LAB
ALBUMIN SERPL-MCNC: 3.7 G/DL (ref 3.5–5.2)
ANION GAP SERPL CALCULATED.3IONS-SCNC: 7.5 MMOL/L (ref 5–15)
BASOPHILS # BLD AUTO: 0.01 10*3/MM3 (ref 0–0.2)
BASOPHILS NFR BLD AUTO: 0.3 % (ref 0–1.5)
BILIRUB UR QL STRIP: NEGATIVE
BUN SERPL-MCNC: 28 MG/DL (ref 6–20)
BUN/CREAT SERPL: 15.4 (ref 7–25)
CALCIUM SPEC-SCNC: 9.6 MG/DL (ref 8.6–10.5)
CHLORIDE SERPL-SCNC: 108 MMOL/L (ref 98–107)
CLARITY UR: CLEAR
CO2 SERPL-SCNC: 20.5 MMOL/L (ref 22–29)
COLOR UR: YELLOW
CREAT SERPL-MCNC: 1.82 MG/DL (ref 0.76–1.27)
CREAT UR-MCNC: 105.7 MG/DL
DEPRECATED RDW RBC AUTO: 40 FL (ref 37–54)
EOSINOPHIL # BLD AUTO: 0.04 10*3/MM3 (ref 0–0.4)
EOSINOPHIL NFR BLD AUTO: 1 % (ref 0.3–6.2)
ERYTHROCYTE [DISTWIDTH] IN BLOOD BY AUTOMATED COUNT: 11.4 % (ref 12.3–15.4)
GFR SERPL CREATININE-BSD FRML MDRD: 38 ML/MIN/1.73
GLUCOSE SERPL-MCNC: 94 MG/DL (ref 65–99)
GLUCOSE UR STRIP-MCNC: NEGATIVE MG/DL
HCT VFR BLD AUTO: 41.2 % (ref 37.5–51)
HGB BLD-MCNC: 14.6 G/DL (ref 13–17.7)
HGB UR QL STRIP.AUTO: NEGATIVE
IMM GRANULOCYTES # BLD AUTO: 0.02 10*3/MM3 (ref 0–0.05)
IMM GRANULOCYTES NFR BLD AUTO: 0.5 % (ref 0–0.5)
KETONES UR QL STRIP: NEGATIVE
LEUKOCYTE ESTERASE UR QL STRIP.AUTO: NEGATIVE
LYMPHOCYTES # BLD AUTO: 0.85 10*3/MM3 (ref 0.7–3.1)
LYMPHOCYTES NFR BLD AUTO: 21.4 % (ref 19.6–45.3)
MAGNESIUM SERPL-MCNC: 1.7 MG/DL (ref 1.6–2.6)
MCH RBC QN AUTO: 34.1 PG (ref 26.6–33)
MCHC RBC AUTO-ENTMCNC: 35.4 G/DL (ref 31.5–35.7)
MCV RBC AUTO: 96.3 FL (ref 79–97)
MONOCYTES # BLD AUTO: 0.34 10*3/MM3 (ref 0.1–0.9)
MONOCYTES NFR BLD AUTO: 8.6 % (ref 5–12)
NEUTROPHILS NFR BLD AUTO: 2.71 10*3/MM3 (ref 1.7–7)
NEUTROPHILS NFR BLD AUTO: 68.2 % (ref 42.7–76)
NITRITE UR QL STRIP: NEGATIVE
NRBC BLD AUTO-RTO: 0 /100 WBC (ref 0–0.2)
PH UR STRIP.AUTO: 5.5 [PH] (ref 5–8)
PHOSPHATE SERPL-MCNC: 2.1 MG/DL (ref 2.5–4.5)
PLATELET # BLD AUTO: 98 10*3/MM3 (ref 140–450)
PMV BLD AUTO: 10 FL (ref 6–12)
POTASSIUM SERPL-SCNC: 4.5 MMOL/L (ref 3.5–5.2)
PROT ?TM UR-MCNC: 12 MG/DL
PROT UR QL STRIP: ABNORMAL
PROT/CREAT UR: 113.5 MG/G CREA (ref 0–200)
RBC # BLD AUTO: 4.28 10*6/MM3 (ref 4.14–5.8)
SODIUM SERPL-SCNC: 136 MMOL/L (ref 136–145)
SP GR UR STRIP: 1.02 (ref 1–1.03)
UROBILINOGEN UR QL STRIP: ABNORMAL
WBC NRBC COR # BLD: 3.97 10*3/MM3 (ref 3.4–10.8)

## 2021-11-24 PROCEDURE — 84156 ASSAY OF PROTEIN URINE: CPT

## 2021-11-24 PROCEDURE — 80197 ASSAY OF TACROLIMUS: CPT

## 2021-11-24 PROCEDURE — 82570 ASSAY OF URINE CREATININE: CPT

## 2021-11-24 PROCEDURE — 80069 RENAL FUNCTION PANEL: CPT

## 2021-11-24 PROCEDURE — 85025 COMPLETE CBC W/AUTO DIFF WBC: CPT

## 2021-11-24 PROCEDURE — 81003 URINALYSIS AUTO W/O SCOPE: CPT

## 2021-11-24 PROCEDURE — 83735 ASSAY OF MAGNESIUM: CPT

## 2021-11-24 PROCEDURE — 36415 COLL VENOUS BLD VENIPUNCTURE: CPT

## 2021-11-28 LAB
CMV DNA SERPL NAA+PROBE-ACNC: NORMAL IU/ML
CMV DNA SERPL NAA+PROBE-LOG IU: NORMAL {LOG_IU}/ML

## 2021-11-29 LAB — TACROLIMUS BLD LC/MS/MS-MCNC: 5.3 NG/ML (ref 2–20)

## 2021-11-30 ENCOUNTER — LAB (OUTPATIENT)
Dept: LAB | Facility: HOSPITAL | Age: 61
End: 2021-11-30

## 2021-11-30 DIAGNOSIS — R39.9 GENITOURINARY SYMPTOMS: ICD-10-CM

## 2021-11-30 DIAGNOSIS — Z79.899 ENCOUNTER FOR LONG-TERM (CURRENT) USE OF OTHER MEDICATIONS: ICD-10-CM

## 2021-11-30 DIAGNOSIS — Z51.81 ENCOUNTER FOR THERAPEUTIC DRUG MONITORING: ICD-10-CM

## 2021-11-30 DIAGNOSIS — Z94.0 KIDNEY REPLACED BY TRANSPLANT: ICD-10-CM

## 2021-11-30 DIAGNOSIS — D64.9 ANEMIA, UNSPECIFIED TYPE: ICD-10-CM

## 2021-11-30 DIAGNOSIS — R79.89 HYPOURICEMIA: ICD-10-CM

## 2021-11-30 LAB
ALBUMIN SERPL-MCNC: 3.6 G/DL (ref 3.5–5.2)
ANION GAP SERPL CALCULATED.3IONS-SCNC: 9.8 MMOL/L (ref 5–15)
BASOPHILS # BLD AUTO: 0.01 10*3/MM3 (ref 0–0.2)
BASOPHILS NFR BLD AUTO: 0.2 % (ref 0–1.5)
BILIRUB UR QL STRIP: NEGATIVE
BUN SERPL-MCNC: 28 MG/DL (ref 6–20)
BUN/CREAT SERPL: 14.4 (ref 7–25)
CALCIUM SPEC-SCNC: 9.9 MG/DL (ref 8.6–10.5)
CHLORIDE SERPL-SCNC: 109 MMOL/L (ref 98–107)
CLARITY UR: CLEAR
CO2 SERPL-SCNC: 21.2 MMOL/L (ref 22–29)
COLOR UR: YELLOW
CREAT SERPL-MCNC: 1.94 MG/DL (ref 0.76–1.27)
CREAT UR-MCNC: 95.6 MG/DL
DEPRECATED RDW RBC AUTO: 41.4 FL (ref 37–54)
EOSINOPHIL # BLD AUTO: 0.02 10*3/MM3 (ref 0–0.4)
EOSINOPHIL NFR BLD AUTO: 0.5 % (ref 0.3–6.2)
ERYTHROCYTE [DISTWIDTH] IN BLOOD BY AUTOMATED COUNT: 11.4 % (ref 12.3–15.4)
GFR SERPL CREATININE-BSD FRML MDRD: 36 ML/MIN/1.73
GLUCOSE SERPL-MCNC: 104 MG/DL (ref 65–99)
GLUCOSE UR STRIP-MCNC: NEGATIVE MG/DL
HCT VFR BLD AUTO: 44.1 % (ref 37.5–51)
HGB BLD-MCNC: 15.2 G/DL (ref 13–17.7)
HGB UR QL STRIP.AUTO: NEGATIVE
IMM GRANULOCYTES # BLD AUTO: 0.02 10*3/MM3 (ref 0–0.05)
IMM GRANULOCYTES NFR BLD AUTO: 0.5 % (ref 0–0.5)
KETONES UR QL STRIP: NEGATIVE
LEUKOCYTE ESTERASE UR QL STRIP.AUTO: NEGATIVE
LYMPHOCYTES # BLD AUTO: 0.57 10*3/MM3 (ref 0.7–3.1)
LYMPHOCYTES NFR BLD AUTO: 12.9 % (ref 19.6–45.3)
MAGNESIUM SERPL-MCNC: 1.6 MG/DL (ref 1.6–2.6)
MCH RBC QN AUTO: 33.8 PG (ref 26.6–33)
MCHC RBC AUTO-ENTMCNC: 34.5 G/DL (ref 31.5–35.7)
MCV RBC AUTO: 98 FL (ref 79–97)
MONOCYTES # BLD AUTO: 0.32 10*3/MM3 (ref 0.1–0.9)
MONOCYTES NFR BLD AUTO: 7.2 % (ref 5–12)
NEUTROPHILS NFR BLD AUTO: 3.48 10*3/MM3 (ref 1.7–7)
NEUTROPHILS NFR BLD AUTO: 78.7 % (ref 42.7–76)
NITRITE UR QL STRIP: NEGATIVE
NRBC BLD AUTO-RTO: 0 /100 WBC (ref 0–0.2)
PH UR STRIP.AUTO: 5.5 [PH] (ref 5–8)
PHOSPHATE SERPL-MCNC: 2.4 MG/DL (ref 2.5–4.5)
PLATELET # BLD AUTO: 106 10*3/MM3 (ref 140–450)
PMV BLD AUTO: 9.8 FL (ref 6–12)
POTASSIUM SERPL-SCNC: 4.4 MMOL/L (ref 3.5–5.2)
PROT ?TM UR-MCNC: 13.3 MG/DL
PROT UR QL STRIP: NEGATIVE
PROT/CREAT UR: 139.1 MG/G CREA (ref 0–200)
RBC # BLD AUTO: 4.5 10*6/MM3 (ref 4.14–5.8)
SODIUM SERPL-SCNC: 140 MMOL/L (ref 136–145)
SP GR UR STRIP: 1.02 (ref 1–1.03)
UROBILINOGEN UR QL STRIP: NORMAL
WBC NRBC COR # BLD: 4.42 10*3/MM3 (ref 3.4–10.8)

## 2021-11-30 PROCEDURE — 84156 ASSAY OF PROTEIN URINE: CPT

## 2021-11-30 PROCEDURE — 85025 COMPLETE CBC W/AUTO DIFF WBC: CPT

## 2021-11-30 PROCEDURE — 80069 RENAL FUNCTION PANEL: CPT

## 2021-11-30 PROCEDURE — 87086 URINE CULTURE/COLONY COUNT: CPT

## 2021-11-30 PROCEDURE — 81003 URINALYSIS AUTO W/O SCOPE: CPT

## 2021-11-30 PROCEDURE — 83735 ASSAY OF MAGNESIUM: CPT

## 2021-11-30 PROCEDURE — 82570 ASSAY OF URINE CREATININE: CPT

## 2021-11-30 PROCEDURE — 36415 COLL VENOUS BLD VENIPUNCTURE: CPT

## 2021-11-30 PROCEDURE — 80197 ASSAY OF TACROLIMUS: CPT

## 2021-12-01 LAB
BACTERIA UR CULT: NORMAL
BACTERIA UR CULT: NORMAL
CMV DNA SERPL NAA+PROBE-ACNC: NORMAL IU/ML
CMV DNA SERPL NAA+PROBE-LOG IU: NORMAL {LOG_IU}/ML

## 2021-12-02 LAB — TACROLIMUS BLD LC/MS/MS-MCNC: 4.8 NG/ML (ref 2–20)

## 2021-12-04 ENCOUNTER — TRANSCRIBE ORDERS (OUTPATIENT)
Dept: ADMINISTRATIVE | Facility: HOSPITAL | Age: 61
End: 2021-12-04

## 2021-12-04 DIAGNOSIS — Z94.0 KIDNEY REPLACED BY TRANSPLANT: ICD-10-CM

## 2021-12-04 DIAGNOSIS — R39.9 GENITOURINARY SYMPTOMS: Primary | ICD-10-CM

## 2021-12-04 DIAGNOSIS — Z79.621 ENCOUNTER FOR MONITORING TACROLIMUS THERAPY: ICD-10-CM

## 2021-12-04 DIAGNOSIS — R79.89 ELEVATED SERUM CREATININE: ICD-10-CM

## 2021-12-04 DIAGNOSIS — B25.9 CYTOMEGALOVIRUS INFECTION, UNSPECIFIED CYTOMEGALOVIRAL INFECTION TYPE (HCC): Primary | ICD-10-CM

## 2021-12-04 DIAGNOSIS — D84.9 IMMUNOSUPPRESSED STATUS (HCC): Primary | ICD-10-CM

## 2021-12-04 DIAGNOSIS — Z51.81 ENCOUNTER FOR MONITORING TACROLIMUS THERAPY: ICD-10-CM

## 2021-12-28 ENCOUNTER — LAB (OUTPATIENT)
Dept: LAB | Facility: HOSPITAL | Age: 61
End: 2021-12-28

## 2021-12-28 DIAGNOSIS — R79.89 ELEVATED SERUM CREATININE: ICD-10-CM

## 2021-12-28 DIAGNOSIS — Z94.0 KIDNEY REPLACED BY TRANSPLANT: ICD-10-CM

## 2021-12-28 DIAGNOSIS — Z79.621 ENCOUNTER FOR MONITORING TACROLIMUS THERAPY: ICD-10-CM

## 2021-12-28 DIAGNOSIS — Z51.81 ENCOUNTER FOR MONITORING TACROLIMUS THERAPY: ICD-10-CM

## 2021-12-28 DIAGNOSIS — R39.9 GENITOURINARY SYMPTOMS: ICD-10-CM

## 2021-12-28 DIAGNOSIS — B25.9 CYTOMEGALOVIRUS INFECTION, UNSPECIFIED CYTOMEGALOVIRAL INFECTION TYPE: ICD-10-CM

## 2021-12-28 DIAGNOSIS — D84.9 IMMUNOSUPPRESSED STATUS: ICD-10-CM

## 2021-12-28 LAB
ALBUMIN SERPL-MCNC: 3.7 G/DL (ref 3.5–5.2)
ALP SERPL-CCNC: 202 U/L (ref 39–117)
ALT SERPL W P-5'-P-CCNC: 34 U/L (ref 1–41)
ANION GAP SERPL CALCULATED.3IONS-SCNC: 7 MMOL/L (ref 5–15)
AST SERPL-CCNC: 29 U/L (ref 1–40)
BACTERIA UR QL AUTO: NORMAL /HPF
BASOPHILS # BLD AUTO: 0.01 10*3/MM3 (ref 0–0.2)
BASOPHILS NFR BLD AUTO: 0.3 % (ref 0–1.5)
BILIRUB CONJ SERPL-MCNC: 0.2 MG/DL (ref 0–0.3)
BILIRUB INDIRECT SERPL-MCNC: 0.3 MG/DL
BILIRUB SERPL-MCNC: 0.5 MG/DL (ref 0–1.2)
BILIRUB UR QL STRIP: NEGATIVE
BUN SERPL-MCNC: 29 MG/DL (ref 6–20)
BUN/CREAT SERPL: 15.4 (ref 7–25)
CALCIUM SPEC-SCNC: 9.6 MG/DL (ref 8.6–10.5)
CHLORIDE SERPL-SCNC: 107 MMOL/L (ref 98–107)
CHOLEST SERPL-MCNC: 152 MG/DL (ref 0–200)
CLARITY UR: CLEAR
CO2 SERPL-SCNC: 23 MMOL/L (ref 22–29)
COLOR UR: YELLOW
CREAT SERPL-MCNC: 1.88 MG/DL (ref 0.76–1.27)
CREAT UR-MCNC: 121.1 MG/DL
DEPRECATED RDW RBC AUTO: 40.9 FL (ref 37–54)
EOSINOPHIL # BLD AUTO: 0.03 10*3/MM3 (ref 0–0.4)
EOSINOPHIL NFR BLD AUTO: 0.8 % (ref 0.3–6.2)
ERYTHROCYTE [DISTWIDTH] IN BLOOD BY AUTOMATED COUNT: 11.6 % (ref 12.3–15.4)
GFR SERPL CREATININE-BSD FRML MDRD: 37 ML/MIN/1.73
GLUCOSE SERPL-MCNC: 216 MG/DL (ref 65–99)
GLUCOSE UR STRIP-MCNC: ABNORMAL MG/DL
HBA1C MFR BLD: 5.8 % (ref 3.5–5.6)
HCT VFR BLD AUTO: 44.8 % (ref 37.5–51)
HDLC SERPL-MCNC: 66 MG/DL (ref 40–60)
HGB BLD-MCNC: 15.7 G/DL (ref 13–17.7)
HGB UR QL STRIP.AUTO: NEGATIVE
HYALINE CASTS UR QL AUTO: NORMAL /LPF
IMM GRANULOCYTES # BLD AUTO: 0.01 10*3/MM3 (ref 0–0.05)
IMM GRANULOCYTES NFR BLD AUTO: 0.3 % (ref 0–0.5)
KETONES UR QL STRIP: NEGATIVE
LDLC SERPL CALC-MCNC: 72 MG/DL (ref 0–100)
LDLC/HDLC SERPL: 1.09 {RATIO}
LEUKOCYTE ESTERASE UR QL STRIP.AUTO: NEGATIVE
LYMPHOCYTES # BLD AUTO: 0.69 10*3/MM3 (ref 0.7–3.1)
LYMPHOCYTES NFR BLD AUTO: 18.3 % (ref 19.6–45.3)
MAGNESIUM SERPL-MCNC: 1.6 MG/DL (ref 1.6–2.6)
MCH RBC QN AUTO: 34.2 PG (ref 26.6–33)
MCHC RBC AUTO-ENTMCNC: 35 G/DL (ref 31.5–35.7)
MCV RBC AUTO: 97.6 FL (ref 79–97)
MONOCYTES # BLD AUTO: 0.21 10*3/MM3 (ref 0.1–0.9)
MONOCYTES NFR BLD AUTO: 5.6 % (ref 5–12)
NEUTROPHILS NFR BLD AUTO: 2.83 10*3/MM3 (ref 1.7–7)
NEUTROPHILS NFR BLD AUTO: 74.7 % (ref 42.7–76)
NITRITE UR QL STRIP: NEGATIVE
NRBC BLD AUTO-RTO: 0 /100 WBC (ref 0–0.2)
PH UR STRIP.AUTO: 5.5 [PH] (ref 5–8)
PHOSPHATE SERPL-MCNC: 2 MG/DL (ref 2.5–4.5)
PLATELET # BLD AUTO: 91 10*3/MM3 (ref 140–450)
PMV BLD AUTO: 10 FL (ref 6–12)
POTASSIUM SERPL-SCNC: 5.1 MMOL/L (ref 3.5–5.2)
PROT ?TM UR-MCNC: 13 MG/DL
PROT SERPL-MCNC: 6.8 G/DL (ref 6–8.5)
PROT UR QL STRIP: NEGATIVE
PROT/CREAT UR: 107.3 MG/G CREA (ref 0–200)
RBC # BLD AUTO: 4.59 10*6/MM3 (ref 4.14–5.8)
RBC # UR STRIP: NORMAL /HPF
REF LAB TEST METHOD: NORMAL
SODIUM SERPL-SCNC: 137 MMOL/L (ref 136–145)
SP GR UR STRIP: 1.02 (ref 1–1.03)
SQUAMOUS #/AREA URNS HPF: NORMAL /HPF
TRIGL SERPL-MCNC: 70 MG/DL (ref 0–150)
UROBILINOGEN UR QL STRIP: ABNORMAL
VLDLC SERPL-MCNC: 14 MG/DL (ref 5–40)
WBC # UR STRIP: NORMAL /HPF
WBC NRBC COR # BLD: 3.78 10*3/MM3 (ref 3.4–10.8)

## 2021-12-28 PROCEDURE — 82570 ASSAY OF URINE CREATININE: CPT

## 2021-12-28 PROCEDURE — 84100 ASSAY OF PHOSPHORUS: CPT

## 2021-12-28 PROCEDURE — 80076 HEPATIC FUNCTION PANEL: CPT

## 2021-12-28 PROCEDURE — 83735 ASSAY OF MAGNESIUM: CPT

## 2021-12-28 PROCEDURE — 84156 ASSAY OF PROTEIN URINE: CPT

## 2021-12-28 PROCEDURE — 87799 DETECT AGENT NOS DNA QUANT: CPT

## 2021-12-28 PROCEDURE — 85025 COMPLETE CBC W/AUTO DIFF WBC: CPT

## 2021-12-28 PROCEDURE — 80197 ASSAY OF TACROLIMUS: CPT

## 2021-12-28 PROCEDURE — 83036 HEMOGLOBIN GLYCOSYLATED A1C: CPT

## 2021-12-28 PROCEDURE — 80061 LIPID PANEL: CPT

## 2021-12-28 PROCEDURE — 80048 BASIC METABOLIC PNL TOTAL CA: CPT

## 2021-12-28 PROCEDURE — 36415 COLL VENOUS BLD VENIPUNCTURE: CPT

## 2021-12-28 PROCEDURE — 81001 URINALYSIS AUTO W/SCOPE: CPT

## 2021-12-30 LAB
CMV DNA SERPL NAA+PROBE-ACNC: NORMAL IU/ML
CMV DNA SERPL NAA+PROBE-LOG IU: NORMAL {LOG_IU}/ML
TACROLIMUS BLD LC/MS/MS-MCNC: 6.2 NG/ML (ref 2–20)

## 2021-12-31 LAB
BKV DNA # UR NAA+PROBE: NEGATIVE COPIES/ML
BKV DNA SPEC NAA+PROBE-LOG#: NORMAL {LOG_COPIES}/ML

## 2022-01-11 ENCOUNTER — LAB (OUTPATIENT)
Dept: LAB | Facility: HOSPITAL | Age: 62
End: 2022-01-11

## 2022-01-11 ENCOUNTER — TRANSCRIBE ORDERS (OUTPATIENT)
Dept: ADMINISTRATIVE | Facility: HOSPITAL | Age: 62
End: 2022-01-11

## 2022-01-11 DIAGNOSIS — R79.89 ELEVATED SERUM CREATININE: ICD-10-CM

## 2022-01-11 DIAGNOSIS — Z79.899 ENCOUNTER FOR LONG-TERM (CURRENT) USE OF OTHER MEDICATIONS: ICD-10-CM

## 2022-01-11 DIAGNOSIS — B25.9 CYTOMEGALOVIRUS INFECTION, UNSPECIFIED CYTOMEGALOVIRAL INFECTION TYPE: ICD-10-CM

## 2022-01-11 DIAGNOSIS — Z20.822 EXPOSURE TO COVID-19 VIRUS: ICD-10-CM

## 2022-01-11 DIAGNOSIS — Z79.621 ENCOUNTER FOR MONITORING TACROLIMUS THERAPY: ICD-10-CM

## 2022-01-11 DIAGNOSIS — Z20.822 EXPOSURE TO COVID-19 VIRUS: Primary | ICD-10-CM

## 2022-01-11 DIAGNOSIS — Z51.81 ENCOUNTER FOR MONITORING TACROLIMUS THERAPY: ICD-10-CM

## 2022-01-11 DIAGNOSIS — Z51.81 ENCOUNTER FOR THERAPEUTIC DRUG MONITORING: ICD-10-CM

## 2022-01-11 DIAGNOSIS — R79.89 HYPOURICEMIA: ICD-10-CM

## 2022-01-11 DIAGNOSIS — Z94.0 KIDNEY REPLACED BY TRANSPLANT: ICD-10-CM

## 2022-01-11 DIAGNOSIS — R39.9 GENITOURINARY SYMPTOMS: ICD-10-CM

## 2022-01-11 DIAGNOSIS — D64.9 ANEMIA, UNSPECIFIED TYPE: ICD-10-CM

## 2022-01-11 LAB
ALBUMIN SERPL-MCNC: 3.8 G/DL (ref 3.5–5.2)
ALP SERPL-CCNC: 208 U/L (ref 39–117)
ALT SERPL W P-5'-P-CCNC: 37 U/L (ref 1–41)
ANION GAP SERPL CALCULATED.3IONS-SCNC: 7 MMOL/L (ref 5–15)
AST SERPL-CCNC: 34 U/L (ref 1–40)
BACTERIA UR QL AUTO: NORMAL /HPF
BASOPHILS # BLD AUTO: 0.01 10*3/MM3 (ref 0–0.2)
BASOPHILS NFR BLD AUTO: 0.4 % (ref 0–1.5)
BILIRUB CONJ SERPL-MCNC: <0.2 MG/DL (ref 0–0.3)
BILIRUB INDIRECT SERPL-MCNC: ABNORMAL MG/DL
BILIRUB SERPL-MCNC: 0.5 MG/DL (ref 0–1.2)
BILIRUB UR QL STRIP: NEGATIVE
BUN SERPL-MCNC: 24 MG/DL (ref 6–20)
BUN/CREAT SERPL: 13.1 (ref 7–25)
CALCIUM SPEC-SCNC: 9.7 MG/DL (ref 8.6–10.5)
CHLORIDE SERPL-SCNC: 103 MMOL/L (ref 98–107)
CHOLEST SERPL-MCNC: 151 MG/DL (ref 0–200)
CLARITY UR: CLEAR
CO2 SERPL-SCNC: 22 MMOL/L (ref 22–29)
COLOR UR: YELLOW
CREAT SERPL-MCNC: 1.83 MG/DL (ref 0.76–1.27)
CREAT UR-MCNC: 144.1 MG/DL
DEPRECATED RDW RBC AUTO: 40.4 FL (ref 37–54)
EOSINOPHIL # BLD AUTO: 0.02 10*3/MM3 (ref 0–0.4)
EOSINOPHIL NFR BLD AUTO: 0.7 % (ref 0.3–6.2)
ERYTHROCYTE [DISTWIDTH] IN BLOOD BY AUTOMATED COUNT: 11.8 % (ref 12.3–15.4)
GFR SERPL CREATININE-BSD FRML MDRD: 38 ML/MIN/1.73
GLUCOSE SERPL-MCNC: 96 MG/DL (ref 65–99)
GLUCOSE UR STRIP-MCNC: NEGATIVE MG/DL
HBA1C MFR BLD: 5.8 % (ref 3.5–5.6)
HCT VFR BLD AUTO: 45.3 % (ref 37.5–51)
HDLC SERPL-MCNC: 65 MG/DL (ref 40–60)
HGB BLD-MCNC: 15.7 G/DL (ref 13–17.7)
HGB UR QL STRIP.AUTO: NEGATIVE
HYALINE CASTS UR QL AUTO: NORMAL /LPF
IMM GRANULOCYTES # BLD AUTO: 0.02 10*3/MM3 (ref 0–0.05)
IMM GRANULOCYTES NFR BLD AUTO: 0.7 % (ref 0–0.5)
KETONES UR QL STRIP: NEGATIVE
LDLC SERPL CALC-MCNC: 76 MG/DL (ref 0–100)
LDLC/HDLC SERPL: 1.18 {RATIO}
LEUKOCYTE ESTERASE UR QL STRIP.AUTO: NEGATIVE
LYMPHOCYTES # BLD AUTO: 0.78 10*3/MM3 (ref 0.7–3.1)
LYMPHOCYTES NFR BLD AUTO: 28.8 % (ref 19.6–45.3)
MAGNESIUM SERPL-MCNC: 1.6 MG/DL (ref 1.6–2.6)
MCH RBC QN AUTO: 32.8 PG (ref 26.6–33)
MCHC RBC AUTO-ENTMCNC: 34.7 G/DL (ref 31.5–35.7)
MCV RBC AUTO: 94.6 FL (ref 79–97)
MONOCYTES # BLD AUTO: 0.26 10*3/MM3 (ref 0.1–0.9)
MONOCYTES NFR BLD AUTO: 9.6 % (ref 5–12)
NEUTROPHILS NFR BLD AUTO: 1.62 10*3/MM3 (ref 1.7–7)
NEUTROPHILS NFR BLD AUTO: 59.8 % (ref 42.7–76)
NITRITE UR QL STRIP: NEGATIVE
NRBC BLD AUTO-RTO: 0 /100 WBC (ref 0–0.2)
PH UR STRIP.AUTO: 5.5 [PH] (ref 5–8)
PHOSPHATE SERPL-MCNC: 2.3 MG/DL (ref 2.5–4.5)
PLATELET # BLD AUTO: 87 10*3/MM3 (ref 140–450)
PMV BLD AUTO: 10 FL (ref 6–12)
POTASSIUM SERPL-SCNC: 4.8 MMOL/L (ref 3.5–5.2)
PROT ?TM UR-MCNC: 24 MG/DL
PROT SERPL-MCNC: 6.8 G/DL (ref 6–8.5)
PROT UR QL STRIP: ABNORMAL
PROT/CREAT UR: 166.6 MG/G CREA (ref 0–200)
RBC # BLD AUTO: 4.79 10*6/MM3 (ref 4.14–5.8)
RBC # UR STRIP: NORMAL /HPF
REF LAB TEST METHOD: NORMAL
SARS-COV-2 ORF1AB RESP QL NAA+PROBE: DETECTED
SODIUM SERPL-SCNC: 132 MMOL/L (ref 136–145)
SP GR UR STRIP: 1.02 (ref 1–1.03)
SQUAMOUS #/AREA URNS HPF: NORMAL /HPF
TRIGL SERPL-MCNC: 47 MG/DL (ref 0–150)
UROBILINOGEN UR QL STRIP: ABNORMAL
VLDLC SERPL-MCNC: 10 MG/DL (ref 5–40)
WBC # UR STRIP: NORMAL /HPF
WBC NRBC COR # BLD: 2.71 10*3/MM3 (ref 3.4–10.8)

## 2022-01-11 PROCEDURE — U0004 COV-19 TEST NON-CDC HGH THRU: HCPCS

## 2022-01-11 PROCEDURE — 81001 URINALYSIS AUTO W/SCOPE: CPT

## 2022-01-11 PROCEDURE — 84156 ASSAY OF PROTEIN URINE: CPT

## 2022-01-11 PROCEDURE — 85025 COMPLETE CBC W/AUTO DIFF WBC: CPT

## 2022-01-11 PROCEDURE — 80061 LIPID PANEL: CPT

## 2022-01-11 PROCEDURE — 82570 ASSAY OF URINE CREATININE: CPT

## 2022-01-11 PROCEDURE — 80076 HEPATIC FUNCTION PANEL: CPT

## 2022-01-11 PROCEDURE — 83735 ASSAY OF MAGNESIUM: CPT

## 2022-01-11 PROCEDURE — 87086 URINE CULTURE/COLONY COUNT: CPT

## 2022-01-11 PROCEDURE — 80048 BASIC METABOLIC PNL TOTAL CA: CPT

## 2022-01-11 PROCEDURE — C9803 HOPD COVID-19 SPEC COLLECT: HCPCS

## 2022-01-11 PROCEDURE — 84100 ASSAY OF PHOSPHORUS: CPT

## 2022-01-11 PROCEDURE — 36415 COLL VENOUS BLD VENIPUNCTURE: CPT

## 2022-01-11 PROCEDURE — 83036 HEMOGLOBIN GLYCOSYLATED A1C: CPT

## 2022-01-11 PROCEDURE — 80197 ASSAY OF TACROLIMUS: CPT

## 2022-01-12 LAB — BACTERIA SPEC AEROBE CULT: NO GROWTH

## 2022-01-13 LAB
CMV DNA SERPL NAA+PROBE-ACNC: NORMAL IU/ML
CMV DNA SERPL NAA+PROBE-LOG IU: NORMAL {LOG_IU}/ML
TACROLIMUS BLD LC/MS/MS-MCNC: 7.4 NG/ML (ref 2–20)

## 2022-01-19 ENCOUNTER — HOSPITAL ENCOUNTER (OUTPATIENT)
Dept: INFUSION THERAPY | Facility: HOSPITAL | Age: 62
Discharge: HOME | End: 2022-01-19
Admitting: NURSE PRACTITIONER

## 2022-01-19 VITALS
OXYGEN SATURATION: 97 % | TEMPERATURE: 97.3 F | RESPIRATION RATE: 14 BRPM | HEART RATE: 82 BPM | DIASTOLIC BLOOD PRESSURE: 78 MMHG | SYSTOLIC BLOOD PRESSURE: 120 MMHG

## 2022-01-19 DIAGNOSIS — U07.1 CLINICAL DIAGNOSIS OF SEVERE ACUTE RESPIRATORY SYNDROME CORONAVIRUS 2 (SARS-COV-2) DISEASE: Primary | ICD-10-CM

## 2022-01-19 PROCEDURE — M0247 HC INTRAVENOUS INFUSION SOTROVIMAB: HCPCS | Performed by: NURSE PRACTITIONER

## 2022-01-19 PROCEDURE — 25010000002 SOTROVIMAB 500 MG/8ML SOLUTION: Performed by: NURSE PRACTITIONER

## 2022-01-19 PROCEDURE — 96361 HYDRATE IV INFUSION ADD-ON: CPT

## 2022-01-19 PROCEDURE — 96365 THER/PROPH/DIAG IV INF INIT: CPT

## 2022-01-19 RX ORDER — METHYLPREDNISOLONE SODIUM SUCCINATE 125 MG/2ML
125 INJECTION, POWDER, LYOPHILIZED, FOR SOLUTION INTRAMUSCULAR; INTRAVENOUS AS NEEDED
Status: CANCELLED | OUTPATIENT
Start: 2022-01-19

## 2022-01-19 RX ORDER — DIPHENHYDRAMINE HCL 25 MG
50 TABLET ORAL ONCE AS NEEDED
Status: CANCELLED | OUTPATIENT
Start: 2022-01-19

## 2022-01-19 RX ORDER — EPINEPHRINE 1 MG/ML
0.3 INJECTION, SOLUTION INTRAMUSCULAR; SUBCUTANEOUS AS NEEDED
Status: CANCELLED | OUTPATIENT
Start: 2022-01-19

## 2022-01-19 RX ORDER — SODIUM CHLORIDE 9 MG/ML
30 INJECTION, SOLUTION INTRAVENOUS ONCE
Status: COMPLETED | OUTPATIENT
Start: 2022-01-19 | End: 2022-01-19

## 2022-01-19 RX ORDER — DIPHENHYDRAMINE HYDROCHLORIDE 50 MG/ML
50 INJECTION INTRAMUSCULAR; INTRAVENOUS ONCE AS NEEDED
Status: CANCELLED | OUTPATIENT
Start: 2022-01-19

## 2022-01-19 RX ORDER — DIPHENHYDRAMINE HCL 25 MG
50 CAPSULE ORAL ONCE AS NEEDED
OUTPATIENT
Start: 2022-01-19

## 2022-01-19 RX ORDER — SODIUM CHLORIDE 9 MG/ML
30 INJECTION, SOLUTION INTRAVENOUS ONCE
Status: CANCELLED | OUTPATIENT
Start: 2022-01-19

## 2022-01-19 RX ORDER — DIPHENHYDRAMINE HYDROCHLORIDE 50 MG/ML
50 INJECTION INTRAMUSCULAR; INTRAVENOUS ONCE AS NEEDED
OUTPATIENT
Start: 2022-01-19

## 2022-01-19 RX ORDER — METHYLPREDNISOLONE SODIUM SUCCINATE 125 MG/2ML
125 INJECTION, POWDER, LYOPHILIZED, FOR SOLUTION INTRAMUSCULAR; INTRAVENOUS AS NEEDED
OUTPATIENT
Start: 2022-01-19

## 2022-01-19 RX ORDER — EPINEPHRINE 1 MG/ML
0.3 INJECTION, SOLUTION, CONCENTRATE INTRAVENOUS AS NEEDED
OUTPATIENT
Start: 2022-01-19

## 2022-01-19 RX ADMIN — SODIUM CHLORIDE 500 MG: 9 INJECTION, SOLUTION INTRAVENOUS at 07:50

## 2022-01-19 RX ADMIN — SODIUM CHLORIDE 30 ML/HR: 9 INJECTION, SOLUTION INTRAVENOUS at 08:15

## 2022-02-08 ENCOUNTER — LAB (OUTPATIENT)
Dept: LAB | Facility: HOSPITAL | Age: 62
End: 2022-02-08

## 2022-02-08 DIAGNOSIS — R79.89 ELEVATED SERUM CREATININE: ICD-10-CM

## 2022-02-08 DIAGNOSIS — Z94.0 KIDNEY REPLACED BY TRANSPLANT: ICD-10-CM

## 2022-02-08 DIAGNOSIS — R39.9 GENITOURINARY SYMPTOMS: ICD-10-CM

## 2022-02-08 DIAGNOSIS — R79.89 HYPOURICEMIA: ICD-10-CM

## 2022-02-08 DIAGNOSIS — D64.9 ANEMIA, UNSPECIFIED TYPE: ICD-10-CM

## 2022-02-08 DIAGNOSIS — Z79.621 ENCOUNTER FOR MONITORING TACROLIMUS THERAPY: ICD-10-CM

## 2022-02-08 DIAGNOSIS — Z51.81 ENCOUNTER FOR THERAPEUTIC DRUG MONITORING: ICD-10-CM

## 2022-02-08 DIAGNOSIS — Z51.81 ENCOUNTER FOR MONITORING TACROLIMUS THERAPY: ICD-10-CM

## 2022-02-08 DIAGNOSIS — Z79.899 ENCOUNTER FOR LONG-TERM (CURRENT) USE OF OTHER MEDICATIONS: ICD-10-CM

## 2022-02-08 DIAGNOSIS — B25.9 CYTOMEGALOVIRUS INFECTION, UNSPECIFIED CYTOMEGALOVIRAL INFECTION TYPE: ICD-10-CM

## 2022-02-08 LAB
ALBUMIN SERPL-MCNC: 3.7 G/DL (ref 3.5–5.2)
ALP SERPL-CCNC: 202 U/L (ref 39–117)
ALT SERPL W P-5'-P-CCNC: 26 U/L (ref 1–41)
ANION GAP SERPL CALCULATED.3IONS-SCNC: 8 MMOL/L (ref 5–15)
AST SERPL-CCNC: 22 U/L (ref 1–40)
BACTERIA UR QL AUTO: NORMAL /HPF
BASOPHILS # BLD AUTO: 0.01 10*3/MM3 (ref 0–0.2)
BASOPHILS NFR BLD AUTO: 0.2 % (ref 0–1.5)
BILIRUB CONJ SERPL-MCNC: 0.2 MG/DL (ref 0–0.3)
BILIRUB INDIRECT SERPL-MCNC: 0.3 MG/DL
BILIRUB SERPL-MCNC: 0.5 MG/DL (ref 0–1.2)
BILIRUB UR QL STRIP: NEGATIVE
BUN SERPL-MCNC: 30 MG/DL (ref 8–23)
BUN/CREAT SERPL: 13 (ref 7–25)
CALCIUM SPEC-SCNC: 10.3 MG/DL (ref 8.6–10.5)
CHLORIDE SERPL-SCNC: 108 MMOL/L (ref 98–107)
CLARITY UR: CLEAR
CO2 SERPL-SCNC: 22 MMOL/L (ref 22–29)
COLOR UR: YELLOW
CREAT SERPL-MCNC: 2.31 MG/DL (ref 0.76–1.27)
CREAT UR-MCNC: 139.3 MG/DL
DEPRECATED RDW RBC AUTO: 44.7 FL (ref 37–54)
EOSINOPHIL # BLD AUTO: 0.03 10*3/MM3 (ref 0–0.4)
EOSINOPHIL NFR BLD AUTO: 0.6 % (ref 0.3–6.2)
ERYTHROCYTE [DISTWIDTH] IN BLOOD BY AUTOMATED COUNT: 12.3 % (ref 12.3–15.4)
GFR SERPL CREATININE-BSD FRML MDRD: 29 ML/MIN/1.73
GLUCOSE SERPL-MCNC: 105 MG/DL (ref 65–99)
GLUCOSE UR STRIP-MCNC: NEGATIVE MG/DL
HCT VFR BLD AUTO: 46 % (ref 37.5–51)
HGB BLD-MCNC: 15.1 G/DL (ref 13–17.7)
HGB UR QL STRIP.AUTO: NEGATIVE
HYALINE CASTS UR QL AUTO: NORMAL /LPF
IMM GRANULOCYTES # BLD AUTO: 0.03 10*3/MM3 (ref 0–0.05)
IMM GRANULOCYTES NFR BLD AUTO: 0.6 % (ref 0–0.5)
KETONES UR QL STRIP: NEGATIVE
LEUKOCYTE ESTERASE UR QL STRIP.AUTO: NEGATIVE
LYMPHOCYTES # BLD AUTO: 0.96 10*3/MM3 (ref 0.7–3.1)
LYMPHOCYTES NFR BLD AUTO: 17.6 % (ref 19.6–45.3)
MAGNESIUM SERPL-MCNC: 1.6 MG/DL (ref 1.6–2.4)
MCH RBC QN AUTO: 32.4 PG (ref 26.6–33)
MCHC RBC AUTO-ENTMCNC: 32.8 G/DL (ref 31.5–35.7)
MCV RBC AUTO: 98.7 FL (ref 79–97)
MONOCYTES # BLD AUTO: 0.43 10*3/MM3 (ref 0.1–0.9)
MONOCYTES NFR BLD AUTO: 7.9 % (ref 5–12)
NEUTROPHILS NFR BLD AUTO: 3.98 10*3/MM3 (ref 1.7–7)
NEUTROPHILS NFR BLD AUTO: 73.1 % (ref 42.7–76)
NITRITE UR QL STRIP: NEGATIVE
NRBC BLD AUTO-RTO: 0 /100 WBC (ref 0–0.2)
PH UR STRIP.AUTO: 5.5 [PH] (ref 5–8)
PHOSPHATE SERPL-MCNC: 2.2 MG/DL (ref 2.5–4.5)
PLATELET # BLD AUTO: 85 10*3/MM3 (ref 140–450)
PMV BLD AUTO: 10.3 FL (ref 6–12)
POTASSIUM SERPL-SCNC: 5.4 MMOL/L (ref 3.5–5.2)
PROT ?TM UR-MCNC: 11.5 MG/DL
PROT SERPL-MCNC: 7.1 G/DL (ref 6–8.5)
PROT UR QL STRIP: NEGATIVE
PROT/CREAT UR: 82.6 MG/G CREA (ref 0–200)
RBC # BLD AUTO: 4.66 10*6/MM3 (ref 4.14–5.8)
RBC # UR STRIP: NORMAL /HPF
REF LAB TEST METHOD: NORMAL
SODIUM SERPL-SCNC: 138 MMOL/L (ref 136–145)
SP GR UR STRIP: 1.02 (ref 1–1.03)
SQUAMOUS #/AREA URNS HPF: NORMAL /HPF
UROBILINOGEN UR QL STRIP: NORMAL
WBC # UR STRIP: NORMAL /HPF
WBC NRBC COR # BLD: 5.44 10*3/MM3 (ref 3.4–10.8)

## 2022-02-08 PROCEDURE — 36415 COLL VENOUS BLD VENIPUNCTURE: CPT

## 2022-02-08 PROCEDURE — 80197 ASSAY OF TACROLIMUS: CPT

## 2022-02-08 PROCEDURE — 80048 BASIC METABOLIC PNL TOTAL CA: CPT

## 2022-02-08 PROCEDURE — 81001 URINALYSIS AUTO W/SCOPE: CPT

## 2022-02-08 PROCEDURE — 82570 ASSAY OF URINE CREATININE: CPT

## 2022-02-08 PROCEDURE — 85025 COMPLETE CBC W/AUTO DIFF WBC: CPT

## 2022-02-08 PROCEDURE — 87086 URINE CULTURE/COLONY COUNT: CPT

## 2022-02-08 PROCEDURE — 80076 HEPATIC FUNCTION PANEL: CPT

## 2022-02-08 PROCEDURE — 83735 ASSAY OF MAGNESIUM: CPT

## 2022-02-08 PROCEDURE — 84156 ASSAY OF PROTEIN URINE: CPT

## 2022-02-08 PROCEDURE — 84100 ASSAY OF PHOSPHORUS: CPT

## 2022-02-09 LAB — BACTERIA SPEC AEROBE CULT: NO GROWTH

## 2022-02-11 LAB
CMV DNA SERPL NAA+PROBE-ACNC: NORMAL IU/ML
CMV DNA SERPL NAA+PROBE-LOG IU: NORMAL {LOG_IU}/ML

## 2022-02-12 LAB — TACROLIMUS BLD LC/MS/MS-MCNC: 10.5 NG/ML (ref 2–20)

## 2022-02-23 ENCOUNTER — LAB (OUTPATIENT)
Dept: LAB | Facility: HOSPITAL | Age: 62
End: 2022-02-23

## 2022-02-23 DIAGNOSIS — R79.89 HYPOURICEMIA: ICD-10-CM

## 2022-02-23 DIAGNOSIS — Z51.81 ENCOUNTER FOR MONITORING TACROLIMUS THERAPY: ICD-10-CM

## 2022-02-23 DIAGNOSIS — Z51.81 ENCOUNTER FOR THERAPEUTIC DRUG MONITORING: ICD-10-CM

## 2022-02-23 DIAGNOSIS — B25.9 CYTOMEGALOVIRUS INFECTION, UNSPECIFIED CYTOMEGALOVIRAL INFECTION TYPE: ICD-10-CM

## 2022-02-23 DIAGNOSIS — R79.89 ELEVATED SERUM CREATININE: ICD-10-CM

## 2022-02-23 DIAGNOSIS — D64.9 ANEMIA, UNSPECIFIED TYPE: ICD-10-CM

## 2022-02-23 DIAGNOSIS — Z79.899 ENCOUNTER FOR LONG-TERM (CURRENT) USE OF OTHER MEDICATIONS: ICD-10-CM

## 2022-02-23 DIAGNOSIS — Z94.0 KIDNEY REPLACED BY TRANSPLANT: ICD-10-CM

## 2022-02-23 DIAGNOSIS — Z79.621 ENCOUNTER FOR MONITORING TACROLIMUS THERAPY: ICD-10-CM

## 2022-02-23 DIAGNOSIS — R39.9 GENITOURINARY SYMPTOMS: ICD-10-CM

## 2022-02-23 LAB
ALBUMIN SERPL-MCNC: 3.6 G/DL (ref 3.5–5.2)
ALP SERPL-CCNC: 198 U/L (ref 39–117)
ALT SERPL W P-5'-P-CCNC: 27 U/L (ref 1–41)
ANION GAP SERPL CALCULATED.3IONS-SCNC: 9 MMOL/L (ref 5–15)
AST SERPL-CCNC: 26 U/L (ref 1–40)
BACTERIA UR QL AUTO: NORMAL /HPF
BASOPHILS # BLD AUTO: 0.01 10*3/MM3 (ref 0–0.2)
BASOPHILS NFR BLD AUTO: 0.3 % (ref 0–1.5)
BILIRUB CONJ SERPL-MCNC: <0.2 MG/DL (ref 0–0.3)
BILIRUB INDIRECT SERPL-MCNC: ABNORMAL MG/DL
BILIRUB SERPL-MCNC: 0.4 MG/DL (ref 0–1.2)
BILIRUB UR QL STRIP: NEGATIVE
BUN SERPL-MCNC: 20 MG/DL (ref 8–23)
BUN/CREAT SERPL: 11.2 (ref 7–25)
CALCIUM SPEC-SCNC: 9.5 MG/DL (ref 8.6–10.5)
CHLORIDE SERPL-SCNC: 109 MMOL/L (ref 98–107)
CLARITY UR: CLEAR
CO2 SERPL-SCNC: 23 MMOL/L (ref 22–29)
COLOR UR: YELLOW
CREAT SERPL-MCNC: 1.79 MG/DL (ref 0.76–1.27)
CREAT UR-MCNC: 120.3 MG/DL
DEPRECATED RDW RBC AUTO: 44.9 FL (ref 37–54)
EOSINOPHIL # BLD AUTO: 0.03 10*3/MM3 (ref 0–0.4)
EOSINOPHIL NFR BLD AUTO: 0.9 % (ref 0.3–6.2)
ERYTHROCYTE [DISTWIDTH] IN BLOOD BY AUTOMATED COUNT: 12.4 % (ref 12.3–15.4)
GFR SERPL CREATININE-BSD FRML MDRD: 39 ML/MIN/1.73
GLUCOSE SERPL-MCNC: 78 MG/DL (ref 65–99)
GLUCOSE UR STRIP-MCNC: NEGATIVE MG/DL
HCT VFR BLD AUTO: 42.8 % (ref 37.5–51)
HGB BLD-MCNC: 14.2 G/DL (ref 13–17.7)
HGB UR QL STRIP.AUTO: NEGATIVE
HYALINE CASTS UR QL AUTO: NORMAL /LPF
IMM GRANULOCYTES # BLD AUTO: 0.01 10*3/MM3 (ref 0–0.05)
IMM GRANULOCYTES NFR BLD AUTO: 0.3 % (ref 0–0.5)
KETONES UR QL STRIP: NEGATIVE
LEUKOCYTE ESTERASE UR QL STRIP.AUTO: NEGATIVE
LYMPHOCYTES # BLD AUTO: 0.85 10*3/MM3 (ref 0.7–3.1)
LYMPHOCYTES NFR BLD AUTO: 25.9 % (ref 19.6–45.3)
MAGNESIUM SERPL-MCNC: 1.6 MG/DL (ref 1.6–2.4)
MCH RBC QN AUTO: 32.2 PG (ref 26.6–33)
MCHC RBC AUTO-ENTMCNC: 33.2 G/DL (ref 31.5–35.7)
MCV RBC AUTO: 97.1 FL (ref 79–97)
MONOCYTES # BLD AUTO: 0.28 10*3/MM3 (ref 0.1–0.9)
MONOCYTES NFR BLD AUTO: 8.5 % (ref 5–12)
NEUTROPHILS NFR BLD AUTO: 2.1 10*3/MM3 (ref 1.7–7)
NEUTROPHILS NFR BLD AUTO: 64.1 % (ref 42.7–76)
NITRITE UR QL STRIP: NEGATIVE
NRBC BLD AUTO-RTO: 0 /100 WBC (ref 0–0.2)
PH UR STRIP.AUTO: 5.5 [PH] (ref 5–8)
PHOSPHATE SERPL-MCNC: 2.9 MG/DL (ref 2.5–4.5)
PLATELET # BLD AUTO: 91 10*3/MM3 (ref 140–450)
PMV BLD AUTO: 9.9 FL (ref 6–12)
POTASSIUM SERPL-SCNC: 4.6 MMOL/L (ref 3.5–5.2)
PROT ?TM UR-MCNC: 14.2 MG/DL
PROT SERPL-MCNC: 7 G/DL (ref 6–8.5)
PROT UR QL STRIP: NEGATIVE
PROT/CREAT UR: 118 MG/G CREA (ref 0–200)
RBC # BLD AUTO: 4.41 10*6/MM3 (ref 4.14–5.8)
RBC # UR STRIP: NORMAL /HPF
REF LAB TEST METHOD: NORMAL
SODIUM SERPL-SCNC: 141 MMOL/L (ref 136–145)
SP GR UR STRIP: 1.02 (ref 1–1.03)
SQUAMOUS #/AREA URNS HPF: NORMAL /HPF
UROBILINOGEN UR QL STRIP: NORMAL
WBC # UR STRIP: NORMAL /HPF
WBC NRBC COR # BLD: 3.28 10*3/MM3 (ref 3.4–10.8)

## 2022-02-23 PROCEDURE — 80048 BASIC METABOLIC PNL TOTAL CA: CPT

## 2022-02-23 PROCEDURE — 84100 ASSAY OF PHOSPHORUS: CPT

## 2022-02-23 PROCEDURE — 84156 ASSAY OF PROTEIN URINE: CPT

## 2022-02-23 PROCEDURE — 85025 COMPLETE CBC W/AUTO DIFF WBC: CPT

## 2022-02-23 PROCEDURE — 81001 URINALYSIS AUTO W/SCOPE: CPT

## 2022-02-23 PROCEDURE — 36415 COLL VENOUS BLD VENIPUNCTURE: CPT

## 2022-02-23 PROCEDURE — 83735 ASSAY OF MAGNESIUM: CPT

## 2022-02-23 PROCEDURE — 82570 ASSAY OF URINE CREATININE: CPT

## 2022-02-23 PROCEDURE — 80197 ASSAY OF TACROLIMUS: CPT

## 2022-02-23 PROCEDURE — 80076 HEPATIC FUNCTION PANEL: CPT

## 2022-02-26 LAB
CMV DNA SERPL NAA+PROBE-ACNC: NORMAL IU/ML
CMV DNA SERPL NAA+PROBE-LOG IU: NORMAL {LOG_IU}/ML

## 2022-02-27 LAB — TACROLIMUS BLD LC/MS/MS-MCNC: 3.8 NG/ML (ref 2–20)

## 2022-05-04 ENCOUNTER — LAB (OUTPATIENT)
Dept: LAB | Facility: HOSPITAL | Age: 62
End: 2022-05-04

## 2022-05-04 DIAGNOSIS — Z51.81 ENCOUNTER FOR MONITORING TACROLIMUS THERAPY: ICD-10-CM

## 2022-05-04 DIAGNOSIS — Z94.0 KIDNEY REPLACED BY TRANSPLANT: ICD-10-CM

## 2022-05-04 DIAGNOSIS — R79.89 ELEVATED SERUM CREATININE: ICD-10-CM

## 2022-05-04 DIAGNOSIS — Z79.621 ENCOUNTER FOR MONITORING TACROLIMUS THERAPY: ICD-10-CM

## 2022-05-04 DIAGNOSIS — R39.9 GENITOURINARY SYMPTOMS: ICD-10-CM

## 2022-05-04 LAB
ALBUMIN SERPL-MCNC: 3.8 G/DL (ref 3.5–5.2)
ALP SERPL-CCNC: 203 U/L (ref 39–117)
ALT SERPL W P-5'-P-CCNC: 22 U/L (ref 1–41)
ANION GAP SERPL CALCULATED.3IONS-SCNC: 8 MMOL/L (ref 5–15)
AST SERPL-CCNC: 20 U/L (ref 1–40)
BACTERIA UR QL AUTO: NORMAL /HPF
BASOPHILS # BLD AUTO: 0.01 10*3/MM3 (ref 0–0.2)
BASOPHILS NFR BLD AUTO: 0.2 % (ref 0–1.5)
BILIRUB CONJ SERPL-MCNC: <0.2 MG/DL (ref 0–0.3)
BILIRUB INDIRECT SERPL-MCNC: ABNORMAL MG/DL
BILIRUB SERPL-MCNC: 0.4 MG/DL (ref 0–1.2)
BILIRUB UR QL STRIP: NEGATIVE
BUN SERPL-MCNC: 30 MG/DL (ref 8–23)
BUN/CREAT SERPL: 14.5 (ref 7–25)
CALCIUM SPEC-SCNC: 9.4 MG/DL (ref 8.6–10.5)
CHLORIDE SERPL-SCNC: 108 MMOL/L (ref 98–107)
CLARITY UR: CLEAR
CO2 SERPL-SCNC: 22 MMOL/L (ref 22–29)
COLOR UR: YELLOW
CREAT SERPL-MCNC: 2.07 MG/DL (ref 0.76–1.27)
CREAT UR-MCNC: 132.7 MG/DL
DEPRECATED RDW RBC AUTO: 43.4 FL (ref 37–54)
EGFRCR SERPLBLD CKD-EPI 2021: 36 ML/MIN/1.73
EOSINOPHIL # BLD AUTO: 0.02 10*3/MM3 (ref 0–0.4)
EOSINOPHIL NFR BLD AUTO: 0.4 % (ref 0.3–6.2)
ERYTHROCYTE [DISTWIDTH] IN BLOOD BY AUTOMATED COUNT: 12.5 % (ref 12.3–15.4)
GLUCOSE SERPL-MCNC: 90 MG/DL (ref 65–99)
GLUCOSE UR STRIP-MCNC: NEGATIVE MG/DL
HCT VFR BLD AUTO: 45.8 % (ref 37.5–51)
HGB BLD-MCNC: 14.9 G/DL (ref 13–17.7)
HGB UR QL STRIP.AUTO: NEGATIVE
HYALINE CASTS UR QL AUTO: NORMAL /LPF
IMM GRANULOCYTES # BLD AUTO: 0.05 10*3/MM3 (ref 0–0.05)
IMM GRANULOCYTES NFR BLD AUTO: 1.1 % (ref 0–0.5)
KETONES UR QL STRIP: NEGATIVE
LEUKOCYTE ESTERASE UR QL STRIP.AUTO: NEGATIVE
LYMPHOCYTES # BLD AUTO: 1.06 10*3/MM3 (ref 0.7–3.1)
LYMPHOCYTES NFR BLD AUTO: 23.6 % (ref 19.6–45.3)
MAGNESIUM SERPL-MCNC: 1.9 MG/DL (ref 1.6–2.4)
MCH RBC QN AUTO: 30.5 PG (ref 26.6–33)
MCHC RBC AUTO-ENTMCNC: 32.5 G/DL (ref 31.5–35.7)
MCV RBC AUTO: 93.9 FL (ref 79–97)
MONOCYTES # BLD AUTO: 0.44 10*3/MM3 (ref 0.1–0.9)
MONOCYTES NFR BLD AUTO: 9.8 % (ref 5–12)
NEUTROPHILS NFR BLD AUTO: 2.91 10*3/MM3 (ref 1.7–7)
NEUTROPHILS NFR BLD AUTO: 64.9 % (ref 42.7–76)
NITRITE UR QL STRIP: NEGATIVE
NRBC BLD AUTO-RTO: 0.2 /100 WBC (ref 0–0.2)
PH UR STRIP.AUTO: 5.5 [PH] (ref 5–8)
PHOSPHATE SERPL-MCNC: 2.9 MG/DL (ref 2.5–4.5)
PLATELET # BLD AUTO: 103 10*3/MM3 (ref 140–450)
PMV BLD AUTO: 9.7 FL (ref 6–12)
POTASSIUM SERPL-SCNC: 5.1 MMOL/L (ref 3.5–5.2)
PROT ?TM UR-MCNC: 10.5 MG/DL
PROT SERPL-MCNC: 7.2 G/DL (ref 6–8.5)
PROT UR QL STRIP: ABNORMAL
PROT/CREAT UR: 79.1 MG/G CREA (ref 0–200)
RBC # BLD AUTO: 4.88 10*6/MM3 (ref 4.14–5.8)
RBC # UR STRIP: NORMAL /HPF
REF LAB TEST METHOD: NORMAL
SODIUM SERPL-SCNC: 138 MMOL/L (ref 136–145)
SP GR UR STRIP: 1.02 (ref 1–1.03)
SQUAMOUS #/AREA URNS HPF: NORMAL /HPF
UROBILINOGEN UR QL STRIP: ABNORMAL
WBC # UR STRIP: NORMAL /HPF
WBC NRBC COR # BLD: 4.49 10*3/MM3 (ref 3.4–10.8)

## 2022-05-04 PROCEDURE — 82570 ASSAY OF URINE CREATININE: CPT

## 2022-05-04 PROCEDURE — 36415 COLL VENOUS BLD VENIPUNCTURE: CPT

## 2022-05-04 PROCEDURE — 80197 ASSAY OF TACROLIMUS: CPT

## 2022-05-04 PROCEDURE — 83735 ASSAY OF MAGNESIUM: CPT

## 2022-05-04 PROCEDURE — 85025 COMPLETE CBC W/AUTO DIFF WBC: CPT

## 2022-05-04 PROCEDURE — 84156 ASSAY OF PROTEIN URINE: CPT

## 2022-05-04 PROCEDURE — 80076 HEPATIC FUNCTION PANEL: CPT

## 2022-05-04 PROCEDURE — 81001 URINALYSIS AUTO W/SCOPE: CPT

## 2022-05-04 PROCEDURE — 80048 BASIC METABOLIC PNL TOTAL CA: CPT

## 2022-05-04 PROCEDURE — 84100 ASSAY OF PHOSPHORUS: CPT

## 2022-05-09 LAB — TACROLIMUS BLD LC/MS/MS-MCNC: 6.6 NG/ML (ref 2–20)

## 2022-05-26 ENCOUNTER — LAB (OUTPATIENT)
Dept: LAB | Facility: HOSPITAL | Age: 62
End: 2022-05-26

## 2022-05-26 ENCOUNTER — TRANSCRIBE ORDERS (OUTPATIENT)
Dept: ADMINISTRATIVE | Facility: HOSPITAL | Age: 62
End: 2022-05-26

## 2022-05-26 DIAGNOSIS — Z12.5 SCREENING FOR PROSTATE CANCER: ICD-10-CM

## 2022-05-26 DIAGNOSIS — Z79.621 ENCOUNTER FOR MONITORING TACROLIMUS THERAPY: ICD-10-CM

## 2022-05-26 DIAGNOSIS — R79.89 ELEVATED SERUM CREATININE: ICD-10-CM

## 2022-05-26 DIAGNOSIS — Z51.81 ENCOUNTER FOR MONITORING TACROLIMUS THERAPY: ICD-10-CM

## 2022-05-26 DIAGNOSIS — D64.9 ANEMIA, UNSPECIFIED TYPE: ICD-10-CM

## 2022-05-26 DIAGNOSIS — Z51.81 ENCOUNTER FOR THERAPEUTIC DRUG MONITORING: ICD-10-CM

## 2022-05-26 DIAGNOSIS — Z79.899 ENCOUNTER FOR LONG-TERM (CURRENT) USE OF OTHER MEDICATIONS: ICD-10-CM

## 2022-05-26 DIAGNOSIS — B25.9 CYTOMEGALOVIRUS INFECTION, UNSPECIFIED CYTOMEGALOVIRAL INFECTION TYPE: ICD-10-CM

## 2022-05-26 DIAGNOSIS — R39.9 GENITOURINARY SYMPTOMS: ICD-10-CM

## 2022-05-26 DIAGNOSIS — Z94.0 KIDNEY REPLACED BY TRANSPLANT: ICD-10-CM

## 2022-05-26 DIAGNOSIS — Z12.5 SCREENING FOR PROSTATE CANCER: Primary | ICD-10-CM

## 2022-05-26 DIAGNOSIS — R79.89 HYPOURICEMIA: ICD-10-CM

## 2022-05-26 LAB
ALBUMIN SERPL-MCNC: 4 G/DL (ref 3.5–5.2)
ALP SERPL-CCNC: 215 U/L (ref 39–117)
ALT SERPL W P-5'-P-CCNC: 23 U/L (ref 1–41)
ANION GAP SERPL CALCULATED.3IONS-SCNC: 8 MMOL/L (ref 5–15)
AST SERPL-CCNC: 22 U/L (ref 1–40)
BACTERIA UR QL AUTO: NORMAL /HPF
BASOPHILS # BLD AUTO: 0 10*3/MM3 (ref 0–0.2)
BASOPHILS NFR BLD AUTO: 0.3 % (ref 0–1.5)
BILIRUB CONJ SERPL-MCNC: <0.2 MG/DL (ref 0–0.3)
BILIRUB INDIRECT SERPL-MCNC: ABNORMAL MG/DL
BILIRUB SERPL-MCNC: 0.5 MG/DL (ref 0–1.2)
BILIRUB UR QL STRIP: NEGATIVE
BUN SERPL-MCNC: 23 MG/DL (ref 8–23)
BUN/CREAT SERPL: 11.7 (ref 7–25)
CALCIUM SPEC-SCNC: 9.8 MG/DL (ref 8.6–10.5)
CHLORIDE SERPL-SCNC: 108 MMOL/L (ref 98–107)
CLARITY UR: CLEAR
CO2 SERPL-SCNC: 22 MMOL/L (ref 22–29)
COLOR UR: YELLOW
CREAT SERPL-MCNC: 1.97 MG/DL (ref 0.76–1.27)
CREAT UR-MCNC: 139.7 MG/DL
DEPRECATED RDW RBC AUTO: 42.4 FL (ref 37–54)
EGFRCR SERPLBLD CKD-EPI 2021: 38.2 ML/MIN/1.73
EOSINOPHIL # BLD AUTO: 0 10*3/MM3 (ref 0–0.4)
EOSINOPHIL NFR BLD AUTO: 0.5 % (ref 0.3–6.2)
ERYTHROCYTE [DISTWIDTH] IN BLOOD BY AUTOMATED COUNT: 13.1 % (ref 12.3–15.4)
GLUCOSE SERPL-MCNC: 96 MG/DL (ref 65–99)
GLUCOSE UR STRIP-MCNC: NEGATIVE MG/DL
HCT VFR BLD AUTO: 44.2 % (ref 37.5–51)
HGB BLD-MCNC: 14.8 G/DL (ref 13–17.7)
HGB UR QL STRIP.AUTO: NEGATIVE
HYALINE CASTS UR QL AUTO: NORMAL /LPF
KETONES UR QL STRIP: NEGATIVE
LEUKOCYTE ESTERASE UR QL STRIP.AUTO: NEGATIVE
LYMPHOCYTES # BLD AUTO: 1.1 10*3/MM3 (ref 0.7–3.1)
LYMPHOCYTES NFR BLD AUTO: 28.9 % (ref 19.6–45.3)
MAGNESIUM SERPL-MCNC: 1.9 MG/DL (ref 1.6–2.4)
MCH RBC QN AUTO: 30.5 PG (ref 26.6–33)
MCHC RBC AUTO-ENTMCNC: 33.5 G/DL (ref 31.5–35.7)
MCV RBC AUTO: 91.1 FL (ref 79–97)
MONOCYTES # BLD AUTO: 0.3 10*3/MM3 (ref 0.1–0.9)
MONOCYTES NFR BLD AUTO: 8.8 % (ref 5–12)
NEUTROPHILS NFR BLD AUTO: 2.3 10*3/MM3 (ref 1.7–7)
NEUTROPHILS NFR BLD AUTO: 61.5 % (ref 42.7–76)
NITRITE UR QL STRIP: NEGATIVE
NRBC BLD AUTO-RTO: 0.2 /100 WBC (ref 0–0.2)
PH UR STRIP.AUTO: 5.5 [PH] (ref 5–8)
PHOSPHATE SERPL-MCNC: 1.7 MG/DL (ref 2.5–4.5)
PLATELET # BLD AUTO: 102 10*3/MM3 (ref 140–450)
PMV BLD AUTO: 8 FL (ref 6–12)
POTASSIUM SERPL-SCNC: 5.8 MMOL/L (ref 3.5–5.2)
PROT ?TM UR-MCNC: 12.8 MG/DL
PROT SERPL-MCNC: 6.8 G/DL (ref 6–8.5)
PROT UR QL STRIP: NEGATIVE
PROT/CREAT UR: 91.6 MG/G CREA (ref 0–200)
PSA SERPL-MCNC: 0.46 NG/ML (ref 0–4)
RBC # BLD AUTO: 4.85 10*6/MM3 (ref 4.14–5.8)
RBC # UR STRIP: NORMAL /HPF
REF LAB TEST METHOD: NORMAL
SODIUM SERPL-SCNC: 138 MMOL/L (ref 136–145)
SP GR UR STRIP: 1.02 (ref 1–1.03)
SQUAMOUS #/AREA URNS HPF: NORMAL /HPF
UROBILINOGEN UR QL STRIP: NORMAL
WBC # UR STRIP: NORMAL /HPF
WBC NRBC COR # BLD: 3.7 10*3/MM3 (ref 3.4–10.8)

## 2022-05-26 PROCEDURE — 82570 ASSAY OF URINE CREATININE: CPT

## 2022-05-26 PROCEDURE — 84100 ASSAY OF PHOSPHORUS: CPT

## 2022-05-26 PROCEDURE — 85025 COMPLETE CBC W/AUTO DIFF WBC: CPT

## 2022-05-26 PROCEDURE — 84156 ASSAY OF PROTEIN URINE: CPT

## 2022-05-26 PROCEDURE — 80048 BASIC METABOLIC PNL TOTAL CA: CPT

## 2022-05-26 PROCEDURE — 80076 HEPATIC FUNCTION PANEL: CPT

## 2022-05-26 PROCEDURE — 80197 ASSAY OF TACROLIMUS: CPT

## 2022-05-26 PROCEDURE — 36415 COLL VENOUS BLD VENIPUNCTURE: CPT

## 2022-05-26 PROCEDURE — 81001 URINALYSIS AUTO W/SCOPE: CPT

## 2022-05-26 PROCEDURE — 83735 ASSAY OF MAGNESIUM: CPT

## 2022-05-26 PROCEDURE — G0103 PSA SCREENING: HCPCS

## 2022-05-28 LAB — TACROLIMUS BLD LC/MS/MS-MCNC: 7.1 NG/ML (ref 2–20)

## 2022-05-29 LAB
CMV DNA SERPL NAA+PROBE-ACNC: NORMAL IU/ML
CMV DNA SERPL NAA+PROBE-LOG IU: NORMAL {LOG_IU}/ML

## 2022-06-10 ENCOUNTER — LAB (OUTPATIENT)
Dept: LAB | Facility: HOSPITAL | Age: 62
End: 2022-06-10

## 2022-06-10 DIAGNOSIS — Z79.899 ENCOUNTER FOR LONG-TERM (CURRENT) USE OF OTHER MEDICATIONS: ICD-10-CM

## 2022-06-10 DIAGNOSIS — Z51.81 ENCOUNTER FOR THERAPEUTIC DRUG MONITORING: ICD-10-CM

## 2022-06-10 DIAGNOSIS — Z94.0 KIDNEY REPLACED BY TRANSPLANT: ICD-10-CM

## 2022-06-10 DIAGNOSIS — R79.89 HYPOURICEMIA: ICD-10-CM

## 2022-06-10 DIAGNOSIS — D64.9 ANEMIA, UNSPECIFIED TYPE: ICD-10-CM

## 2022-06-10 DIAGNOSIS — R39.9 GENITOURINARY SYMPTOMS: ICD-10-CM

## 2022-06-14 ENCOUNTER — TRANSCRIBE ORDERS (OUTPATIENT)
Dept: ADMINISTRATIVE | Facility: HOSPITAL | Age: 62
End: 2022-06-14

## 2022-06-14 ENCOUNTER — LAB (OUTPATIENT)
Dept: LAB | Facility: HOSPITAL | Age: 62
End: 2022-06-14

## 2022-06-14 DIAGNOSIS — Z79.899 ENCOUNTER FOR LONG-TERM (CURRENT) USE OF OTHER MEDICATIONS: ICD-10-CM

## 2022-06-14 DIAGNOSIS — R39.9 LOWER URINARY TRACT SYMPTOMS: ICD-10-CM

## 2022-06-14 DIAGNOSIS — R39.9 LOWER URINARY TRACT SYMPTOMS: Primary | ICD-10-CM

## 2022-06-14 DIAGNOSIS — Z94.0 KIDNEY REPLACED BY TRANSPLANT: ICD-10-CM

## 2022-06-14 DIAGNOSIS — Z51.81 ENCOUNTER FOR THERAPEUTIC DRUG MONITORING: ICD-10-CM

## 2022-06-14 DIAGNOSIS — R79.89 HYPOURICEMIA: ICD-10-CM

## 2022-06-14 LAB
ALBUMIN SERPL-MCNC: 3.8 G/DL (ref 3.5–5.2)
ALP SERPL-CCNC: 182 U/L (ref 39–117)
ALT SERPL W P-5'-P-CCNC: 21 U/L (ref 1–41)
ANION GAP SERPL CALCULATED.3IONS-SCNC: 10 MMOL/L (ref 5–15)
AST SERPL-CCNC: 23 U/L (ref 1–40)
BACTERIA UR QL AUTO: NORMAL /HPF
BASOPHILS # BLD AUTO: 0.01 10*3/MM3 (ref 0–0.2)
BASOPHILS NFR BLD AUTO: 0.3 % (ref 0–1.5)
BILIRUB CONJ SERPL-MCNC: <0.2 MG/DL (ref 0–0.3)
BILIRUB INDIRECT SERPL-MCNC: ABNORMAL MG/DL
BILIRUB SERPL-MCNC: 0.5 MG/DL (ref 0–1.2)
BILIRUB UR QL STRIP: NEGATIVE
BUN SERPL-MCNC: 24 MG/DL (ref 8–23)
BUN/CREAT SERPL: 13.5 (ref 7–25)
CALCIUM SPEC-SCNC: 9.5 MG/DL (ref 8.6–10.5)
CHLORIDE SERPL-SCNC: 108 MMOL/L (ref 98–107)
CHOLEST SERPL-MCNC: 134 MG/DL (ref 0–200)
CLARITY UR: CLEAR
CO2 SERPL-SCNC: 19 MMOL/L (ref 22–29)
COLOR UR: YELLOW
CREAT SERPL-MCNC: 1.78 MG/DL (ref 0.76–1.27)
CREAT UR-MCNC: 134.8 MG/DL
DEPRECATED RDW RBC AUTO: 40.3 FL (ref 37–54)
EGFRCR SERPLBLD CKD-EPI 2021: 43.1 ML/MIN/1.73
EOSINOPHIL # BLD AUTO: 0.06 10*3/MM3 (ref 0–0.4)
EOSINOPHIL NFR BLD AUTO: 1.6 % (ref 0.3–6.2)
ERYTHROCYTE [DISTWIDTH] IN BLOOD BY AUTOMATED COUNT: 12.2 % (ref 12.3–15.4)
GLUCOSE SERPL-MCNC: 98 MG/DL (ref 65–99)
GLUCOSE UR STRIP-MCNC: NEGATIVE MG/DL
HBA1C MFR BLD: 5.9 % (ref 3.5–5.6)
HCT VFR BLD AUTO: 43.5 % (ref 37.5–51)
HDLC SERPL-MCNC: 42 MG/DL (ref 40–60)
HGB BLD-MCNC: 14.4 G/DL (ref 13–17.7)
HGB UR QL STRIP.AUTO: NEGATIVE
HYALINE CASTS UR QL AUTO: NORMAL /LPF
IMM GRANULOCYTES # BLD AUTO: 0.01 10*3/MM3 (ref 0–0.05)
IMM GRANULOCYTES NFR BLD AUTO: 0.3 % (ref 0–0.5)
KETONES UR QL STRIP: NEGATIVE
LDLC SERPL CALC-MCNC: 78 MG/DL (ref 0–100)
LDLC/HDLC SERPL: 1.88 {RATIO}
LEUKOCYTE ESTERASE UR QL STRIP.AUTO: NEGATIVE
LYMPHOCYTES # BLD AUTO: 1.08 10*3/MM3 (ref 0.7–3.1)
LYMPHOCYTES NFR BLD AUTO: 29.2 % (ref 19.6–45.3)
MAGNESIUM SERPL-MCNC: 1.8 MG/DL (ref 1.6–2.4)
MCH RBC QN AUTO: 30.1 PG (ref 26.6–33)
MCHC RBC AUTO-ENTMCNC: 33.1 G/DL (ref 31.5–35.7)
MCV RBC AUTO: 90.8 FL (ref 79–97)
MONOCYTES # BLD AUTO: 0.32 10*3/MM3 (ref 0.1–0.9)
MONOCYTES NFR BLD AUTO: 8.6 % (ref 5–12)
NEUTROPHILS NFR BLD AUTO: 2.22 10*3/MM3 (ref 1.7–7)
NEUTROPHILS NFR BLD AUTO: 60 % (ref 42.7–76)
NITRITE UR QL STRIP: NEGATIVE
NRBC BLD AUTO-RTO: 0 /100 WBC (ref 0–0.2)
PH UR STRIP.AUTO: 5.5 [PH] (ref 5–8)
PHOSPHATE SERPL-MCNC: 2 MG/DL (ref 2.5–4.5)
PLATELET # BLD AUTO: 106 10*3/MM3 (ref 140–450)
PMV BLD AUTO: 9.8 FL (ref 6–12)
POTASSIUM SERPL-SCNC: 4.9 MMOL/L (ref 3.5–5.2)
PROT ?TM UR-MCNC: 12.8 MG/DL
PROT SERPL-MCNC: 6.8 G/DL (ref 6–8.5)
PROT UR QL STRIP: NEGATIVE
PROT/CREAT UR: 95 MG/G CREA (ref 0–200)
RBC # BLD AUTO: 4.79 10*6/MM3 (ref 4.14–5.8)
RBC # UR STRIP: NORMAL /HPF
REF LAB TEST METHOD: NORMAL
SODIUM SERPL-SCNC: 137 MMOL/L (ref 136–145)
SP GR UR STRIP: 1.02 (ref 1–1.03)
SQUAMOUS #/AREA URNS HPF: NORMAL /HPF
TRIGL SERPL-MCNC: 66 MG/DL (ref 0–150)
UROBILINOGEN UR QL STRIP: NORMAL
VLDLC SERPL-MCNC: 14 MG/DL (ref 5–40)
WBC # UR STRIP: NORMAL /HPF
WBC NRBC COR # BLD: 3.7 10*3/MM3 (ref 3.4–10.8)

## 2022-06-14 PROCEDURE — 81001 URINALYSIS AUTO W/SCOPE: CPT

## 2022-06-14 PROCEDURE — 83036 HEMOGLOBIN GLYCOSYLATED A1C: CPT

## 2022-06-14 PROCEDURE — 36415 COLL VENOUS BLD VENIPUNCTURE: CPT

## 2022-06-14 PROCEDURE — 80048 BASIC METABOLIC PNL TOTAL CA: CPT

## 2022-06-14 PROCEDURE — 80061 LIPID PANEL: CPT

## 2022-06-14 PROCEDURE — 85025 COMPLETE CBC W/AUTO DIFF WBC: CPT

## 2022-06-14 PROCEDURE — 82570 ASSAY OF URINE CREATININE: CPT

## 2022-06-14 PROCEDURE — 84156 ASSAY OF PROTEIN URINE: CPT

## 2022-06-14 PROCEDURE — 87086 URINE CULTURE/COLONY COUNT: CPT

## 2022-06-14 PROCEDURE — 80076 HEPATIC FUNCTION PANEL: CPT

## 2022-06-14 PROCEDURE — 83735 ASSAY OF MAGNESIUM: CPT

## 2022-06-14 PROCEDURE — 84100 ASSAY OF PHOSPHORUS: CPT

## 2022-06-14 PROCEDURE — 80197 ASSAY OF TACROLIMUS: CPT

## 2022-06-15 LAB — BACTERIA SPEC AEROBE CULT: NO GROWTH

## 2022-06-16 LAB — TACROLIMUS BLD LC/MS/MS-MCNC: 6.6 NG/ML (ref 2–20)

## 2022-07-13 ENCOUNTER — LAB (OUTPATIENT)
Dept: LAB | Facility: HOSPITAL | Age: 62
End: 2022-07-13

## 2022-07-13 DIAGNOSIS — Z79.899 ENCOUNTER FOR LONG-TERM (CURRENT) USE OF OTHER MEDICATIONS: ICD-10-CM

## 2022-07-13 DIAGNOSIS — Z94.0 KIDNEY REPLACED BY TRANSPLANT: ICD-10-CM

## 2022-07-13 DIAGNOSIS — R39.9 LOWER URINARY TRACT SYMPTOMS: ICD-10-CM

## 2022-07-13 DIAGNOSIS — Z51.81 ENCOUNTER FOR THERAPEUTIC DRUG MONITORING: ICD-10-CM

## 2022-07-13 DIAGNOSIS — R79.89 HYPOURICEMIA: ICD-10-CM

## 2022-07-13 LAB
ALBUMIN SERPL-MCNC: 4 G/DL (ref 3.5–5.2)
ALP SERPL-CCNC: 192 U/L (ref 39–117)
ALT SERPL W P-5'-P-CCNC: 19 U/L (ref 1–41)
ANION GAP SERPL CALCULATED.3IONS-SCNC: 10 MMOL/L (ref 5–15)
AST SERPL-CCNC: 24 U/L (ref 1–40)
BACTERIA UR QL AUTO: NORMAL /HPF
BASOPHILS # BLD AUTO: 0.01 10*3/MM3 (ref 0–0.2)
BASOPHILS NFR BLD AUTO: 0.2 % (ref 0–1.5)
BILIRUB CONJ SERPL-MCNC: <0.2 MG/DL (ref 0–0.3)
BILIRUB INDIRECT SERPL-MCNC: ABNORMAL MG/DL
BILIRUB SERPL-MCNC: 0.5 MG/DL (ref 0–1.2)
BILIRUB UR QL STRIP: NEGATIVE
BUN SERPL-MCNC: 28 MG/DL (ref 8–23)
BUN/CREAT SERPL: 16.5 (ref 7–25)
CALCIUM SPEC-SCNC: 9.7 MG/DL (ref 8.6–10.5)
CHLORIDE SERPL-SCNC: 108 MMOL/L (ref 98–107)
CHOLEST SERPL-MCNC: 153 MG/DL (ref 0–200)
CLARITY UR: CLEAR
CO2 SERPL-SCNC: 21 MMOL/L (ref 22–29)
COLOR UR: YELLOW
CREAT SERPL-MCNC: 1.7 MG/DL (ref 0.76–1.27)
CREAT UR-MCNC: 102.8 MG/DL
DEPRECATED RDW RBC AUTO: 41.7 FL (ref 37–54)
EGFRCR SERPLBLD CKD-EPI 2021: 45.6 ML/MIN/1.73
EOSINOPHIL # BLD AUTO: 0.05 10*3/MM3 (ref 0–0.4)
EOSINOPHIL NFR BLD AUTO: 1.2 % (ref 0.3–6.2)
ERYTHROCYTE [DISTWIDTH] IN BLOOD BY AUTOMATED COUNT: 12.8 % (ref 12.3–15.4)
GLUCOSE SERPL-MCNC: 101 MG/DL (ref 65–99)
GLUCOSE UR STRIP-MCNC: NEGATIVE MG/DL
HBA1C MFR BLD: 5.8 % (ref 3.5–5.6)
HCT VFR BLD AUTO: 44.1 % (ref 37.5–51)
HDLC SERPL-MCNC: 48 MG/DL (ref 40–60)
HGB BLD-MCNC: 14.5 G/DL (ref 13–17.7)
HGB UR QL STRIP.AUTO: NEGATIVE
HYALINE CASTS UR QL AUTO: NORMAL /LPF
IMM GRANULOCYTES # BLD AUTO: 0.01 10*3/MM3 (ref 0–0.05)
IMM GRANULOCYTES NFR BLD AUTO: 0.2 % (ref 0–0.5)
KETONES UR QL STRIP: NEGATIVE
LDLC SERPL CALC-MCNC: 90 MG/DL (ref 0–100)
LDLC/HDLC SERPL: 1.85 {RATIO}
LEUKOCYTE ESTERASE UR QL STRIP.AUTO: NEGATIVE
LYMPHOCYTES # BLD AUTO: 1.16 10*3/MM3 (ref 0.7–3.1)
LYMPHOCYTES NFR BLD AUTO: 28.6 % (ref 19.6–45.3)
MAGNESIUM SERPL-MCNC: 1.7 MG/DL (ref 1.6–2.4)
MCH RBC QN AUTO: 29.9 PG (ref 26.6–33)
MCHC RBC AUTO-ENTMCNC: 32.9 G/DL (ref 31.5–35.7)
MCV RBC AUTO: 90.9 FL (ref 79–97)
MONOCYTES # BLD AUTO: 0.37 10*3/MM3 (ref 0.1–0.9)
MONOCYTES NFR BLD AUTO: 9.1 % (ref 5–12)
NEUTROPHILS NFR BLD AUTO: 2.45 10*3/MM3 (ref 1.7–7)
NEUTROPHILS NFR BLD AUTO: 60.7 % (ref 42.7–76)
NITRITE UR QL STRIP: NEGATIVE
NRBC BLD AUTO-RTO: 0 /100 WBC (ref 0–0.2)
PH UR STRIP.AUTO: 6 [PH] (ref 5–8)
PHOSPHATE SERPL-MCNC: 2.6 MG/DL (ref 2.5–4.5)
PLATELET # BLD AUTO: 98 10*3/MM3 (ref 140–450)
PMV BLD AUTO: 9.7 FL (ref 6–12)
POTASSIUM SERPL-SCNC: 5 MMOL/L (ref 3.5–5.2)
PROT ?TM UR-MCNC: 8.9 MG/DL
PROT SERPL-MCNC: 6.8 G/DL (ref 6–8.5)
PROT UR QL STRIP: NEGATIVE
PROT/CREAT UR: 86.6 MG/G CREA (ref 0–200)
RBC # BLD AUTO: 4.85 10*6/MM3 (ref 4.14–5.8)
RBC # UR STRIP: NORMAL /HPF
REF LAB TEST METHOD: NORMAL
SODIUM SERPL-SCNC: 139 MMOL/L (ref 136–145)
SP GR UR STRIP: 1.02 (ref 1–1.03)
SQUAMOUS #/AREA URNS HPF: NORMAL /HPF
TRIGL SERPL-MCNC: 80 MG/DL (ref 0–150)
UROBILINOGEN UR QL STRIP: NORMAL
VLDLC SERPL-MCNC: 15 MG/DL (ref 5–40)
WBC # UR STRIP: NORMAL /HPF
WBC NRBC COR # BLD: 4.05 10*3/MM3 (ref 3.4–10.8)

## 2022-07-13 PROCEDURE — 80076 HEPATIC FUNCTION PANEL: CPT

## 2022-07-13 PROCEDURE — 83036 HEMOGLOBIN GLYCOSYLATED A1C: CPT

## 2022-07-13 PROCEDURE — 81001 URINALYSIS AUTO W/SCOPE: CPT

## 2022-07-13 PROCEDURE — 84156 ASSAY OF PROTEIN URINE: CPT

## 2022-07-13 PROCEDURE — 82570 ASSAY OF URINE CREATININE: CPT

## 2022-07-13 PROCEDURE — 84100 ASSAY OF PHOSPHORUS: CPT

## 2022-07-13 PROCEDURE — 36415 COLL VENOUS BLD VENIPUNCTURE: CPT

## 2022-07-13 PROCEDURE — 80061 LIPID PANEL: CPT

## 2022-07-13 PROCEDURE — 87086 URINE CULTURE/COLONY COUNT: CPT

## 2022-07-13 PROCEDURE — 80048 BASIC METABOLIC PNL TOTAL CA: CPT

## 2022-07-13 PROCEDURE — 83735 ASSAY OF MAGNESIUM: CPT

## 2022-07-13 PROCEDURE — 80197 ASSAY OF TACROLIMUS: CPT

## 2022-07-13 PROCEDURE — 85025 COMPLETE CBC W/AUTO DIFF WBC: CPT

## 2022-07-14 LAB — BACTERIA SPEC AEROBE CULT: NO GROWTH

## 2022-07-16 LAB — TACROLIMUS BLD LC/MS/MS-MCNC: 6.7 NG/ML (ref 2–20)

## 2022-10-21 ENCOUNTER — TRANSCRIBE ORDERS (OUTPATIENT)
Dept: ADMINISTRATIVE | Facility: HOSPITAL | Age: 62
End: 2022-10-21

## 2022-10-21 ENCOUNTER — LAB (OUTPATIENT)
Dept: LAB | Facility: HOSPITAL | Age: 62
End: 2022-10-21

## 2022-10-21 DIAGNOSIS — R39.9 GENITOURINARY SYMPTOMS: ICD-10-CM

## 2022-10-21 DIAGNOSIS — R79.89 HYPOURICEMIA: ICD-10-CM

## 2022-10-21 DIAGNOSIS — Z79.899 ENCOUNTER FOR LONG-TERM (CURRENT) USE OF OTHER MEDICATIONS: ICD-10-CM

## 2022-10-21 DIAGNOSIS — D64.9 ANEMIA, UNSPECIFIED TYPE: ICD-10-CM

## 2022-10-21 DIAGNOSIS — R39.9 GENITOURINARY SYMPTOMS: Primary | ICD-10-CM

## 2022-10-21 DIAGNOSIS — R39.9 LOWER URINARY TRACT SYMPTOMS: ICD-10-CM

## 2022-10-21 DIAGNOSIS — Z94.0 KIDNEY REPLACED BY TRANSPLANT: ICD-10-CM

## 2022-10-21 DIAGNOSIS — Z51.81 ENCOUNTER FOR THERAPEUTIC DRUG MONITORING: ICD-10-CM

## 2022-10-21 LAB
ALBUMIN SERPL-MCNC: 3.9 G/DL (ref 3.5–5.2)
ALP SERPL-CCNC: 174 U/L (ref 39–117)
ALT SERPL W P-5'-P-CCNC: 18 U/L (ref 1–41)
ANION GAP SERPL CALCULATED.3IONS-SCNC: 7 MMOL/L (ref 5–15)
AST SERPL-CCNC: 25 U/L (ref 1–40)
BACTERIA UR QL AUTO: NORMAL /HPF
BASOPHILS # BLD AUTO: 0.01 10*3/MM3 (ref 0–0.2)
BASOPHILS NFR BLD AUTO: 0.3 % (ref 0–1.5)
BILIRUB CONJ SERPL-MCNC: <0.2 MG/DL (ref 0–0.3)
BILIRUB INDIRECT SERPL-MCNC: ABNORMAL MG/DL
BILIRUB SERPL-MCNC: 0.5 MG/DL (ref 0–1.2)
BILIRUB UR QL STRIP: NEGATIVE
BUN SERPL-MCNC: 28 MG/DL (ref 8–23)
BUN/CREAT SERPL: 17.3 (ref 7–25)
CALCIUM SPEC-SCNC: 9.4 MG/DL (ref 8.6–10.5)
CHLORIDE SERPL-SCNC: 109 MMOL/L (ref 98–107)
CHOLEST SERPL-MCNC: 138 MG/DL (ref 0–200)
CLARITY UR: CLEAR
CO2 SERPL-SCNC: 23 MMOL/L (ref 22–29)
COLOR UR: YELLOW
CREAT SERPL-MCNC: 1.62 MG/DL (ref 0.76–1.27)
CREAT UR-MCNC: 84.6 MG/DL
DEPRECATED RDW RBC AUTO: 42.7 FL (ref 37–54)
EGFRCR SERPLBLD CKD-EPI 2021: 48.3 ML/MIN/1.73
EOSINOPHIL # BLD AUTO: 0.04 10*3/MM3 (ref 0–0.4)
EOSINOPHIL NFR BLD AUTO: 1 % (ref 0.3–6.2)
ERYTHROCYTE [DISTWIDTH] IN BLOOD BY AUTOMATED COUNT: 13.2 % (ref 12.3–15.4)
GLUCOSE SERPL-MCNC: 101 MG/DL (ref 65–99)
GLUCOSE UR STRIP-MCNC: NEGATIVE MG/DL
HBA1C MFR BLD: 5.7 % (ref 3.5–5.6)
HCT VFR BLD AUTO: 43.7 % (ref 37.5–51)
HDLC SERPL-MCNC: 42 MG/DL (ref 40–60)
HGB BLD-MCNC: 14.5 G/DL (ref 13–17.7)
HGB UR QL STRIP.AUTO: NEGATIVE
HYALINE CASTS UR QL AUTO: NORMAL /LPF
IMM GRANULOCYTES # BLD AUTO: 0.01 10*3/MM3 (ref 0–0.05)
IMM GRANULOCYTES NFR BLD AUTO: 0.3 % (ref 0–0.5)
KETONES UR QL STRIP: NEGATIVE
LDLC SERPL CALC-MCNC: 84 MG/DL (ref 0–100)
LDLC/HDLC SERPL: 2.02 {RATIO}
LEUKOCYTE ESTERASE UR QL STRIP.AUTO: NEGATIVE
LYMPHOCYTES # BLD AUTO: 0.97 10*3/MM3 (ref 0.7–3.1)
LYMPHOCYTES NFR BLD AUTO: 25.1 % (ref 19.6–45.3)
MAGNESIUM SERPL-MCNC: 1.5 MG/DL (ref 1.6–2.4)
MCH RBC QN AUTO: 29.4 PG (ref 26.6–33)
MCHC RBC AUTO-ENTMCNC: 33.2 G/DL (ref 31.5–35.7)
MCV RBC AUTO: 88.6 FL (ref 79–97)
MONOCYTES # BLD AUTO: 0.34 10*3/MM3 (ref 0.1–0.9)
MONOCYTES NFR BLD AUTO: 8.8 % (ref 5–12)
NEUTROPHILS NFR BLD AUTO: 2.49 10*3/MM3 (ref 1.7–7)
NEUTROPHILS NFR BLD AUTO: 64.5 % (ref 42.7–76)
NITRITE UR QL STRIP: NEGATIVE
NRBC BLD AUTO-RTO: 0 /100 WBC (ref 0–0.2)
PH UR STRIP.AUTO: 6 [PH] (ref 5–8)
PHOSPHATE SERPL-MCNC: 2.4 MG/DL (ref 2.5–4.5)
PLATELET # BLD AUTO: 109 10*3/MM3 (ref 140–450)
PMV BLD AUTO: 9.8 FL (ref 6–12)
POTASSIUM SERPL-SCNC: 5.1 MMOL/L (ref 3.5–5.2)
PROT ?TM UR-MCNC: 10.3 MG/DL
PROT SERPL-MCNC: 6.8 G/DL (ref 6–8.5)
PROT UR QL STRIP: NEGATIVE
PROT/CREAT UR: 121.7 MG/G CREA (ref 0–200)
RBC # BLD AUTO: 4.93 10*6/MM3 (ref 4.14–5.8)
RBC # UR STRIP: NORMAL /HPF
REF LAB TEST METHOD: NORMAL
SODIUM SERPL-SCNC: 139 MMOL/L (ref 136–145)
SP GR UR STRIP: 1.02 (ref 1–1.03)
SQUAMOUS #/AREA URNS HPF: NORMAL /HPF
TRIGL SERPL-MCNC: 56 MG/DL (ref 0–150)
UROBILINOGEN UR QL STRIP: NORMAL
VLDLC SERPL-MCNC: 12 MG/DL (ref 5–40)
WBC # UR STRIP: NORMAL /HPF
WBC NRBC COR # BLD: 3.86 10*3/MM3 (ref 3.4–10.8)

## 2022-10-21 PROCEDURE — 80048 BASIC METABOLIC PNL TOTAL CA: CPT

## 2022-10-21 PROCEDURE — 84100 ASSAY OF PHOSPHORUS: CPT

## 2022-10-21 PROCEDURE — 87086 URINE CULTURE/COLONY COUNT: CPT

## 2022-10-21 PROCEDURE — 84156 ASSAY OF PROTEIN URINE: CPT

## 2022-10-21 PROCEDURE — 80076 HEPATIC FUNCTION PANEL: CPT

## 2022-10-21 PROCEDURE — 83036 HEMOGLOBIN GLYCOSYLATED A1C: CPT

## 2022-10-21 PROCEDURE — 85025 COMPLETE CBC W/AUTO DIFF WBC: CPT

## 2022-10-21 PROCEDURE — 80061 LIPID PANEL: CPT

## 2022-10-21 PROCEDURE — 82570 ASSAY OF URINE CREATININE: CPT

## 2022-10-21 PROCEDURE — 80197 ASSAY OF TACROLIMUS: CPT

## 2022-10-21 PROCEDURE — 36415 COLL VENOUS BLD VENIPUNCTURE: CPT

## 2022-10-21 PROCEDURE — 81001 URINALYSIS AUTO W/SCOPE: CPT

## 2022-10-21 PROCEDURE — 83735 ASSAY OF MAGNESIUM: CPT

## 2022-10-22 LAB — BACTERIA SPEC AEROBE CULT: NO GROWTH

## 2022-10-24 LAB
CMV DNA SERPL NAA+PROBE-ACNC: NORMAL IU/ML
CMV DNA SERPL NAA+PROBE-LOG IU: NORMAL LOG10 IU/ML

## 2022-10-25 LAB — TACROLIMUS BLD LC/MS/MS-MCNC: 8.4 NG/ML (ref 2–20)

## 2022-12-28 ENCOUNTER — LAB (OUTPATIENT)
Dept: LAB | Facility: HOSPITAL | Age: 62
End: 2022-12-28
Payer: MEDICARE

## 2022-12-28 DIAGNOSIS — R79.89 HYPOURICEMIA: ICD-10-CM

## 2022-12-28 DIAGNOSIS — R39.9 LOWER URINARY TRACT SYMPTOMS: ICD-10-CM

## 2022-12-28 DIAGNOSIS — D64.9 ANEMIA, UNSPECIFIED TYPE: ICD-10-CM

## 2022-12-28 DIAGNOSIS — Z94.0 KIDNEY REPLACED BY TRANSPLANT: ICD-10-CM

## 2022-12-28 DIAGNOSIS — R39.9 GENITOURINARY SYMPTOMS: ICD-10-CM

## 2022-12-28 DIAGNOSIS — Z79.899 ENCOUNTER FOR LONG-TERM (CURRENT) USE OF OTHER MEDICATIONS: ICD-10-CM

## 2022-12-28 DIAGNOSIS — Z51.81 ENCOUNTER FOR THERAPEUTIC DRUG MONITORING: ICD-10-CM

## 2022-12-28 LAB
ALBUMIN SERPL-MCNC: 3.9 G/DL (ref 3.5–5.2)
ALBUMIN SERPL-MCNC: 3.9 G/DL (ref 3.5–5.2)
ALP SERPL-CCNC: 163 U/L (ref 39–117)
ALT SERPL W P-5'-P-CCNC: 16 U/L (ref 1–41)
ANION GAP SERPL CALCULATED.3IONS-SCNC: 6.5 MMOL/L (ref 5–15)
AST SERPL-CCNC: 17 U/L (ref 1–40)
BACTERIA UR QL AUTO: NORMAL /HPF
BASOPHILS # BLD AUTO: 0.01 10*3/MM3 (ref 0–0.2)
BASOPHILS NFR BLD AUTO: 0.3 % (ref 0–1.5)
BILIRUB CONJ SERPL-MCNC: <0.2 MG/DL (ref 0–0.3)
BILIRUB INDIRECT SERPL-MCNC: ABNORMAL MG/DL
BILIRUB SERPL-MCNC: 0.4 MG/DL (ref 0–1.2)
BILIRUB UR QL STRIP: NEGATIVE
BUN SERPL-MCNC: 34 MG/DL (ref 8–23)
BUN/CREAT SERPL: 17.6 (ref 7–25)
CALCIUM SPEC-SCNC: 9.7 MG/DL (ref 8.6–10.5)
CHLORIDE SERPL-SCNC: 110 MMOL/L (ref 98–107)
CLARITY UR: CLEAR
CO2 SERPL-SCNC: 22.5 MMOL/L (ref 22–29)
COLOR UR: YELLOW
CREAT SERPL-MCNC: 1.93 MG/DL (ref 0.76–1.27)
CREAT UR-MCNC: 107.2 MG/DL
DEPRECATED RDW RBC AUTO: 40.1 FL (ref 37–54)
EGFRCR SERPLBLD CKD-EPI 2021: 39.1 ML/MIN/1.73
EOSINOPHIL # BLD AUTO: 0.04 10*3/MM3 (ref 0–0.4)
EOSINOPHIL NFR BLD AUTO: 1.1 % (ref 0.3–6.2)
ERYTHROCYTE [DISTWIDTH] IN BLOOD BY AUTOMATED COUNT: 12.8 % (ref 12.3–15.4)
GLUCOSE SERPL-MCNC: 134 MG/DL (ref 65–99)
GLUCOSE UR STRIP-MCNC: NEGATIVE MG/DL
HCT VFR BLD AUTO: 42.2 % (ref 37.5–51)
HGB BLD-MCNC: 14.2 G/DL (ref 13–17.7)
HGB UR QL STRIP.AUTO: NEGATIVE
HYALINE CASTS UR QL AUTO: NORMAL /LPF
IMM GRANULOCYTES # BLD AUTO: 0.01 10*3/MM3 (ref 0–0.05)
IMM GRANULOCYTES NFR BLD AUTO: 0.3 % (ref 0–0.5)
KETONES UR QL STRIP: NEGATIVE
LEUKOCYTE ESTERASE UR QL STRIP.AUTO: NEGATIVE
LYMPHOCYTES # BLD AUTO: 1.06 10*3/MM3 (ref 0.7–3.1)
LYMPHOCYTES NFR BLD AUTO: 29.5 % (ref 19.6–45.3)
MAGNESIUM SERPL-MCNC: 1.7 MG/DL (ref 1.6–2.4)
MCH RBC QN AUTO: 29 PG (ref 26.6–33)
MCHC RBC AUTO-ENTMCNC: 33.6 G/DL (ref 31.5–35.7)
MCV RBC AUTO: 86.1 FL (ref 79–97)
MONOCYTES # BLD AUTO: 0.34 10*3/MM3 (ref 0.1–0.9)
MONOCYTES NFR BLD AUTO: 9.5 % (ref 5–12)
NEUTROPHILS NFR BLD AUTO: 2.13 10*3/MM3 (ref 1.7–7)
NEUTROPHILS NFR BLD AUTO: 59.3 % (ref 42.7–76)
NITRITE UR QL STRIP: NEGATIVE
NRBC BLD AUTO-RTO: 0 /100 WBC (ref 0–0.2)
PH UR STRIP.AUTO: 5.5 [PH] (ref 5–8)
PHOSPHATE SERPL-MCNC: 2.3 MG/DL (ref 2.5–4.5)
PHOSPHATE SERPL-MCNC: 2.3 MG/DL (ref 2.5–4.5)
PLATELET # BLD AUTO: 105 10*3/MM3 (ref 140–450)
PMV BLD AUTO: 9.9 FL (ref 6–12)
POTASSIUM SERPL-SCNC: 4.8 MMOL/L (ref 3.5–5.2)
PROT ?TM UR-MCNC: 8.3 MG/DL
PROT SERPL-MCNC: 6.9 G/DL (ref 6–8.5)
PROT UR QL STRIP: NEGATIVE
PROT/CREAT UR: 77.4 MG/G CREA (ref 0–200)
RBC # BLD AUTO: 4.9 10*6/MM3 (ref 4.14–5.8)
RBC # UR STRIP: NORMAL /HPF
REF LAB TEST METHOD: NORMAL
SODIUM SERPL-SCNC: 139 MMOL/L (ref 136–145)
SP GR UR STRIP: 1.02 (ref 1–1.03)
SQUAMOUS #/AREA URNS HPF: NORMAL /HPF
UROBILINOGEN UR QL STRIP: NORMAL
WBC # UR STRIP: NORMAL /HPF
WBC NRBC COR # BLD: 3.59 10*3/MM3 (ref 3.4–10.8)

## 2022-12-28 PROCEDURE — 85025 COMPLETE CBC W/AUTO DIFF WBC: CPT

## 2022-12-28 PROCEDURE — 83735 ASSAY OF MAGNESIUM: CPT

## 2022-12-28 PROCEDURE — 84156 ASSAY OF PROTEIN URINE: CPT

## 2022-12-28 PROCEDURE — 82570 ASSAY OF URINE CREATININE: CPT

## 2022-12-28 PROCEDURE — 80197 ASSAY OF TACROLIMUS: CPT

## 2022-12-28 PROCEDURE — 36415 COLL VENOUS BLD VENIPUNCTURE: CPT

## 2022-12-28 PROCEDURE — 80053 COMPREHEN METABOLIC PANEL: CPT

## 2022-12-28 PROCEDURE — 82248 BILIRUBIN DIRECT: CPT

## 2022-12-28 PROCEDURE — 84100 ASSAY OF PHOSPHORUS: CPT

## 2022-12-28 PROCEDURE — 81001 URINALYSIS AUTO W/SCOPE: CPT

## 2022-12-30 LAB — TACROLIMUS BLD LC/MS/MS-MCNC: 6.5 NG/ML (ref 2–20)

## 2023-03-07 ENCOUNTER — TRANSCRIBE ORDERS (OUTPATIENT)
Dept: ADMINISTRATIVE | Facility: HOSPITAL | Age: 63
End: 2023-03-07
Payer: MEDICARE

## 2023-03-07 ENCOUNTER — LAB (OUTPATIENT)
Dept: LAB | Facility: HOSPITAL | Age: 63
End: 2023-03-07
Payer: MEDICARE

## 2023-03-07 DIAGNOSIS — E11.8 DIABETIC COMPLICATION: ICD-10-CM

## 2023-03-07 DIAGNOSIS — R80.9 PROTEINURIA, UNSPECIFIED TYPE: ICD-10-CM

## 2023-03-07 DIAGNOSIS — N18.30 STAGE 3 CHRONIC KIDNEY DISEASE, UNSPECIFIED WHETHER STAGE 3A OR 3B CKD: ICD-10-CM

## 2023-03-07 DIAGNOSIS — R39.9 GENITOURINARY SYMPTOMS: ICD-10-CM

## 2023-03-07 DIAGNOSIS — Z79.899 ENCOUNTER FOR LONG-TERM (CURRENT) USE OF OTHER MEDICATIONS: ICD-10-CM

## 2023-03-07 DIAGNOSIS — R39.9 LOWER URINARY TRACT SYMPTOMS: ICD-10-CM

## 2023-03-07 DIAGNOSIS — D64.9 ANEMIA, UNSPECIFIED TYPE: ICD-10-CM

## 2023-03-07 DIAGNOSIS — Z94.0 KIDNEY REPLACED BY TRANSPLANT: ICD-10-CM

## 2023-03-07 DIAGNOSIS — N18.30 STAGE 3 CHRONIC KIDNEY DISEASE, UNSPECIFIED WHETHER STAGE 3A OR 3B CKD: Primary | ICD-10-CM

## 2023-03-07 DIAGNOSIS — R79.89 HYPOURICEMIA: ICD-10-CM

## 2023-03-07 DIAGNOSIS — Z51.81 ENCOUNTER FOR THERAPEUTIC DRUG MONITORING: ICD-10-CM

## 2023-03-07 LAB
ALBUMIN SERPL-MCNC: 4.1 G/DL (ref 3.5–5.2)
ALP SERPL-CCNC: 163 U/L (ref 39–117)
ALT SERPL W P-5'-P-CCNC: 20 U/L (ref 1–41)
ANION GAP SERPL CALCULATED.3IONS-SCNC: 9 MMOL/L (ref 5–15)
AST SERPL-CCNC: 18 U/L (ref 1–40)
BACTERIA UR QL AUTO: NORMAL /HPF
BASOPHILS # BLD AUTO: 0.01 10*3/MM3 (ref 0–0.2)
BASOPHILS NFR BLD AUTO: 0.2 % (ref 0–1.5)
BILIRUB CONJ SERPL-MCNC: <0.2 MG/DL (ref 0–0.3)
BILIRUB INDIRECT SERPL-MCNC: ABNORMAL MG/DL
BILIRUB SERPL-MCNC: 0.6 MG/DL (ref 0–1.2)
BILIRUB UR QL STRIP: NEGATIVE
BILIRUB UR QL STRIP: NEGATIVE
BUN SERPL-MCNC: 32 MG/DL (ref 8–23)
BUN/CREAT SERPL: 18.2 (ref 7–25)
CALCIUM SPEC-SCNC: 9.4 MG/DL (ref 8.6–10.5)
CHLORIDE SERPL-SCNC: 107 MMOL/L (ref 98–107)
CHOLEST SERPL-MCNC: 160 MG/DL (ref 0–200)
CLARITY UR: CLEAR
CLARITY UR: CLEAR
CO2 SERPL-SCNC: 21 MMOL/L (ref 22–29)
COLOR UR: YELLOW
COLOR UR: YELLOW
CREAT SERPL-MCNC: 1.76 MG/DL (ref 0.76–1.27)
CREAT UR-MCNC: 154.8 MG/DL
DEPRECATED RDW RBC AUTO: 40.6 FL (ref 37–54)
EGFRCR SERPLBLD CKD-EPI 2021: 43.5 ML/MIN/1.73
EOSINOPHIL # BLD AUTO: 0.04 10*3/MM3 (ref 0–0.4)
EOSINOPHIL NFR BLD AUTO: 0.9 % (ref 0.3–6.2)
ERYTHROCYTE [DISTWIDTH] IN BLOOD BY AUTOMATED COUNT: 13.1 % (ref 12.3–15.4)
GLUCOSE SERPL-MCNC: 117 MG/DL (ref 65–99)
GLUCOSE UR STRIP-MCNC: NEGATIVE MG/DL
GLUCOSE UR STRIP-MCNC: NEGATIVE MG/DL
HBA1C MFR BLD: 6.8 % (ref 4.8–5.6)
HCT VFR BLD AUTO: 44.2 % (ref 37.5–51)
HDLC SERPL-MCNC: 44 MG/DL (ref 40–60)
HGB BLD-MCNC: 15.1 G/DL (ref 13–17.7)
HGB UR QL STRIP.AUTO: NEGATIVE
HGB UR QL STRIP.AUTO: NEGATIVE
HYALINE CASTS UR QL AUTO: NORMAL /LPF
IMM GRANULOCYTES # BLD AUTO: 0.01 10*3/MM3 (ref 0–0.05)
IMM GRANULOCYTES NFR BLD AUTO: 0.2 % (ref 0–0.5)
KETONES UR QL STRIP: NEGATIVE
KETONES UR QL STRIP: NEGATIVE
LDLC SERPL CALC-MCNC: 103 MG/DL (ref 0–100)
LDLC/HDLC SERPL: 2.34 {RATIO}
LEUKOCYTE ESTERASE UR QL STRIP.AUTO: NEGATIVE
LEUKOCYTE ESTERASE UR QL STRIP.AUTO: NEGATIVE
LYMPHOCYTES # BLD AUTO: 1.23 10*3/MM3 (ref 0.7–3.1)
LYMPHOCYTES NFR BLD AUTO: 29.1 % (ref 19.6–45.3)
MAGNESIUM SERPL-MCNC: 1.7 MG/DL (ref 1.6–2.4)
MCH RBC QN AUTO: 29.1 PG (ref 26.6–33)
MCHC RBC AUTO-ENTMCNC: 34.2 G/DL (ref 31.5–35.7)
MCV RBC AUTO: 85.2 FL (ref 79–97)
MONOCYTES # BLD AUTO: 0.38 10*3/MM3 (ref 0.1–0.9)
MONOCYTES NFR BLD AUTO: 9 % (ref 5–12)
NEUTROPHILS NFR BLD AUTO: 2.56 10*3/MM3 (ref 1.7–7)
NEUTROPHILS NFR BLD AUTO: 60.6 % (ref 42.7–76)
NITRITE UR QL STRIP: NEGATIVE
NITRITE UR QL STRIP: NEGATIVE
NRBC BLD AUTO-RTO: 0 /100 WBC (ref 0–0.2)
PH UR STRIP.AUTO: 5.5 [PH] (ref 5–8)
PH UR STRIP.AUTO: 5.5 [PH] (ref 5–8)
PHOSPHATE SERPL-MCNC: 1.7 MG/DL (ref 2.5–4.5)
PLATELET # BLD AUTO: 119 10*3/MM3 (ref 140–450)
PMV BLD AUTO: 9.8 FL (ref 6–12)
POTASSIUM SERPL-SCNC: 4.9 MMOL/L (ref 3.5–5.2)
PROT ?TM UR-MCNC: 11.6 MG/DL
PROT SERPL-MCNC: 7 G/DL (ref 6–8.5)
PROT UR QL STRIP: ABNORMAL
PROT UR QL STRIP: ABNORMAL
PROT/CREAT UR: 74.9 MG/G CREA (ref 0–200)
RBC # BLD AUTO: 5.19 10*6/MM3 (ref 4.14–5.8)
RBC # UR STRIP: NORMAL /HPF
REF LAB TEST METHOD: NORMAL
SODIUM SERPL-SCNC: 137 MMOL/L (ref 136–145)
SP GR UR STRIP: 1.02 (ref 1–1.03)
SP GR UR STRIP: 1.02 (ref 1–1.03)
SQUAMOUS #/AREA URNS HPF: NORMAL /HPF
TRIGL SERPL-MCNC: 66 MG/DL (ref 0–150)
UROBILINOGEN UR QL STRIP: ABNORMAL
UROBILINOGEN UR QL STRIP: ABNORMAL
VLDLC SERPL-MCNC: 13 MG/DL (ref 5–40)
WBC # UR STRIP: NORMAL /HPF
WBC NRBC COR # BLD: 4.23 10*3/MM3 (ref 3.4–10.8)

## 2023-03-07 PROCEDURE — 80048 BASIC METABOLIC PNL TOTAL CA: CPT

## 2023-03-07 PROCEDURE — 82570 ASSAY OF URINE CREATININE: CPT

## 2023-03-07 PROCEDURE — 85025 COMPLETE CBC W/AUTO DIFF WBC: CPT

## 2023-03-07 PROCEDURE — 87799 DETECT AGENT NOS DNA QUANT: CPT

## 2023-03-07 PROCEDURE — 80197 ASSAY OF TACROLIMUS: CPT

## 2023-03-07 PROCEDURE — 80076 HEPATIC FUNCTION PANEL: CPT

## 2023-03-07 PROCEDURE — 84100 ASSAY OF PHOSPHORUS: CPT

## 2023-03-07 PROCEDURE — 81001 URINALYSIS AUTO W/SCOPE: CPT

## 2023-03-07 PROCEDURE — 83735 ASSAY OF MAGNESIUM: CPT

## 2023-03-07 PROCEDURE — 84156 ASSAY OF PROTEIN URINE: CPT

## 2023-03-07 PROCEDURE — 83036 HEMOGLOBIN GLYCOSYLATED A1C: CPT

## 2023-03-07 PROCEDURE — 36415 COLL VENOUS BLD VENIPUNCTURE: CPT

## 2023-03-07 PROCEDURE — 80061 LIPID PANEL: CPT

## 2023-03-09 LAB
BKV DNA # UR NAA+PROBE: NEGATIVE IU/ML
BKV DNA SPEC NAA+PROBE-ACNC: NEGATIVE IU/ML
CMV DNA SERPL NAA+PROBE-ACNC: NORMAL IU/ML
CMV DNA SERPL NAA+PROBE-LOG IU: NORMAL LOG10 IU/ML
TACROLIMUS BLD LC/MS/MS-MCNC: 6.4 NG/ML (ref 2–20)

## 2023-05-02 ENCOUNTER — LAB (OUTPATIENT)
Dept: LAB | Facility: HOSPITAL | Age: 63
End: 2023-05-02
Payer: COMMERCIAL

## 2023-05-02 DIAGNOSIS — R79.89 HYPOURICEMIA: ICD-10-CM

## 2023-05-02 DIAGNOSIS — Z94.0 KIDNEY REPLACED BY TRANSPLANT: ICD-10-CM

## 2023-05-02 DIAGNOSIS — Z79.899 ENCOUNTER FOR LONG-TERM (CURRENT) USE OF OTHER MEDICATIONS: ICD-10-CM

## 2023-05-02 DIAGNOSIS — R39.9 LOWER URINARY TRACT SYMPTOMS: ICD-10-CM

## 2023-05-02 DIAGNOSIS — Z51.81 ENCOUNTER FOR THERAPEUTIC DRUG MONITORING: ICD-10-CM

## 2023-05-02 DIAGNOSIS — N18.30 STAGE 3 CHRONIC KIDNEY DISEASE, UNSPECIFIED WHETHER STAGE 3A OR 3B CKD: ICD-10-CM

## 2023-05-02 DIAGNOSIS — E11.8 DIABETIC COMPLICATION: ICD-10-CM

## 2023-05-02 DIAGNOSIS — R39.9 GENITOURINARY SYMPTOMS: ICD-10-CM

## 2023-05-02 DIAGNOSIS — R80.9 PROTEINURIA, UNSPECIFIED TYPE: ICD-10-CM

## 2023-05-02 DIAGNOSIS — D64.9 ANEMIA, UNSPECIFIED TYPE: ICD-10-CM

## 2023-05-02 LAB
25(OH)D3 SERPL-MCNC: 33.5 NG/ML (ref 30–100)
ALBUMIN SERPL-MCNC: 4.2 G/DL (ref 3.5–5.2)
ALBUMIN/GLOB SERPL: 1.4 G/DL
ALP SERPL-CCNC: 150 U/L (ref 39–117)
ALT SERPL W P-5'-P-CCNC: 18 U/L (ref 1–41)
ANION GAP SERPL CALCULATED.3IONS-SCNC: 7.2 MMOL/L (ref 5–15)
AST SERPL-CCNC: 18 U/L (ref 1–40)
BASOPHILS # BLD AUTO: 0.02 10*3/MM3 (ref 0–0.2)
BASOPHILS NFR BLD AUTO: 0.4 % (ref 0–1.5)
BILIRUB SERPL-MCNC: 0.6 MG/DL (ref 0–1.2)
BILIRUB UR QL STRIP: NEGATIVE
BUN SERPL-MCNC: 25 MG/DL (ref 8–23)
BUN/CREAT SERPL: 14.1 (ref 7–25)
CALCIUM SPEC-SCNC: 10.4 MG/DL (ref 8.6–10.5)
CHLORIDE SERPL-SCNC: 108 MMOL/L (ref 98–107)
CHOLEST SERPL-MCNC: 162 MG/DL (ref 0–200)
CK SERPL-CCNC: 30 U/L (ref 20–200)
CLARITY UR: CLEAR
CO2 SERPL-SCNC: 22.8 MMOL/L (ref 22–29)
COLOR UR: YELLOW
CREAT SERPL-MCNC: 1.77 MG/DL (ref 0.76–1.27)
CREAT UR-MCNC: 140.5 MG/DL
DEPRECATED RDW RBC AUTO: 41.2 FL (ref 37–54)
EGFRCR SERPLBLD CKD-EPI 2021: 43.2 ML/MIN/1.73
EOSINOPHIL # BLD AUTO: 0.04 10*3/MM3 (ref 0–0.4)
EOSINOPHIL NFR BLD AUTO: 0.8 % (ref 0.3–6.2)
ERYTHROCYTE [DISTWIDTH] IN BLOOD BY AUTOMATED COUNT: 13 % (ref 12.3–15.4)
FERRITIN SERPL-MCNC: 1161 NG/ML (ref 30–400)
FOLATE SERPL-MCNC: >20 NG/ML (ref 4.78–24.2)
GLOBULIN UR ELPH-MCNC: 2.9 GM/DL
GLUCOSE SERPL-MCNC: 128 MG/DL (ref 65–99)
GLUCOSE UR STRIP-MCNC: NEGATIVE MG/DL
HBA1C MFR BLD: 6.2 % (ref 4.8–5.6)
HCT VFR BLD AUTO: 45.6 % (ref 37.5–51)
HDLC SERPL-MCNC: 43 MG/DL (ref 40–60)
HGB BLD-MCNC: 15.5 G/DL (ref 13–17.7)
HGB UR QL STRIP.AUTO: NEGATIVE
IMM GRANULOCYTES # BLD AUTO: 0.01 10*3/MM3 (ref 0–0.05)
IMM GRANULOCYTES NFR BLD AUTO: 0.2 % (ref 0–0.5)
IRON 24H UR-MRATE: 94 MCG/DL (ref 59–158)
IRON SATN MFR SERPL: 30 % (ref 20–50)
KETONES UR QL STRIP: NEGATIVE
LDLC SERPL CALC-MCNC: 105 MG/DL (ref 0–100)
LDLC/HDLC SERPL: 2.44 {RATIO}
LEUKOCYTE ESTERASE UR QL STRIP.AUTO: NEGATIVE
LYMPHOCYTES # BLD AUTO: 1.14 10*3/MM3 (ref 0.7–3.1)
LYMPHOCYTES NFR BLD AUTO: 23.9 % (ref 19.6–45.3)
MAGNESIUM SERPL-MCNC: 1.6 MG/DL (ref 1.6–2.4)
MCH RBC QN AUTO: 29.2 PG (ref 26.6–33)
MCHC RBC AUTO-ENTMCNC: 34 G/DL (ref 31.5–35.7)
MCV RBC AUTO: 86 FL (ref 79–97)
MONOCYTES # BLD AUTO: 0.34 10*3/MM3 (ref 0.1–0.9)
MONOCYTES NFR BLD AUTO: 7.1 % (ref 5–12)
NEUTROPHILS NFR BLD AUTO: 3.22 10*3/MM3 (ref 1.7–7)
NEUTROPHILS NFR BLD AUTO: 67.6 % (ref 42.7–76)
NITRITE UR QL STRIP: NEGATIVE
NRBC BLD AUTO-RTO: 0 /100 WBC (ref 0–0.2)
PH UR STRIP.AUTO: 6 [PH] (ref 5–8)
PHOSPHATE SERPL-MCNC: 2.1 MG/DL (ref 2.5–4.5)
PLATELET # BLD AUTO: 126 10*3/MM3 (ref 140–450)
PMV BLD AUTO: 9.8 FL (ref 6–12)
POTASSIUM SERPL-SCNC: 5 MMOL/L (ref 3.5–5.2)
PROT ?TM UR-MCNC: 10.4 MG/DL
PROT SERPL-MCNC: 7.1 G/DL (ref 6–8.5)
PROT UR QL STRIP: ABNORMAL
PROT/CREAT UR: 74 MG/G CREA (ref 0–200)
PTH-INTACT SERPL-MCNC: 158 PG/ML (ref 15–65)
RBC # BLD AUTO: 5.3 10*6/MM3 (ref 4.14–5.8)
RETICS # AUTO: 0.1 10*6/MM3 (ref 0.02–0.13)
RETICS/RBC NFR AUTO: 1.83 % (ref 0.7–1.9)
SODIUM SERPL-SCNC: 138 MMOL/L (ref 136–145)
SP GR UR STRIP: 1.02 (ref 1–1.03)
TIBC SERPL-MCNC: 316 MCG/DL (ref 298–536)
TRANSFERRIN SERPL-MCNC: 212 MG/DL (ref 200–360)
TRIGL SERPL-MCNC: 71 MG/DL (ref 0–150)
URATE SERPL-MCNC: 7.3 MG/DL (ref 3.4–7)
UROBILINOGEN UR QL STRIP: ABNORMAL
VIT B12 BLD-MCNC: 638 PG/ML (ref 211–946)
VLDLC SERPL-MCNC: 14 MG/DL (ref 5–40)
WBC NRBC COR # BLD: 4.77 10*3/MM3 (ref 3.4–10.8)

## 2023-05-02 PROCEDURE — 80197 ASSAY OF TACROLIMUS: CPT

## 2023-05-02 PROCEDURE — 80053 COMPREHEN METABOLIC PANEL: CPT

## 2023-05-02 PROCEDURE — 82728 ASSAY OF FERRITIN: CPT

## 2023-05-02 PROCEDURE — 82746 ASSAY OF FOLIC ACID SERUM: CPT

## 2023-05-02 PROCEDURE — 82607 VITAMIN B-12: CPT

## 2023-05-02 PROCEDURE — 84156 ASSAY OF PROTEIN URINE: CPT

## 2023-05-02 PROCEDURE — 85045 AUTOMATED RETICULOCYTE COUNT: CPT

## 2023-05-02 PROCEDURE — 82550 ASSAY OF CK (CPK): CPT

## 2023-05-02 PROCEDURE — 83540 ASSAY OF IRON: CPT

## 2023-05-02 PROCEDURE — 82570 ASSAY OF URINE CREATININE: CPT

## 2023-05-02 PROCEDURE — 83036 HEMOGLOBIN GLYCOSYLATED A1C: CPT

## 2023-05-02 PROCEDURE — 84550 ASSAY OF BLOOD/URIC ACID: CPT

## 2023-05-02 PROCEDURE — 84100 ASSAY OF PHOSPHORUS: CPT

## 2023-05-02 PROCEDURE — 83970 ASSAY OF PARATHORMONE: CPT

## 2023-05-02 PROCEDURE — 36415 COLL VENOUS BLD VENIPUNCTURE: CPT

## 2023-05-02 PROCEDURE — 83735 ASSAY OF MAGNESIUM: CPT

## 2023-05-02 PROCEDURE — 87799 DETECT AGENT NOS DNA QUANT: CPT

## 2023-05-02 PROCEDURE — 81003 URINALYSIS AUTO W/O SCOPE: CPT

## 2023-05-02 PROCEDURE — 82306 VITAMIN D 25 HYDROXY: CPT

## 2023-05-02 PROCEDURE — 85025 COMPLETE CBC W/AUTO DIFF WBC: CPT

## 2023-05-02 PROCEDURE — 80061 LIPID PANEL: CPT

## 2023-05-02 PROCEDURE — 84466 ASSAY OF TRANSFERRIN: CPT

## 2023-05-04 LAB — BKV DNA # UR NAA+PROBE: NEGATIVE IU/ML

## 2023-05-05 LAB — TACROLIMUS BLD LC/MS/MS-MCNC: 6 NG/ML (ref 2–20)

## 2023-06-16 ENCOUNTER — LAB (OUTPATIENT)
Dept: LAB | Facility: HOSPITAL | Age: 63
End: 2023-06-16
Payer: COMMERCIAL

## 2023-06-16 DIAGNOSIS — N18.30 STAGE 3 CHRONIC KIDNEY DISEASE, UNSPECIFIED WHETHER STAGE 3A OR 3B CKD: ICD-10-CM

## 2023-06-16 DIAGNOSIS — O12.10 GESTATIONAL PROTEINURIA, ANTEPARTUM: ICD-10-CM

## 2023-06-16 DIAGNOSIS — E55.9 VITAMIN D INSUFFICIENCY: ICD-10-CM

## 2023-06-16 DIAGNOSIS — E11.8 DIABETIC COMPLICATION: ICD-10-CM

## 2023-06-16 DIAGNOSIS — Z94.0 KIDNEY REPLACED BY TRANSPLANT: ICD-10-CM

## 2023-06-16 LAB
25(OH)D3 SERPL-MCNC: 39.2 NG/ML (ref 30–100)
ALBUMIN SERPL-MCNC: 4.2 G/DL (ref 3.5–5.2)
ALBUMIN/GLOB SERPL: 1.5 G/DL
ALP SERPL-CCNC: 143 U/L (ref 39–117)
ALT SERPL W P-5'-P-CCNC: 20 U/L (ref 1–41)
ANION GAP SERPL CALCULATED.3IONS-SCNC: 6.5 MMOL/L (ref 5–15)
AST SERPL-CCNC: 14 U/L (ref 1–40)
BASOPHILS # BLD AUTO: 0.02 10*3/MM3 (ref 0–0.2)
BASOPHILS NFR BLD AUTO: 0.4 % (ref 0–1.5)
BILIRUB SERPL-MCNC: 0.5 MG/DL (ref 0–1.2)
BILIRUB UR QL STRIP: NEGATIVE
BUN SERPL-MCNC: 23 MG/DL (ref 8–23)
BUN/CREAT SERPL: 12.7 (ref 7–25)
CALCIUM SPEC-SCNC: 10.2 MG/DL (ref 8.6–10.5)
CHLORIDE SERPL-SCNC: 111 MMOL/L (ref 98–107)
CK SERPL-CCNC: 38 U/L (ref 20–200)
CLARITY UR: CLEAR
CO2 SERPL-SCNC: 21.5 MMOL/L (ref 22–29)
COLOR UR: YELLOW
CREAT SERPL-MCNC: 1.81 MG/DL (ref 0.76–1.27)
CREAT UR-MCNC: 153.7 MG/DL
DEPRECATED RDW RBC AUTO: 42.9 FL (ref 37–54)
EGFRCR SERPLBLD CKD-EPI 2021: 42 ML/MIN/1.73
EOSINOPHIL # BLD AUTO: 0.05 10*3/MM3 (ref 0–0.4)
EOSINOPHIL NFR BLD AUTO: 1 % (ref 0.3–6.2)
ERYTHROCYTE [DISTWIDTH] IN BLOOD BY AUTOMATED COUNT: 13.4 % (ref 12.3–15.4)
GLOBULIN UR ELPH-MCNC: 2.8 GM/DL
GLUCOSE SERPL-MCNC: 131 MG/DL (ref 65–99)
GLUCOSE UR STRIP-MCNC: ABNORMAL MG/DL
HBA1C MFR BLD: 6.3 % (ref 4.8–5.6)
HCT VFR BLD AUTO: 45.1 % (ref 37.5–51)
HGB BLD-MCNC: 15.4 G/DL (ref 13–17.7)
HGB UR QL STRIP.AUTO: NEGATIVE
IMM GRANULOCYTES # BLD AUTO: 0.02 10*3/MM3 (ref 0–0.05)
IMM GRANULOCYTES NFR BLD AUTO: 0.4 % (ref 0–0.5)
KETONES UR QL STRIP: NEGATIVE
LEUKOCYTE ESTERASE UR QL STRIP.AUTO: NEGATIVE
LYMPHOCYTES # BLD AUTO: 1.57 10*3/MM3 (ref 0.7–3.1)
LYMPHOCYTES NFR BLD AUTO: 31 % (ref 19.6–45.3)
MCH RBC QN AUTO: 29.8 PG (ref 26.6–33)
MCHC RBC AUTO-ENTMCNC: 34.1 G/DL (ref 31.5–35.7)
MCV RBC AUTO: 87.4 FL (ref 79–97)
MONOCYTES # BLD AUTO: 0.37 10*3/MM3 (ref 0.1–0.9)
MONOCYTES NFR BLD AUTO: 7.3 % (ref 5–12)
NEUTROPHILS NFR BLD AUTO: 3.04 10*3/MM3 (ref 1.7–7)
NEUTROPHILS NFR BLD AUTO: 59.9 % (ref 42.7–76)
NITRITE UR QL STRIP: NEGATIVE
NRBC BLD AUTO-RTO: 0 /100 WBC (ref 0–0.2)
PH UR STRIP.AUTO: 5.5 [PH] (ref 5–8)
PHOSPHATE SERPL-MCNC: 2.4 MG/DL (ref 2.5–4.5)
PLATELET # BLD AUTO: 121 10*3/MM3 (ref 140–450)
PMV BLD AUTO: 9.7 FL (ref 6–12)
POTASSIUM SERPL-SCNC: 4.6 MMOL/L (ref 3.5–5.2)
PROT ?TM UR-MCNC: 11.6 MG/DL
PROT SERPL-MCNC: 7 G/DL (ref 6–8.5)
PROT UR QL STRIP: ABNORMAL
PROT/CREAT UR: 75.5 MG/G CREA (ref 0–200)
PTH-INTACT SERPL-MCNC: 188 PG/ML (ref 15–65)
RBC # BLD AUTO: 5.16 10*6/MM3 (ref 4.14–5.8)
SODIUM SERPL-SCNC: 139 MMOL/L (ref 136–145)
SP GR UR STRIP: 1.02 (ref 1–1.03)
URATE SERPL-MCNC: 7.2 MG/DL (ref 3.4–7)
UROBILINOGEN UR QL STRIP: ABNORMAL
WBC NRBC COR # BLD: 5.07 10*3/MM3 (ref 3.4–10.8)

## 2023-06-16 PROCEDURE — 80197 ASSAY OF TACROLIMUS: CPT

## 2023-06-16 PROCEDURE — 83970 ASSAY OF PARATHORMONE: CPT

## 2023-06-16 PROCEDURE — 84550 ASSAY OF BLOOD/URIC ACID: CPT

## 2023-06-16 PROCEDURE — 36415 COLL VENOUS BLD VENIPUNCTURE: CPT

## 2023-06-16 PROCEDURE — 80053 COMPREHEN METABOLIC PANEL: CPT

## 2023-06-16 PROCEDURE — 82306 VITAMIN D 25 HYDROXY: CPT

## 2023-06-16 PROCEDURE — 84100 ASSAY OF PHOSPHORUS: CPT

## 2023-06-16 PROCEDURE — 84156 ASSAY OF PROTEIN URINE: CPT

## 2023-06-16 PROCEDURE — 82550 ASSAY OF CK (CPK): CPT

## 2023-06-16 PROCEDURE — 83036 HEMOGLOBIN GLYCOSYLATED A1C: CPT

## 2023-06-16 PROCEDURE — 81003 URINALYSIS AUTO W/O SCOPE: CPT

## 2023-06-16 PROCEDURE — 82570 ASSAY OF URINE CREATININE: CPT

## 2023-06-16 PROCEDURE — 85025 COMPLETE CBC W/AUTO DIFF WBC: CPT

## 2023-06-19 LAB — TACROLIMUS BLD LC/MS/MS-MCNC: 6.2 NG/ML (ref 2–20)

## 2023-08-02 ENCOUNTER — TRANSCRIBE ORDERS (OUTPATIENT)
Dept: ADMINISTRATIVE | Facility: HOSPITAL | Age: 63
End: 2023-08-02
Payer: MEDICARE

## 2023-08-02 DIAGNOSIS — Z94.0 TRANSPLANTED KIDNEY: Primary | ICD-10-CM

## 2023-08-08 ENCOUNTER — HOSPITAL ENCOUNTER (OUTPATIENT)
Dept: ULTRASOUND IMAGING | Facility: HOSPITAL | Age: 63
Discharge: HOME | End: 2023-08-08
Admitting: NURSE PRACTITIONER
Payer: MEDICARE

## 2023-08-08 DIAGNOSIS — Z94.0 TRANSPLANTED KIDNEY: ICD-10-CM

## 2023-08-08 PROCEDURE — 76775 US EXAM ABDO BACK WALL LIM: CPT

## 2023-09-30 ENCOUNTER — APPOINTMENT (OUTPATIENT)
Dept: GENERAL RADIOLOGY | Facility: HOSPITAL | Age: 63
End: 2023-09-30
Payer: MEDICARE

## 2023-09-30 ENCOUNTER — HOSPITAL ENCOUNTER (OUTPATIENT)
Facility: HOSPITAL | Age: 63
Setting detail: OBSERVATION
Discharge: HOME | End: 2023-10-02
Attending: EMERGENCY MEDICINE | Admitting: INTERNAL MEDICINE
Payer: MEDICARE

## 2023-09-30 DIAGNOSIS — E86.0 DEHYDRATION: ICD-10-CM

## 2023-09-30 DIAGNOSIS — R73.9 HYPERGLYCEMIA: ICD-10-CM

## 2023-09-30 DIAGNOSIS — E11.10 DIABETIC KETOACIDOSIS WITHOUT COMA ASSOCIATED WITH TYPE 2 DIABETES MELLITUS: Primary | ICD-10-CM

## 2023-09-30 DIAGNOSIS — N17.9 AKI (ACUTE KIDNEY INJURY): ICD-10-CM

## 2023-09-30 LAB
ALBUMIN SERPL-MCNC: 3.7 G/DL (ref 3.5–5.2)
ALBUMIN/GLOB SERPL: 1.4 G/DL
ALP SERPL-CCNC: 172 U/L (ref 39–117)
ALT SERPL W P-5'-P-CCNC: 18 U/L (ref 1–41)
ANION GAP SERPL CALCULATED.3IONS-SCNC: 11 MMOL/L (ref 5–15)
ANION GAP SERPL CALCULATED.3IONS-SCNC: 17 MMOL/L (ref 5–15)
AST SERPL-CCNC: 13 U/L (ref 1–40)
ATMOSPHERIC PRESS: ABNORMAL MM[HG]
BASE EXCESS BLDV CALC-SCNC: -14.7 MMOL/L (ref -2–2)
BASOPHILS # BLD AUTO: 0.1 10*3/MM3 (ref 0–0.2)
BASOPHILS # BLD AUTO: 0.1 10*3/MM3 (ref 0–0.2)
BASOPHILS NFR BLD AUTO: 1.2 % (ref 0–1.5)
BASOPHILS NFR BLD AUTO: 1.2 % (ref 0–1.5)
BDY SITE: ABNORMAL
BILIRUB SERPL-MCNC: 0.7 MG/DL (ref 0–1.2)
BILIRUB UR QL STRIP: NEGATIVE
BUN SERPL-MCNC: 48 MG/DL (ref 8–23)
BUN SERPL-MCNC: 52 MG/DL (ref 8–23)
BUN/CREAT SERPL: 22.8 (ref 7–25)
BUN/CREAT SERPL: 24.9 (ref 7–25)
CALCIUM SPEC-SCNC: 8.9 MG/DL (ref 8.6–10.5)
CALCIUM SPEC-SCNC: 9.2 MG/DL (ref 8.6–10.5)
CHLORIDE SERPL-SCNC: 102 MMOL/L (ref 98–107)
CHLORIDE SERPL-SCNC: 87 MMOL/L (ref 98–107)
CLARITY UR: CLEAR
CO2 BLDA-SCNC: 11.1 MMOL/L (ref 22–29)
CO2 SERPL-SCNC: 17 MMOL/L (ref 22–29)
CO2 SERPL-SCNC: 20 MMOL/L (ref 22–29)
COLOR UR: YELLOW
CREAT SERPL-MCNC: 1.93 MG/DL (ref 0.76–1.27)
CREAT SERPL-MCNC: 2.28 MG/DL (ref 0.76–1.27)
DEPRECATED RDW RBC AUTO: 42 FL (ref 37–54)
DEPRECATED RDW RBC AUTO: 46.4 FL (ref 37–54)
EGFRCR SERPLBLD CKD-EPI 2021: 31.8 ML/MIN/1.73
EGFRCR SERPLBLD CKD-EPI 2021: 38.9 ML/MIN/1.73
EOSINOPHIL # BLD AUTO: 0 10*3/MM3 (ref 0–0.4)
EOSINOPHIL # BLD AUTO: 0 10*3/MM3 (ref 0–0.4)
EOSINOPHIL NFR BLD AUTO: 0.3 % (ref 0.3–6.2)
EOSINOPHIL NFR BLD AUTO: 0.3 % (ref 0.3–6.2)
ERYTHROCYTE [DISTWIDTH] IN BLOOD BY AUTOMATED COUNT: 13.5 % (ref 12.3–15.4)
ERYTHROCYTE [DISTWIDTH] IN BLOOD BY AUTOMATED COUNT: 14.1 % (ref 12.3–15.4)
GEN 5 2HR TROPONIN T REFLEX: 21 NG/L
GLOBULIN UR ELPH-MCNC: 2.7 GM/DL
GLUCOSE BLDC GLUCOMTR-MCNC: 230 MG/DL (ref 70–105)
GLUCOSE BLDC GLUCOMTR-MCNC: 278 MG/DL (ref 70–105)
GLUCOSE BLDC GLUCOMTR-MCNC: 387 MG/DL (ref 70–105)
GLUCOSE BLDC GLUCOMTR-MCNC: 503 MG/DL (ref 70–105)
GLUCOSE BLDC GLUCOMTR-MCNC: 581 MG/DL (ref 70–105)
GLUCOSE BLDC GLUCOMTR-MCNC: >600 MG/DL (ref 70–105)
GLUCOSE SERPL-MCNC: 510 MG/DL (ref 65–99)
GLUCOSE SERPL-MCNC: 979 MG/DL (ref 65–99)
GLUCOSE UR STRIP-MCNC: ABNORMAL MG/DL
HBA1C MFR BLD: 12.1 % (ref 4.8–5.6)
HCO3 BLDV-SCNC: 10.4 MMOL/L (ref 22–26)
HCT VFR BLD AUTO: 38 % (ref 37.5–51)
HCT VFR BLD AUTO: 43 % (ref 37.5–51)
HGB BLD-MCNC: 13 G/DL (ref 13–17.7)
HGB BLD-MCNC: 14.2 G/DL (ref 13–17.7)
HGB UR QL STRIP.AUTO: NEGATIVE
HOLD SPECIMEN: NORMAL
HOLD SPECIMEN: NORMAL
INHALED O2 CONCENTRATION: 21 %
KETONES UR QL STRIP: ABNORMAL
LEUKOCYTE ESTERASE UR QL STRIP.AUTO: NEGATIVE
LIPASE SERPL-CCNC: 67 U/L (ref 13–60)
LYMPHOCYTES # BLD AUTO: 1.1 10*3/MM3 (ref 0.7–3.1)
LYMPHOCYTES # BLD AUTO: 1.1 10*3/MM3 (ref 0.7–3.1)
LYMPHOCYTES NFR BLD AUTO: 20.7 % (ref 19.6–45.3)
LYMPHOCYTES NFR BLD AUTO: 25.2 % (ref 19.6–45.3)
MAGNESIUM SERPL-MCNC: 1.8 MG/DL (ref 1.6–2.4)
MAGNESIUM SERPL-MCNC: 1.8 MG/DL (ref 1.6–2.4)
MCH RBC QN AUTO: 29 PG (ref 26.6–33)
MCH RBC QN AUTO: 29.1 PG (ref 26.6–33)
MCHC RBC AUTO-ENTMCNC: 33 G/DL (ref 31.5–35.7)
MCHC RBC AUTO-ENTMCNC: 34.3 G/DL (ref 31.5–35.7)
MCV RBC AUTO: 84.7 FL (ref 79–97)
MCV RBC AUTO: 88 FL (ref 79–97)
MODALITY: ABNORMAL
MONOCYTES # BLD AUTO: 0.4 10*3/MM3 (ref 0.1–0.9)
MONOCYTES # BLD AUTO: 0.4 10*3/MM3 (ref 0.1–0.9)
MONOCYTES NFR BLD AUTO: 6.7 % (ref 5–12)
MONOCYTES NFR BLD AUTO: 8 % (ref 5–12)
NEUTROPHILS NFR BLD AUTO: 2.9 10*3/MM3 (ref 1.7–7)
NEUTROPHILS NFR BLD AUTO: 3.8 10*3/MM3 (ref 1.7–7)
NEUTROPHILS NFR BLD AUTO: 65.3 % (ref 42.7–76)
NEUTROPHILS NFR BLD AUTO: 71.1 % (ref 42.7–76)
NITRITE UR QL STRIP: NEGATIVE
NRBC BLD AUTO-RTO: 0.1 /100 WBC (ref 0–0.2)
NRBC BLD AUTO-RTO: 0.2 /100 WBC (ref 0–0.2)
NT-PROBNP SERPL-MCNC: 485.6 PG/ML (ref 0–900)
OSMOLALITY SERPL: 317 MOSM/KG (ref 280–301)
OSMOLALITY SERPL: 329 MOSM/KG (ref 280–301)
PCO2 BLDV: 21.9 MM HG (ref 42–51)
PH BLDV: 7.29 PH UNITS (ref 7.32–7.43)
PH UR STRIP.AUTO: <=5 [PH] (ref 5–8)
PHOSPHATE SERPL-MCNC: 2.1 MG/DL (ref 2.5–4.5)
PHOSPHATE SERPL-MCNC: 3.7 MG/DL (ref 2.5–4.5)
PLATELET # BLD AUTO: 112 10*3/MM3 (ref 140–450)
PLATELET # BLD AUTO: 117 10*3/MM3 (ref 140–450)
PMV BLD AUTO: 7.7 FL (ref 6–12)
PMV BLD AUTO: 8.4 FL (ref 6–12)
PO2 BLDV: 41 MM HG (ref 40–42)
POTASSIUM SERPL-SCNC: 4.2 MMOL/L (ref 3.5–5.2)
POTASSIUM SERPL-SCNC: 5 MMOL/L (ref 3.5–5.2)
PROT SERPL-MCNC: 6.4 G/DL (ref 6–8.5)
PROT UR QL STRIP: NEGATIVE
RBC # BLD AUTO: 4.49 10*6/MM3 (ref 4.14–5.8)
RBC # BLD AUTO: 4.88 10*6/MM3 (ref 4.14–5.8)
SAO2 % BLDCOV: 71.7 % (ref 45–75)
SODIUM SERPL-SCNC: 121 MMOL/L (ref 136–145)
SODIUM SERPL-SCNC: 133 MMOL/L (ref 136–145)
SP GR UR STRIP: 1.03 (ref 1–1.03)
TROPONIN T DELTA: -1 NG/L
TROPONIN T SERPL HS-MCNC: 22 NG/L
UROBILINOGEN UR QL STRIP: ABNORMAL
WBC NRBC COR # BLD: 4.4 10*3/MM3 (ref 3.4–10.8)
WBC NRBC COR # BLD: 5.3 10*3/MM3 (ref 3.4–10.8)
WHOLE BLOOD HOLD COAG: NORMAL

## 2023-09-30 PROCEDURE — 96365 THER/PROPH/DIAG IV INF INIT: CPT

## 2023-09-30 PROCEDURE — 84484 ASSAY OF TROPONIN QUANT: CPT | Performed by: PHYSICIAN ASSISTANT

## 2023-09-30 PROCEDURE — 83036 HEMOGLOBIN GLYCOSYLATED A1C: CPT | Performed by: PHYSICIAN ASSISTANT

## 2023-09-30 PROCEDURE — 99284 EMERGENCY DEPT VISIT MOD MDM: CPT

## 2023-09-30 PROCEDURE — 71045 X-RAY EXAM CHEST 1 VIEW: CPT

## 2023-09-30 PROCEDURE — 96366 THER/PROPH/DIAG IV INF ADDON: CPT

## 2023-09-30 PROCEDURE — 84100 ASSAY OF PHOSPHORUS: CPT | Performed by: PHYSICIAN ASSISTANT

## 2023-09-30 PROCEDURE — 85025 COMPLETE CBC W/AUTO DIFF WBC: CPT | Performed by: PHYSICIAN ASSISTANT

## 2023-09-30 PROCEDURE — 83930 ASSAY OF BLOOD OSMOLALITY: CPT | Performed by: PHYSICIAN ASSISTANT

## 2023-09-30 PROCEDURE — 82803 BLOOD GASES ANY COMBINATION: CPT

## 2023-09-30 PROCEDURE — 93005 ELECTROCARDIOGRAM TRACING: CPT | Performed by: PHYSICIAN ASSISTANT

## 2023-09-30 PROCEDURE — 83735 ASSAY OF MAGNESIUM: CPT | Performed by: PHYSICIAN ASSISTANT

## 2023-09-30 PROCEDURE — 82948 REAGENT STRIP/BLOOD GLUCOSE: CPT

## 2023-09-30 PROCEDURE — 80053 COMPREHEN METABOLIC PANEL: CPT | Performed by: PHYSICIAN ASSISTANT

## 2023-09-30 PROCEDURE — 25010000002 SODIUM CHLORIDE 0.9 % WITH KCL 20 MEQ 20-0.9 MEQ/L-% SOLUTION: Performed by: PHYSICIAN ASSISTANT

## 2023-09-30 PROCEDURE — 81003 URINALYSIS AUTO W/O SCOPE: CPT | Performed by: PHYSICIAN ASSISTANT

## 2023-09-30 PROCEDURE — 83880 ASSAY OF NATRIURETIC PEPTIDE: CPT | Performed by: PHYSICIAN ASSISTANT

## 2023-09-30 PROCEDURE — 63710000001 MYCOPHENOLATE MOFETIL PER 250 MG: Performed by: FAMILY MEDICINE

## 2023-09-30 PROCEDURE — 96368 THER/DIAG CONCURRENT INF: CPT

## 2023-09-30 PROCEDURE — G0378 HOSPITAL OBSERVATION PER HR: HCPCS

## 2023-09-30 PROCEDURE — 83690 ASSAY OF LIPASE: CPT | Performed by: PHYSICIAN ASSISTANT

## 2023-09-30 PROCEDURE — 63710000001 TACROLIMUS PER 1 MG: Performed by: FAMILY MEDICINE

## 2023-09-30 PROCEDURE — 36415 COLL VENOUS BLD VENIPUNCTURE: CPT

## 2023-09-30 RX ORDER — TACROLIMUS 1 MG/1
3 CAPSULE ORAL EVERY MORNING
Status: DISCONTINUED | OUTPATIENT
Start: 2023-10-01 | End: 2023-10-02 | Stop reason: HOSPADM

## 2023-09-30 RX ORDER — SODIUM CHLORIDE 0.9 % (FLUSH) 0.9 %
10 SYRINGE (ML) INJECTION AS NEEDED
Status: DISCONTINUED | OUTPATIENT
Start: 2023-09-30 | End: 2023-10-02 | Stop reason: HOSPADM

## 2023-09-30 RX ORDER — SODIUM CHLORIDE 9 MG/ML
40 INJECTION, SOLUTION INTRAVENOUS AS NEEDED
Status: DISCONTINUED | OUTPATIENT
Start: 2023-09-30 | End: 2023-10-01

## 2023-09-30 RX ORDER — SODIUM CHLORIDE 0.9 % (FLUSH) 0.9 %
10 SYRINGE (ML) INJECTION EVERY 12 HOURS SCHEDULED
Status: DISCONTINUED | OUTPATIENT
Start: 2023-10-01 | End: 2023-10-02 | Stop reason: HOSPADM

## 2023-09-30 RX ORDER — MYCOPHENOLATE MOFETIL 250 MG/1
500 CAPSULE ORAL 2 TIMES DAILY
Status: DISCONTINUED | OUTPATIENT
Start: 2023-09-30 | End: 2023-10-02 | Stop reason: HOSPADM

## 2023-09-30 RX ORDER — IBUPROFEN 600 MG/1
1 TABLET ORAL
Status: DISCONTINUED | OUTPATIENT
Start: 2023-09-30 | End: 2023-10-01

## 2023-09-30 RX ORDER — DEXTROSE MONOHYDRATE, SODIUM CHLORIDE, AND POTASSIUM CHLORIDE 50; 1.49; 4.5 G/1000ML; G/1000ML; G/1000ML
150 INJECTION, SOLUTION INTRAVENOUS CONTINUOUS PRN
Status: DISCONTINUED | OUTPATIENT
Start: 2023-09-30 | End: 2023-10-01

## 2023-09-30 RX ORDER — SODIUM CHLORIDE 0.9 % (FLUSH) 0.9 %
10 SYRINGE (ML) INJECTION EVERY 12 HOURS SCHEDULED
Status: DISCONTINUED | OUTPATIENT
Start: 2023-09-30 | End: 2023-10-01

## 2023-09-30 RX ORDER — DEXTROSE AND SODIUM CHLORIDE 5; .45 G/100ML; G/100ML
150 INJECTION, SOLUTION INTRAVENOUS CONTINUOUS PRN
Status: DISCONTINUED | OUTPATIENT
Start: 2023-09-30 | End: 2023-10-01

## 2023-09-30 RX ORDER — MAGNESIUM OXIDE 400 MG/1
400 TABLET ORAL DAILY
COMMUNITY

## 2023-09-30 RX ORDER — MYCOPHENOLATE MOFETIL 250 MG/1
500 CAPSULE ORAL 2 TIMES DAILY
COMMUNITY

## 2023-09-30 RX ORDER — SODIUM CHLORIDE 450 MG/100ML
250 INJECTION, SOLUTION INTRAVENOUS CONTINUOUS PRN
Status: DISCONTINUED | OUTPATIENT
Start: 2023-09-30 | End: 2023-10-01

## 2023-09-30 RX ORDER — SODIUM CHLORIDE AND POTASSIUM CHLORIDE 150; 450 MG/100ML; MG/100ML
250 INJECTION, SOLUTION INTRAVENOUS CONTINUOUS PRN
Status: DISCONTINUED | OUTPATIENT
Start: 2023-09-30 | End: 2023-10-01

## 2023-09-30 RX ORDER — SODIUM CHLORIDE AND POTASSIUM CHLORIDE 300; 900 MG/100ML; MG/100ML
250 INJECTION, SOLUTION INTRAVENOUS CONTINUOUS PRN
Status: DISCONTINUED | OUTPATIENT
Start: 2023-09-30 | End: 2023-10-01

## 2023-09-30 RX ORDER — TACROLIMUS 1 MG/1
1 CAPSULE ORAL 2 TIMES DAILY
Status: DISCONTINUED | OUTPATIENT
Start: 2023-09-30 | End: 2023-09-30

## 2023-09-30 RX ORDER — BISACODYL 10 MG
10 SUPPOSITORY, RECTAL RECTAL DAILY PRN
Status: DISCONTINUED | OUTPATIENT
Start: 2023-09-30 | End: 2023-10-02 | Stop reason: HOSPADM

## 2023-09-30 RX ORDER — DEXTROSE AND SODIUM CHLORIDE 5; .9 G/100ML; G/100ML
150 INJECTION, SOLUTION INTRAVENOUS CONTINUOUS PRN
Status: DISCONTINUED | OUTPATIENT
Start: 2023-09-30 | End: 2023-10-01

## 2023-09-30 RX ORDER — AMLODIPINE BESYLATE 10 MG/1
10 TABLET ORAL DAILY
COMMUNITY

## 2023-09-30 RX ORDER — TACROLIMUS 0.5 MG/1
2 CAPSULE ORAL NIGHTLY
Status: DISCONTINUED | OUTPATIENT
Start: 2023-09-30 | End: 2023-10-02 | Stop reason: HOSPADM

## 2023-09-30 RX ORDER — AMOXICILLIN 250 MG
2 CAPSULE ORAL 2 TIMES DAILY
Status: DISCONTINUED | OUTPATIENT
Start: 2023-10-01 | End: 2023-10-02 | Stop reason: HOSPADM

## 2023-09-30 RX ORDER — SODIUM CHLORIDE 9 MG/ML
40 INJECTION, SOLUTION INTRAVENOUS AS NEEDED
Status: DISCONTINUED | OUTPATIENT
Start: 2023-09-30 | End: 2023-09-30 | Stop reason: SDUPTHER

## 2023-09-30 RX ORDER — TACROLIMUS 1 MG/1
3 CAPSULE ORAL EVERY MORNING
COMMUNITY

## 2023-09-30 RX ORDER — DEXTROSE MONOHYDRATE, SODIUM CHLORIDE, AND POTASSIUM CHLORIDE 50; 2.98; 9 G/1000ML; G/1000ML; G/1000ML
150 INJECTION, SOLUTION INTRAVENOUS CONTINUOUS PRN
Status: DISCONTINUED | OUTPATIENT
Start: 2023-09-30 | End: 2023-10-01

## 2023-09-30 RX ORDER — DEXTROSE MONOHYDRATE, SODIUM CHLORIDE, AND POTASSIUM CHLORIDE 50; 2.98; 4.5 G/1000ML; G/1000ML; G/1000ML
150 INJECTION, SOLUTION INTRAVENOUS CONTINUOUS PRN
Status: DISCONTINUED | OUTPATIENT
Start: 2023-09-30 | End: 2023-10-01

## 2023-09-30 RX ORDER — BISACODYL 5 MG/1
5 TABLET, DELAYED RELEASE ORAL DAILY PRN
Status: DISCONTINUED | OUTPATIENT
Start: 2023-09-30 | End: 2023-10-02 | Stop reason: HOSPADM

## 2023-09-30 RX ORDER — DEXTROSE MONOHYDRATE 25 G/50ML
10-50 INJECTION, SOLUTION INTRAVENOUS
Status: DISCONTINUED | OUTPATIENT
Start: 2023-09-30 | End: 2023-10-01

## 2023-09-30 RX ORDER — SODIUM CHLORIDE 9 MG/ML
250 INJECTION, SOLUTION INTRAVENOUS CONTINUOUS PRN
Status: DISCONTINUED | OUTPATIENT
Start: 2023-09-30 | End: 2023-10-01

## 2023-09-30 RX ORDER — POLYETHYLENE GLYCOL 3350 17 G/17G
17 POWDER, FOR SOLUTION ORAL DAILY PRN
Status: DISCONTINUED | OUTPATIENT
Start: 2023-09-30 | End: 2023-10-02 | Stop reason: HOSPADM

## 2023-09-30 RX ORDER — SODIUM CHLORIDE 0.9 % (FLUSH) 0.9 %
10 SYRINGE (ML) INJECTION AS NEEDED
Status: DISCONTINUED | OUTPATIENT
Start: 2023-09-30 | End: 2023-10-01

## 2023-09-30 RX ORDER — TACROLIMUS 1 MG/1
1 CAPSULE ORAL NIGHTLY
COMMUNITY

## 2023-09-30 RX ORDER — SODIUM CHLORIDE AND POTASSIUM CHLORIDE 150; 900 MG/100ML; MG/100ML
250 INJECTION, SOLUTION INTRAVENOUS CONTINUOUS PRN
Status: DISCONTINUED | OUTPATIENT
Start: 2023-09-30 | End: 2023-10-01

## 2023-09-30 RX ORDER — HYDRALAZINE HYDROCHLORIDE 25 MG/1
25 TABLET, FILM COATED ORAL 2 TIMES DAILY
COMMUNITY
End: 2023-10-02 | Stop reason: HOSPADM

## 2023-09-30 RX ORDER — DEXTROSE MONOHYDRATE, SODIUM CHLORIDE, AND POTASSIUM CHLORIDE 50; 1.49; 9 G/1000ML; G/1000ML; G/1000ML
150 INJECTION, SOLUTION INTRAVENOUS CONTINUOUS PRN
Status: DISCONTINUED | OUTPATIENT
Start: 2023-09-30 | End: 2023-10-01

## 2023-09-30 RX ORDER — NICOTINE POLACRILEX 4 MG
15 LOZENGE BUCCAL
Status: DISCONTINUED | OUTPATIENT
Start: 2023-09-30 | End: 2023-10-01

## 2023-09-30 RX ADMIN — SODIUM CHLORIDE 1000 ML: 9 INJECTION, SOLUTION INTRAVENOUS at 16:35

## 2023-09-30 RX ADMIN — SODIUM CHLORIDE 1000 ML/HR: 9 INJECTION, SOLUTION INTRAVENOUS at 18:07

## 2023-09-30 RX ADMIN — TACROLIMUS 2 MG: 1 CAPSULE ORAL at 21:49

## 2023-09-30 RX ADMIN — POTASSIUM CHLORIDE AND SODIUM CHLORIDE 250 ML/HR: 900; 150 INJECTION, SOLUTION INTRAVENOUS at 20:21

## 2023-09-30 RX ADMIN — MYCOPHENOLATE MOFETIL 500 MG: 250 CAPSULE ORAL at 21:46

## 2023-09-30 RX ADMIN — SODIUM CHLORIDE 1000 ML: 9 INJECTION, SOLUTION INTRAVENOUS at 17:17

## 2023-09-30 RX ADMIN — INSULIN HUMAN 5.4 UNITS/HR: 1 INJECTION, SOLUTION INTRAVENOUS at 18:07

## 2023-09-30 NOTE — ED PROVIDER NOTES
Subjective   History of Present Illness      Patient is a 63-year-old male comes in complaining of increased thirst and polyuria for about the past 2 weeks.  Patient states that he has had increased fatigue over that time.  Patient states that he was on vacation about 2 weeks ago in [place] but was not around anyone that was sick.  Patient reports that he felt sick for about a day a few weeks ago but otherwise denies any fever, chills, pain with urination, cough or congestion.  Patient does report 3 days ago he had an episode of nausea and vomiting.  Patient states he has been drinking water throughout the day continuously and urinating much more frequent than usual.  Of note, patient follows with [facility] for history of kidney transplant.  Patient reports he has been told he was prediabetic in the past but denies any current medications related to diabetes.    Upon chart review, last office visit with Ana Maria Thacker, 7/5/2023 patient was seen for follow-up of of kidney transplant patient currently on Prograf and CellCept.      Review of Systems   Constitutional:  Positive for fatigue. Negative for chills and fever.   HENT:  Negative for congestion, sore throat, tinnitus and trouble swallowing.    Eyes:  Negative for photophobia, discharge and visual disturbance.   Respiratory:  Negative for cough, shortness of breath and wheezing.    Cardiovascular:  Negative for chest pain, palpitations and leg swelling.   Gastrointestinal:  Positive for nausea and vomiting. Negative for abdominal pain and diarrhea.   Endocrine: Positive for polydipsia and polyuria.   Genitourinary:  Positive for frequency. Negative for dysuria, flank pain and urgency.   Musculoskeletal:  Negative for arthralgias and myalgias.   Skin:  Negative for rash.   Neurological:  Negative for dizziness and headaches.   Psychiatric/Behavioral:  Negative for confusion.      Past Medical History:   Diagnosis Date    CKD (chronic kidney disease)  requiring chronic dialysis     COPD (chronic obstructive pulmonary disease)     Hypertension     Kidney failure        No Known Allergies    Past Surgical History:   Procedure Laterality Date    ARTERIOVENOUS FISTULA REPAIR      ARTERIOVENOUS FISTULA/SHUNT SURGERY      CARDIAC CATHETERIZATION N/A 2019    Procedure: Left Heart Cath;  Surgeon: Juanpablo Garcia MD;  Location: [facility] CATH INVASIVE LOCATION;  Service: Cardiology    CARDIAC CATHETERIZATION N/A 2019    Procedure: Aortic root aortogram;  Surgeon: Juanpablo Garcia MD;  Location: [facility] CATH INVASIVE LOCATION;  Service: Cardiology    LYMPH NODE BIOPSY  2002    ORCHIECTOMY         Family History   Problem Relation Age of Onset    Heart attack Mother        Social History     Socioeconomic History    Marital status:    Tobacco Use    Smoking status: Former     Packs/day: 1.00     Types: Cigarettes     Start date:      Quit date: 2019     Years since quittin.1    Smokeless tobacco: Never   Substance and Sexual Activity    Alcohol use: No    Drug use: No    Sexual activity: Yes     Partners: Female           Objective   Physical Exam  Vitals and nursing note reviewed.   Constitutional:       General: He is not in acute distress.     Appearance: Normal appearance. He is well-developed. He is not diaphoretic.   HENT:      Head: Normocephalic and atraumatic.      Right Ear: External ear normal.      Left Ear: External ear normal.      Nose: Nose normal.      Mouth/Throat:      Mouth: Mucous membranes are moist.   Eyes:      Extraocular Movements: Extraocular movements intact.      Conjunctiva/sclera: Conjunctivae normal.      Pupils: Pupils are equal, round, and reactive to light.   Cardiovascular:      Rate and Rhythm: Normal rate and regular rhythm.      Pulses: Normal pulses.      Heart sounds: Normal heart sounds.      Comments: S1, S2 audible.  Pulmonary:      Effort: Pulmonary effort is normal. No respiratory  distress.      Breath sounds: Normal breath sounds. No wheezing.      Comments: On room air.  Abdominal:      General: Bowel sounds are normal. There is no distension.      Palpations: Abdomen is soft.      Tenderness: There is no abdominal tenderness. There is no guarding or rebound.   Musculoskeletal:         General: No swelling, tenderness or deformity. Normal range of motion.      Cervical back: Normal range of motion.   Skin:     General: Skin is warm.      Capillary Refill: Capillary refill takes less than 2 seconds.      Findings: No erythema or rash.   Neurological:      Mental Status: He is alert and oriented to person, place, and time.      Cranial Nerves: No cranial nerve deficit.   Psychiatric:         Mood and Affect: Mood normal.         Behavior: Behavior normal.       Procedures           ED Course  ED Course as of 09/30/23 2130   Sat Sep 30, 2023   1721 Chest x-ray independently interpreted by myself shows no cardiomegaly, no acute pulmonary infiltrates. [RL]   1805 EKG independently interpreted by myself shows sinus bradycardia rate 58 beats a minute, no ST elevation apparent.  Similar to previous study done on 11/3/2020 shows sinus rhythm 66 beats a minute, no ST elevation at that time. [RL]      ED Course User Index  [RL] Familia Rogers PA      Labs Reviewed   COMPREHENSIVE METABOLIC PANEL - Abnormal; Notable for the following components:       Result Value    Glucose 979 (*)     BUN 52 (*)     Creatinine 2.28 (*)     Sodium 121 (*)     Chloride 87 (*)     CO2 17.0 (*)     Alkaline Phosphatase 172 (*)     Anion Gap 17.0 (*)     eGFR 31.8 (*)     All other components within normal limits    Narrative:     GFR Normal >60  Chronic Kidney Disease <60  Kidney Failure <15     LIPASE - Abnormal; Notable for the following components:    Lipase 67 (*)     All other components within normal limits   URINALYSIS W/ CULTURE IF INDICATED - Abnormal; Notable for the following components:    Glucose, UA >=1000  mg/dL (3+) (*)     Ketones, UA 15 mg/dL (1+) (*)     All other components within normal limits    Narrative:     In absence of clinical symptoms, the presence of pyuria, bacteria, and/or nitrites on the urinalysis result does not correlate with infection.  Urine microscopic not indicated.   CBC WITH AUTO DIFFERENTIAL - Abnormal; Notable for the following components:    Platelets 117 (*)     All other components within normal limits   OSMOLALITY, SERUM - Abnormal; Notable for the following components:    Osmolality 329 (*)     All other components within normal limits   BLOOD GAS, VENOUS - Abnormal; Notable for the following components:    pH, Venous 7.286 (*)     pCO2, Venous 21.9 (*)     HCO3, Venous 10.4 (*)     Base Excess, Venous -14.7 (*)     CO2 Content 11.1 (*)     All other components within normal limits   OSMOLALITY, SERUM - Abnormal; Notable for the following components:    Osmolality 317 (*)     All other components within normal limits   HEMOGLOBIN A1C - Abnormal; Notable for the following components:    Hemoglobin A1C 12.10 (*)     All other components within normal limits   TROPONIN - Abnormal; Notable for the following components:    HS Troponin T 22 (*)     All other components within normal limits    Narrative:     High Sensitive Troponin T Reference Range:  <10.0 ng/L- Negative Female for AMI  <15.0 ng/L- Negative Male for AMI  >=10 - Abnormal Female indicating possible myocardial injury.  >=15 - Abnormal Male indicating possible myocardial injury.   Clinicians would have to utilize clinical acumen, EKG, Troponin, and serial changes to determine if it is an Acute Myocardial Infarction or myocardial injury due to an underlying chronic condition.        BASIC METABOLIC PANEL - Abnormal; Notable for the following components:    Glucose 510 (*)     BUN 48 (*)     Creatinine 1.93 (*)     Sodium 133 (*)     CO2 20.0 (*)     eGFR 38.9 (*)     All other components within normal limits    Narrative:      GFR Normal >60  Chronic Kidney Disease <60  Kidney Failure <15     PHOSPHORUS - Abnormal; Notable for the following components:    Phosphorus 2.1 (*)     All other components within normal limits   CBC WITH AUTO DIFFERENTIAL - Abnormal; Notable for the following components:    Platelets 112 (*)     All other components within normal limits   HIGH SENSITIVITIY TROPONIN T 2HR - Abnormal; Notable for the following components:    HS Troponin T 21 (*)     All other components within normal limits    Narrative:     High Sensitive Troponin T Reference Range:  <10.0 ng/L- Negative Female for AMI  <15.0 ng/L- Negative Male for AMI  >=10 - Abnormal Female indicating possible myocardial injury.  >=15 - Abnormal Male indicating possible myocardial injury.   Clinicians would have to utilize clinical acumen, EKG, Troponin, and serial changes to determine if it is an Acute Myocardial Infarction or myocardial injury due to an underlying chronic condition.        POCT GLUCOSE FINGERSTICK - Abnormal; Notable for the following components:    Glucose >600 (*)     All other components within normal limits   POCT GLUCOSE FINGERSTICK - Abnormal; Notable for the following components:    Glucose 581 (*)     All other components within normal limits   POCT GLUCOSE FINGERSTICK - Abnormal; Notable for the following components:    Glucose 503 (*)     All other components within normal limits   POCT GLUCOSE FINGERSTICK - Abnormal; Notable for the following components:    Glucose 387 (*)     All other components within normal limits   PHOSPHORUS - Normal   MAGNESIUM - Normal   MAGNESIUM - Normal   BNP (IN-HOUSE) - Normal    Narrative:     This assay is used as an aid in the diagnosis of individuals suspected of having heart failure. It can be used as an aid in the diagnosis of acute decompensated heart failure (ADHF) in patients presenting with signs and symptoms of ADHF to the emergency department (ED). In addition, NT-proBNP of <300 pg/mL  indicates ADHF is not likely.   RAINBOW DRAW    Narrative:     The following orders were created for panel order [place] Draw.  Procedure                               Abnormality         Status                     ---------                               -----------         ------                     Green Top (Gel)[[id number]]                                  Final result               Lavender Top[[id number]]                                     Final result               Gold Top - SST[[id number]]                                   Final result               Light Blue Top[[id number]]                                   Final result                 Please view results for these tests on the individual orders.   BLOOD GAS, VENOUS   BASIC METABOLIC PANEL   MAGNESIUM   PHOSPHORUS   BASIC METABOLIC PANEL   MAGNESIUM   PHOSPHORUS   CBC AND DIFFERENTIAL    Narrative:     The following orders were created for panel order CBC & Differential.  Procedure                               Abnormality         Status                     ---------                               -----------         ------                     CBC Auto Differential[[id number]]        Abnormal            Final result                 Please view results for these tests on the individual orders.   GREEN TOP   LAVENDER TOP   GOLD TOP - SST   LIGHT BLUE TOP   CBC AND DIFFERENTIAL    Narrative:     The following orders were created for panel order CBC & Differential.  Procedure                               Abnormality         Status                     ---------                               -----------         ------                     CBC Auto Differential[[id number]]        Abnormal            Final result                 Please view results for these tests on the individual orders.                                          Medical Decision Making  Problems Addressed:  SHADY (acute kidney injury): complicated acute illness or injury  Dehydration: complicated acute  "illness or injury  Diabetic ketoacidosis without coma associated with type 2 diabetes mellitus: complicated acute illness or injury  Hyperglycemia: complicated acute illness or injury    Amount and/or Complexity of Data Reviewed  Labs: ordered.  Radiology: ordered.  ECG/medicine tests: ordered.    Risk  OTC drugs.  Prescription drug management.  Decision regarding hospitalization.      Differential diagnosis: HHS, DKA, not meant to be an all-inclusive list    Chart review:  see above    EKG:  not indicated    Imaging:    XR Chest 1 View   Final Result   Impression:   Cardiomegaly and central vascular congestion.      Electronically Signed: Washington Feng MD     9/30/2023 4:34 PM EDT     Workstation ID: [id number]        Labs: Lab work independently interpreted by myself shows CBC largely unremarkable for acute findings although platelets slightly low at 117.  UA shows 1+ ketones.  Serum creatinine of 2.28 with previous around 1.9.  VBG shows initial pH of 7.28.  Initial troponin of 22.  Serum osmole elevated at 329.  Lipase of 67.  BNP normal.    Vitals:  /89   Pulse 65   Temp 97.7 °F (36.5 °C) (Oral)   Resp 16   Ht 181.6 cm (71.5\")   Wt 93.9 kg (207 lb 0.2 oz)   SpO2 98%   BMI 28.47 kg/m²    Medications given:    Medications   sodium chloride 0.9 % flush 10 mL (has no administration in time range)   sodium chloride 0.9 % flush 10 mL (10 mL Intravenous Not Given 9/30/23 2021)   sodium chloride 0.9 % flush 10 mL (has no administration in time range)   sodium chloride 0.9 % infusion 40 mL (has no administration in time range)   dextrose (GLUTOSE) oral gel 15 g (has no administration in time range)   dextrose (D50W) (25 g/50 mL) IV injection 10-50 mL (has no administration in time range)   Glucagon (GLUCAGEN) injection 1 mg (has no administration in time range)   sodium chloride 0.9 % bolus (0 mL/hr Intravenous Stopped 9/30/23 2021)   sodium chloride 0.9 % infusion (has no administration in time range) "   sodium chloride 0.9 % with KCl 20 mEq/L infusion (250 mL/hr Intravenous New Bag 9/30/23 2021)   sodium chloride 0.9 % with KCl 40 mEq/L infusion (has no administration in time range)   dextrose 5 % and sodium chloride 0.9 % infusion (has no administration in time range)   dextrose 5 % and sodium chloride 0.9 % with KCl 20 mEq/L infusion (has no administration in time range)   dextrose 5 % and sodium chloride 0.9 % with KCl 40 mEq/L infusion (has no administration in time range)   sodium chloride 0.45 % infusion (has no administration in time range)   sodium chloride 0.45 % with KCl 20 mEq/L infusion (has no administration in time range)   sodium chloride 0.45 % 1,000 mL with potassium chloride 40 mEq infusion (has no administration in time range)   dextrose 5 % and sodium chloride 0.45 % infusion (has no administration in time range)   dextrose 5 % and sodium chloride 0.45 % with KCl 20 mEq/L infusion (has no administration in time range)   dextrose 5 % and sodium chloride 0.45 % with KCl 40 mEq/L infusion (has no administration in time range)   insulin regular 1 unit/mL in 0.9% sodium chloride (Glucommander) (4.1 Units/hr Intravenous Rate Change (DUAL SIGN) 9/30/23 2111)   Potassium Replacement - Follow Nurse / BPA Driven Protocol (has no administration in time range)   Magnesium Standard Dose Replacement - Follow Nurse / BPA Driven Protocol (has no administration in time range)   Phosphorus Replacement - Follow Nurse / BPA Driven Protocol (has no administration in time range)   Calcium Replacement - Follow Nurse / BPA Driven Protocol (has no administration in time range)   mycophenolate (CELLCEPT) capsule 500 mg (has no administration in time range)   tacrolimus (PROGRAF) capsule 1 mg (has no administration in time range)   sodium chloride 0.9 % bolus 1,000 mL (0 mL Intravenous Stopped 9/30/23 1808)   sodium chloride 0.9 % bolus 1,000 mL (0 mL Intravenous Stopped 9/30/23 1808)       Procedures:  not  indicated    MDM: Patient is 63-year-old male comes in complaining of polydipsia and polyuria.  Patient has history of prediabetes. Lab work independently interpreted by myself shows CBC largely unremarkable for acute findings although platelets slightly low at 117.  UA shows 1+ ketones.  Serum creatinine of 2.28 with previous around 1.9.  VBG shows initial pH of 7.28.  Initial troponin of 22.  Serum osmole elevated at 329.  Lipase of 67.  BNP normal.  Chest x-ray and EKG unremarkable for acute findings largely.  Patient was given 2 L normal saline here in the ED.  Patient was then started on Glucomander protocol.  Spoke with Dr. Bean, who is on-call for patient's primary care provider for further work-up and treatment patient admitted to PCU.   Plan discussed with patient and is agreeable with plan.  I spent 40 minutes of critical care time in care of this patient.       Final diagnoses:   Diabetic ketoacidosis without coma associated with type 2 diabetes mellitus   Hyperglycemia   Dehydration   SHADY (acute kidney injury)       ED Disposition  ED Disposition       ED Disposition   Decision to Admit    Condition   --    Comment   Level of Care: Telemetry [5]   Admitting Physician: GIULIANO CRAIN [[id number]]   Attending Physician: GIULIANO CRAIN [[id number]]   Bed Request Comments: PCU                 No follow-up provider specified.       Medication List      No changes were made to your prescriptions during this visit.            Familia Rogers PA  09/30/23 1316

## 2023-10-01 LAB
ANION GAP SERPL CALCULATED.3IONS-SCNC: 11 MMOL/L (ref 5–15)
ANION GAP SERPL CALCULATED.3IONS-SCNC: 8 MMOL/L (ref 5–15)
ANION GAP SERPL CALCULATED.3IONS-SCNC: 9 MMOL/L (ref 5–15)
BUN SERPL-MCNC: 28 MG/DL (ref 8–23)
BUN SERPL-MCNC: 36 MG/DL (ref 8–23)
BUN SERPL-MCNC: 39 MG/DL (ref 8–23)
BUN/CREAT SERPL: 21.2 (ref 7–25)
BUN/CREAT SERPL: 22.8 (ref 7–25)
BUN/CREAT SERPL: 24.2 (ref 7–25)
CALCIUM SPEC-SCNC: 8.8 MG/DL (ref 8.6–10.5)
CALCIUM SPEC-SCNC: 8.9 MG/DL (ref 8.6–10.5)
CALCIUM SPEC-SCNC: 9.2 MG/DL (ref 8.6–10.5)
CHLORIDE SERPL-SCNC: 107 MMOL/L (ref 98–107)
CHLORIDE SERPL-SCNC: 110 MMOL/L (ref 98–107)
CHLORIDE SERPL-SCNC: 110 MMOL/L (ref 98–107)
CO2 SERPL-SCNC: 19 MMOL/L (ref 22–29)
CO2 SERPL-SCNC: 20 MMOL/L (ref 22–29)
CO2 SERPL-SCNC: 21 MMOL/L (ref 22–29)
CREAT SERPL-MCNC: 1.32 MG/DL (ref 0.76–1.27)
CREAT SERPL-MCNC: 1.49 MG/DL (ref 0.76–1.27)
CREAT SERPL-MCNC: 1.71 MG/DL (ref 0.76–1.27)
EGFRCR SERPLBLD CKD-EPI 2021: 45 ML/MIN/1.73
EGFRCR SERPLBLD CKD-EPI 2021: 53.1 ML/MIN/1.73
EGFRCR SERPLBLD CKD-EPI 2021: 61.4 ML/MIN/1.73
GLUCOSE BLDC GLUCOMTR-MCNC: 111 MG/DL (ref 70–105)
GLUCOSE BLDC GLUCOMTR-MCNC: 129 MG/DL (ref 70–105)
GLUCOSE BLDC GLUCOMTR-MCNC: 130 MG/DL (ref 70–105)
GLUCOSE BLDC GLUCOMTR-MCNC: 144 MG/DL (ref 70–105)
GLUCOSE BLDC GLUCOMTR-MCNC: 156 MG/DL (ref 70–105)
GLUCOSE BLDC GLUCOMTR-MCNC: 157 MG/DL (ref 70–105)
GLUCOSE BLDC GLUCOMTR-MCNC: 175 MG/DL (ref 70–105)
GLUCOSE BLDC GLUCOMTR-MCNC: 177 MG/DL (ref 70–105)
GLUCOSE BLDC GLUCOMTR-MCNC: 190 MG/DL (ref 70–105)
GLUCOSE BLDC GLUCOMTR-MCNC: 190 MG/DL (ref 70–105)
GLUCOSE BLDC GLUCOMTR-MCNC: 201 MG/DL (ref 70–105)
GLUCOSE BLDC GLUCOMTR-MCNC: 241 MG/DL (ref 70–105)
GLUCOSE BLDC GLUCOMTR-MCNC: 254 MG/DL (ref 70–105)
GLUCOSE BLDC GLUCOMTR-MCNC: 296 MG/DL (ref 70–105)
GLUCOSE SERPL-MCNC: 132 MG/DL (ref 65–99)
GLUCOSE SERPL-MCNC: 173 MG/DL (ref 65–99)
GLUCOSE SERPL-MCNC: 223 MG/DL (ref 65–99)
MAGNESIUM SERPL-MCNC: 1.7 MG/DL (ref 1.6–2.4)
MAGNESIUM SERPL-MCNC: 1.8 MG/DL (ref 1.6–2.4)
MAGNESIUM SERPL-MCNC: 1.9 MG/DL (ref 1.6–2.4)
PHOSPHATE SERPL-MCNC: 1.7 MG/DL (ref 2.5–4.5)
PHOSPHATE SERPL-MCNC: 2 MG/DL (ref 2.5–4.5)
PHOSPHATE SERPL-MCNC: 2.2 MG/DL (ref 2.5–4.5)
PHOSPHATE SERPL-MCNC: 2.3 MG/DL (ref 2.5–4.5)
POTASSIUM SERPL-SCNC: 3.8 MMOL/L (ref 3.5–5.2)
POTASSIUM SERPL-SCNC: 3.8 MMOL/L (ref 3.5–5.2)
POTASSIUM SERPL-SCNC: 4 MMOL/L (ref 3.5–5.2)
QT INTERVAL: 413 MS
QTC INTERVAL: 400 MS
SODIUM SERPL-SCNC: 137 MMOL/L (ref 136–145)
SODIUM SERPL-SCNC: 139 MMOL/L (ref 136–145)
SODIUM SERPL-SCNC: 139 MMOL/L (ref 136–145)
TSH SERPL DL<=0.05 MIU/L-ACNC: 2.13 UIU/ML (ref 0.27–4.2)

## 2023-10-01 PROCEDURE — 96368 THER/DIAG CONCURRENT INF: CPT

## 2023-10-01 PROCEDURE — 96361 HYDRATE IV INFUSION ADD-ON: CPT

## 2023-10-01 PROCEDURE — 63710000001 TACROLIMUS PER 1 MG: Performed by: FAMILY MEDICINE

## 2023-10-01 PROCEDURE — 84100 ASSAY OF PHOSPHORUS: CPT | Performed by: PHYSICIAN ASSISTANT

## 2023-10-01 PROCEDURE — 80048 BASIC METABOLIC PNL TOTAL CA: CPT | Performed by: PHYSICIAN ASSISTANT

## 2023-10-01 PROCEDURE — 83735 ASSAY OF MAGNESIUM: CPT | Performed by: PHYSICIAN ASSISTANT

## 2023-10-01 PROCEDURE — 96375 TX/PRO/DX INJ NEW DRUG ADDON: CPT

## 2023-10-01 PROCEDURE — 25810000003 SODIUM CHLORIDE 0.9 % SOLUTION: Performed by: INTERNAL MEDICINE

## 2023-10-01 PROCEDURE — 63710000001 INSULIN LISPRO (HUMAN) PER 5 UNITS: Performed by: INTERNAL MEDICINE

## 2023-10-01 PROCEDURE — 99204 OFFICE O/P NEW MOD 45 MIN: CPT | Performed by: INTERNAL MEDICINE

## 2023-10-01 PROCEDURE — 84100 ASSAY OF PHOSPHORUS: CPT | Performed by: NURSE PRACTITIONER

## 2023-10-01 PROCEDURE — 82948 REAGENT STRIP/BLOOD GLUCOSE: CPT

## 2023-10-01 PROCEDURE — 36415 COLL VENOUS BLD VENIPUNCTURE: CPT | Performed by: PHYSICIAN ASSISTANT

## 2023-10-01 PROCEDURE — 63710000001 MYCOPHENOLATE MOFETIL PER 250 MG: Performed by: FAMILY MEDICINE

## 2023-10-01 PROCEDURE — 96376 TX/PRO/DX INJ SAME DRUG ADON: CPT

## 2023-10-01 PROCEDURE — 63710000001 INSULIN GLARGINE PER 5 UNITS: Performed by: INTERNAL MEDICINE

## 2023-10-01 PROCEDURE — G0378 HOSPITAL OBSERVATION PER HR: HCPCS

## 2023-10-01 PROCEDURE — 96366 THER/PROPH/DIAG IV INF ADDON: CPT

## 2023-10-01 PROCEDURE — 84443 ASSAY THYROID STIM HORMONE: CPT | Performed by: INTERNAL MEDICINE

## 2023-10-01 PROCEDURE — 25010000002 SODIUM CHLORIDE 0.9 % WITH KCL 20 MEQ 20-0.9 MEQ/L-% SOLUTION: Performed by: PHYSICIAN ASSISTANT

## 2023-10-01 RX ORDER — FAMOTIDINE 10 MG/ML
20 INJECTION, SOLUTION INTRAVENOUS EVERY 12 HOURS SCHEDULED
Status: DISCONTINUED | OUTPATIENT
Start: 2023-10-01 | End: 2023-10-02 | Stop reason: HOSPADM

## 2023-10-01 RX ORDER — SODIUM CHLORIDE 9 MG/ML
75 INJECTION, SOLUTION INTRAVENOUS CONTINUOUS
Status: DISCONTINUED | OUTPATIENT
Start: 2023-10-01 | End: 2023-10-02

## 2023-10-01 RX ORDER — IBUPROFEN 600 MG/1
1 TABLET ORAL
Status: DISCONTINUED | OUTPATIENT
Start: 2023-10-01 | End: 2023-10-02 | Stop reason: HOSPADM

## 2023-10-01 RX ORDER — NICOTINE POLACRILEX 4 MG
15 LOZENGE BUCCAL
Status: DISCONTINUED | OUTPATIENT
Start: 2023-10-01 | End: 2023-10-02 | Stop reason: HOSPADM

## 2023-10-01 RX ORDER — INSULIN LISPRO 100 [IU]/ML
10 INJECTION, SOLUTION INTRAVENOUS; SUBCUTANEOUS
Status: DISCONTINUED | OUTPATIENT
Start: 2023-10-01 | End: 2023-10-02

## 2023-10-01 RX ORDER — DEXTROSE MONOHYDRATE 25 G/50ML
25 INJECTION, SOLUTION INTRAVENOUS
Status: DISCONTINUED | OUTPATIENT
Start: 2023-10-01 | End: 2023-10-02 | Stop reason: HOSPADM

## 2023-10-01 RX ORDER — INSULIN LISPRO 100 [IU]/ML
2-9 INJECTION, SOLUTION INTRAVENOUS; SUBCUTANEOUS
Status: DISCONTINUED | OUTPATIENT
Start: 2023-10-01 | End: 2023-10-02 | Stop reason: HOSPADM

## 2023-10-01 RX ADMIN — POTASSIUM CHLORIDE AND SODIUM CHLORIDE 250 ML/HR: 900; 150 INJECTION, SOLUTION INTRAVENOUS at 00:59

## 2023-10-01 RX ADMIN — Medication 2 PACKET: at 12:40

## 2023-10-01 RX ADMIN — POTASSIUM CHLORIDE, DEXTROSE MONOHYDRATE AND SODIUM CHLORIDE 150 ML/HR: 150; 5; 450 INJECTION, SOLUTION INTRAVENOUS at 10:56

## 2023-10-01 RX ADMIN — SODIUM CHLORIDE 75 ML/HR: 9 INJECTION, SOLUTION INTRAVENOUS at 12:39

## 2023-10-01 RX ADMIN — FAMOTIDINE 20 MG: 10 INJECTION INTRAVENOUS at 10:58

## 2023-10-01 RX ADMIN — INSULIN LISPRO 10 UNITS: 100 INJECTION, SOLUTION INTRAVENOUS; SUBCUTANEOUS at 11:35

## 2023-10-01 RX ADMIN — Medication 10 ML: at 20:46

## 2023-10-01 RX ADMIN — TACROLIMUS 2 MG: 1 CAPSULE ORAL at 20:45

## 2023-10-01 RX ADMIN — INSULIN LISPRO 6 UNITS: 100 INJECTION, SOLUTION INTRAVENOUS; SUBCUTANEOUS at 20:45

## 2023-10-01 RX ADMIN — POTASSIUM CHLORIDE, DEXTROSE MONOHYDRATE AND SODIUM CHLORIDE 150 ML/HR: 150; 5; 900 INJECTION, SOLUTION INTRAVENOUS at 02:48

## 2023-10-01 RX ADMIN — MYCOPHENOLATE MOFETIL 500 MG: 250 CAPSULE ORAL at 09:40

## 2023-10-01 RX ADMIN — TACROLIMUS 3 MG: 1 CAPSULE ORAL at 09:40

## 2023-10-01 RX ADMIN — INSULIN LISPRO 10 UNITS: 100 INJECTION, SOLUTION INTRAVENOUS; SUBCUTANEOUS at 17:48

## 2023-10-01 RX ADMIN — FAMOTIDINE 20 MG: 10 INJECTION INTRAVENOUS at 20:45

## 2023-10-01 RX ADMIN — INSULIN LISPRO 6 UNITS: 100 INJECTION, SOLUTION INTRAVENOUS; SUBCUTANEOUS at 17:54

## 2023-10-01 RX ADMIN — Medication 10 ML: at 08:26

## 2023-10-01 RX ADMIN — INSULIN GLARGINE 30 UNITS: 100 INJECTION, SOLUTION SUBCUTANEOUS at 10:56

## 2023-10-01 RX ADMIN — MYCOPHENOLATE MOFETIL 500 MG: 250 CAPSULE ORAL at 20:46

## 2023-10-01 NOTE — PLAN OF CARE
Goal Outcome Evaluation:  Plan of Care Reviewed With: patient        Progress: improving  Outcome Evaluation: Pt resting this shift, has been pleasant, cooperative and denies any pain. Pt blood sugars are more stable and met the criteria to come off insulin drip. Pt was transitioned to sub q insulin, was educated on medication and administration. Pt ate lunch with no problem, was transitioned to normal saline at 75 cc/hr and tolerating well. Phosphorus was 1.7, replacement given per protocol. Will cont to monitor and document as needed.

## 2023-10-01 NOTE — NURSING NOTE
Pt most recent labs show pt meets the criteria to be taken off insulin drip. Hospitalist notified, requested for this nurse to call Endocrinology for them to make the decision to take pt off insulin drip. Spoke to Dr. Sanchez and gave telephone orders, see MAR. Pt can come off insulin drip 2 hours after long acting insulin administration. Pt Phosphorus is 1.7, spoke to Nephrology MD, due to pt only having one kidney and PRN protocol for DKA says to treat only if it is <1.0. Dr. Junior gave ok to replace Phosphorus but not from DKA protocol.

## 2023-10-01 NOTE — CONSULTS
"[facility]    Referring Provider: Dr. Gabbie Booth  Reason for Consultation: Diabetes evaluation & management.    Patient Care Team:  Jazmine Pastrana APRN as PCP - General (Nurse Practitioner)  Kvng Goff MD (Infectious Diseases)  Ana Maria Thacker APRN ([facility])  Darrell Gonzalez MD as Consulting Physician (Nephrology)    Chief complaint Hyperglycemia (Pt reports he was sent from [facility] for \"high\" blood sugar.  Pt states he had dry mouth and increased thirst for the past week. )      Subjective .     History of present illness:    This is a  63 y.o. male with type 2 Diabetes since 2023 ( A1C 6.8%), on diet only.  States he was offered Rx, but wanted to try & work on diet. Able to improve his A1C.  Prediabetes since at least  per chart review.  Has an old meter. Test strips .  Developed polyuria & polydipsia the last couple weeks. Blood work showed high sugar & sent to ER.  Admitted with 979 blood sugar, acidotic, but not ketotic. Placed on the insulin drip protocol. Cleared overnight.  S/p kidney transplant ( from hypertensive renal disease ).    Review of Systems  Review of Systems   Constitutional:  Positive for unexpected weight loss.   HENT:  Negative for trouble swallowing.    Eyes:  Negative for blurred vision.   Gastrointestinal:  Negative for nausea.   Endocrine: Positive for polydipsia and polyuria.   Genitourinary:  Positive for nocturia.   Neurological:  Negative for headache.     History  Past Medical History:   Diagnosis Date    CKD (chronic kidney disease) requiring chronic dialysis     COPD (chronic obstructive pulmonary disease)     DKA (diabetic ketoacidosis) 2023    Hypertension     Kidney failure      Past Surgical History:   Procedure Laterality Date    ARTERIOVENOUS FISTULA REPAIR      ARTERIOVENOUS FISTULA/SHUNT SURGERY      CARDIAC CATHETERIZATION N/A 2019    Procedure: Left Heart Cath;  Surgeon: Juanpablo Garcia " MD Julio C;  Location: [facility] CATH INVASIVE LOCATION;  Service: Cardiology    CARDIAC CATHETERIZATION N/A 2019    Procedure: Aortic root aortogram;  Surgeon: Juanpablo Garcia MD;  Location: [facility] CATH INVASIVE LOCATION;  Service: Cardiology    LYMPH NODE BIOPSY  2002    ORCHIECTOMY       Family History   Problem Relation Age of Onset    Heart attack Mother      Social History     Tobacco Use    Smoking status: Every Day     Packs/day: 1.00     Types: Cigarettes     Start date:      Last attempt to quit: 2019     Years since quittin.1     Passive exposure: Past    Smokeless tobacco: Never   Vaping Use    Vaping Use: Never used   Substance Use Topics    Alcohol use: No    Drug use: No     Medications Prior to Admission   Medication Sig Dispense Refill Last Dose    tacrolimus (PROGRAF) 1 MG capsule Take 1 capsule by mouth Every Night. Take 3 caps every morning and 2 caps every evening   2023    tacrolimus (PROGRAF) 1 MG capsule Take 3 capsules by mouth Every Morning.   2023    albuterol sulfate  (90 Base) MCG/ACT inhaler Inhale 2 puffs Every 4 (Four) Hours As Needed for Wheezing.       amLODIPine (NORVASC) 10 MG tablet Take 1 tablet by mouth Daily. Take at night       b complex-vitamin c-folic acid (NEPHRO-MARK) 0.8 MG tablet tablet Take 1 tablet by mouth Daily.       calcium acetate (PHOSLO) 667 MG capsule Take 1,334 mg by mouth 2 (Two) Times a Day With Meals. Breakfast and lunch (Patient not taking: Reported on 2023)   Not Taking    calcium acetate (PHOSLO) 667 MG capsule Take 2,001 mg by mouth Daily With Dinner.       cinacalcet (SENSIPAR) 30 MG tablet Take 1 tablet by mouth Every Other Day. Pt takes on Monday, Wednesday, Friday       CloNIDine (CATAPRES) 0.1 MG tablet Take 0.1 mg by mouth 2 (Two) Times a Day.       hydrALAZINE (APRESOLINE) 25 MG tablet Take 1 tablet by mouth 2 (Two) Times a Day.       hydrALAZINE (APRESOLINE) 50 MG tablet Take 100 mg by mouth 2 (Two)  Times a Day.       HYDROcodone-acetaminophen (NORCO) 5-325 MG per tablet Take 1 tablet by mouth Every 6 (Six) Hours As Needed for Moderate Pain . 12 tablet 0     magnesium oxide (MAG-OX) 400 MG tablet Take 1 tablet by mouth Daily.       mycophenolate (CELLCEPT) 250 MG capsule Take 2 capsules by mouth 2 (Two) Times a Day.       sodium bicarbonate 650 MG tablet Take 1 tablet by mouth 1 (One) Time.       TRELEGY ELLIPTA 100-62.5-25 MCG/INH aerosol powder          Scheduled Meds:  famotidine, 20 mg, Intravenous, Q12H  [START ON 10/2/2023] insulin glargine, 30 Units, Subcutaneous, Daily  insulin lispro, 10 Units, Subcutaneous, TID With Meals  insulin lispro, 2-9 Units, Subcutaneous, 4x Daily AC & at Bedtime  mycophenolate, 500 mg, Oral, BID  senna-docusate sodium, 2 tablet, Oral, BID  sodium chloride, 10 mL, Intravenous, Q12H  tacrolimus, 2 mg, Oral, Nightly  tacrolimus, 3 mg, Oral, QAM      Continuous Infusions:  sodium chloride, 75 mL/hr, Last Rate: 75 mL/hr (10/01/23 1239)      PRN Meds:    senna-docusate sodium **AND** polyethylene glycol **AND** bisacodyl **AND** bisacodyl    dextrose    dextrose    glucagon (human recombinant)    sodium chloride    sodium chloride  Allergies:  Patient has no known allergies.    Objective     Vital Signs   Temp:  [97.6 °F (36.4 °C)-98.7 °F (37.1 °C)] 98.7 °F (37.1 °C)  Heart Rate:  [59-71] 59  Resp:  [15-20] 18  BP: ()/(63-95) 121/80    Physical Exam:     General Appearance:    Alert, cooperative, in no acute distress   Head:    Normocephalic, without obvious abnormality, atraumatic   Eyes:            Lids and lashes normal, conjunctivae and sclerae normal, no   icterus, no pallor, corneas clear, PERRLA   Throat:   No oral lesions,  oral mucosa moist. Dentures   Neck:   No adenopathy, supple,  no thyromegaly, no carotid bruit   Lungs:     Clear    Heart:    Regular rhythm and normal rate   Chest Wall:    No abnormalities observed   Abdomen:     Normal bowel sounds, soft                  Extremities:   Moves all extremities well, no edema               Pulses:   Pulses palpable and equal bilaterally   Skin:   Dry. No bruising or rash   Neurologic:  DTR absent, able to feel the 10g monofilament       Results Review  I have reviewed the patient's new clinical results, labs & imaging.    Lab Results (last 24 hours)       Procedure Component Value Units Date/Time    POC Glucose Once [[id number]]  (Abnormal) Collected: 10/01/23 1746    Specimen: Blood Updated: 10/01/23 1747     Glucose 296 mg/dL      Comment: Serial Number: [id number]:  [id number]       POC Glucose Once [[id number]]  (Abnormal) Collected: 10/01/23 1622    Specimen: Blood Updated: 10/01/23 1625     Glucose 241 mg/dL      Comment: Serial Number: [id number]:  [id number]       TSH [[id number]]  (Normal) Collected: 10/01/23 0852    Specimen: Blood Updated: 10/01/23 1452     TSH 2.130 uIU/mL     POC Glucose Once [[id number]]  (Abnormal) Collected: 10/01/23 1206    Specimen: Blood Updated: 10/01/23 1252     Glucose 144 mg/dL      Comment: Serial Number: [id number]:  [id number]       POC Glucose Once [[id number]]  (Abnormal) Collected: 10/01/23 1106    Specimen: Blood Updated: 10/01/23 1142     Glucose 111 mg/dL      Comment: Serial Number: [id number]:  [id number]       POC Glucose Once [[id number]]  (Abnormal) Collected: 10/01/23 0954    Specimen: Blood Updated: 10/01/23 1138     Glucose 129 mg/dL      Comment: Serial Number: [id number]:  [id number]       POC Glucose Once [[id number]]  (Abnormal) Collected: 10/01/23 0836    Specimen: Blood Updated: 10/01/23 1137     Glucose 130 mg/dL      Comment: Serial Number: [id number]:  [id number]       POC Glucose Once [[id number]]  (Abnormal) Collected: 10/01/23 0725    Specimen: Blood Updated: 10/01/23 1129     Glucose 156 mg/dL      Comment: Serial Number: [id number]:  [id number]       Phosphorus [[id number]]  (Abnormal) Collected: 10/01/23 0852    Specimen:  Blood Updated: 10/01/23 1008     Phosphorus 1.7 mg/dL     Magnesium [[id number]]  (Normal) Collected: 10/01/23 0852    Specimen: Blood Updated: 10/01/23 0951     Magnesium 1.7 mg/dL     Basic Metabolic Panel [[id number]]  (Abnormal) Collected: 10/01/23 0852    Specimen: Blood Updated: 10/01/23 0951     Glucose 132 mg/dL      BUN 28 mg/dL      Creatinine 1.32 mg/dL      Sodium 139 mmol/L      Potassium 3.8 mmol/L      Chloride 110 mmol/L      CO2 20.0 mmol/L      Calcium 9.2 mg/dL      BUN/Creatinine Ratio 21.2     Anion Gap 9.0 mmol/L      eGFR 61.4 mL/min/1.73     Narrative:      GFR Normal >60  Chronic Kidney Disease <60  Kidney Failure <15      POC Glucose Once [[id number]]  (Abnormal) Collected: 10/01/23 0626    Specimen: Blood Updated: 10/01/23 0655     Glucose 190 mg/dL      Comment: Serial Number: [id number]:  [id number]       POC Glucose Once [[id number]]  (Abnormal) Collected: 10/01/23 0517    Specimen: Blood Updated: 10/01/23 0653     Glucose 177 mg/dL      Comment: Serial Number: [id number]:  [id number]       POC Glucose Once [[id number]]  (Abnormal) Collected: 10/01/23 0355    Specimen: Blood Updated: 10/01/23 0650     Glucose 157 mg/dL      Comment: Serial Number: [id number]:  [id number]       POC Glucose Once [[id number]]  (Abnormal) Collected: 10/01/23 0240    Specimen: Blood Updated: 10/01/23 0649     Glucose 175 mg/dL      Comment: Serial Number: [id number]:  [id number]       Magnesium [[id number]]  (Normal) Collected: 10/01/23 0340    Specimen: Blood Updated: 10/01/23 0417     Magnesium 1.8 mg/dL     Basic Metabolic Panel [[id number]]  (Abnormal) Collected: 10/01/23 0340    Specimen: Blood Updated: 10/01/23 0417     Glucose 173 mg/dL      BUN 36 mg/dL      Creatinine 1.49 mg/dL      Sodium 139 mmol/L      Potassium 3.8 mmol/L      Chloride 110 mmol/L      CO2 21.0 mmol/L      Calcium 8.9 mg/dL      BUN/Creatinine Ratio 24.2     Anion Gap 8.0 mmol/L      eGFR 53.1 mL/min/1.73      Narrative:      GFR Normal >60  Chronic Kidney Disease <60  Kidney Failure <15      Phosphorus [[id number]]  (Abnormal) Collected: 10/01/23 0340    Specimen: Blood Updated: 10/01/23 0417     Phosphorus 2.0 mg/dL     POC Glucose Once [[id number]]  (Abnormal) Collected: 10/01/23 0131    Specimen: Blood Updated: 10/01/23 0133     Glucose 190 mg/dL      Comment: Serial Number: [id number]:  [id number]       Magnesium [[id number]]  (Normal) Collected: 10/01/23 0050    Specimen: Blood Updated: 10/01/23 0122     Magnesium 1.9 mg/dL     Basic Metabolic Panel [[id number]]  (Abnormal) Collected: 10/01/23 0050    Specimen: Blood Updated: 10/01/23 0122     Glucose 223 mg/dL      BUN 39 mg/dL      Creatinine 1.71 mg/dL      Sodium 137 mmol/L      Potassium 4.0 mmol/L      Comment: Slight hemolysis detected by analyzer. Results may be affected.        Chloride 107 mmol/L      CO2 19.0 mmol/L      Calcium 8.8 mg/dL      BUN/Creatinine Ratio 22.8     Anion Gap 11.0 mmol/L      eGFR 45.0 mL/min/1.73     Narrative:      GFR Normal >60  Chronic Kidney Disease <60  Kidney Failure <15      Phosphorus [[id number]]  (Abnormal) Collected: 10/01/23 0050    Specimen: Blood Updated: 10/01/23 0122     Phosphorus 2.2 mg/dL     POC Glucose Once [[id number]]  (Abnormal) Collected: 10/01/23 0029    Specimen: Blood Updated: 10/01/23 0030     Glucose 201 mg/dL      Comment: Serial Number: [id number]:  [id number]       POC Glucose Once [[id number]]  (Abnormal) Collected: 09/30/23 2327    Specimen: Blood Updated: 09/30/23 2328     Glucose 230 mg/dL      Comment: Serial Number: [id number]:  [id number]       POC Glucose Once [[id number]]  (Abnormal) Collected: 09/30/23 2209    Specimen: Blood Updated: 09/30/23 2211     Glucose 278 mg/dL      Comment: Serial Number: [id number]:  [id number]       POC Glucose Once [[id number]]  (Abnormal) Collected: 09/30/23 2106    Specimen: Blood Updated: 09/30/23 2109     Glucose 387 mg/dL      Comment:  Serial Number: [id number]:  [id number]       Phosphorus [[id number]]  (Abnormal) Collected: 09/30/23 2023    Specimen: Blood Updated: 09/30/23 2101     Phosphorus 2.1 mg/dL     Basic Metabolic Panel [[id number]]  (Abnormal) Collected: 09/30/23 2023    Specimen: Blood Updated: 09/30/23 2059     Glucose 510 mg/dL      BUN 48 mg/dL      Creatinine 1.93 mg/dL      Sodium 133 mmol/L      Potassium 4.2 mmol/L      Chloride 102 mmol/L      CO2 20.0 mmol/L      Calcium 8.9 mg/dL      BUN/Creatinine Ratio 24.9     Anion Gap 11.0 mmol/L      eGFR 38.9 mL/min/1.73     Narrative:      GFR Normal >60  Chronic Kidney Disease <60  Kidney Failure <15      Magnesium [[id number]]  (Normal) Collected: 09/30/23 2023    Specimen: Blood Updated: 09/30/23 2059     Magnesium 1.8 mg/dL     High Sensitivity Troponin T 2Hr [[id number]]  (Abnormal) Collected: 09/30/23 2023    Specimen: Blood Updated: 09/30/23 2054     HS Troponin T 21 ng/L      Troponin T Delta -1 ng/L     Narrative:      High Sensitive Troponin T Reference Range:  <10.0 ng/L- Negative Female for AMI  <15.0 ng/L- Negative Male for AMI  >=10 - Abnormal Female indicating possible myocardial injury.  >=15 - Abnormal Male indicating possible myocardial injury.   Clinicians would have to utilize clinical acumen, EKG, Troponin, and serial changes to determine if it is an Acute Myocardial Infarction or myocardial injury due to an underlying chronic condition.         Osmolality, Serum [[id number]]  (Abnormal) Collected: 09/30/23 2023    Specimen: Blood Updated: 09/30/23 2043     Osmolality 317 mOsm/kg      Comment: Corrected result. Previous result was 575 mOsm/kg on 9/30/2023 at 2037 EDT.       CBC & Differential [[id number]]  (Abnormal) Collected: 09/30/23 2023    Specimen: Blood Updated: 09/30/23 2031    Narrative:      The following orders were created for panel order CBC & Differential.  Procedure                               Abnormality         Status                      ---------                               -----------         ------                     CBC Auto Differential[[id number]]        Abnormal            Final result                 Please view results for these tests on the individual orders.    CBC Auto Differential [[id number]]  (Abnormal) Collected: 09/30/23 2023    Specimen: Blood Updated: 09/30/23 2031     WBC 4.40 10*3/mm3      RBC 4.49 10*6/mm3      Hemoglobin 13.0 g/dL      Hematocrit 38.0 %      MCV 84.7 fL      MCH 29.0 pg      MCHC 34.3 g/dL      RDW 13.5 %      RDW-SD 42.0 fl      MPV 7.7 fL      Platelets 112 10*3/mm3      Neutrophil % 65.3 %      Lymphocyte % 25.2 %      Monocyte % 8.0 %      Eosinophil % 0.3 %      Basophil % 1.2 %      Neutrophils, Absolute 2.90 10*3/mm3      Lymphocytes, Absolute 1.10 10*3/mm3      Monocytes, Absolute 0.40 10*3/mm3      Eosinophils, Absolute 0.00 10*3/mm3      Basophils, Absolute 0.10 10*3/mm3      nRBC 0.1 /100 WBC     POC Glucose Once [[id number]]  (Abnormal) Collected: 09/30/23 2011    Specimen: Blood Updated: 09/30/23 2012     Glucose 503 mg/dL      Comment: Serial Number: [id number]:  [id number]             Lab Results   Component Value Date    HGBA1C 12.10 (H) 09/30/2023     Lab Results   Component Value Date    TSH 2.130 10/01/2023      Latest Reference Range & Units 10/21/22 10:37 03/07/23 09:01 05/02/23 09:29 06/16/23 08:11 09/30/23 16:15   Hemoglobin A1C 4.80 - 5.60 % 5.7 (H) 6.80 (H) 6.20 (H) 6.30 (H) 12.10 (H)   (H): Data is abnormally high    Assessment & Plan     Diabetes type 2, with hyperglycemia  S/p renal transplant    Transitioned off the insulin drip this morning.  On basal / bolus insulin plus lispro sliding scale.  To see diabetes educator tomorrow for new meter & insulin teaching.  Ordered c-peptide & DANYELLE 65 AB to access insulin reserve.  Will follow with you.  Thank you for the consult.      I discussed the patients findings and my recommendations with patient    Daphne Sanchez,  MD  10/01/23  19:48 EDT

## 2023-10-01 NOTE — CONSULTS
NEPHROLOGY CONSULTATION-----[facility]    [facility]  [phone]  Joey Junior MD    Patient Care Team:  Jazmine Pastrana APRN as PCP - General (Nurse Practitioner)  Kvng Goff MD (Infectious Diseases)  Ana Maria Thacker APRN ([facility])    CC/REASON FOR CONSULTATION: Kidney transplant/elevated creatinine    PHYSICIAN REQUESTING CONSULTATION: Dr. Booth    History of Present Illness  62-year-old male with past medical ESRD status post renal transplant, hypertension, coronary disease presented to hospital complains of fatigue polyuria and thirst for few weeks.  He was found to be in DKA.  His creatinine was 2.2 that is improved to 1.3 now.  No dysuria gross hematuria.  No chest pain or shortness of breath.  He was on a trip to [place] when he started developing polyuria and polydipsia.  No previous history of diabetes.  He is currently on Prograf and CellCept for renal transplant.    Review of Systems   As noted above otherwise 10 systems reviewed and negative.  Tacrolimus was    Past Medical History:   Diagnosis Date    CKD (chronic kidney disease) requiring chronic dialysis     COPD (chronic obstructive pulmonary disease)     DKA (diabetic ketoacidosis) 9/30/2023    Hypertension     Kidney failure        Past Surgical History:   Procedure Laterality Date    ARTERIOVENOUS FISTULA REPAIR      ARTERIOVENOUS FISTULA/SHUNT SURGERY      CARDIAC CATHETERIZATION N/A 8/22/2019    Procedure: Left Heart Cath;  Surgeon: Juanpablo Garcia MD;  Location: [facility] CATH INVASIVE LOCATION;  Service: Cardiology    CARDIAC CATHETERIZATION N/A 8/22/2019    Procedure: Aortic root aortogram;  Surgeon: Juanpablo Garcia MD;  Location: [facility] CATH INVASIVE LOCATION;  Service: Cardiology    LYMPH NODE BIOPSY  2002    ORCHIECTOMY         Family History   Problem Relation Age of Onset    Heart attack Mother        Social History     Tobacco Use     Smoking status: Every Day     Packs/day: 1.00     Types: Cigarettes     Start date:      Last attempt to quit: 2019     Years since quittin.1     Passive exposure: Past    Smokeless tobacco: Never   Vaping Use    Vaping Use: Never used   Substance Use Topics    Alcohol use: No    Drug use: No       Home Meds:   Medications Prior to Admission   Medication Sig Dispense Refill Last Dose    tacrolimus (PROGRAF) 1 MG capsule Take 1 capsule by mouth Every Night. Take 3 caps every morning and 2 caps every evening   2023    tacrolimus (PROGRAF) 1 MG capsule Take 3 capsules by mouth Every Morning.   2023    albuterol sulfate  (90 Base) MCG/ACT inhaler Inhale 2 puffs Every 4 (Four) Hours As Needed for Wheezing.       amLODIPine (NORVASC) 10 MG tablet Take 1 tablet by mouth Daily. Take at night       b complex-vitamin c-folic acid (NEPHRO-MARK) 0.8 MG tablet tablet Take 1 tablet by mouth Daily.       calcium acetate (PHOSLO) 667 MG capsule Take 1,334 mg by mouth 2 (Two) Times a Day With Meals. Breakfast and lunch (Patient not taking: Reported on 2023)   Not Taking    calcium acetate (PHOSLO) 667 MG capsule Take 2,001 mg by mouth Daily With Dinner.       cinacalcet (SENSIPAR) 30 MG tablet Take 1 tablet by mouth Every Other Day. Pt takes on Monday, Wednesday, Friday       CloNIDine (CATAPRES) 0.1 MG tablet Take 0.1 mg by mouth 2 (Two) Times a Day.       hydrALAZINE (APRESOLINE) 25 MG tablet Take 1 tablet by mouth 2 (Two) Times a Day.       hydrALAZINE (APRESOLINE) 50 MG tablet Take 100 mg by mouth 2 (Two) Times a Day.       HYDROcodone-acetaminophen (NORCO) 5-325 MG per tablet Take 1 tablet by mouth Every 6 (Six) Hours As Needed for Moderate Pain . 12 tablet 0     magnesium oxide (MAG-OX) 400 MG tablet Take 1 tablet by mouth Daily.       mycophenolate (CELLCEPT) 250 MG capsule Take 2 capsules by mouth 2 (Two) Times a Day.       sodium bicarbonate 650 MG tablet Take 1 tablet by mouth 1 (One)  Time.       TRELEGY ELLIPTA 100-62.5-25 MCG/INH aerosol powder            Scheduled Meds:  famotidine, 20 mg, Intravenous, Q12H  mycophenolate, 500 mg, Oral, BID  senna-docusate sodium, 2 tablet, Oral, BID  sodium chloride, 10 mL, Intravenous, Q12H  sodium chloride, 10 mL, Intravenous, Q12H  tacrolimus, 2 mg, Oral, Nightly  tacrolimus, 3 mg, Oral, QAM        Continuous Infusions:  dextrose 5 % and sodium chloride 0.45 %, 150 mL/hr  dextrose 5 % and sodium chloride 0.45 % with KCl 20 mEq/L, 150 mL/hr  dextrose 5 % and sodium chloride 0.45 % with KCl 40 mEq/L, 150 mL/hr  dextrose 5 % and sodium chloride 0.9 %, 150 mL/hr  dextrose 5 % and sodium chloride 0.9 % with KCl 20 mEq, 150 mL/hr, Last Rate: 150 mL/hr (10/01/23 0248)  dextrose 5% and sodium chloride 0.9% with KCl 40 mEq/L, 150 mL/hr  insulin, 0-100 Units/hr, Last Rate: 2.6 Units/hr (10/01/23 1006)  custom IV KCl infusion builder, 250 mL/hr  sodium chloride, 250 mL/hr  sodium chloride 0.45 % with KCl 20 mEq, 250 mL/hr  sodium chloride, 250 mL/hr  sodium chloride 0.9 % with KCl 20 mEq, 250 mL/hr, Last Rate: Stopped (10/01/23 0249)  sodium chloride 0.9 % with KCl 40 mEq/L, 250 mL/hr        PRN Meds:    senna-docusate sodium **AND** polyethylene glycol **AND** bisacodyl **AND** bisacodyl    Calcium Replacement - Follow Nurse / BPA Driven Protocol    dextrose    dextrose    dextrose 5 % and sodium chloride 0.45 %    dextrose 5 % and sodium chloride 0.45 % with KCl 20 mEq/L    dextrose 5 % and sodium chloride 0.45 % with KCl 40 mEq/L    dextrose 5 % and sodium chloride 0.9 %    dextrose 5 % and sodium chloride 0.9 % with KCl 20 mEq    dextrose 5% and sodium chloride 0.9% with KCl 40 mEq/L    glucagon (human recombinant)    Magnesium Standard Dose Replacement - Follow Nurse / BPA Driven Protocol    Phosphorus Replacement - Follow Nurse / BPA Driven Protocol    Potassium Replacement - Follow Nurse / BPA Driven Protocol    custom IV KCl infusion builder    sodium  chloride    sodium chloride 0.45 % with KCl 20 mEq    sodium chloride    sodium chloride    sodium chloride    sodium chloride    sodium chloride    sodium chloride 0.9 % with KCl 20 mEq    sodium chloride 0.9 % with KCl 40 mEq/L    Allergies:  Patient has no known allergies.    OBJECTIVE    Vital Signs  Temp:  [97.6 °F (36.4 °C)-98.7 °F (37.1 °C)] 97.8 °F (36.6 °C)  Heart Rate:  [62-92] 63  Resp:  [15-18] 18  BP: ()/(63-95) 132/95    No intake/output data recorded.  I/O last 3 completed shifts:  In: 1000 [IV Piggyback:1000]  Out: -     Physical Exam:  General Appearance: alert, appears stated age and cooperative  Head: normocephalic, without obvious abnormality and atraumatic  Eyes: conjunctivae and sclerae normal and no icterus  Neck: supple and no JVD  Lungs: clear to auscultation and respirations regular  Heart: regular rhythm & normal rate and normal S1, S2  Chest Wall: no abnormalities observed  Abdomen: normal bowel sounds and soft, nontender  Extremities: moves extremities well, no edema, no cyanosis  Skin: no bleeding, bruising or rash  Neurologic: Alert, and oriented. No focal deficits    Results Review:    I reviewed the patient's new clinical results.    WBC WBC   Date Value Ref Range Status   09/30/2023 4.40 3.40 - 10.80 10*3/mm3 Final   09/30/2023 5.30 3.40 - 10.80 10*3/mm3 Final      HGB Hemoglobin   Date Value Ref Range Status   09/30/2023 13.0 13.0 - 17.7 g/dL Final   09/30/2023 14.2 13.0 - 17.7 g/dL Final      HCT Hematocrit   Date Value Ref Range Status   09/30/2023 38.0 37.5 - 51.0 % Final   09/30/2023 43.0 37.5 - 51.0 % Final      Platelets No results found for: LABPLAT   MCV MCV   Date Value Ref Range Status   09/30/2023 84.7 79.0 - 97.0 fL Final   09/30/2023 88.0 79.0 - 97.0 fL Final          Sodium Sodium   Date Value Ref Range Status   10/01/2023 139 136 - 145 mmol/L Final   10/01/2023 139 136 - 145 mmol/L Final   10/01/2023 137 136 - 145 mmol/L Final   09/30/2023 133 (L) 136 - 145  mmol/L Final   09/30/2023 121 (L) 136 - 145 mmol/L Final      Potassium Potassium   Date Value Ref Range Status   10/01/2023 3.8 3.5 - 5.2 mmol/L Final   10/01/2023 3.8 3.5 - 5.2 mmol/L Final   10/01/2023 4.0 3.5 - 5.2 mmol/L Final     Comment:     Slight hemolysis detected by analyzer. Results may be affected.   09/30/2023 4.2 3.5 - 5.2 mmol/L Final   09/30/2023 5.0 3.5 - 5.2 mmol/L Final      Chloride Chloride   Date Value Ref Range Status   10/01/2023 110 (H) 98 - 107 mmol/L Final   10/01/2023 110 (H) 98 - 107 mmol/L Final   10/01/2023 107 98 - 107 mmol/L Final   09/30/2023 102 98 - 107 mmol/L Final   09/30/2023 87 (L) 98 - 107 mmol/L Final      CO2 CO2   Date Value Ref Range Status   10/01/2023 20.0 (L) 22.0 - 29.0 mmol/L Final   10/01/2023 21.0 (L) 22.0 - 29.0 mmol/L Final   10/01/2023 19.0 (L) 22.0 - 29.0 mmol/L Final   09/30/2023 20.0 (L) 22.0 - 29.0 mmol/L Final   09/30/2023 17.0 (L) 22.0 - 29.0 mmol/L Final      BUN BUN   Date Value Ref Range Status   10/01/2023 28 (H) 8 - 23 mg/dL Final   10/01/2023 36 (H) 8 - 23 mg/dL Final   10/01/2023 39 (H) 8 - 23 mg/dL Final   09/30/2023 48 (H) 8 - 23 mg/dL Final   09/30/2023 52 (H) 8 - 23 mg/dL Final      Creatinine Creatinine   Date Value Ref Range Status   10/01/2023 1.32 (H) 0.76 - 1.27 mg/dL Final   10/01/2023 1.49 (H) 0.76 - 1.27 mg/dL Final   10/01/2023 1.71 (H) 0.76 - 1.27 mg/dL Final   09/30/2023 1.93 (H) 0.76 - 1.27 mg/dL Final   09/30/2023 2.28 (H) 0.76 - 1.27 mg/dL Final      Calcium Calcium   Date Value Ref Range Status   10/01/2023 9.2 8.6 - 10.5 mg/dL Final   10/01/2023 8.9 8.6 - 10.5 mg/dL Final   10/01/2023 8.8 8.6 - 10.5 mg/dL Final   09/30/2023 8.9 8.6 - 10.5 mg/dL Final   09/30/2023 9.2 8.6 - 10.5 mg/dL Final      PO4 No results found for: CAPO4   Albumin Albumin   Date Value Ref Range Status   09/30/2023 3.7 3.5 - 5.2 g/dL Final      Magnesium Magnesium   Date Value Ref Range Status   10/01/2023 1.7 1.6 - 2.4 mg/dL Final   10/01/2023 1.8 1.6 - 2.4  mg/dL Final   10/01/2023 1.9 1.6 - 2.4 mg/dL Final   09/30/2023 1.8 1.6 - 2.4 mg/dL Final   09/30/2023 1.8 1.6 - 2.4 mg/dL Final      Uric Acid No results found for: URICACID       Imaging Results (Last 72 Hours)       Procedure Component Value Units Date/Time    XR Chest 1 View [[id number]] Collected: 09/30/23 1632     Updated: 09/30/23 1636    Narrative:      XR CHEST 1 VW    Date of Exam: 9/30/2023 4:23 PM EDT    Indication: cough    Comparison: Chest radiograph 9/9/2019.    Findings:  Mild cardiomegaly. Mild prominence of the central vasculature without overt interstitial or alveolar edema. Negative for focal infiltrate, effusion or pneumothorax. Osseous structures grossly unremarkable.      Impression:      Impression:  Cardiomegaly and central vascular congestion.    Electronically Signed: Washington Feng MD    9/30/2023 4:34 PM EDT    Workstation ID: [id number]              Results for orders placed during the hospital encounter of 09/30/23    XR Chest 1 View    Narrative  XR CHEST 1 VW    Date of Exam: 9/30/2023 4:23 PM EDT    Indication: cough    Comparison: Chest radiograph 9/9/2019.    Findings:  Mild cardiomegaly. Mild prominence of the central vasculature without overt interstitial or alveolar edema. Negative for focal infiltrate, effusion or pneumothorax. Osseous structures grossly unremarkable.    Impression  Impression:  Cardiomegaly and central vascular congestion.    Electronically Signed: Washington Feng MD  9/30/2023 4:34 PM EDT  Workstation ID: [id number]      Results for orders placed during the hospital encounter of 11/08/20    XR Knee 3 View Left    Narrative  DATE OF EXAM:  11/8/2020 7:41 AM    PROCEDURE:  XR KNEE 3 VW LEFT-    INDICATIONS:  pain, swelling    COMPARISON:  No Comparisons Available    TECHNIQUE:  Three radiologic views of the left knee were obtained.      FINDINGS:  No acute fracture is identified. No focal osseous abnormality is  identified. The medial, lateral, and patellofemoral  compartments appear  well-maintained. There is a small enthesophyte along the suprapatellar.  There is a suprapatellar joint effusion.    Impression  1.No acute fracture or traumatic malalignment identified.  2.Suprapatellar joint effusion.    Electronically Signed By-DR. Gt Al MD On:11/8/2020 8:07 AM  This report was finalized on [id number] by DR. Gt Al MD.      Results for orders placed during the hospital encounter of 09/09/19    XR Chest 1 View    Narrative  Examination: XR CHEST 1 VW-    Date of Exam: 9/9/2019 7:31 PM    Indication: pain.    Comparison: None available.    Technique: 08/22/2019    FINDINGS:  There is cardiomegaly. Mediastinal and hilar contours appear stable.  There is no vascular congestion, airspace consolidation, or pleural  effusion. No bone lesion is seen.    Impression  1. Cardiomegaly, with no evidence of active lung disease.    Electronically Signed By-Sophia Ross On:9/9/2019 8:06 PM  This report was finalized on [id number] by  Sophia Ross, .        Results for orders placed during the hospital encounter of 08/27/19    Duplex venous lower extremity bilateral CAR    Interpretation Summary  · Normal bilateral lower extremity venous duplex scan.      ASSESSMENT / PLAN      DKA (diabetic ketoacidosis)    SHADY-due to dehydration from severe hyperglycemia.  CKD stage III-secondary to hypertensive nephrosclerosis and chronic CNI toxicity  History ESRD status post renal transplant  Hypertension  New onset diabetes with DKA.  HbA1c 12.1.  Endocrinology consulted  DKA-resolved  Hypophosphatemia replace    Creatinine better, glucose controlled  Anion gap acidosis resolved  Continue IV fluids  Monitor renal function fluid status electrolytes  Check Prograf level in a.m.        I discussed the patient's findings and my recommendations with patient and consulting provider    Will follow along closely. Thank you for allowing us to see this patient in renal  consultation.    [facility]  [phone]  MD Joey Lyon MD  10/01/23  10:34 EDT

## 2023-10-01 NOTE — H&P
Patient Care Team:  Jazmine Pastrana APRN as PCP - General (Nurse Practitioner)  Kvng Goff MD (Infectious Diseases)  Ana Maria Thacker APRN ([facility])    Chief complaint Fatigue    Subjective     Patient is a 63 y.o. male who presents with ESRD on HD, htn, CAD with NSTEMI in past, anemia, secondary hyperparathyroidism, orchiectomy previous cancer, and hx kidney transplant, tobacco abuse. He presented to the ED with complaints of fatigue, polyuria and thirst for last few weeks. He had nausea and vomiting few days ago.     Review of Systems       History  Past Medical History:   Diagnosis Date    CKD (chronic kidney disease) requiring chronic dialysis     COPD (chronic obstructive pulmonary disease)     DKA (diabetic ketoacidosis) 2023    Hypertension     Kidney failure      Past Surgical History:   Procedure Laterality Date    ARTERIOVENOUS FISTULA REPAIR      ARTERIOVENOUS FISTULA/SHUNT SURGERY      CARDIAC CATHETERIZATION N/A 2019    Procedure: Left Heart Cath;  Surgeon: Juanpablo Garcia MD;  Location:  [facility];  Service: Cardiology    CARDIAC CATHETERIZATION N/A 2019    Procedure: Aortic root aortogram;  Surgeon: Juanpablo Garcia MD;  Location:  [facility];  Service: Cardiology    LYMPH NODE BIOPSY  2002    ORCHIECTOMY       Family History   Problem Relation Age of Onset    Heart attack Mother      Social History     Tobacco Use    Smoking status: Every Day     Packs/day: 1.00     Types: Cigarettes     Start date:      Last attempt to quit: 2019     Years since quittin.1     Passive exposure: Past    Smokeless tobacco: Never   Vaping Use    Vaping Use: Never used   Substance Use Topics    Alcohol use: No    Drug use: No     Medications Prior to Admission   Medication Sig Dispense Refill Last Dose    tacrolimus (PROGRAF) 1 MG capsule Take 1 capsule by mouth Every Night. Take 3 caps every morning and 2 caps  every evening   9/30/2023    tacrolimus (PROGRAF) 1 MG capsule Take 3 capsules by mouth Every Morning.   9/30/2023    albuterol sulfate  (90 Base) MCG/ACT inhaler Inhale 2 puffs Every 4 (Four) Hours As Needed for Wheezing.       amLODIPine (NORVASC) 10 MG tablet Take 1 tablet by mouth Daily. Take at night       b complex-vitamin c-folic acid (NEPHRO-MARK) 0.8 MG tablet tablet Take 1 tablet by mouth Daily.       calcium acetate (PHOSLO) 667 MG capsule Take 1,334 mg by mouth 2 (Two) Times a Day With Meals. Breakfast and lunch (Patient not taking: Reported on 9/30/2023)   Not Taking    calcium acetate (PHOSLO) 667 MG capsule Take 2,001 mg by mouth Daily With Dinner.       cinacalcet (SENSIPAR) 30 MG tablet Take 1 tablet by mouth Every Other Day. Pt takes on Monday, Wednesday, Friday       CloNIDine (CATAPRES) 0.1 MG tablet Take 0.1 mg by mouth 2 (Two) Times a Day.       hydrALAZINE (APRESOLINE) 25 MG tablet Take 1 tablet by mouth 2 (Two) Times a Day.       hydrALAZINE (APRESOLINE) 50 MG tablet Take 100 mg by mouth 2 (Two) Times a Day.       HYDROcodone-acetaminophen (NORCO) 5-325 MG per tablet Take 1 tablet by mouth Every 6 (Six) Hours As Needed for Moderate Pain . 12 tablet 0     magnesium oxide (MAG-OX) 400 MG tablet Take 1 tablet by mouth Daily.       mycophenolate (CELLCEPT) 250 MG capsule Take 2 capsules by mouth 2 (Two) Times a Day.       sodium bicarbonate 650 MG tablet Take 1 tablet by mouth 1 (One) Time.       TRELEGY ELLIPTA 100-62.5-25 MCG/INH aerosol powder          Allergies:  Patient has no known allergies.    Objective     Vital Signs  Temp:  [97.6 °F (36.4 °C)-98.7 °F (37.1 °C)] 97.8 °F (36.6 °C)  Heart Rate:  [62-92] 63  Resp:  [15-18] 18  BP: ()/(63-95) 132/95       Physical Exam     Results Review:     Imaging Results (Last 24 Hours)       Procedure Component Value Units Date/Time    XR Chest 1 View [[id number]] Collected: 09/30/23 1632     Updated: 09/30/23 1636    Narrative:      XR  CHEST 1 VW    Date of Exam: 9/30/2023 4:23 PM EDT    Indication: cough    Comparison: Chest radiograph 9/9/2019.    Findings:  Mild cardiomegaly. Mild prominence of the central vasculature without overt interstitial or alveolar edema. Negative for focal infiltrate, effusion or pneumothorax. Osseous structures grossly unremarkable.      Impression:      Impression:  Cardiomegaly and central vascular congestion.    Electronically Signed: Washington Feng MD    9/30/2023 4:34 PM EDT    Workstation ID: [id number]             Lab Results (last 24 hours)       Procedure Component Value Units Date/Time    Basic Metabolic Panel [[id number]] Collected: 10/01/23 0852    Specimen: Blood Updated: 10/01/23 0920    Magnesium [[id number]] Collected: 10/01/23 0852    Specimen: Blood Updated: 10/01/23 0920    Phosphorus [[id number]] Collected: 10/01/23 0852    Specimen: Blood Updated: 10/01/23 0920    POC Glucose Once [[id number]]  (Abnormal) Collected: 10/01/23 0626    Specimen: Blood Updated: 10/01/23 0655     Glucose 190 mg/dL      Comment: Serial Number: [id number]:  [id number]       POC Glucose Once [[id number]]  (Abnormal) Collected: 10/01/23 0517    Specimen: Blood Updated: 10/01/23 0653     Glucose 177 mg/dL      Comment: Serial Number: [id number]:  [id number]       POC Glucose Once [[id number]]  (Abnormal) Collected: 10/01/23 0355    Specimen: Blood Updated: 10/01/23 0650     Glucose 157 mg/dL      Comment: Serial Number: [id number]:  [id number]       POC Glucose Once [[id number]]  (Abnormal) Collected: 10/01/23 0240    Specimen: Blood Updated: 10/01/23 0649     Glucose 175 mg/dL      Comment: Serial Number: [id number]:  [id number]       Magnesium [[id number]]  (Normal) Collected: 10/01/23 0340    Specimen: Blood Updated: 10/01/23 0417     Magnesium 1.8 mg/dL     Basic Metabolic Panel [[id number]]  (Abnormal) Collected: 10/01/23 0340    Specimen: Blood Updated: 10/01/23 0417     Glucose 173 mg/dL      BUN 36  mg/dL      Creatinine 1.49 mg/dL      Sodium 139 mmol/L      Potassium 3.8 mmol/L      Chloride 110 mmol/L      CO2 21.0 mmol/L      Calcium 8.9 mg/dL      BUN/Creatinine Ratio 24.2     Anion Gap 8.0 mmol/L      eGFR 53.1 mL/min/1.73     Narrative:      GFR Normal >60  Chronic Kidney Disease <60  Kidney Failure <15      Phosphorus [[id number]]  (Abnormal) Collected: 10/01/23 0340    Specimen: Blood Updated: 10/01/23 0417     Phosphorus 2.0 mg/dL     POC Glucose Once [[id number]]  (Abnormal) Collected: 10/01/23 0131    Specimen: Blood Updated: 10/01/23 0133     Glucose 190 mg/dL      Comment: Serial Number: [id number]:  [id number]       Magnesium [[id number]]  (Normal) Collected: 10/01/23 0050    Specimen: Blood Updated: 10/01/23 0122     Magnesium 1.9 mg/dL     Basic Metabolic Panel [[id number]]  (Abnormal) Collected: 10/01/23 0050    Specimen: Blood Updated: 10/01/23 0122     Glucose 223 mg/dL      BUN 39 mg/dL      Creatinine 1.71 mg/dL      Sodium 137 mmol/L      Potassium 4.0 mmol/L      Comment: Slight hemolysis detected by analyzer. Results may be affected.        Chloride 107 mmol/L      CO2 19.0 mmol/L      Calcium 8.8 mg/dL      BUN/Creatinine Ratio 22.8     Anion Gap 11.0 mmol/L      eGFR 45.0 mL/min/1.73     Narrative:      GFR Normal >60  Chronic Kidney Disease <60  Kidney Failure <15      Phosphorus [[id number]]  (Abnormal) Collected: 10/01/23 0050    Specimen: Blood Updated: 10/01/23 0122     Phosphorus 2.2 mg/dL     POC Glucose Once [[id number]]  (Abnormal) Collected: 10/01/23 0029    Specimen: Blood Updated: 10/01/23 0030     Glucose 201 mg/dL      Comment: Serial Number: [id number]:  [id number]       POC Glucose Once [[id number]]  (Abnormal) Collected: 09/30/23 2327    Specimen: Blood Updated: 09/30/23 2328     Glucose 230 mg/dL      Comment: Serial Number: [id number]:  [id number]       POC Glucose Once [[id number]]  (Abnormal) Collected: 09/30/23 4082    Specimen: Blood Updated: 09/30/23  2211     Glucose 278 mg/dL      Comment: Serial Number: [id number]:  [id number]       POC Glucose Once [[id number]]  (Abnormal) Collected: 09/30/23 2106    Specimen: Blood Updated: 09/30/23 2109     Glucose 387 mg/dL      Comment: Serial Number: [id number]:  [id number]       Phosphorus [[id number]]  (Abnormal) Collected: 09/30/23 2023    Specimen: Blood Updated: 09/30/23 2101     Phosphorus 2.1 mg/dL     Basic Metabolic Panel [[id number]]  (Abnormal) Collected: 09/30/23 2023    Specimen: Blood Updated: 09/30/23 2059     Glucose 510 mg/dL      BUN 48 mg/dL      Creatinine 1.93 mg/dL      Sodium 133 mmol/L      Potassium 4.2 mmol/L      Chloride 102 mmol/L      CO2 20.0 mmol/L      Calcium 8.9 mg/dL      BUN/Creatinine Ratio 24.9     Anion Gap 11.0 mmol/L      eGFR 38.9 mL/min/1.73     Narrative:      GFR Normal >60  Chronic Kidney Disease <60  Kidney Failure <15      Magnesium [[id number]]  (Normal) Collected: 09/30/23 2023    Specimen: Blood Updated: 09/30/23 2059     Magnesium 1.8 mg/dL     High Sensitivity Troponin T 2Hr [[id number]]  (Abnormal) Collected: 09/30/23 2023    Specimen: Blood Updated: 09/30/23 2054     HS Troponin T 21 ng/L      Troponin T Delta -1 ng/L     Narrative:      High Sensitive Troponin T Reference Range:  <10.0 ng/L- Negative Female for AMI  <15.0 ng/L- Negative Male for AMI  >=10 - Abnormal Female indicating possible myocardial injury.  >=15 - Abnormal Male indicating possible myocardial injury.   Clinicians would have to utilize clinical acumen, EKG, Troponin, and serial changes to determine if it is an Acute Myocardial Infarction or myocardial injury due to an underlying chronic condition.         Osmolality, Serum [[id number]]  (Abnormal) Collected: 09/30/23 2023    Specimen: Blood Updated: 09/30/23 2043     Osmolality 317 mOsm/kg      Comment: Corrected result. Previous result was 575 mOsm/kg on 9/30/2023 at 2037 EDT.       CBC & Differential [[id number]]  (Abnormal) Collected:  09/30/23 2023    Specimen: Blood Updated: 09/30/23 2031    Narrative:      The following orders were created for panel order CBC & Differential.  Procedure                               Abnormality         Status                     ---------                               -----------         ------                     CBC Auto Differential[[id number]]        Abnormal            Final result                 Please view results for these tests on the individual orders.    CBC Auto Differential [[id number]]  (Abnormal) Collected: 09/30/23 2023    Specimen: Blood Updated: 09/30/23 2031     WBC 4.40 10*3/mm3      RBC 4.49 10*6/mm3      Hemoglobin 13.0 g/dL      Hematocrit 38.0 %      MCV 84.7 fL      MCH 29.0 pg      MCHC 34.3 g/dL      RDW 13.5 %      RDW-SD 42.0 fl      MPV 7.7 fL      Platelets 112 10*3/mm3      Neutrophil % 65.3 %      Lymphocyte % 25.2 %      Monocyte % 8.0 %      Eosinophil % 0.3 %      Basophil % 1.2 %      Neutrophils, Absolute 2.90 10*3/mm3      Lymphocytes, Absolute 1.10 10*3/mm3      Monocytes, Absolute 0.40 10*3/mm3      Eosinophils, Absolute 0.00 10*3/mm3      Basophils, Absolute 0.10 10*3/mm3      nRBC 0.1 /100 WBC     POC Glucose Once [[id number]]  (Abnormal) Collected: 09/30/23 2011    Specimen: Blood Updated: 09/30/23 2012     Glucose 503 mg/dL      Comment: Serial Number: [id number]:  [id number]       POC Glucose Once [[id number]]  (Abnormal) Collected: 09/30/23 1905    Specimen: Blood Updated: 09/30/23 1909     Glucose 581 mg/dL      Comment: Serial Number: [id number]:  [id number]       Hemoglobin A1c [[id number]]  (Abnormal) Collected: 09/30/23 1615    Specimen: Blood Updated: 09/30/23 1739     Hemoglobin A1C 12.10 %     High Sensitivity Troponin T [[id number]]  (Abnormal) Collected: 09/30/23 1615    Specimen: Blood Updated: 09/30/23 1738     HS Troponin T 22 ng/L     Narrative:      High Sensitive Troponin T Reference Range:  <10.0 ng/L- Negative Female for AMI  <15.0 ng/L-  Negative Male for AMI  >=10 - Abnormal Female indicating possible myocardial injury.  >=15 - Abnormal Male indicating possible myocardial injury.   Clinicians would have to utilize clinical acumen, EKG, Troponin, and serial changes to determine if it is an Acute Myocardial Infarction or myocardial injury due to an underlying chronic condition.         BNP [[id number]]  (Normal) Collected: 09/30/23 1615    Specimen: Blood Updated: 09/30/23 1738     proBNP 485.6 pg/mL     Narrative:      This assay is used as an aid in the diagnosis of individuals suspected of having heart failure. It can be used as an aid in the diagnosis of acute decompensated heart failure (ADHF) in patients presenting with signs and symptoms of ADHF to the emergency department (ED). In addition, NT-proBNP of <300 pg/mL indicates ADHF is not likely.    Phosphorus [[id number]]  (Normal) Collected: 09/30/23 1615    Specimen: Blood Updated: 09/30/23 1736     Phosphorus 3.7 mg/dL     Magnesium [[id number]]  (Normal) Collected: 09/30/23 1615    Specimen: Blood Updated: 09/30/23 1736     Magnesium 1.8 mg/dL     [place] Draw [[id number]] Collected: 09/30/23 1615    Specimen: Blood Updated: 09/30/23 1716    Narrative:      The following orders were created for panel order [place] Draw.  Procedure                               Abnormality         Status                     ---------                               -----------         ------                     Green Top (Gel)[[id number]]                                  Final result               Lavender Top[[id number]]                                     Final result               Gold Top - SST[[id number]]                                   Final result               Light Blue Top[[id number]]                                   Final result                 Please view results for these tests on the individual orders.    Green Top (Gel) [[id number]] Collected: 09/30/23 1615    Specimen: Blood Updated: 09/30/23 1716      Extra Tube Hold for add-ons.     Comment: Auto resulted.       Light Blue Top [[id number]] Collected: 09/30/23 1615    Specimen: Blood Updated: 09/30/23 1716     Extra Tube Hold for add-ons.     Comment: Auto resulted       Comprehensive Metabolic Panel [[id number]]  (Abnormal) Collected: 09/30/23 1615    Specimen: Blood Updated: 09/30/23 1707     Glucose 979 mg/dL      BUN 52 mg/dL      Creatinine 2.28 mg/dL      Sodium 121 mmol/L      Potassium 5.0 mmol/L      Chloride 87 mmol/L      CO2 17.0 mmol/L      Calcium 9.2 mg/dL      Total Protein 6.4 g/dL      Albumin 3.7 g/dL      ALT (SGPT) 18 U/L      AST (SGOT) 13 U/L      Alkaline Phosphatase 172 U/L      Total Bilirubin 0.7 mg/dL      Globulin 2.7 gm/dL      A/G Ratio 1.4 g/dL      BUN/Creatinine Ratio 22.8     Anion Gap 17.0 mmol/L      eGFR 31.8 mL/min/1.73     Narrative:      GFR Normal >60  Chronic Kidney Disease <60  Kidney Failure <15      Gold Top - SST [[id number]] Collected: 09/30/23 1615    Specimen: Blood Updated: 09/30/23 1655     Extra Tube done    Lipase [[id number]]  (Abnormal) Collected: 09/30/23 1615    Specimen: Blood Updated: 09/30/23 1654     Lipase 67 U/L     Blood Gas, Venous - [[id number]]  (Abnormal) Collected: 09/30/23 1648    Specimen: Venous Blood Updated: 09/30/23 1653     Site PA     pH, Venous 7.286 pH Units      pCO2, Venous 21.9 mm Hg      pO2, Venous 41.0 mm Hg      HCO3, Venous 10.4 mmol/L      Base Excess, Venous -14.7 mmol/L      Comment: Serial Number: [id number]:  [id number]        O2 Saturation, Venous 71.7 %      CO2 Content 11.1 mmol/L      Barometric Pressure for Blood Gas --     Comment: N/A        Modality Room Air     FIO2 21 %     Osmolality, Serum [[id number]]  (Abnormal) Collected: 09/30/23 1615    Specimen: Blood Updated: 09/30/23 1647     Osmolality 329 mOsm/kg     Lavender Top [[id number]] Collected: 09/30/23 1615    Specimen: Blood Updated: 09/30/23 1630    Urinalysis With Culture If Indicated - Urine, Clean Catch  [[id number]]  (Abnormal) Collected: 09/30/23 1620    Specimen: Urine, Clean Catch Updated: 09/30/23 1634     Color, UA Yellow     Appearance, UA Clear     pH, UA <=5.0     Specific Gravity, UA 1.030     Glucose, UA >=1000 mg/dL (3+)     Ketones, UA 15 mg/dL (1+)     Bilirubin, UA Negative     Blood, UA Negative     Protein, UA Negative     Leuk Esterase, UA Negative     Nitrite, UA Negative     Urobilinogen, UA 0.2 E.U./dL    Narrative:      In absence of clinical symptoms, the presence of pyuria, bacteria, and/or nitrites on the urinalysis result does not correlate with infection.  Urine microscopic not indicated.    CBC & Differential [[id number]]  (Abnormal) Collected: 09/30/23 1615    Specimen: Blood Updated: 09/30/23 1633    Narrative:      The following orders were created for panel order CBC & Differential.  Procedure                               Abnormality         Status                     ---------                               -----------         ------                     CBC Auto Differential[[id number]]        Abnormal            Final result                 Please view results for these tests on the individual orders.    CBC Auto Differential [[id number]]  (Abnormal) Collected: 09/30/23 1615    Specimen: Blood Updated: 09/30/23 1633     WBC 5.30 10*3/mm3      RBC 4.88 10*6/mm3      Hemoglobin 14.2 g/dL      Hematocrit 43.0 %      MCV 88.0 fL      MCH 29.1 pg      MCHC 33.0 g/dL      RDW 14.1 %      RDW-SD 46.4 fl      MPV 8.4 fL      Platelets 117 10*3/mm3      Neutrophil % 71.1 %      Lymphocyte % 20.7 %      Monocyte % 6.7 %      Eosinophil % 0.3 %      Basophil % 1.2 %      Neutrophils, Absolute 3.80 10*3/mm3      Lymphocytes, Absolute 1.10 10*3/mm3      Monocytes, Absolute 0.40 10*3/mm3      Eosinophils, Absolute 0.00 10*3/mm3      Basophils, Absolute 0.10 10*3/mm3      nRBC 0.2 /100 WBC     POC Glucose Once [[id number]]  (Abnormal) Collected: 09/30/23 1542    Specimen: Blood Updated: 09/30/23 1543      Glucose >600 mg/dL      Comment: Serial Number: [id number]:  [id number]                I reviewed the patient's new clinical results.    Assessment & Plan     Diabetic ketoacidosis without coma or complicated acute illness or injury  Dehydration from the above  New onset of DM II  Hyperglycemia  -continue current protocol  -endocrinology/diabetes education  -hgb A 1c 12  -bs on admission 500    ESRD on HD  Kidney transplant  -continue transplant meds  -nephr consult    Hypertension with CKD  Anemia of ESRD-stable  COPD/tobacco abuse    DVT prophylaxis-scd  GI prophylaxis-pepcid    Plan  Continue insulin protocol, neph/endocrine consults. Restart home medication when taking po can take rejection meds now.    I discussed the patient's findings and my recommendations with patient.     JENIFFER Diana  10/01/23  09:39 EDT

## 2023-10-02 ENCOUNTER — READMISSION MANAGEMENT (OUTPATIENT)
Dept: CALL CENTER | Facility: HOSPITAL | Age: 63
End: 2023-10-02
Payer: MEDICARE

## 2023-10-02 VITALS
HEART RATE: 65 BPM | BODY MASS INDEX: 28.04 KG/M2 | WEIGHT: 207 LBS | HEIGHT: 72 IN | RESPIRATION RATE: 16 BRPM | SYSTOLIC BLOOD PRESSURE: 119 MMHG | OXYGEN SATURATION: 98 % | TEMPERATURE: 97.8 F | DIASTOLIC BLOOD PRESSURE: 80 MMHG

## 2023-10-02 PROBLEM — R73.9 HYPERGLYCEMIA: Status: ACTIVE | Noted: 2023-10-02

## 2023-10-02 LAB
25(OH)D3 SERPL-MCNC: 30.5 NG/ML (ref 30–100)
ALBUMIN SERPL-MCNC: 3 G/DL (ref 3.5–5.2)
ANION GAP SERPL CALCULATED.3IONS-SCNC: 8 MMOL/L (ref 5–15)
BUN SERPL-MCNC: 22 MG/DL (ref 8–23)
BUN/CREAT SERPL: 16.9 (ref 7–25)
CALCIUM SPEC-SCNC: 9 MG/DL (ref 8.6–10.5)
CHLORIDE SERPL-SCNC: 110 MMOL/L (ref 98–107)
CO2 SERPL-SCNC: 20 MMOL/L (ref 22–29)
CREAT SERPL-MCNC: 1.3 MG/DL (ref 0.76–1.27)
EGFRCR SERPLBLD CKD-EPI 2021: 62.5 ML/MIN/1.73
GLUCOSE BLDC GLUCOMTR-MCNC: 170 MG/DL (ref 70–105)
GLUCOSE BLDC GLUCOMTR-MCNC: 212 MG/DL (ref 70–105)
GLUCOSE BLDC GLUCOMTR-MCNC: 224 MG/DL (ref 70–105)
GLUCOSE SERPL-MCNC: 103 MG/DL (ref 65–99)
MAGNESIUM SERPL-MCNC: 1.4 MG/DL (ref 1.6–2.4)
PHOSPHATE SERPL-MCNC: 2 MG/DL (ref 2.5–4.5)
POTASSIUM SERPL-SCNC: 3.6 MMOL/L (ref 3.5–5.2)
SODIUM SERPL-SCNC: 138 MMOL/L (ref 136–145)
URATE SERPL-MCNC: 6.7 MG/DL (ref 3.4–7)

## 2023-10-02 PROCEDURE — 63710000001 MYCOPHENOLATE MOFETIL PER 250 MG: Performed by: FAMILY MEDICINE

## 2023-10-02 PROCEDURE — 86341 ISLET CELL ANTIBODY: CPT | Performed by: INTERNAL MEDICINE

## 2023-10-02 PROCEDURE — 80197 ASSAY OF TACROLIMUS: CPT | Performed by: INTERNAL MEDICINE

## 2023-10-02 PROCEDURE — 25810000003 SODIUM CHLORIDE 0.9 % SOLUTION: Performed by: INTERNAL MEDICINE

## 2023-10-02 PROCEDURE — 84550 ASSAY OF BLOOD/URIC ACID: CPT | Performed by: INTERNAL MEDICINE

## 2023-10-02 PROCEDURE — 96376 TX/PRO/DX INJ SAME DRUG ADON: CPT

## 2023-10-02 PROCEDURE — 63710000001 TACROLIMUS PER 1 MG: Performed by: FAMILY MEDICINE

## 2023-10-02 PROCEDURE — 63710000001 INSULIN LISPRO (HUMAN) PER 5 UNITS: Performed by: INTERNAL MEDICINE

## 2023-10-02 PROCEDURE — 63710000001 INSULIN GLARGINE PER 5 UNITS: Performed by: INTERNAL MEDICINE

## 2023-10-02 PROCEDURE — 82306 VITAMIN D 25 HYDROXY: CPT | Performed by: INTERNAL MEDICINE

## 2023-10-02 PROCEDURE — 96361 HYDRATE IV INFUSION ADD-ON: CPT

## 2023-10-02 PROCEDURE — 82948 REAGENT STRIP/BLOOD GLUCOSE: CPT

## 2023-10-02 PROCEDURE — 80069 RENAL FUNCTION PANEL: CPT | Performed by: INTERNAL MEDICINE

## 2023-10-02 PROCEDURE — G0378 HOSPITAL OBSERVATION PER HR: HCPCS

## 2023-10-02 PROCEDURE — 99214 OFFICE O/P EST MOD 30 MIN: CPT | Performed by: INTERNAL MEDICINE

## 2023-10-02 PROCEDURE — 84681 ASSAY OF C-PEPTIDE: CPT | Performed by: INTERNAL MEDICINE

## 2023-10-02 PROCEDURE — 83735 ASSAY OF MAGNESIUM: CPT | Performed by: INTERNAL MEDICINE

## 2023-10-02 PROCEDURE — G0108 DIAB MANAGE TRN  PER INDIV: HCPCS

## 2023-10-02 RX ORDER — BLOOD SUGAR DIAGNOSTIC
1 STRIP MISCELLANEOUS
Qty: 150 EACH | Refills: 2 | Status: SHIPPED | OUTPATIENT
Start: 2023-10-02

## 2023-10-02 RX ORDER — INSULIN LISPRO 100 [IU]/ML
INJECTION, SOLUTION INTRAVENOUS; SUBCUTANEOUS
Qty: 15 ML | Refills: 5 | Status: SHIPPED | OUTPATIENT
Start: 2023-10-02

## 2023-10-02 RX ORDER — INSULIN LISPRO 100 [IU]/ML
12 INJECTION, SOLUTION INTRAVENOUS; SUBCUTANEOUS
Status: DISCONTINUED | OUTPATIENT
Start: 2023-10-02 | End: 2023-10-02 | Stop reason: HOSPADM

## 2023-10-02 RX ORDER — LANCETS
1 EACH MISCELLANEOUS
Qty: 200 EACH | Refills: 2 | Status: SHIPPED | OUTPATIENT
Start: 2023-10-02

## 2023-10-02 RX ORDER — INSULIN LISPRO 100 [IU]/ML
12 INJECTION, SOLUTION INTRAVENOUS; SUBCUTANEOUS
Qty: 15 ML | Refills: 12 | Status: CANCELLED | OUTPATIENT
Start: 2023-10-02

## 2023-10-02 RX ORDER — SODIUM BICARBONATE 650 MG/1
1300 TABLET ORAL 3 TIMES DAILY
Status: DISCONTINUED | OUTPATIENT
Start: 2023-10-02 | End: 2023-10-02 | Stop reason: HOSPADM

## 2023-10-02 RX ORDER — PEN NEEDLE, DIABETIC 30 GX3/16"
1 NEEDLE, DISPOSABLE MISCELLANEOUS 4 TIMES DAILY
Qty: 200 EACH | Refills: 2 | Status: SHIPPED | OUTPATIENT
Start: 2023-10-02

## 2023-10-02 RX ADMIN — INSULIN LISPRO 12 UNITS: 100 INJECTION, SOLUTION INTRAVENOUS; SUBCUTANEOUS at 17:33

## 2023-10-02 RX ADMIN — INSULIN LISPRO 10 UNITS: 100 INJECTION, SOLUTION INTRAVENOUS; SUBCUTANEOUS at 13:13

## 2023-10-02 RX ADMIN — INSULIN LISPRO 4 UNITS: 100 INJECTION, SOLUTION INTRAVENOUS; SUBCUTANEOUS at 13:13

## 2023-10-02 RX ADMIN — SODIUM BICARBONATE 1300 MG: 650 TABLET ORAL at 17:27

## 2023-10-02 RX ADMIN — Medication 10 ML: at 09:26

## 2023-10-02 RX ADMIN — TACROLIMUS 3 MG: 1 CAPSULE ORAL at 09:25

## 2023-10-02 RX ADMIN — INSULIN GLARGINE 30 UNITS: 100 INJECTION, SOLUTION SUBCUTANEOUS at 09:24

## 2023-10-02 RX ADMIN — MYCOPHENOLATE MOFETIL 500 MG: 250 CAPSULE ORAL at 09:25

## 2023-10-02 RX ADMIN — INSULIN LISPRO 2 UNITS: 100 INJECTION, SOLUTION INTRAVENOUS; SUBCUTANEOUS at 09:23

## 2023-10-02 RX ADMIN — INSULIN LISPRO 10 UNITS: 100 INJECTION, SOLUTION INTRAVENOUS; SUBCUTANEOUS at 09:27

## 2023-10-02 RX ADMIN — INSULIN LISPRO 4 UNITS: 100 INJECTION, SOLUTION INTRAVENOUS; SUBCUTANEOUS at 17:33

## 2023-10-02 RX ADMIN — SODIUM CHLORIDE 75 ML/HR: 9 INJECTION, SOLUTION INTRAVENOUS at 02:02

## 2023-10-02 RX ADMIN — FAMOTIDINE 20 MG: 10 INJECTION INTRAVENOUS at 09:24

## 2023-10-02 NOTE — NURSING NOTE
"Pt stated \"Am I getting to much NS, I'm starting to have a cough, runny nose and congestion like chest pain.\" Secure chat sent to Jyotsna Weiner NP for Dr. Booth, okay with stopping NS at this time and having nephro reevaluate in am.  "

## 2023-10-02 NOTE — PLAN OF CARE
Goal Outcome Evaluation:         Pt given printed education that [facility] prepared for him overnight.  More education was given r/t long and short acting insulin, when to test and when to take in relation to meals.  Verbalized understanding, but remains anxious about his new dx and tx.  Continued reinforcement throughout day.    Currently awaiting Diabetes Educator to speak with pt.    Dr. Booth will discharge after education is complete.

## 2023-10-02 NOTE — CONSULTS
"Diabetes Education  Assessment/Teaching    Patient Name:  Jose Miguel Martinez  YOB: 1960  MRN: [id number]  Admit Date:  9/30/2023      Assessment Date:  10/2/2023  Flowsheet Row Most Recent Value   General Information     Referral From: MD order  [Consult received due to new diagnosis of diabetes. A1c this adm 12.1%. Adm bs 979.]   Height 181.6 cm (71.5\")   Height Method Stated   Weight 93.9 kg (207 lb)   Weight Method Stated   Pregnancy Assessment    Diabetes History    Length of Diabetes Diagnosis --  [Pt dx in 3/2023. Has not been taking any diabetes medication.]   Do you test your blood sugar at home? yes   Frequency of checks not routine   Meter type has Accuchek Guide bs meter that is about 3 years old. Pt requesting new meter   Who performs the test? self   Education Preferences    Nutrition Information    Assessment Topics    DM Goals             Flowsheet Row Most Recent Value   DM Education Needs    Meter Meter provided  [Gave Accuchek Guide Me. Pt had this same meter at home. He states he does not need instruction in use.]   Meter Type Accuchek   Frequency of Testing AC meals  [Discussed importance of checking bs ac and hs daily. Gave log book to record bs.]   Blood Glucose Target Range Discussed A1c result of 12.1%. Reviewed healthy bs range and healthy A1c target. Discussed importance of bs control.   Medication --  [Pt has been started on basal/bolus insulin therapy.]   Problem Solving Hypoglycemia, Signs, Symptoms, Treatment, Hyperglycemia  [DIscussed importance of keeping low bs tx available.]   Reducing Risks A1C testing  [Gave A1c info sheet.]   Healthy Eating --  [Pt usually eating 2 meals/day. He states plans to eat 3 meals/day. Discussed importance of giving mealtime injection if he is eating 30 grams of CHO or more. Discussed taking mealtime injections up to 3 times/day but not more than 3 times/day.]   Motivation Strong   Teaching Method Explanation, Discussion, Demonstration, " Handouts   Patient Response Verbalized understanding, Demonstrates adequately              Other Comments:  Met with pt at bedside. Wife present during education session. Discussed type 1 vs type 2 diabetes. Discussed endocrinology has ordered testing to determine type of diabetes.     Instructed pt in use of insulin pen. Pt performed return demo correctly and injected into foam pad correctly. Discussed when to take long-acting and mealtime insulin. Discussed importance of not taking mealtime insulin if not eating. Discussed injection sites, rotation of sites, storage of insulin and disposal of pen needles. Observed pt give actual injection of insulin presupper. He performed injection correctly.     Discussed food groups containing CHO. Reviewed example serving sizes from these groups. Discussed trying to stay within 4 servings (60 grams of CHO) at each meal. Discussed low-fat meal prep.     Gave First Steps booklet, Planning Healthy Meals packet, Low bs handout, Insulin Pen instruction sheet, and log book .     Rxs started for Lantus pen, Lispro pen, pen needles, Accuchek Guide test strips and Accuchek Softclix lancets. Pt/wife verbalized understanding of info covered. Pt with no additional questions.       Electronically signed by:  Laura Wagner RN  10/02/23 17:58 EDT

## 2023-10-02 NOTE — PROGRESS NOTES
"NEPHROLOGY PROGRESS NOTE------[facility]    [facility]  [phone]  Joey Junior MD      Patient Care Team:  Jazmine Pastrana APRN as PCP - General (Nurse Practitioner)  Kvng Goff MD (Infectious Diseases)  Ana Maria Thacker APRN ([facility])  Darrell Gonzalez MD as Consulting Physician (Nephrology)      Provider:  Joey Junior MD  Patient Name: Jose Miguel Martinez  :  1960    SUBJECTIVE:  Follow-up SHADY/CKD/kidney transplant  No chest pain shortness of breath  Tolerating diet    Medication:  famotidine, 20 mg, Intravenous, Q12H  insulin glargine, 30 Units, Subcutaneous, Daily  insulin lispro, 10 Units, Subcutaneous, TID With Meals  insulin lispro, 2-9 Units, Subcutaneous, 4x Daily AC & at Bedtime  mycophenolate, 500 mg, Oral, BID  senna-docusate sodium, 2 tablet, Oral, BID  sodium bicarbonate, 1,300 mg, Oral, TID  sodium chloride, 10 mL, Intravenous, Q12H  tacrolimus, 2 mg, Oral, Nightly  tacrolimus, 3 mg, Oral, QAM           OBJECTIVE    Vital Sign Min/Max for last 24 hours  Temp  Min: 97.5 °F (36.4 °C)  Max: 98.7 °F (37.1 °C)   BP  Min: 106/69  Max: 140/88   Pulse  Min: 58  Max: 63   Resp  Min: 16  Max: 20   No data recorded   No data recorded   Weight  Min: 93.9 kg (207 lb)  Max: 93.9 kg (207 lb)     Flowsheet Rows      Flowsheet Row First Filed Value   Admission Height 181.6 cm (71.5\") Documented at 2023 1536   Admission Weight 93.9 kg (207 lb 0.2 oz) Documented at 2023 1536            No intake/output data recorded.  I/O last 3 completed shifts:  In: 1600 [P.O.:600; IV Piggyback:1000]  Out: 2350 [Urine:2350]    Physical Exam:  General Appearance: alert, appears stated age and cooperative  Head: normocephalic, without obvious abnormality and atraumatic  Eyes: conjunctivae and sclerae normal and no icterus  Neck: supple and no JVD  Lungs: clear to auscultation and respirations regular  Heart: regular " rhythm & normal rate and normal S1, S2  Chest: Wall no abnormalities observed  Abdomen: normal bowel sounds and soft, nontender  Extremities: moves extremities well, no edema, no cyanosis and no redness  Skin: no bleeding, bruising or rash, turgor normal, color normal and no lesions noted  Neurologic: Alert, and oriented. No focal deficits    Labs:    WBC WBC   Date Value Ref Range Status   09/30/2023 4.40 3.40 - 10.80 10*3/mm3 Final   09/30/2023 5.30 3.40 - 10.80 10*3/mm3 Final      HGB Hemoglobin   Date Value Ref Range Status   09/30/2023 13.0 13.0 - 17.7 g/dL Final   09/30/2023 14.2 13.0 - 17.7 g/dL Final      HCT Hematocrit   Date Value Ref Range Status   09/30/2023 38.0 37.5 - 51.0 % Final   09/30/2023 43.0 37.5 - 51.0 % Final      Platelets No results found for: LABPLAT   MCV MCV   Date Value Ref Range Status   09/30/2023 84.7 79.0 - 97.0 fL Final   09/30/2023 88.0 79.0 - 97.0 fL Final          Sodium Sodium   Date Value Ref Range Status   10/02/2023 138 136 - 145 mmol/L Final   10/01/2023 139 136 - 145 mmol/L Final   10/01/2023 139 136 - 145 mmol/L Final   10/01/2023 137 136 - 145 mmol/L Final   09/30/2023 133 (L) 136 - 145 mmol/L Final   09/30/2023 121 (L) 136 - 145 mmol/L Final      Potassium Potassium   Date Value Ref Range Status   10/02/2023 3.6 3.5 - 5.2 mmol/L Final   10/01/2023 3.8 3.5 - 5.2 mmol/L Final   10/01/2023 3.8 3.5 - 5.2 mmol/L Final   10/01/2023 4.0 3.5 - 5.2 mmol/L Final     Comment:     Slight hemolysis detected by analyzer. Results may be affected.   09/30/2023 4.2 3.5 - 5.2 mmol/L Final   09/30/2023 5.0 3.5 - 5.2 mmol/L Final      Chloride Chloride   Date Value Ref Range Status   10/02/2023 110 (H) 98 - 107 mmol/L Final   10/01/2023 110 (H) 98 - 107 mmol/L Final   10/01/2023 110 (H) 98 - 107 mmol/L Final   10/01/2023 107 98 - 107 mmol/L Final   09/30/2023 102 98 - 107 mmol/L Final   09/30/2023 87 (L) 98 - 107 mmol/L Final      CO2 CO2   Date Value Ref Range Status   10/02/2023 20.0 (L)  22.0 - 29.0 mmol/L Final   10/01/2023 20.0 (L) 22.0 - 29.0 mmol/L Final   10/01/2023 21.0 (L) 22.0 - 29.0 mmol/L Final   10/01/2023 19.0 (L) 22.0 - 29.0 mmol/L Final   09/30/2023 20.0 (L) 22.0 - 29.0 mmol/L Final   09/30/2023 17.0 (L) 22.0 - 29.0 mmol/L Final      BUN BUN   Date Value Ref Range Status   10/02/2023 22 8 - 23 mg/dL Final   10/01/2023 28 (H) 8 - 23 mg/dL Final   10/01/2023 36 (H) 8 - 23 mg/dL Final   10/01/2023 39 (H) 8 - 23 mg/dL Final   09/30/2023 48 (H) 8 - 23 mg/dL Final   09/30/2023 52 (H) 8 - 23 mg/dL Final      Creatinine Creatinine   Date Value Ref Range Status   10/02/2023 1.30 (H) 0.76 - 1.27 mg/dL Final   10/01/2023 1.32 (H) 0.76 - 1.27 mg/dL Final   10/01/2023 1.49 (H) 0.76 - 1.27 mg/dL Final   10/01/2023 1.71 (H) 0.76 - 1.27 mg/dL Final   09/30/2023 1.93 (H) 0.76 - 1.27 mg/dL Final   09/30/2023 2.28 (H) 0.76 - 1.27 mg/dL Final      Calcium Calcium   Date Value Ref Range Status   10/02/2023 9.0 8.6 - 10.5 mg/dL Final   10/01/2023 9.2 8.6 - 10.5 mg/dL Final   10/01/2023 8.9 8.6 - 10.5 mg/dL Final   10/01/2023 8.8 8.6 - 10.5 mg/dL Final   09/30/2023 8.9 8.6 - 10.5 mg/dL Final   09/30/2023 9.2 8.6 - 10.5 mg/dL Final      PO4 No components found for: PO4   Albumin Albumin   Date Value Ref Range Status   10/02/2023 3.0 (L) 3.5 - 5.2 g/dL Final   09/30/2023 3.7 3.5 - 5.2 g/dL Final      Magnesium Magnesium   Date Value Ref Range Status   10/02/2023 1.4 (L) 1.6 - 2.4 mg/dL Final   10/01/2023 1.7 1.6 - 2.4 mg/dL Final   10/01/2023 1.8 1.6 - 2.4 mg/dL Final   10/01/2023 1.9 1.6 - 2.4 mg/dL Final   09/30/2023 1.8 1.6 - 2.4 mg/dL Final   09/30/2023 1.8 1.6 - 2.4 mg/dL Final      Uric Acid No components found for: URIC ACID     Imaging Results (Last 72 Hours)       Procedure Component Value Units Date/Time    XR Chest 1 View [[id number]] Collected: 09/30/23 1632     Updated: 09/30/23 1636    Narrative:      XR CHEST 1 VW    Date of Exam: 9/30/2023 4:23 PM EDT    Indication: cough    Comparison: Chest  radiograph 9/9/2019.    Findings:  Mild cardiomegaly. Mild prominence of the central vasculature without overt interstitial or alveolar edema. Negative for focal infiltrate, effusion or pneumothorax. Osseous structures grossly unremarkable.      Impression:      Impression:  Cardiomegaly and central vascular congestion.    Electronically Signed: Washington Feng MD    9/30/2023 4:34 PM EDT    Workstation ID: [id number]            Results for orders placed during the hospital encounter of 09/30/23    XR Chest 1 View    Narrative  XR CHEST 1 VW    Date of Exam: 9/30/2023 4:23 PM EDT    Indication: cough    Comparison: Chest radiograph 9/9/2019.    Findings:  Mild cardiomegaly. Mild prominence of the central vasculature without overt interstitial or alveolar edema. Negative for focal infiltrate, effusion or pneumothorax. Osseous structures grossly unremarkable.    Impression  Impression:  Cardiomegaly and central vascular congestion.    Electronically Signed: Washington Feng MD  9/30/2023 4:34 PM EDT  Workstation ID: [id number]      Results for orders placed during the hospital encounter of 11/08/20    XR Knee 3 View Left    Narrative  DATE OF EXAM:  11/8/2020 7:41 AM    PROCEDURE:  XR KNEE 3 VW LEFT-    INDICATIONS:  pain, swelling    COMPARISON:  No Comparisons Available    TECHNIQUE:  Three radiologic views of the left knee were obtained.      FINDINGS:  No acute fracture is identified. No focal osseous abnormality is  identified. The medial, lateral, and patellofemoral compartments appear  well-maintained. There is a small enthesophyte along the suprapatellar.  There is a suprapatellar joint effusion.    Impression  1.No acute fracture or traumatic malalignment identified.  2.Suprapatellar joint effusion.    Electronically Signed By-DR. Gt Al MD On:11/8/2020 8:07 AM  This report was finalized on [id number] by DR. Gt Al MD.      Results for orders placed during the hospital encounter of  09/09/19    XR Chest 1 View    Narrative  Examination: XR CHEST 1 VW-    Date of Exam: 9/9/2019 7:31 PM    Indication: pain.    Comparison: None available.    Technique: 08/22/2019    FINDINGS:  There is cardiomegaly. Mediastinal and hilar contours appear stable.  There is no vascular congestion, airspace consolidation, or pleural  effusion. No bone lesion is seen.    Impression  1. Cardiomegaly, with no evidence of active lung disease.    Electronically Signed By-Sophia Ross On:9/9/2019 8:06 PM  This report was finalized on [id number] by  Sophia Ross, .      Results for orders placed during the hospital encounter of 08/27/19    Duplex venous lower extremity bilateral CAR    Interpretation Summary  · Normal bilateral lower extremity venous duplex scan.        ASSESSMENT / PLAN      DKA (diabetic ketoacidosis)  SHADY-due to dehydration from severe hyperglycemia.  CKD stage III-secondary to hypertensive nephrosclerosis and chronic CNI toxicity  History ESRD status post renal transplant  Hypertension  New onset diabetes with DKA.  HbA1c 12.1.  Endocrinology consulted  DKA-resolved  Hypophosphatemia replace  Mild metabolic acidosis-add sodium bicarb     Creatinine stable  Add p.o. sodium bicarb  Stop IV fluids  No objection to discharge from renal standpoint          Joey Junior MD  [facility]  [phone]  10/02/23  11:35 EDT

## 2023-10-02 NOTE — DISCHARGE SUMMARY
Date of Discharge:  10/2/2023    Discharge Diagnosis:   **DKA (diabetic ketoacidosis) [E11.10]   Hyperglycemia [R73.9]   ESRD (end stage renal disease) [N18.6]   HTN (hypertension) [I10]   S/p renal transplant    Presenting Problem/History of Present Illness  Active Hospital Problems    Diagnosis  POA    **DKA (diabetic ketoacidosis) [E11.10]  Yes    Hyperglycemia [R73.9]  Yes    ESRD (end stage renal disease) [N18.6]  Yes    HTN (hypertension) [I10]  Yes      Resolved Hospital Problems   No resolved problems to display.          Hospital Course  Patient is a 63 y.o. male with history of renal transplant (related to hypertension) and type 2 diabetes who presented with polyuria, polydipsia and blood sugar greater than 900 on admission.  He was originally diagnosed with type 2 diabetes in March by his report.  He had been attempting to control blood sugars with diet and exercise.  He had not been monitoring blood sugars at home.  He presented to an outside urgent care when he developed general malaise, polyuria, polydipsia.  On admission he was treated with aggressive fluid hydration, electrolyte replacement and insulin via DKA protocol.  He was evaluated by endocrinology and insulin doses were adjusted.  At the time of discharge his blood sugars are much better controlled and his symptoms have resolved.  He is being discharged home on Lantus 30 units once a day and Humalog 12 units with each meal plus low-dose sliding scale.  He met with the diabetes educator and was instructed on appropriate diet, use of insulin, testing instructions.  He will have prompt follow-up with both PCP and endocrinologist.    Procedures Performed         Consults:   Consults       Date and Time Order Name Status Description    10/1/2023  9:45 AM Inpatient Nephrology Consult Completed     9/30/2023  5:37 PM Inpatient Endocrinology Consult Completed             Pertinent Test Results:    Lab Results (most recent)       Procedure Component  Value Units Date/Time    POC Glucose Once [[id number]]  (Abnormal) Collected: 10/02/23 1724    Specimen: Blood Updated: 10/02/23 1725     Glucose 224 mg/dL      Comment: Serial Number: [id number]:  [id number]       POC Glucose Once [[id number]]  (Abnormal) Collected: 10/02/23 1207    Specimen: Blood Updated: 10/02/23 1208     Glucose 212 mg/dL      Comment: Serial Number: [id number]:  [id number]       Vitamin D,25-Hydroxy [[id number]]  (Normal) Collected: 10/02/23 0109    Specimen: Blood Updated: 10/02/23 1155     25 Hydroxy, Vitamin D 30.5 ng/ml     Narrative:      Reference Range for Total Vitamin D 25(OH)     Deficiency <20.0 ng/mL   Insufficiency 21-29 ng/mL   Sufficiency  ng/mL  Toxicity >100 ng/ml      Renal Function Panel [[id number]]  (Abnormal) Collected: 10/02/23 0109    Specimen: Blood Updated: 10/02/23 0252     Glucose 103 mg/dL      BUN 22 mg/dL      Creatinine 1.30 mg/dL      Sodium 138 mmol/L      Potassium 3.6 mmol/L      Chloride 110 mmol/L      CO2 20.0 mmol/L      Calcium 9.0 mg/dL      Albumin 3.0 g/dL      Phosphorus 2.0 mg/dL      Anion Gap 8.0 mmol/L      BUN/Creatinine Ratio 16.9     eGFR 62.5 mL/min/1.73     Narrative:      GFR Normal >60  Chronic Kidney Disease <60  Kidney Failure <15      Magnesium [[id number]]  (Abnormal) Collected: 10/02/23 0109    Specimen: Blood Updated: 10/02/23 0252     Magnesium 1.4 mg/dL     Uric Acid [[id number]]  (Normal) Collected: 10/02/23 0109    Specimen: Blood Updated: 10/02/23 0252     Uric Acid 6.7 mg/dL     C-Peptide [[id number]] Collected: 10/02/23 0109    Specimen: Blood Updated: 10/02/23 0218    Glutamic Acid Decarboxylase [[id number]] Collected: 10/02/23 0109    Specimen: Blood Updated: 10/02/23 0218    Tacrolimus Level [[id number]] Collected: 10/02/23 0109    Specimen: Blood Updated: 10/02/23 0217    Phosphorus [[id number]]  (Abnormal) Collected: 10/01/23 1959    Specimen: Blood Updated: 10/01/23 2052     Phosphorus 2.3 mg/dL     TSH  [[id number]]  (Normal) Collected: 10/01/23 0852    Specimen: Blood Updated: 10/01/23 1452     TSH 2.130 uIU/mL     Phosphorus [[id number]]  (Abnormal) Collected: 10/01/23 0852    Specimen: Blood Updated: 10/01/23 1008     Phosphorus 1.7 mg/dL     Magnesium [[id number]]  (Normal) Collected: 10/01/23 0852    Specimen: Blood Updated: 10/01/23 0951     Magnesium 1.7 mg/dL     Basic Metabolic Panel [[id number]]  (Abnormal) Collected: 10/01/23 0852    Specimen: Blood Updated: 10/01/23 0951     Glucose 132 mg/dL      BUN 28 mg/dL      Creatinine 1.32 mg/dL      Sodium 139 mmol/L      Potassium 3.8 mmol/L      Chloride 110 mmol/L      CO2 20.0 mmol/L      Calcium 9.2 mg/dL      BUN/Creatinine Ratio 21.2     Anion Gap 9.0 mmol/L      eGFR 61.4 mL/min/1.73     Narrative:      GFR Normal >60  Chronic Kidney Disease <60  Kidney Failure <15      Basic Metabolic Panel [[id number]]  (Abnormal) Collected: 10/01/23 0340    Specimen: Blood Updated: 10/01/23 0417     Glucose 173 mg/dL      BUN 36 mg/dL      Creatinine 1.49 mg/dL      Sodium 139 mmol/L      Potassium 3.8 mmol/L      Chloride 110 mmol/L      CO2 21.0 mmol/L      Calcium 8.9 mg/dL      BUN/Creatinine Ratio 24.2     Anion Gap 8.0 mmol/L      eGFR 53.1 mL/min/1.73     Narrative:      GFR Normal >60  Chronic Kidney Disease <60  Kidney Failure <15      High Sensitivity Troponin T 2Hr [[id number]]  (Abnormal) Collected: 09/30/23 2023    Specimen: Blood Updated: 09/30/23 2054     HS Troponin T 21 ng/L      Troponin T Delta -1 ng/L     Narrative:      High Sensitive Troponin T Reference Range:  <10.0 ng/L- Negative Female for AMI  <15.0 ng/L- Negative Male for AMI  >=10 - Abnormal Female indicating possible myocardial injury.  >=15 - Abnormal Male indicating possible myocardial injury.   Clinicians would have to utilize clinical acumen, EKG, Troponin, and serial changes to determine if it is an Acute Myocardial Infarction or myocardial injury due to an underlying chronic  condition.         Osmolality, Serum [[id number]]  (Abnormal) Collected: 09/30/23 2023    Specimen: Blood Updated: 09/30/23 2043     Osmolality 317 mOsm/kg      Comment: Corrected result. Previous result was 575 mOsm/kg on 9/30/2023 at 2037 EDT.       CBC & Differential [[id number]]  (Abnormal) Collected: 09/30/23 2023    Specimen: Blood Updated: 09/30/23 2031    Narrative:      The following orders were created for panel order CBC & Differential.  Procedure                               Abnormality         Status                     ---------                               -----------         ------                     CBC Auto Differential[[id number]]        Abnormal            Final result                 Please view results for these tests on the individual orders.    CBC Auto Differential [[id number]]  (Abnormal) Collected: 09/30/23 2023    Specimen: Blood Updated: 09/30/23 2031     WBC 4.40 10*3/mm3      RBC 4.49 10*6/mm3      Hemoglobin 13.0 g/dL      Hematocrit 38.0 %      MCV 84.7 fL      MCH 29.0 pg      MCHC 34.3 g/dL      RDW 13.5 %      RDW-SD 42.0 fl      MPV 7.7 fL      Platelets 112 10*3/mm3      Neutrophil % 65.3 %      Lymphocyte % 25.2 %      Monocyte % 8.0 %      Eosinophil % 0.3 %      Basophil % 1.2 %      Neutrophils, Absolute 2.90 10*3/mm3      Lymphocytes, Absolute 1.10 10*3/mm3      Monocytes, Absolute 0.40 10*3/mm3      Eosinophils, Absolute 0.00 10*3/mm3      Basophils, Absolute 0.10 10*3/mm3      nRBC 0.1 /100 WBC     Hemoglobin A1c [[id number]]  (Abnormal) Collected: 09/30/23 1615    Specimen: Blood Updated: 09/30/23 1739     Hemoglobin A1C 12.10 %     High Sensitivity Troponin T [[id number]]  (Abnormal) Collected: 09/30/23 1615    Specimen: Blood Updated: 09/30/23 1738     HS Troponin T 22 ng/L     Narrative:      High Sensitive Troponin T Reference Range:  <10.0 ng/L- Negative Female for AMI  <15.0 ng/L- Negative Male for AMI  >=10 - Abnormal Female indicating possible myocardial  injury.  >=15 - Abnormal Male indicating possible myocardial injury.   Clinicians would have to utilize clinical acumen, EKG, Troponin, and serial changes to determine if it is an Acute Myocardial Infarction or myocardial injury due to an underlying chronic condition.         BNP [[id number]]  (Normal) Collected: 09/30/23 1615    Specimen: Blood Updated: 09/30/23 1738     proBNP 485.6 pg/mL     Narrative:      This assay is used as an aid in the diagnosis of individuals suspected of having heart failure. It can be used as an aid in the diagnosis of acute decompensated heart failure (ADHF) in patients presenting with signs and symptoms of ADHF to the emergency department (ED). In addition, NT-proBNP of <300 pg/mL indicates ADHF is not likely.    [place] Draw [[id number]] Collected: 09/30/23 1615    Specimen: Blood Updated: 09/30/23 1716    Narrative:      The following orders were created for panel order [place] Draw.  Procedure                               Abnormality         Status                     ---------                               -----------         ------                     Green Top (Gel)[[id number]]                                  Final result               Lavender Top[[id number]]                                     Final result               Gold Top - SST[[id number]]                                   Final result               Light Blue Top[[id number]]                                   Final result                 Please view results for these tests on the individual orders.    Green Top (Gel) [[id number]] Collected: 09/30/23 1615    Specimen: Blood Updated: 09/30/23 1716     Extra Tube Hold for add-ons.     Comment: Auto resulted.       Light Blue Top [[id number]] Collected: 09/30/23 1615    Specimen: Blood Updated: 09/30/23 1716     Extra Tube Hold for add-ons.     Comment: Auto resulted       Comprehensive Metabolic Panel [[id number]]  (Abnormal) Collected: 09/30/23 1615    Specimen: Blood Updated:  09/30/23 1707     Glucose 979 mg/dL      BUN 52 mg/dL      Creatinine 2.28 mg/dL      Sodium 121 mmol/L      Potassium 5.0 mmol/L      Chloride 87 mmol/L      CO2 17.0 mmol/L      Calcium 9.2 mg/dL      Total Protein 6.4 g/dL      Albumin 3.7 g/dL      ALT (SGPT) 18 U/L      AST (SGOT) 13 U/L      Alkaline Phosphatase 172 U/L      Total Bilirubin 0.7 mg/dL      Globulin 2.7 gm/dL      A/G Ratio 1.4 g/dL      BUN/Creatinine Ratio 22.8     Anion Gap 17.0 mmol/L      eGFR 31.8 mL/min/1.73     Narrative:      GFR Normal >60  Chronic Kidney Disease <60  Kidney Failure <15      Gold Top - SST [[id number]] Collected: 09/30/23 1615    Specimen: Blood Updated: 09/30/23 1655     Extra Tube done    Lipase [[id number]]  (Abnormal) Collected: 09/30/23 1615    Specimen: Blood Updated: 09/30/23 1654     Lipase 67 U/L     Blood Gas, Venous - [[id number]]  (Abnormal) Collected: 09/30/23 1648    Specimen: Venous Blood Updated: 09/30/23 1653     Site PA     pH, Venous 7.286 pH Units      pCO2, Venous 21.9 mm Hg      pO2, Venous 41.0 mm Hg      HCO3, Venous 10.4 mmol/L      Base Excess, Venous -14.7 mmol/L      Comment: Serial Number: [id number]:  [id number]        O2 Saturation, Venous 71.7 %      CO2 Content 11.1 mmol/L      Barometric Pressure for Blood Gas --     Comment: N/A        Modality Room Air     FIO2 21 %     Osmolality, Serum [[id number]]  (Abnormal) Collected: 09/30/23 1615    Specimen: Blood Updated: 09/30/23 1647     Osmolality 329 mOsm/kg     Lavender Top [[id number]] Collected: 09/30/23 1615    Specimen: Blood Updated: 09/30/23 1634    Urinalysis With Culture If Indicated - Urine, Clean Catch [[id number]]  (Abnormal) Collected: 09/30/23 1620    Specimen: Urine, Clean Catch Updated: 09/30/23 1634     Color, UA Yellow     Appearance, UA Clear     pH, UA <=5.0     Specific Gravity, UA 1.030     Glucose, UA >=1000 mg/dL (3+)     Ketones, UA 15 mg/dL (1+)     Bilirubin, UA Negative     Blood, UA Negative     Protein, UA Negative      Leuk Esterase, UA Negative     Nitrite, UA Negative     Urobilinogen, UA 0.2 E.U./dL    Narrative:      In absence of clinical symptoms, the presence of pyuria, bacteria, and/or nitrites on the urinalysis result does not correlate with infection.  Urine microscopic not indicated.    CBC & Differential [[id number]]  (Abnormal) Collected: 09/30/23 1615    Specimen: Blood Updated: 09/30/23 1633    Narrative:      The following orders were created for panel order CBC & Differential.  Procedure                               Abnormality         Status                     ---------                               -----------         ------                     CBC Auto Differential[[id number]]        Abnormal            Final result                 Please view results for these tests on the individual orders.    CBC Auto Differential [[id number]]  (Abnormal) Collected: 09/30/23 1615    Specimen: Blood Updated: 09/30/23 1633     WBC 5.30 10*3/mm3      RBC 4.88 10*6/mm3      Hemoglobin 14.2 g/dL      Hematocrit 43.0 %      MCV 88.0 fL      MCH 29.1 pg      MCHC 33.0 g/dL      RDW 14.1 %      RDW-SD 46.4 fl      MPV 8.4 fL      Platelets 117 10*3/mm3      Neutrophil % 71.1 %      Lymphocyte % 20.7 %      Monocyte % 6.7 %      Eosinophil % 0.3 %      Basophil % 1.2 %      Neutrophils, Absolute 3.80 10*3/mm3      Lymphocytes, Absolute 1.10 10*3/mm3      Monocytes, Absolute 0.40 10*3/mm3      Eosinophils, Absolute 0.00 10*3/mm3      Basophils, Absolute 0.10 10*3/mm3      nRBC 0.2 /100 WBC              Results for orders placed during the hospital encounter of 08/22/19    Adult Transthoracic Echo Complete W/ Cont if Necessary Per Protocol    Interpretation Summary  · The left ventricular cavity is mildly dilated.  · Left atrial cavity size is moderately dilated.  · Left ventricular wall thickness is consistent with severe concentric hypertrophy.  · Mild aortic valve stenosis is present.  · Mild mitral valve regurgitation is  present      Normal LV size and contractility EF of 60 to 65% severe concentric LVH  Normal RV size  Moderate left atrial enlargement  Aortic valve, mitral valve, tricuspid valve appears structurally normal, mild aortic, mitral regurgitation seen.  Trace tricuspid regurgitation seen no signs of increased right ventricular pressure  No pericardial effusion seen.  Proximal aorta appears normal in size.              Condition on Discharge:  Improved, stable    Vital Signs  Temp:  [97.5 °F (36.4 °C)-98.2 °F (36.8 °C)] 97.8 °F (36.6 °C)  Heart Rate:  [58-65] 65  Resp:  [16-18] 16  BP: (116-140)/(64-88) 119/80    Physical Exam:     General Appearance:    Alert, cooperative, in no acute distress   Head:    Normocephalic, without obvious abnormality, atraumatic   Eyes:            Lids and lashes normal, conjunctivae and sclerae normal, no   icterus, no pallor, corneas clear, PERRLA   Ears:    Ears appear intact with no abnormalities noted   Throat:   No oral lesions, no thrush, oral mucosa moist   Neck:   No adenopathy, supple, trachea midline, no thyromegaly, no   carotid bruit, no JVD   Lungs:     Clear to auscultation,respirations regular, even and                  unlabored    Heart:    Regular rhythm and normal rate, normal S1 and S2, no            murmur, no gallop, no rub, no click   Chest Wall:    No abnormalities observed   Abdomen:     Normal bowel sounds, no masses, no organomegaly, soft        non-tender, non-distended, no guarding, no rebound                tenderness   Extremities:   Moves all extremities well, no edema, no cyanosis, no             redness   Pulses:   Pulses palpable and equal bilaterally   Skin:   No bleeding, bruising or rash   Lymph nodes:   No palpable adenopathy   Neurologic:   Cranial nerves 2 - 12 grossly intact, sensation intact, DTR       present and equal bilaterally       Discharge Disposition  Home or Self Care    Discharge Medications     Discharge Medications        New  Medications        Instructions Start Date   Accu-Chek Guide test strip  Generic drug: glucose blood   1 each, Other, 4 Times Daily Before Meals & Nightly, Dx: E11.65. Use as instructed      Accu-Chek Softclix Lancets lancets   1 each, Other, 4 Times Daily Before Meals & Nightly, Dx: E11.65. Use as instructed      Insulin Glargine 100 UNIT/ML injection pen  Commonly known as: LANTUS SOLOSTAR   30 Units, Subcutaneous, Daily      Insulin Lispro (1 Unit Dial) 100 UNIT/ML solution pen-injector  Commonly known as: HumaLOG KwikPen   12 units with each meal plus sliding scale for correction      Pen Needles 32G X 4 MM misc   1 each, Does not apply, 4 Times Daily             Changes to Medications        Instructions Start Date   calcium acetate 667 MG capsule  Commonly known as: PHOSLO  What changed: Another medication with the same name was removed. Continue taking this medication, and follow the directions you see here.   2,001 mg, Oral, Daily With Dinner             Continue These Medications        Instructions Start Date   amLODIPine 10 MG tablet  Commonly known as: NORVASC   10 mg, Oral, Daily, Take at night      b complex-vitamin c-folic acid 0.8 MG tablet tablet   1 tablet, Oral, Daily      cinacalcet 30 MG tablet  Commonly known as: SENSIPAR   30 mg, Oral, Every Other Day, Pt takes on Monday, Wednesday, Friday      magnesium oxide 400 MG tablet  Commonly known as: MAG-OX   400 mg, Oral, Daily      mycophenolate 250 MG capsule  Commonly known as: CELLCEPT   500 mg, Oral, 2 Times Daily      sodium bicarbonate 650 MG tablet   650 mg, Oral, Once      tacrolimus 1 MG capsule  Commonly known as: PROGRAF   1 mg, Oral, Nightly, Take 3 caps every morning and 2 caps every evening      tacrolimus 1 MG capsule  Commonly known as: PROGRAF   3 mg, Oral, Every Morning             Stop These Medications      albuterol sulfate  (90 Base) MCG/ACT inhaler  Commonly known as: PROVENTIL HFA;VENTOLIN HFA;PROAIR HFA     cloNIDine  0.1 MG tablet  Commonly known as: CATAPRES     hydrALAZINE 25 MG tablet  Commonly known as: APRESOLINE     hydrALAZINE 50 MG tablet  Commonly known as: APRESOLINE     HYDROcodone-acetaminophen 5-325 MG per tablet  Commonly known as: NORCO     Trelegy Ellipta 100-62.5-25 MCG/ACT inhaler  Generic drug: Fluticasone-Umeclidin-Vilant              Discharge Diet:   Diet Instructions       Diet: Diabetic Diets; Consistent Carbohydrate; Thin (IDDSI 0)      Discharge Diet: Diabetic Diets    Diabetic Diet: Consistent Carbohydrate    Fluid Consistency: Thin (IDDSI 0)            Activity at Discharge: No restrictions    Follow-up Appointments  No future appointments.  Additional Instructions for the Follow-ups that You Need to Schedule       Discharge Follow-up with PCP   As directed       Currently Documented PCP:    Jazmine Pastrana APRN    PCP Phone Number:    [phone]     Follow Up Details: [facility] will contact you with details of a follow up appointment.        Discharge Follow-up with Specified Provider: Endocrinologist (diabetes specialist) - [facility].; 2 Weeks   As directed      To: Endocrinologist (diabetes specialist) - [facility].   Follow Up: 2 Weeks                Test Results Pending at Discharge  Pending Labs       Order Current Status    C-Peptide In process    Glutamic Acid Decarboxylase In process    Tacrolimus Level In process             Gabbie Booth MD  10/02/23  18:32 EDT    Time: Discharge 35 min

## 2023-10-02 NOTE — PLAN OF CARE
Goal Outcome Evaluation:      Pt has been resting through the night, main complaint patient has had tonight was congestion he felt that was associated with his NS infusing. On call provider notified and fluids were to be stopped and to be reevaluated in the morning. Pt highly concerned in regards to diabetes management and insulin management. VSS at this time with no further issues noted. Call light and cell phone has remained within reach and patient is able to make needs known to staff. Plan for diabetes educator along with endocrine to come speak with patient today.

## 2023-10-02 NOTE — PROGRESS NOTES
ENDOCRINE CONSULT PROGRESS NOTE  DATE OF SERVICE: 10/02/23        PATIENT NAME: Jose Miguel Martinez  PATIENT : 1960 AGE: 63 y.o.  MRN NUMBER: [id number]    ==========================================================================    CHIEF COMPLAINT: T2DM    CARE TEAM:   Patient Care Team:  Jazmine Pastrana APRN as PCP - General (Nurse Practitioner)  Kvng Goff MD (Infectious Diseases)  Ana Maria Thacker APRN ([facility])  Darrell Gonzalez MD as Consulting Physician (Nephrology)    SUBJECTIVE    Pt seen and examined.  Denied fever or chill.  Denied nausea or vomiting.  Denied abdominal pain, diarrhea or constipation.  Tolerating diet.  No other acute event overnight.    ==========================================================================    CURRENT ACTIVE HOSPITAL MEDICATIONS    Scheduled Medications:  famotidine, 20 mg, Intravenous, Q12H  insulin glargine, 30 Units, Subcutaneous, Daily  insulin lispro, 10 Units, Subcutaneous, TID With Meals  insulin lispro, 2-9 Units, Subcutaneous, 4x Daily AC & at Bedtime  mycophenolate, 500 mg, Oral, BID  senna-docusate sodium, 2 tablet, Oral, BID  sodium bicarbonate, 1,300 mg, Oral, TID  sodium chloride, 10 mL, Intravenous, Q12H  tacrolimus, 2 mg, Oral, Nightly  tacrolimus, 3 mg, Oral, QAM         PRN Medications:    senna-docusate sodium **AND** polyethylene glycol **AND** bisacodyl **AND** bisacodyl    dextrose    dextrose    glucagon (human recombinant)    sodium chloride    sodium chloride     ==========================================================================    OBJECTIVE    Vitals:    10/02/23 1600   BP:    Pulse: 65   Resp: 16   Temp:    SpO2:       Body mass index is 28.47 kg/m².     General - A&Ox3, NAD, Calm  CV - RRR S1 S2  RESP - CTA B/L  No ronchi, wheezing or crepts  ABD - soft, NT, ND Positive BS    ==========================================================================    LAB EVALUATION    Lab Results   Component  "Value Date    GLUCOSE 103 (H) 10/02/2023    BUN 22 10/02/2023    CREATININE 1.30 (H) 10/02/2023    EGFRIFNONA 39 (L) 02/23/2022    EGFRIFAFRI 20 04/02/2021    BCR 16.9 10/02/2023    K 3.6 10/02/2023    CO2 20.0 (L) 10/02/2023    CALCIUM 9.0 10/02/2023    ALBUMIN 3.0 (L) 10/02/2023    AST 13 09/30/2023    ALT 18 09/30/2023       Lab Results   Component Value Date    HGBA1C 12.10 (H) 09/30/2023    HGBA1C 6.30 (H) 06/16/2023    HGBA1C 6.20 (H) 05/02/2023     Lab Results   Component Value Date    CREATININE 1.30 (H) 10/02/2023     Results from last 7 days   Lab Units 10/02/23  1207 10/02/23  0640 10/01/23  2009 10/01/23  1746 10/01/23  1622 10/01/23  1206   GLUCOSE mg/dL 212* 170* 254* 296* 241* 144*       ==========================================================================    ASSESSMENT AND PLAN    #Type 2 diabetes with hyperglycemia  - Reviewed blood glucose for last 24 hours  - Adjusted insulin therapy:  Insulin Lantus 30 units every morning  Insulin lispro 12 units with each meal  Correction scale  - Pending C-peptide and jordan 65 levels    #S/p renal transplant  #Hypertension  - Care as per primary care    Will follow with you.  Rest as per primary team.    This note was created using voice recognition software and is inherently subject to errors including those of syntax and \"sound-alike\" substitutions which may escape proofreading.  In such instances, original meaning may be extrapolated by contextual derivation.    Note: Portions of this note may have been copied from previous notes but documentation have been reviewed and edited as necessary to support clinical decision making for today's visit.    ==========================================================================  Maurisio Bunch MD  Department of Endocrine, Diabetes and Metabolism  [facility] [place]  ==========================================================================    "

## 2023-10-03 ENCOUNTER — TELEPHONE (OUTPATIENT)
Dept: DIABETES SERVICES | Facility: HOSPITAL | Age: 63
End: 2023-10-03
Payer: MEDICARE

## 2023-10-03 LAB — C PEPTIDE SERPL-MCNC: 1 NG/ML (ref 1.1–4.4)

## 2023-10-03 NOTE — TELEPHONE ENCOUNTER
Pt had attempted to reach educator several times this afternoon to discuss not receiving insulin pens and unsure what to do. Pt left voice mails for educator.    Educator returned patient's call at 1800. Educator spoke with pt and wife. Wife states pt has received the Lantus pen but insurance will not cover for the Lispro pens. Wife had contacted MD office this morning and rx sent in for Humalog but insurance will not cover this name brand either. Wife contacted insurance company and was told they do cover Novolog and Fiasp.     Educator [facility] and called in rx for Novolog pens 12 units ac, 15 mL with 5 refills. Discussed with pharmacy pt needing to  insulin this pm if possible.     Educator called pt back. Wife answered phone call. Discussed Novolog has been called in. Pt had taken a dose of Lantus earlier today and took 12 units. He thought the pen mealtime insulin. Discussed with wife pt needs to take an additional 18 units of Lantus now and then to begin Lantus 30 units daily tomorrow. Discussed when he receives the Novolog pen, he is to take 12 units premeals. Wife verbalized understanding. Pt/wife with no additional questions.

## 2023-10-03 NOTE — OUTREACH NOTE
Prep Survey      Flowsheet Row Responses   [facility] discharged from? [facility]   Is LACE score < 7 ? No   Eligibility Readm Mgmt   Discharge diagnosis DKA (diabetic ketoacidosis)   Does the patient have one of the following disease processes/diagnoses(primary or secondary)? Other   Does the patient have Home health ordered? No   Is there a DME ordered? No   Medication alerts for this patient See AVS   Prep survey completed? Yes            Yvette ALTAMIRANO - Registered Nurse

## 2023-10-03 NOTE — TELEPHONE ENCOUNTER
Pt states he went to  rxs for lantus pens, lispro pens, pen needles, test strips and lancets but they were not ready at the pharmacy. He state he was told rxs would be ready around 11 am. Per discharge, all rxs were sent to [facility] pharmacy. Pt states he checked bs this am and bs 135. Educator discussed if he has issues with obtaining his insulin, he should contact educator. Pt verbalized understanding. Pt with no questions at this time.

## 2023-10-04 ENCOUNTER — TELEPHONE (OUTPATIENT)
Dept: DIABETES SERVICES | Facility: HOSPITAL | Age: 63
End: 2023-10-04
Payer: MEDICARE

## 2023-10-04 LAB — TACROLIMUS BLD LC/MS/MS-MCNC: 12.8 NG/ML (ref 2–20)

## 2023-10-04 RX ORDER — HYDRALAZINE HYDROCHLORIDE 25 MG/1
25 TABLET, FILM COATED ORAL
COMMUNITY

## 2023-10-04 RX ORDER — CINACALCET 30 MG/1
30 TABLET, FILM COATED ORAL
COMMUNITY

## 2023-10-04 RX ORDER — LANTHANUM CARBONATE 1000 MG/1
1 TABLET, CHEWABLE ORAL 3 TIMES DAILY
COMMUNITY
Start: 2019-06-14

## 2023-10-04 RX ORDER — CLONIDINE HYDROCHLORIDE 0.1 MG/1
1 TABLET ORAL
COMMUNITY
Start: 2018-10-12

## 2023-10-05 ENCOUNTER — READMISSION MANAGEMENT (OUTPATIENT)
Dept: CALL CENTER | Facility: HOSPITAL | Age: 63
End: 2023-10-05
Payer: MEDICARE

## 2023-10-05 ENCOUNTER — TELEPHONE (OUTPATIENT)
Dept: DIABETES SERVICES | Facility: HOSPITAL | Age: 63
End: 2023-10-05
Payer: MEDICARE

## 2023-10-05 LAB — GAD65 AB SER IA-ACNC: <5 U/ML (ref 0–5)

## 2023-10-05 NOTE — TELEPHONE ENCOUNTER
Educator reached pt by phone call. Pt states he has now received both insulins (Lantus and Lispro) and is taking as prescribed. Pt with no additional education needs at present.

## 2023-10-05 NOTE — TELEPHONE ENCOUNTER
Pt returned educator phone call and left voice mail. Educator returned pt's call and left voice mail for pt to return educator call. Pt did state on voice mail he left that he did have both his insulins.

## 2023-10-05 NOTE — OUTREACH NOTE
Medical Week 1 Survey      Flowsheet Row Responses   [facility] patient discharged from? [facility]   Does the patient have one of the following disease processes/diagnoses(primary or secondary)? Other   Week 1 attempt successful? Yes   Call start time 1349   Call end time 1422   General alerts for this patient hx kidney transplant 2021   Discharge diagnosis DKA (diabetic ketoacidosis)   Meds reviewed with patient/caregiver? Yes   Is the patient having any side effects they believe may be caused by any medication additions or changes? No   Does the patient have all medications ordered at discharge? Yes   Is the patient taking all medications as directed (includes completed medication regime)? Yes   Does the patient have a primary care provider?  Yes   Does the patient have an appointment with their PCP within 7 days of discharge? Yes   Has the patient kept scheduled appointments due by today? N/A   Has home health visited the patient within 72 hours of discharge? N/A   Psychosocial issues? No   Comments pt examing lifestyle to make changes to acommodate new DM diagnosis, pt is a kidney transplant recipient   Did the patient receive a copy of their discharge instructions? Yes   Nursing interventions Reviewed instructions with patient   What is the patient's perception of their health status since discharge? Improving   Is the patient/caregiver able to teach back signs and symptoms related to disease process for when to call PCP? Yes   Is the patient/caregiver able to teach back signs and symptoms related to disease process for when to call 911? Yes   Is the patient/caregiver able to teach back the hierarchy of who to call/visit for symptoms/problems? PCP, Specialist, Home health nurse, Urgent Care, ED, 911 Yes   If the patient is a current smoker, are they able to teach back resources for cessation? Not a smoker  [has not smoked since hospital stay, plans to quit permanently]   Additional teach back comments states  glucoses running WNL's, 120's, discussed management of newly diagnosed DM, diet, activity, insulin, glucometer   Week 1 call completed? Yes   Call end time 1422            Pretty LORD - Registered Nurse

## 2023-10-10 ENCOUNTER — OFFICE VISIT (OUTPATIENT)
Dept: ENDOCRINOLOGY | Facility: CLINIC | Age: 63
End: 2023-10-10
Payer: MEDICARE

## 2023-10-10 ENCOUNTER — PATIENT ROUNDING (BHMG ONLY) (OUTPATIENT)
Dept: ENDOCRINOLOGY | Facility: CLINIC | Age: 63
End: 2023-10-10
Payer: MEDICARE

## 2023-10-10 VITALS
HEIGHT: 72 IN | OXYGEN SATURATION: 97 % | HEART RATE: 57 BPM | WEIGHT: 214 LBS | SYSTOLIC BLOOD PRESSURE: 138 MMHG | BODY MASS INDEX: 28.99 KG/M2 | DIASTOLIC BLOOD PRESSURE: 89 MMHG

## 2023-10-10 DIAGNOSIS — E10.65 TYPE 1 DIABETES MELLITUS WITH HYPERGLYCEMIA: Primary | ICD-10-CM

## 2023-10-10 PROCEDURE — 3075F SYST BP GE 130 - 139MM HG: CPT | Performed by: INTERNAL MEDICINE

## 2023-10-10 PROCEDURE — 1159F MED LIST DOCD IN RCRD: CPT | Performed by: INTERNAL MEDICINE

## 2023-10-10 PROCEDURE — 1160F RVW MEDS BY RX/DR IN RCRD: CPT | Performed by: INTERNAL MEDICINE

## 2023-10-10 PROCEDURE — 3079F DIAST BP 80-89 MM HG: CPT | Performed by: INTERNAL MEDICINE

## 2023-10-10 PROCEDURE — 3046F HEMOGLOBIN A1C LEVEL >9.0%: CPT | Performed by: INTERNAL MEDICINE

## 2023-10-10 PROCEDURE — 99214 OFFICE O/P EST MOD 30 MIN: CPT | Performed by: INTERNAL MEDICINE

## 2023-10-10 RX ORDER — INSULIN LISPRO 100 [IU]/ML
INJECTION, SOLUTION INTRAVENOUS; SUBCUTANEOUS
Start: 2023-10-10

## 2023-10-10 RX ORDER — INSULIN ASPART 100 [IU]/ML
INJECTION, SOLUTION INTRAVENOUS; SUBCUTANEOUS
COMMUNITY
Start: 2023-10-03 | End: 2023-10-10 | Stop reason: CLARIF

## 2023-10-10 NOTE — PROGRESS NOTES
[facility]        Patient Care Team:  Jazmine Pastrana APRN as PCP - General (Nurse Practitioner)  Kvng Goff MD (Infectious Diseases)  Ana Maria Thacker APRN ([facility])  Darrell Gonzalez MD as Consulting Physician (Nephrology)    Chief Complaint:    Chief Complaint   Patient presents with    Diabetes     HFU / DM type  /  / no food          Subjective   Here for diabetes hosp f/u  Blood sugars: in the 80's @ lunch  Exercise program: walking    Interval History:     Patient Complaints:  feels hot w 80's bl sugar  Patient Denies: severe hypoglycemia  History taken from: patient    Review of Systems:   Review of Systems   HENT:  Positive for hearing loss. Negative for trouble swallowing.    Eyes:  Negative for blurred vision.   Cardiovascular:  Positive for leg swelling.   Gastrointestinal:  Negative for nausea.   Endocrine: Negative for polyuria.   Neurological:  Negative for headache.     Gained 7 lb since hosp    Objective     Vital Signs  Heart Rate:  [57] 57  BP: (138)/(89) 138/89    Physical Exam:     General Appearance:    Alert, cooperative, in no acute distress   Head:    Normocephalic, without obvious abnormality, atraumatic   Eyes:            Lids and lashes normal, conjunctivae and sclerae normal, no   icterus, no pallor, corneas clear, PERRLA   Throat:   No oral lesions,  oral mucosa moist   Neck:   No adenopathy, supple,  no thyromegaly, no carotid bruit   Lungs:     Clear    Heart:    Regular rhythm and normal rate   Chest Wall:    No abnormalities observed   Abdomen:     Normal bowel sounds, soft                 Extremities:   Moves all extremities well, trace edema               Pulses:   Pulses palpable and equal bilaterally   Skin:   Dry   Neurologic:  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    Medication Review:   Prior to Admission medications    Medication Sig Start Date End Date Taking? Authorizing Provider   Accu-Chek Softclix Lancets lancets 1 each by Other route 4 (Four) Times a Day Before Meals & at Bedtime. Dx: E11.65. Use as instructed 10/2/23  Yes Gabbie Booth MD   b complex-vitamin c-folic acid (NEPHRO-MARK) 0.8 MG tablet tablet Take 1 tablet by mouth Daily.   Yes Gunnar Pineda MD   cloNIDine (CATAPRES) 0.1 MG tablet Take 1 tablet by mouth. 10/12/18  Yes Gunnar Pineda MD   glucose blood (Accu-Chek Guide) test strip 1 each by Other route 4 (Four) Times a Day Before Meals & at Bedtime. Dx: E11.65. Use as instructed 10/2/23  Yes Gabbie Booth MD   hydrALAZINE (APRESOLINE) 25 MG tablet Take 1 tablet by mouth.   Yes Gunnar Pineda MD   Insulin Glargine (LANTUS SOLOSTAR) 100 UNIT/ML injection pen Inject 30 Units under the skin into the appropriate area as directed Daily. 10/2/23  Yes Gabbie Booth MD   Insulin Lispro, 1 Unit Dial, (HumaLOG KwikPen) 100 UNIT/ML solution pen-injector 12 units with each meal plus sliding scale for correction 10/2/23  Yes Gabbie Booth MD   Insulin Pen Needle (Pen Needles) 32G X 4 MM misc Use 1 each 4 (Four) Times a Day. 10/2/23  Yes Gabbie Booth MD   lanthanum (Fosrenol) 1000 MG chewable tablet Chew 1 tablet 3 (Three) Times a Day. 6/14/19  Yes Gunnar Pineda MD   magnesium oxide (MAG-OX) 400 MG tablet Take 1 tablet by mouth Daily.   Yes Gunnar Pineda MD   mycophenolate (CELLCEPT) 250 MG capsule Take 2 capsules by mouth 2 (Two) Times a Day.   Yes Gunnar Pineda MD   sodium bicarbonate 650 MG tablet Take 1 tablet by mouth 1 (One) Time.   Yes Gunnar Pineda MD   tacrolimus (PROGRAF) 1 MG capsule Take 1 capsule by mouth Every Night. Take 3 caps every morning and 2 caps every evening   Yes Gunnar Pineda MD   tacrolimus (PROGRAF) 1 MG capsule Take 3 capsules by mouth Every Morning.   Yes Edwin  MD Gunnar   cinacalcet (SENSIPAR) 30 MG tablet Take 1 tablet by mouth.  10/10/23 Yes Gunnar Pineda MD   calcium acetate (PHOSLO) 667 MG capsule Take 2,001 mg by mouth Daily With Dinner.    Gunnar Pineda MD   cinacalcet (SENSIPAR) 30 MG tablet Take 1 tablet by mouth Every Other Day. Pt takes on Monday, Wednesday, Friday  Patient not taking: Reported on 10/10/2023    Gunnar Pineda MD   amLODIPine (NORVASC) 10 MG tablet Take 1 tablet by mouth Daily. Take at night  Patient not taking: Reported on 10/10/2023  10/10/23  Gunnar Pineda MD   NovoLOG FlexPen 100 UNIT/ML solution pen-injector sc pen  10/3/23 10/10/23  Gunnar Pineda MD         Lab Results   Component Value Date    HGBA1C 12.10 (H) 09/30/2023    HGBA1C 6.30 (H) 06/16/2023    HGBA1C 6.20 (H) 05/02/2023      Lab Results   Component Value Date    GLUCOSE 103 (H) 10/02/2023    BUN 22 10/02/2023    CREATININE 1.30 (H) 10/02/2023    EGFRIFNONA 39 (L) 02/23/2022    EGFRIFAFRI 20 04/02/2021    BCR 16.9 10/02/2023    K 3.6 10/02/2023    CO2 20.0 (L) 10/02/2023    CALCIUM 9.0 10/02/2023    ALBUMIN 3.0 (L) 10/02/2023    AST 13 09/30/2023    ALT 18 09/30/2023    CHOL 162 05/02/2023     (H) 05/02/2023    HDL 43 05/02/2023    TRIG 71 05/02/2023     Lab Results   Component Value Date    TSH 2.130 10/01/2023    FFNV12ON 30.5 10/02/2023     C-peptide 1.0 ng/ml ( 1.1-4.4 ); DANYELLE 65 AB < 5.0 on 10/2/2023.    Assessment & Plan     Diagnoses and all orders for this visit:    1. Type 1 diabetes mellitus with hyperglycemia (Primary)  -     Hemoglobin A1c; Future  -     Microalbumin / Creatinine Urine Ratio - Urine, Clean Catch; Future  -     Comprehensive Metabolic Panel; Future  -     Lipid Panel; Future  -     Ambulatory Referral to Diabetic Education  -     Insulin Glargine (LANTUS SOLOSTAR) 100 UNIT/ML injection pen; Inject 26 units daily  -     Insulin Lispro, 1 Unit Dial, (HumaLOG KwikPen) 100 UNIT/ML solution pen-injector; 8  units with each meal plus sliding scale for correction. MDD 30 units    Glucose control improving.    See eye doctor for diabetes eye exam.  Schedule diabetes education.  Decrease Lantus insulin to 26 units daily.  Decrease lispro insulin to 8 units before meals.  Call if blood sugars are running under 100 or over 200.        Daphne Sanchez MD  10/10/23  19:28 EDT

## 2023-10-10 NOTE — PATIENT INSTRUCTIONS
See eye doctor for diabetes eye exam.  Schedule diabetes education.  Decrease Lantus insulin to 26 units daily.  Decrease lispro insulin to 8 units before meals.  Call if blood sugars are running under 100 or over 200.  F/u in 6 months, with fasting labs prior.

## 2023-10-10 NOTE — PROGRESS NOTES
October 10, 2023    Hello, may I speak with Jose Miguel Martinez?    My name is Aimee      I am  with [facility]  [address] [place] [address].    Before we get started may I verify your date of birth? 1960    I am calling to officially welcome you to our practice and ask about your recent visit. Is this a good time to talk? yes    Tell me about your visit with us. What things went well?  it was ok. She is definitely thorough.        We're always looking for ways to make our patients' experiences even better. Do you have recommendations on ways we may improve?  no    Overall were you satisfied with your first visit to our practice? yes       I appreciate you taking the time to speak with me today. Is there anything else I can do for you? no      Thank you, and have a great day.

## 2023-10-12 ENCOUNTER — LAB (OUTPATIENT)
Dept: LAB | Facility: HOSPITAL | Age: 63
End: 2023-10-12
Payer: COMMERCIAL

## 2023-10-12 ENCOUNTER — TRANSCRIBE ORDERS (OUTPATIENT)
Dept: ADMINISTRATIVE | Facility: HOSPITAL | Age: 63
End: 2023-10-12
Payer: MEDICARE

## 2023-10-12 DIAGNOSIS — R39.9 GENITOURINARY SYMPTOMS: Primary | ICD-10-CM

## 2023-10-12 DIAGNOSIS — R39.9 GENITOURINARY SYMPTOMS: ICD-10-CM

## 2023-10-12 DIAGNOSIS — N18.30 STAGE 3 CHRONIC KIDNEY DISEASE, UNSPECIFIED WHETHER STAGE 3A OR 3B CKD: Primary | ICD-10-CM

## 2023-10-12 DIAGNOSIS — R79.89 HYPOURICEMIA: ICD-10-CM

## 2023-10-12 DIAGNOSIS — Z94.0 KIDNEY REPLACED BY TRANSPLANT: ICD-10-CM

## 2023-10-12 DIAGNOSIS — N18.30 STAGE 3 CHRONIC KIDNEY DISEASE, UNSPECIFIED WHETHER STAGE 3A OR 3B CKD: ICD-10-CM

## 2023-10-12 DIAGNOSIS — D64.9 ANEMIA, UNSPECIFIED TYPE: ICD-10-CM

## 2023-10-12 LAB
ALBUMIN SERPL-MCNC: 3.9 G/DL (ref 3.5–5.2)
ANION GAP SERPL CALCULATED.3IONS-SCNC: 5.7 MMOL/L (ref 5–15)
BACTERIA UR QL AUTO: NORMAL /HPF
BASOPHILS # BLD AUTO: 0.01 10*3/MM3 (ref 0–0.2)
BASOPHILS NFR BLD AUTO: 0.3 % (ref 0–1.5)
BILIRUB UR QL STRIP: NEGATIVE
BUN SERPL-MCNC: 18 MG/DL (ref 8–23)
BUN/CREAT SERPL: 12.6 (ref 7–25)
CALCIUM SPEC-SCNC: 10 MG/DL (ref 8.6–10.5)
CHLORIDE SERPL-SCNC: 106 MMOL/L (ref 98–107)
CLARITY UR: CLEAR
CO2 SERPL-SCNC: 24.3 MMOL/L (ref 22–29)
COLOR UR: YELLOW
CREAT SERPL-MCNC: 1.43 MG/DL (ref 0.76–1.27)
CREAT UR-MCNC: 96.9 MG/DL
DEPRECATED RDW RBC AUTO: 41 FL (ref 37–54)
EGFRCR SERPLBLD CKD-EPI 2021: 55.7 ML/MIN/1.73
EOSINOPHIL # BLD AUTO: 0.03 10*3/MM3 (ref 0–0.4)
EOSINOPHIL NFR BLD AUTO: 0.9 % (ref 0.3–6.2)
ERYTHROCYTE [DISTWIDTH] IN BLOOD BY AUTOMATED COUNT: 13 % (ref 12.3–15.4)
GLUCOSE SERPL-MCNC: 140 MG/DL (ref 65–99)
GLUCOSE UR STRIP-MCNC: NEGATIVE MG/DL
HCT VFR BLD AUTO: 41.1 % (ref 37.5–51)
HGB BLD-MCNC: 13.6 G/DL (ref 13–17.7)
HGB UR QL STRIP.AUTO: NEGATIVE
HYALINE CASTS UR QL AUTO: NORMAL /LPF
IMM GRANULOCYTES # BLD AUTO: 0.02 10*3/MM3 (ref 0–0.05)
IMM GRANULOCYTES NFR BLD AUTO: 0.6 % (ref 0–0.5)
KETONES UR QL STRIP: NEGATIVE
LEUKOCYTE ESTERASE UR QL STRIP.AUTO: NEGATIVE
LYMPHOCYTES # BLD AUTO: 0.99 10*3/MM3 (ref 0.7–3.1)
LYMPHOCYTES NFR BLD AUTO: 28.2 % (ref 19.6–45.3)
MAGNESIUM SERPL-MCNC: 1.6 MG/DL (ref 1.6–2.4)
MCH RBC QN AUTO: 28.8 PG (ref 26.6–33)
MCHC RBC AUTO-ENTMCNC: 33.1 G/DL (ref 31.5–35.7)
MCV RBC AUTO: 87.1 FL (ref 79–97)
MONOCYTES # BLD AUTO: 0.27 10*3/MM3 (ref 0.1–0.9)
MONOCYTES NFR BLD AUTO: 7.7 % (ref 5–12)
NEUTROPHILS NFR BLD AUTO: 2.19 10*3/MM3 (ref 1.7–7)
NEUTROPHILS NFR BLD AUTO: 62.3 % (ref 42.7–76)
NITRITE UR QL STRIP: NEGATIVE
NRBC BLD AUTO-RTO: 0 /100 WBC (ref 0–0.2)
PH UR STRIP.AUTO: 6 [PH] (ref 5–8)
PHOSPHATE SERPL-MCNC: 1.9 MG/DL (ref 2.5–4.5)
PLATELET # BLD AUTO: 135 10*3/MM3 (ref 140–450)
PMV BLD AUTO: 9.7 FL (ref 6–12)
POTASSIUM SERPL-SCNC: 4.8 MMOL/L (ref 3.5–5.2)
PROT ?TM UR-MCNC: 9.5 MG/DL
PROT UR QL STRIP: NEGATIVE
PROT/CREAT UR: 98 MG/G CREA (ref 0–200)
RBC # BLD AUTO: 4.72 10*6/MM3 (ref 4.14–5.8)
RBC # UR STRIP: NORMAL /HPF
REF LAB TEST METHOD: NORMAL
SODIUM SERPL-SCNC: 136 MMOL/L (ref 136–145)
SP GR UR STRIP: 1.02 (ref 1–1.03)
SQUAMOUS #/AREA URNS HPF: NORMAL /HPF
UROBILINOGEN UR QL STRIP: NORMAL
WBC # UR STRIP: NORMAL /HPF
WBC NRBC COR # BLD: 3.51 10*3/MM3 (ref 3.4–10.8)

## 2023-10-12 PROCEDURE — 83735 ASSAY OF MAGNESIUM: CPT

## 2023-10-12 PROCEDURE — 80197 ASSAY OF TACROLIMUS: CPT

## 2023-10-12 PROCEDURE — 84156 ASSAY OF PROTEIN URINE: CPT

## 2023-10-12 PROCEDURE — 36415 COLL VENOUS BLD VENIPUNCTURE: CPT

## 2023-10-12 PROCEDURE — 81001 URINALYSIS AUTO W/SCOPE: CPT

## 2023-10-12 PROCEDURE — 87086 URINE CULTURE/COLONY COUNT: CPT

## 2023-10-12 PROCEDURE — 82570 ASSAY OF URINE CREATININE: CPT

## 2023-10-12 PROCEDURE — 85025 COMPLETE CBC W/AUTO DIFF WBC: CPT

## 2023-10-12 PROCEDURE — 80069 RENAL FUNCTION PANEL: CPT

## 2023-10-13 LAB — BACTERIA SPEC AEROBE CULT: NO GROWTH

## 2023-10-18 LAB — TACROLIMUS BLD LC/MS/MS-MCNC: 2.8 NG/ML (ref 2–20)

## 2023-10-24 ENCOUNTER — OFFICE VISIT (OUTPATIENT)
Dept: ENDOCRINOLOGY | Facility: CLINIC | Age: 63
End: 2023-10-24
Payer: MEDICARE

## 2023-10-24 DIAGNOSIS — E10.65 TYPE 1 DIABETES MELLITUS WITH HYPERGLYCEMIA: Primary | ICD-10-CM

## 2023-10-24 PROCEDURE — G0108 DIAB MANAGE TRN  PER INDIV: HCPCS | Performed by: DIETITIAN, REGISTERED

## 2023-10-24 RX ORDER — INSULIN LISPRO 100 [IU]/ML
INJECTION, SOLUTION INTRAVENOUS; SUBCUTANEOUS
Start: 2023-10-24

## 2023-10-24 NOTE — PROGRESS NOTES
Pt here today for 1 hour from 12PM to 1PM for Individual  DSMES.     Pt with BG log. Pts BG dropping below 70 nightly. Pt to decrease Lantus to 24u qAM and Humalog to 5u TID w/meals per MD standing order. Pt to call back to clinic with BG on Friday or sooner if he has anymore low Bgs. Reviewed Rule of 15 for treatment of low BG. Pt acknowledged understanding.          Most Recent A1c  Hemoglobin A1C   Date Value Ref Range Status   09/30/2023 12.10 (H) 4.80 - 5.60 % Final   06/16/2023 6.30 (H) 4.80 - 5.60 % Final   05/02/2023 6.20 (H) 4.80 - 5.60 % Final   04/12/2022 6.3 (H) 4.0 - 5.6 % Final     Comment:     Hemoglobin A1c Interpretation Guidelines:  Normal: <5.7%  Prediabetes: 5.7-6.4%  Diabetes: >/=   6.5%                                                                                                                                                                                                                       Diagnosis requires two independent tests unless clinical diagnosis is clear.    Some clinical conditions, particularly anemias and hemoglobinopathies, may  interfere with the diagnostic accuracy of hemoglobin A1c.    The recommended goal for diabetic glycemic control (Hemoglobin A1c <7.0%)   should be Individualized based on duration of diabetes, age/life expectancy,   comorbid conditions, known CVD or advanced microvascular complications,   hypoglycemia unawareness, and other individual patient considerations.   03/15/2022 6.3 (H) 4.0 - 5.6 % Final     Comment:     Hemoglobin A1c Interpretation Guidelines:  Normal: <5.7%  Prediabetes: 5.7-6.4%  Diabetes: >/=   6.5%                                                                                                                                                                                                                       Diagnosis requires two independent tests unless clinical diagnosis is clear.    Some clinical conditions, particularly  anemias and hemoglobinopathies, may  interfere with the diagnostic accuracy of hemoglobin A1c.    The recommended goal for diabetic glycemic control (Hemoglobin A1c <7.0%)   should be Individualized based on duration of diabetes, age/life expectancy,   comorbid conditions, known CVD or advanced microvascular complications,   hypoglycemia unawareness, and other individual patient considerations.   06/11/2021 5.4 4.0 - 5.6 % Final     Comment:     Hemoglobin A1c Interpretation Guidelines:  Normal: <5.7%  Prediabetes: 5.7-6.4%  Diabetes: >/=   6.5%                                                                                                                                                                                                                       Diagnosis requires two independent tests unless clinical diagnosis is clear.    Some clinical conditions, particularly anemias and hemoglobinopathies, may  interfere with the diagnostic accuracy of hemoglobin A1c.    The recommended goal for diabetic glycemic control (Hemoglobin A1c <7.0%)   should be Individualized based on duration of diabetes, age/life expectancy,   comorbid conditions, known CVD or advanced microvascular complications,   hypoglycemia unawareness, and other individual patient considerations.            Current Medications     Current Outpatient Medications:     Accu-Chek Softclix Lancets lancets, 1 each by Other route 4 (Four) Times a Day Before Meals & at Bedtime. Dx: E11.65. Use as instructed, Disp: 200 each, Rfl: 2    b complex-vitamin c-folic acid (NEPHRO-MARK) 0.8 MG tablet tablet, Take 1 tablet by mouth Daily., Disp: , Rfl:     cinacalcet (SENSIPAR) 30 MG tablet, Take 1 tablet by mouth Every Other Day. Pt takes on Monday, Wednesday, Friday, Disp: , Rfl:     cloNIDine (CATAPRES) 0.1 MG tablet, Take 1 tablet by mouth., Disp: , Rfl:     glucose blood (Accu-Chek Guide) test strip, 1 each by Other route 4 (Four) Times a Day Before Meals & at  Bedtime. Dx: E11.65. Use as instructed, Disp: 150 each, Rfl: 2    hydrALAZINE (APRESOLINE) 25 MG tablet, Take 1 tablet by mouth., Disp: , Rfl:     Insulin Glargine (LANTUS SOLOSTAR) 100 UNIT/ML injection pen, Inject 26 units daily, Disp: , Rfl:     Insulin Lispro, 1 Unit Dial, (HumaLOG KwikPen) 100 UNIT/ML solution pen-injector, 8 units with each meal plus sliding scale for correction. MDD 30 units, Disp: , Rfl:     Insulin Pen Needle (Pen Needles) 32G X 4 MM misc, Use 1 each 4 (Four) Times a Day., Disp: 200 each, Rfl: 2    lanthanum (Fosrenol) 1000 MG chewable tablet, Chew 1 tablet 3 (Three) Times a Day., Disp: , Rfl:     magnesium oxide (MAG-OX) 400 MG tablet, Take 1 tablet by mouth Daily., Disp: , Rfl:     mycophenolate (CELLCEPT) 250 MG capsule, Take 2 capsules by mouth 2 (Two) Times a Day., Disp: , Rfl:     sodium bicarbonate 650 MG tablet, Take 1 tablet by mouth 1 (One) Time., Disp: , Rfl:     tacrolimus (PROGRAF) 1 MG capsule, Take 1 capsule by mouth Every Night. Take 3 caps every morning and 2 caps every evening, Disp: , Rfl:     tacrolimus (PROGRAF) 1 MG capsule, Take 3 capsules by mouth Every Morning., Disp: , Rfl:

## 2023-11-21 ENCOUNTER — OFFICE VISIT (OUTPATIENT)
Dept: ENDOCRINOLOGY | Facility: CLINIC | Age: 63
End: 2023-11-21
Payer: MEDICARE

## 2023-11-21 DIAGNOSIS — E10.65 TYPE 1 DIABETES MELLITUS WITH HYPERGLYCEMIA: Primary | ICD-10-CM

## 2023-11-21 PROCEDURE — G0109 DIAB MANAGE TRN IND/GROUP: HCPCS | Performed by: DIETITIAN, REGISTERED

## 2023-11-21 NOTE — PROGRESS NOTES
Pt here today for 4 hour from 12PM to 4PM for Group  DSMES.     Reviewed pts Bgs. Pt experiencing hypoglycemia. Decreased Lantus to 20u qHS and Humalog 3u TID w/meals per MD standing order.      Pt scheduled for Class 2 December 14 @ 12:00 pm         Most Recent A1c  Hemoglobin A1C   Date Value Ref Range Status   09/30/2023 12.10 (H) 4.80 - 5.60 % Final   06/16/2023 6.30 (H) 4.80 - 5.60 % Final   05/02/2023 6.20 (H) 4.80 - 5.60 % Final   04/12/2022 6.3 (H) 4.0 - 5.6 % Final     Comment:     Hemoglobin A1c Interpretation Guidelines:  Normal: <5.7%  Prediabetes: 5.7-6.4%  Diabetes: >/=   6.5%                                                                                                                                                                                                                       Diagnosis requires two independent tests unless clinical diagnosis is clear.    Some clinical conditions, particularly anemias and hemoglobinopathies, may  interfere with the diagnostic accuracy of hemoglobin A1c.    The recommended goal for diabetic glycemic control (Hemoglobin A1c <7.0%)   should be Individualized based on duration of diabetes, age/life expectancy,   comorbid conditions, known CVD or advanced microvascular complications,   hypoglycemia unawareness, and other individual patient considerations.   03/15/2022 6.3 (H) 4.0 - 5.6 % Final     Comment:     Hemoglobin A1c Interpretation Guidelines:  Normal: <5.7%  Prediabetes: 5.7-6.4%  Diabetes: >/=   6.5%                                                                                                                                                                                                                       Diagnosis requires two independent tests unless clinical diagnosis is clear.    Some clinical conditions, particularly anemias and hemoglobinopathies, may  interfere with the diagnostic accuracy of hemoglobin A1c.    The recommended goal for  diabetic glycemic control (Hemoglobin A1c <7.0%)   should be Individualized based on duration of diabetes, age/life expectancy,   comorbid conditions, known CVD or advanced microvascular complications,   hypoglycemia unawareness, and other individual patient considerations.   06/11/2021 5.4 4.0 - 5.6 % Final     Comment:     Hemoglobin A1c Interpretation Guidelines:  Normal: <5.7%  Prediabetes: 5.7-6.4%  Diabetes: >/=   6.5%                                                                                                                                                                                                                       Diagnosis requires two independent tests unless clinical diagnosis is clear.    Some clinical conditions, particularly anemias and hemoglobinopathies, may  interfere with the diagnostic accuracy of hemoglobin A1c.    The recommended goal for diabetic glycemic control (Hemoglobin A1c <7.0%)   should be Individualized based on duration of diabetes, age/life expectancy,   comorbid conditions, known CVD or advanced microvascular complications,   hypoglycemia unawareness, and other individual patient considerations.            Current Medications     Current Outpatient Medications:     Accu-Chek Softclix Lancets lancets, 1 each by Other route 4 (Four) Times a Day Before Meals & at Bedtime. Dx: E11.65. Use as instructed, Disp: 200 each, Rfl: 2    b complex-vitamin c-folic acid (NEPHRO-MARK) 0.8 MG tablet tablet, Take 1 tablet by mouth Daily., Disp: , Rfl:     cinacalcet (SENSIPAR) 30 MG tablet, Take 1 tablet by mouth Every Other Day. Pt takes on Monday, Wednesday, Friday, Disp: , Rfl:     cloNIDine (CATAPRES) 0.1 MG tablet, Take 1 tablet by mouth., Disp: , Rfl:     glucose blood (Accu-Chek Guide) test strip, 1 each by Other route 4 (Four) Times a Day Before Meals & at Bedtime. Dx: E11.65. Use as instructed, Disp: 150 each, Rfl: 2    hydrALAZINE (APRESOLINE) 25 MG tablet, Take 1 tablet by  mouth., Disp: , Rfl:     Insulin Glargine (LANTUS SOLOSTAR) 100 UNIT/ML injection pen, Inject 24 units daily, Disp: , Rfl:     Insulin Lispro, 1 Unit Dial, (HumaLOG KwikPen) 100 UNIT/ML solution pen-injector, 5 units with each meal plus sliding scale for correction. MDD 30 units, Disp: , Rfl:     Insulin Pen Needle (Pen Needles) 32G X 4 MM misc, Use 1 each 4 (Four) Times a Day., Disp: 200 each, Rfl: 2    lanthanum (Fosrenol) 1000 MG chewable tablet, Chew 1 tablet 3 (Three) Times a Day., Disp: , Rfl:     magnesium oxide (MAG-OX) 400 MG tablet, Take 1 tablet by mouth Daily., Disp: , Rfl:     mycophenolate (CELLCEPT) 250 MG capsule, Take 2 capsules by mouth 2 (Two) Times a Day., Disp: , Rfl:     sodium bicarbonate 650 MG tablet, Take 1 tablet by mouth 1 (One) Time., Disp: , Rfl:     tacrolimus (PROGRAF) 1 MG capsule, Take 1 capsule by mouth Every Night. Take 3 caps every morning and 2 caps every evening, Disp: , Rfl:     tacrolimus (PROGRAF) 1 MG capsule, Take 3 capsules by mouth Every Morning., Disp: , Rfl:         Margareth Deluna RD, LD, [facility]   Nutrition and Diabetes Education     Diabetes Center   [facility]  [address] [place] [address]

## 2024-01-17 ENCOUNTER — LAB (OUTPATIENT)
Dept: LAB | Facility: HOSPITAL | Age: 64
End: 2024-01-17
Payer: MEDICARE

## 2024-01-17 ENCOUNTER — TRANSCRIBE ORDERS (OUTPATIENT)
Dept: ADMINISTRATIVE | Facility: HOSPITAL | Age: 64
End: 2024-01-17
Payer: MEDICARE

## 2024-01-17 DIAGNOSIS — I10 ESSENTIAL HYPERTENSION, MALIGNANT: ICD-10-CM

## 2024-01-17 DIAGNOSIS — D64.9 ANEMIA, UNSPECIFIED TYPE: ICD-10-CM

## 2024-01-17 DIAGNOSIS — N18.30 STAGE 3 CHRONIC KIDNEY DISEASE, UNSPECIFIED WHETHER STAGE 3A OR 3B CKD: ICD-10-CM

## 2024-01-17 DIAGNOSIS — E55.9 AVITAMINOSIS D: ICD-10-CM

## 2024-01-17 DIAGNOSIS — Z94.0 KIDNEY REPLACED BY TRANSPLANT: ICD-10-CM

## 2024-01-17 DIAGNOSIS — R79.89 HYPOURICEMIA: ICD-10-CM

## 2024-01-17 DIAGNOSIS — R39.9 GENITOURINARY SYMPTOMS: ICD-10-CM

## 2024-01-17 DIAGNOSIS — E10.65 POOR CONTROL TYPE I DIABETES MELLITUS: ICD-10-CM

## 2024-01-17 DIAGNOSIS — E10.65 POOR CONTROL TYPE I DIABETES MELLITUS: Primary | ICD-10-CM

## 2024-01-17 DIAGNOSIS — N18.30 STAGE 3 CHRONIC KIDNEY DISEASE, UNSPECIFIED WHETHER STAGE 3A OR 3B CKD: Primary | ICD-10-CM

## 2024-01-17 LAB
25(OH)D3 SERPL-MCNC: 35.4 NG/ML (ref 30–100)
ALBUMIN SERPL-MCNC: 4.5 G/DL (ref 3.5–5.2)
ANION GAP SERPL CALCULATED.3IONS-SCNC: 9 MMOL/L (ref 5–15)
BASOPHILS # BLD AUTO: 0 10*3/MM3 (ref 0–0.2)
BASOPHILS NFR BLD AUTO: 0.4 % (ref 0–1.5)
BILIRUB UR QL STRIP: NEGATIVE
BUN SERPL-MCNC: 29 MG/DL (ref 8–23)
BUN/CREAT SERPL: 18.2 (ref 7–25)
CALCIUM SPEC-SCNC: 10.2 MG/DL (ref 8.6–10.5)
CHLORIDE SERPL-SCNC: 106 MMOL/L (ref 98–107)
CHOLEST SERPL-MCNC: 169 MG/DL (ref 0–200)
CK SERPL-CCNC: 34 U/L (ref 20–200)
CLARITY UR: CLEAR
CO2 SERPL-SCNC: 26 MMOL/L (ref 22–29)
COLOR UR: YELLOW
CREAT SERPL-MCNC: 1.59 MG/DL (ref 0.76–1.27)
CREAT UR-MCNC: 97.9 MG/DL
DEPRECATED RDW RBC AUTO: 45.9 FL (ref 37–54)
EGFRCR SERPLBLD CKD-EPI 2021: 49.1 ML/MIN/1.73
EOSINOPHIL # BLD AUTO: 0 10*3/MM3 (ref 0–0.4)
EOSINOPHIL NFR BLD AUTO: 1.2 % (ref 0.3–6.2)
ERYTHROCYTE [DISTWIDTH] IN BLOOD BY AUTOMATED COUNT: 14.3 % (ref 12.3–15.4)
GLUCOSE SERPL-MCNC: 116 MG/DL (ref 65–99)
GLUCOSE UR STRIP-MCNC: NEGATIVE MG/DL
HBA1C MFR BLD: 6.3 % (ref 4.8–5.6)
HCT VFR BLD AUTO: 46.9 % (ref 37.5–51)
HDLC SERPL-MCNC: 50 MG/DL (ref 40–60)
HGB BLD-MCNC: 15.8 G/DL (ref 13–17.7)
HGB UR QL STRIP.AUTO: NEGATIVE
KETONES UR QL STRIP: NEGATIVE
LDLC SERPL CALC-MCNC: 105 MG/DL (ref 0–100)
LDLC/HDLC SERPL: 2.09 {RATIO}
LEUKOCYTE ESTERASE UR QL STRIP.AUTO: NEGATIVE
LYMPHOCYTES # BLD AUTO: 1.2 10*3/MM3 (ref 0.7–3.1)
LYMPHOCYTES NFR BLD AUTO: 31.2 % (ref 19.6–45.3)
MAGNESIUM SERPL-MCNC: 1.5 MG/DL (ref 1.6–2.4)
MCH RBC QN AUTO: 29.1 PG (ref 26.6–33)
MCHC RBC AUTO-ENTMCNC: 33.6 G/DL (ref 31.5–35.7)
MCV RBC AUTO: 86.5 FL (ref 79–97)
MONOCYTES # BLD AUTO: 0.3 10*3/MM3 (ref 0.1–0.9)
MONOCYTES NFR BLD AUTO: 8.1 % (ref 5–12)
NEUTROPHILS NFR BLD AUTO: 2.3 10*3/MM3 (ref 1.7–7)
NEUTROPHILS NFR BLD AUTO: 59.1 % (ref 42.7–76)
NITRITE UR QL STRIP: NEGATIVE
NRBC BLD AUTO-RTO: 0.1 /100 WBC (ref 0–0.2)
PH UR STRIP.AUTO: 5.5 [PH] (ref 5–8)
PHOSPHATE SERPL-MCNC: 1.9 MG/DL (ref 2.5–4.5)
PLATELET # BLD AUTO: 119 10*3/MM3 (ref 140–450)
PMV BLD AUTO: 8 FL (ref 6–12)
POTASSIUM SERPL-SCNC: 4.5 MMOL/L (ref 3.5–5.2)
PROT ?TM UR-MCNC: 11.6 MG/DL
PROT UR QL STRIP: NEGATIVE
PROT/CREAT UR: 118.5 MG/G CREA (ref 0–200)
PTH-INTACT SERPL-MCNC: 149.3 PG/ML (ref 15–65)
RBC # BLD AUTO: 5.43 10*6/MM3 (ref 4.14–5.8)
SODIUM SERPL-SCNC: 141 MMOL/L (ref 136–145)
SP GR UR STRIP: 1.02 (ref 1–1.03)
TRIGL SERPL-MCNC: 72 MG/DL (ref 0–150)
URATE SERPL-MCNC: 6.9 MG/DL (ref 3.4–7)
UROBILINOGEN UR QL STRIP: NORMAL
VLDLC SERPL-MCNC: 14 MG/DL (ref 5–40)
WBC NRBC COR # BLD AUTO: 3.9 10*3/MM3 (ref 3.4–10.8)

## 2024-01-17 PROCEDURE — 84156 ASSAY OF PROTEIN URINE: CPT

## 2024-01-17 PROCEDURE — 84550 ASSAY OF BLOOD/URIC ACID: CPT

## 2024-01-17 PROCEDURE — 87086 URINE CULTURE/COLONY COUNT: CPT

## 2024-01-17 PROCEDURE — 81003 URINALYSIS AUTO W/O SCOPE: CPT

## 2024-01-17 PROCEDURE — 83970 ASSAY OF PARATHORMONE: CPT

## 2024-01-17 PROCEDURE — 83735 ASSAY OF MAGNESIUM: CPT

## 2024-01-17 PROCEDURE — 85025 COMPLETE CBC W/AUTO DIFF WBC: CPT

## 2024-01-17 PROCEDURE — 80061 LIPID PANEL: CPT

## 2024-01-17 PROCEDURE — 36415 COLL VENOUS BLD VENIPUNCTURE: CPT

## 2024-01-17 PROCEDURE — 80069 RENAL FUNCTION PANEL: CPT

## 2024-01-17 PROCEDURE — 80197 ASSAY OF TACROLIMUS: CPT

## 2024-01-17 PROCEDURE — 82306 VITAMIN D 25 HYDROXY: CPT

## 2024-01-17 PROCEDURE — 82570 ASSAY OF URINE CREATININE: CPT

## 2024-01-17 PROCEDURE — 83036 HEMOGLOBIN GLYCOSYLATED A1C: CPT

## 2024-01-17 PROCEDURE — 82550 ASSAY OF CK (CPK): CPT

## 2024-01-18 LAB — BACTERIA SPEC AEROBE CULT: NO GROWTH

## 2024-01-19 LAB — TACROLIMUS BLD LC/MS/MS-MCNC: 9.6 NG/ML (ref 2–20)

## 2024-02-16 ENCOUNTER — TELEPHONE (OUTPATIENT)
Dept: ENDOCRINOLOGY | Facility: CLINIC | Age: 64
End: 2024-02-16
Payer: MEDICARE

## 2024-03-04 ENCOUNTER — HOSPITAL ENCOUNTER (OUTPATIENT)
Dept: CARDIOLOGY | Facility: HOSPITAL | Age: 64
Discharge: HOME | End: 2024-03-04
Payer: MEDICARE

## 2024-03-04 ENCOUNTER — TRANSCRIBE ORDERS (OUTPATIENT)
Dept: ADMINISTRATIVE | Facility: HOSPITAL | Age: 64
End: 2024-03-04
Payer: MEDICARE

## 2024-03-04 ENCOUNTER — LAB (OUTPATIENT)
Dept: LAB | Facility: HOSPITAL | Age: 64
End: 2024-03-04
Payer: MEDICARE

## 2024-03-04 ENCOUNTER — HOSPITAL ENCOUNTER (OUTPATIENT)
Dept: GENERAL RADIOLOGY | Facility: HOSPITAL | Age: 64
Discharge: HOME | End: 2024-03-04
Payer: MEDICARE

## 2024-03-04 DIAGNOSIS — Z01.818 PRE-OP EXAMINATION: ICD-10-CM

## 2024-03-04 DIAGNOSIS — Z01.818 PRE-OP EXAMINATION: Primary | ICD-10-CM

## 2024-03-04 LAB
ALBUMIN SERPL-MCNC: 4.1 G/DL (ref 3.5–5.2)
ALBUMIN/GLOB SERPL: 1.5 G/DL
ALP SERPL-CCNC: 111 U/L (ref 39–117)
ALT SERPL W P-5'-P-CCNC: 15 U/L (ref 1–41)
ANION GAP SERPL CALCULATED.3IONS-SCNC: 9.7 MMOL/L (ref 5–15)
AST SERPL-CCNC: 19 U/L (ref 1–40)
BASOPHILS # BLD AUTO: 0 10*3/MM3 (ref 0–0.2)
BASOPHILS NFR BLD AUTO: 0.4 % (ref 0–1.5)
BILIRUB SERPL-MCNC: 0.6 MG/DL (ref 0–1.2)
BUN SERPL-MCNC: 39 MG/DL (ref 8–23)
BUN/CREAT SERPL: 20.9 (ref 7–25)
CALCIUM SPEC-SCNC: 9.4 MG/DL (ref 8.6–10.5)
CHLORIDE SERPL-SCNC: 111 MMOL/L (ref 98–107)
CO2 SERPL-SCNC: 23.3 MMOL/L (ref 22–29)
CREAT SERPL-MCNC: 1.87 MG/DL (ref 0.76–1.27)
DEPRECATED RDW RBC AUTO: 45.5 FL (ref 37–54)
EGFRCR SERPLBLD CKD-EPI 2021: 40.2 ML/MIN/1.73
EOSINOPHIL # BLD AUTO: 0.1 10*3/MM3 (ref 0–0.4)
EOSINOPHIL NFR BLD AUTO: 1.6 % (ref 0.3–6.2)
ERYTHROCYTE [DISTWIDTH] IN BLOOD BY AUTOMATED COUNT: 14.6 % (ref 12.3–15.4)
GLOBULIN UR ELPH-MCNC: 2.8 GM/DL
GLUCOSE SERPL-MCNC: 117 MG/DL (ref 65–99)
HCT VFR BLD AUTO: 45.1 % (ref 37.5–51)
HGB BLD-MCNC: 14.7 G/DL (ref 13–17.7)
LYMPHOCYTES # BLD AUTO: 1.3 10*3/MM3 (ref 0.7–3.1)
LYMPHOCYTES NFR BLD AUTO: 26.9 % (ref 19.6–45.3)
MCH RBC QN AUTO: 29.3 PG (ref 26.6–33)
MCHC RBC AUTO-ENTMCNC: 32.7 G/DL (ref 31.5–35.7)
MCV RBC AUTO: 89.6 FL (ref 79–97)
MONOCYTES # BLD AUTO: 0.4 10*3/MM3 (ref 0.1–0.9)
MONOCYTES NFR BLD AUTO: 7.5 % (ref 5–12)
NEUTROPHILS NFR BLD AUTO: 3.2 10*3/MM3 (ref 1.7–7)
NEUTROPHILS NFR BLD AUTO: 63.6 % (ref 42.7–76)
NRBC BLD AUTO-RTO: 0.3 /100 WBC (ref 0–0.2)
PLATELET # BLD AUTO: 116 10*3/MM3 (ref 140–450)
PMV BLD AUTO: 7.9 FL (ref 6–12)
POTASSIUM SERPL-SCNC: 5.6 MMOL/L (ref 3.5–5.2)
PROT SERPL-MCNC: 6.9 G/DL (ref 6–8.5)
QT INTERVAL: 422 MS
QTC INTERVAL: 419 MS
RBC # BLD AUTO: 5.03 10*6/MM3 (ref 4.14–5.8)
SODIUM SERPL-SCNC: 144 MMOL/L (ref 136–145)
WBC NRBC COR # BLD AUTO: 5 10*3/MM3 (ref 3.4–10.8)

## 2024-03-04 PROCEDURE — 85025 COMPLETE CBC W/AUTO DIFF WBC: CPT

## 2024-03-04 PROCEDURE — 80053 COMPREHEN METABOLIC PANEL: CPT

## 2024-03-04 PROCEDURE — 93010 ELECTROCARDIOGRAM REPORT: CPT | Performed by: INTERNAL MEDICINE

## 2024-03-04 PROCEDURE — 36415 COLL VENOUS BLD VENIPUNCTURE: CPT

## 2024-03-04 PROCEDURE — 71046 X-RAY EXAM CHEST 2 VIEWS: CPT

## 2024-03-04 PROCEDURE — 93005 ELECTROCARDIOGRAM TRACING: CPT | Performed by: ORTHOPAEDIC SURGERY

## 2024-03-15 RX ORDER — AMLODIPINE BESYLATE 10 MG/1
TABLET ORAL
COMMUNITY
Start: 2023-12-19 | End: 2024-03-27

## 2024-03-27 ENCOUNTER — OFFICE VISIT (OUTPATIENT)
Dept: CARDIOLOGY | Facility: CLINIC | Age: 64
End: 2024-03-27
Payer: MEDICARE

## 2024-03-27 VITALS
SYSTOLIC BLOOD PRESSURE: 136 MMHG | RESPIRATION RATE: 18 BRPM | DIASTOLIC BLOOD PRESSURE: 95 MMHG | HEIGHT: 71 IN | OXYGEN SATURATION: 97 % | HEART RATE: 68 BPM | BODY MASS INDEX: 31.36 KG/M2 | WEIGHT: 224 LBS

## 2024-03-27 DIAGNOSIS — E10.65 TYPE 1 DIABETES MELLITUS WITH HYPERGLYCEMIA: ICD-10-CM

## 2024-03-27 DIAGNOSIS — R07.9 CHRONIC CHEST PAIN WITH LOW TO MODERATE RISK FOR CAD: Primary | ICD-10-CM

## 2024-03-27 DIAGNOSIS — Z94.0 STATUS POST KIDNEY TRANSPLANT: ICD-10-CM

## 2024-03-27 DIAGNOSIS — Z91.89 CHRONIC CHEST PAIN WITH LOW TO MODERATE RISK FOR CAD: Primary | ICD-10-CM

## 2024-03-27 DIAGNOSIS — Z72.0 TOBACCO ABUSE: Chronic | ICD-10-CM

## 2024-03-27 DIAGNOSIS — G89.29 CHRONIC CHEST PAIN WITH LOW TO MODERATE RISK FOR CAD: Primary | ICD-10-CM

## 2024-03-27 DIAGNOSIS — N18.6 ESRD (END STAGE RENAL DISEASE): Chronic | ICD-10-CM

## 2024-03-27 NOTE — PROGRESS NOTES
"      Subjective:     Encounter Date:03/27/2024      Patient ID: Jose Miugel Martinez is a 64 y.o. male.    Chief Complaint:  Chief Complaint   Patient presents with    New Patient       HPI:    64-year-old gentleman with a past medical history of type 1 diabetes mellitus, obesity-BMI 31, hypertension, ESRD secondary to diabetes/hypertension status post renal transplant, tobacco use disorder presents to [facility] care  Patient sustained a fall, mechanical with significant pain in his right knee and was found to have torn meniscus, plan for arthroscopic surgery/repair for the same.  Needs preoperative cardiovascular risk ratification with the same.  Denies any chest pain or difficulty breathing, limited mobility due to knee pain, does not appear to be in heart failure, last EKG without evidence of sustained arrhythmias.      Objective:         /95 (BP Location: Left arm, Patient Position: Sitting, Cuff Size: Large Adult)   Pulse 68   Resp 18   Ht 180.3 cm (71\")   Wt 102 kg (224 lb)   SpO2 97%   BMI 31.24 kg/m²     Physical exam  General-no acute distress  CVS-S1-S2 normal, no murmur  Respiratory-clear breath sounds  GI-soft nontender abdomen  No pedal edema  Alert oriented X3    ROS:  14 point review of systems negative except as mentioned above    Current Outpatient Medications:     Accu-Chek Softclix Lancets lancets, 1 each by Other route 4 (Four) Times a Day Before Meals & at Bedtime. Dx: E11.65. Use as instructed, Disp: 200 each, Rfl: 2    b complex-vitamin c-folic acid (NEPHRO-MARK) 0.8 MG tablet tablet, Take 1 tablet by mouth Daily., Disp: , Rfl:     cinacalcet (SENSIPAR) 30 MG tablet, Take 1 tablet by mouth Every Other Day. Pt takes on Monday, Wednesday, Friday, Disp: , Rfl:     glucose blood (Accu-Chek Guide) test strip, 1 each by Other route 4 (Four) Times a Day Before Meals & at Bedtime. Dx: E11.65. Use as instructed, Disp: 150 each, Rfl: 2    hydrALAZINE (APRESOLINE) 25 MG tablet, Take 2 tablets by " mouth 3 (Three) Times a Day., Disp: , Rfl:     Insulin Glargine (LANTUS SOLOSTAR) 100 UNIT/ML injection pen, Inject 24 units daily, Disp: , Rfl:     Insulin Lispro, 1 Unit Dial, (HumaLOG KwikPen) 100 UNIT/ML solution pen-injector, 5 units with each meal plus sliding scale for correction. MDD 30 units, Disp: , Rfl:     Insulin Pen Needle (Pen Needles) 32G X 4 MM misc, Use 1 each 4 (Four) Times a Day., Disp: 200 each, Rfl: 2    magnesium oxide (MAG-OX) 400 MG tablet, Take 1 tablet by mouth 2 (Two) Times a Day., Disp: , Rfl:     mycophenolate (CELLCEPT) 250 MG capsule, Take 2 capsules by mouth 2 (Two) Times a Day., Disp: , Rfl:     sodium bicarbonate 650 MG tablet, Take 1 tablet by mouth 1 (One) Time., Disp: , Rfl:     tacrolimus (PROGRAF) 1 MG capsule, Take 1 capsule by mouth Every Night. Take 3 caps every morning and 2 caps every evening, Disp: , Rfl:     Problem List:  Patient Active Problem List   Diagnosis    STEMI (ST elevation myocardial infarction)    ESRD (end stage renal disease)    HTN (hypertension)    PVD (peripheral vascular disease)    Tobacco abuse    Chest pain on breathing    Clinical diagnosis of severe acute respiratory syndrome coronavirus 2 (SARS-CoV-2) disease    DKA (diabetic ketoacidosis)    Type 1 diabetes mellitus with hyperglycemia     Past Medical History:  Past Medical History:   Diagnosis Date    CKD (chronic kidney disease) requiring chronic dialysis     COPD (chronic obstructive pulmonary disease)     Diabetes mellitus type I 09/30/2023    DKA (diabetic ketoacidosis) 09/30/2023    Hyperparathyroidism     Dr. Gonzalez    Hypertension     Hypothyroidism     Kidney failure     Thyroid nodule     Lisa     Past Surgical History:  Past Surgical History:   Procedure Laterality Date    ARTERIOVENOUS FISTULA REPAIR      ARTERIOVENOUS FISTULA/SHUNT SURGERY      CARDIAC CATHETERIZATION N/A 08/22/2019    Procedure: Left Heart Cath;  Surgeon: Juanpablo Garcia MD;  Location: [facility]  INVASIVE LOCATION;  Service: Cardiology    CARDIAC CATHETERIZATION N/A 08/22/2019    Procedure: Aortic root aortogram;  Surgeon: Juanpablo Garcia MD;  Location: [facility] CATH INVASIVE LOCATION;  Service: Cardiology    LYMPH NODE BIOPSY  2002    ORCHIECTOMY       Social History:  Social History     Socioeconomic History    Marital status:    Tobacco Use    Smoking status: Every Day     Current packs/day: 0.50     Average packs/day: 0.5 packs/day for 54.2 years (27.1 ttl pk-yrs)     Types: Cigarettes     Start date: 1970     Passive exposure: Past    Smokeless tobacco: Never   Vaping Use    Vaping status: Never Used   Substance and Sexual Activity    Alcohol use: No    Drug use: No    Sexual activity: Yes     Partners: Female     Birth control/protection: None     Allergies:  No Known Allergies  Immunizations:  Immunization History   Administered Date(s) Administered    Fluzone (or Fluarix & Flulaval for VFC) >6mos 10/02/2020    Fluzone High-Dose 65+yrs 10/13/2021    Hepatitis B Adult/Adolescent IM 12/23/2015, 02/19/2016, 03/18/2016, 06/15/2016, 02/17/2017, 03/17/2017, 04/19/2017, 07/19/2017, 01/18/2019, 01/22/2021    Pneumococcal Conjugate 13-Valent (PCV13) 12/14/2015    Pneumococcal Polysaccharide (PPSV23) 02/08/2016            In-Office Procedure(s):  No orders to display        ASCVD RIsk Score::  The ASCVD Risk score (Elva DK, et al., 2019) failed to calculate for the following reasons:    The patient has a prior MI or stroke diagnosis    Imaging:    Results for orders placed during the hospital encounter of 03/04/24    XR Chest 2 View    Narrative  XR CHEST 2 VW    Date of Exam: 3/4/2024 7:39 AM EST    Indication: pre op    Comparison: AP portable chest 9/30/2023    Findings:  No acute airspace disease. Heart size is normal. No pleural effusion or pneumothorax. Mild degenerative endplate spurring in the thoracic spine    Impression  Impression:  No acute cardiopulmonary  findings.      Electronically Signed: Latrice Sam MD  3/4/2024 4:03 PM EST  Workstation ID: [id number]       Results for orders placed during the hospital encounter of 08/08/23    US Renal Bilateral    Narrative  US RENAL BILATERAL    Date of Exam: 8/8/2023 8:07 AM EDT    Indication: Z94.0.    Comparison: No comparisons available.    Technique: Grayscale and color Doppler ultrasound evaluation of the kidneys and urinary bladder was performed.    FINDINGS:      The native right kidney measures 7.2 cm in length and demonstrates increased cortical echogenicity.    The left kidney measures 7.0 cm in length and demonstrates increased cortical echogenicity. 2.6 cm left renal cyst is noted.    Right lower quadrant transplant kidney measures 13.4 cm in length and appears unremarkable.  No hydronephrosis or renal calculi identified.    The urinary bladder is partially distended with urine and appears unremarkable.    Limited evaluation of the abdominal aorta and IVC is unremarkable.    Impression  1.Unremarkable appearance of the right lower quadrant transplant kidney. No hydronephrosis is seen.  2.Atrophic native kidneys.  3.Left renal cyst.    Electronically Signed: Clarence Garcia  8/8/2023 9:29 AM EDT  Workstation ID: [id number]      Results for orders placed during the hospital encounter of 08/27/19    CT Chest Pulmonary Embolism With Contrast    Narrative  DATE OF EXAM:  8/27/2019 4:35 PM    PROCEDURE:  CT CHEST PULMONARY EMBOLISM W CONTRAST-    INDICATIONS:  soa, shortness of breath, dyspnea, elevated d-dimer; R06.02-Shortness of  breath; R79.89-Other specified abnormal findings of blood chemistry    COMPARISON:  No Comparisons Available    TECHNIQUE:  Routine transaxial slices were obtained through the chest after the  intravenous administration of 100 mL of Isovue 370. Reconstructed  coronal and sagittal images were also obtained. Automated exposure  control and iterative construction methods were  used.    FINDINGS:  No pulmonary embolism is seen. A small pericardial effusion is  identified. No cardiac enlargement. No pleural effusion. No acute  infiltrate is appreciated. No pneumothorax is seen. There are coronary  artery calcifications. There is narrowing of the transverse dimension of  the trachea, such as seen on image 44 of series 4, measuring about 10 mm  in transverse diameter at the point of greatest narrowing. Please  correlate with prior history of intubation. The contrast bolus within  the thoracic aorta is limited, precluding assessment for thoracic aortic  dissection. No thoracic aortic aneurysm is appreciated. The thoracic  aorta is atherosclerotic. There may be small hepatic cysts, estimated at  about 1.1 cm or less in size.    Impression  No pulmonary embolism is seen. No acute infiltrate is identified. There  is a small pericardial effusion. No cardiac enlargement is suggested.    Electronically Signed By-Dr. Cortez Moss MD On:8/27/2019 5:15 PM  This report was finalized on [id number] by Dr. Cortez Moss MD.        Most recent EKG as reviewed and interpreted by me:  Procedures     Most recent echo as reviewed and interpreted by me:  Results for orders placed during the hospital encounter of 08/22/19    Adult Transthoracic Echo Complete W/ Cont if Necessary Per Protocol    Interpretation Summary  · The left ventricular cavity is mildly dilated.  · Left atrial cavity size is moderately dilated.  · Left ventricular wall thickness is consistent with severe concentric hypertrophy.  · Mild aortic valve stenosis is present.  · Mild mitral valve regurgitation is present      Normal LV size and contractility EF of 60 to 65% severe concentric LVH  Normal RV size  Moderate left atrial enlargement  Aortic valve, mitral valve, tricuspid valve appears structurally normal, mild aortic, mitral regurgitation seen.  Trace tricuspid regurgitation seen no signs of increased right ventricular  pressure  No pericardial effusion seen.  Proximal aorta appears normal in size.      Most recent stress test as reviewed and interpreted by me:      Most recent cardiac catheterization as reviewed interpreted by me:  Results for orders placed during the hospital encounter of 19    Cardiac Catheterization/Vascular Study    Narrative  Table formatting from the original result was not included.  2019    Heart Cath Report    NAME:              Jose Miguel Martinez  :                1960  AGE/SEX:        59 y.o. male  MRN:                [id number]      Pre-Procedure Notes  H&P Performed  [x]  Yes []  No       []  N/A    Indications:  [x]  ACS <= 24 HRS  []  ACS >24 HRS  []  New Onset Angina <= 2 mos  []  Worsening Angina  []  Resuscitated Cardiac Arrest  []  Angina on Exertion:  [x]  Suspected CAD  []  Valvular Disease  []  Pericardial Disease  []  Cardiac Arrythmia  []  Cardiomyopathy  []  LV Dysfunction  []  Syncope  []  Post Cardiac Transplant  []  Eval. For Exercise Clearance  []  Other  []  Pre-Operative Evaluation  If Pre-Op Eval:  Evaluation for Surgery Type:  []  Cardiac Surgery   []  Non-Cardiac Surgery  Functional Capacity:  []  <4 METS  []  >=4 METS w/o symptoms  []  >= 4 METS with symptoms  []  Unknown  Surgical Risk:  []  Low  []  Intermediate  []  High Risk: Vascular  []  High Risk Non-Vascular    Risks, Benefits, & Complications Discussed:  [x]  Yes  []  No  []  N/A    Questions Answered:  [x]  Yes  []  No  []  N/A    Consent Obtained:  [x]  Yes  []  No  []  N/A    CHF: []  Yes  [x]  No  If Yes:  Newly Diagnosed?  []  Yes  []  No  If Yes:  HF Type:  []  Diastolic  []  Systolic  []  Unknown      Procedures Performed    1. Left heart catheterization  2. Selective coronary angiography  3. Left ventriculography  4. Aortic root angiography      :   Juanpablo Garcia MD    Vascular Access Site: Femoral    Indication for procedure: Recurrent prolonged chest pain at rest  consistent with unstable angina, abnormal EKG with ST elevation      Pertinent History    Past Medical History:  Diagnosis Date   CKD (chronic kidney disease) requiring chronic dialysis (CMS/HCC)   COPD (chronic obstructive pulmonary disease) (CMS/[facility])   Hypertension    Past Surgical History:  Procedure Laterality Date   ARTERIOVENOUS FISTULA REPAIR   ARTERIOVENOUS FISTULA/SHUNT SURGERY   LYMPH NODE BIOPSY  2002   ORCHIECTOMY        Procedure Note    After discussing the risks, benefits, and alternatives of the procedure, informed consent was obtained.  The vascular access site was prepped and draped in the usual sterile fashion.  2% lidocaine was used for local anesthesia. Appropriate landmarks were assessed.  A 6 Bengali short sheath was inserted in the artery using the modified Seldinger technique.    Selective coronary angiography was performed with JL4 and JR4 diagnostic catheters. Left ventriculogram  was performed with an angled pigtail catheter.  Pigtail was placed in aorta and aortic root angiography was done in [place] view, all exchanges were performed over the wire.  There were no apparent acute or early complications.  The patient tolerated the procedure well and was transferred to the recovery area in stable condition.    Artery hemostasis will be achieved with manual pressure.    Complications:  None  Blood Loss: minimal    Hemodynamics    Pressures    Ao:    119/69 mmHg  LV:    116/12 mmHg  End-diastolic pressure:  12 mmHg  No significant aortic valvular gradient on pullback    Coronary Angiography    Left Main: Normal    Left Anterior Descending: Normal    Ramus intermedius :Normal    Left Circumflex:  Normal    Right Coronary Artery: Dominant vessel, normal  Dominance:  []  Left  [x]  Right  []  Co-Dominant      Left Ventriculography:    Estimated Ejection Fraction: 55 %  Wall motion abnormalities:  None  Mitral Regurgitation:  None    Aortic root shot was done in standard [place] view.  Proximal aorta  appeared mildly dilated there was no dissections visualized.  No significant AI seen.  Aortic valve appears trileaflet with 3 sinuses    Impression:    1. No obstructive CAD, normal LV systolic function    Recommendations:    1. Evaluate other etiologies for patient's chest pain        I sincerely appreciate the opportunity to participate in your patient's care. Please feel free to contact me anytime if I can be of assistance in this or any other way.    Juanpablo Garcia MD  8/22/2019  5:48 PM  Electronically signed by Juanpablo Garcia MD, 08/22/19, 5:48 PM.      Assessment & Plan :         Preoperative cardiovascular  risk stratification for left knee meniscus repair  Low risk surgery  No chest pain or difficulty breathing with 4 METS  No cardiac contraindications to planned knee surgery.  No further cardiac workup required prior to surgery    Type 1 diabetes mellitus  Follows with endocrinology  Consider adding statin therapy and follow-up    Hypertension  Blood pressure is normal today  Check echocardiogram  Continue hydralazine as per nephrologist    Status post renal transplant  Follows with nephrologist, on Prograf and CellCept      Part of this note may be an electronic transcription/translation of spoken language to printed text using the Dragon Dictation System.    Kwabena Perrin MD  03/27/24  .

## 2024-03-28 ENCOUNTER — PATIENT ROUNDING (BHMG ONLY) (OUTPATIENT)
Dept: CARDIOLOGY | Facility: CLINIC | Age: 64
End: 2024-03-28
Payer: MEDICARE

## 2024-04-02 ENCOUNTER — HOSPITAL ENCOUNTER (OUTPATIENT)
Dept: CARDIOLOGY | Facility: HOSPITAL | Age: 64
Discharge: HOME | End: 2024-04-02
Admitting: STUDENT IN AN ORGANIZED HEALTH CARE EDUCATION/TRAINING PROGRAM
Payer: MEDICARE

## 2024-04-02 VITALS
BODY MASS INDEX: 31.36 KG/M2 | WEIGHT: 224 LBS | SYSTOLIC BLOOD PRESSURE: 133 MMHG | HEIGHT: 71 IN | DIASTOLIC BLOOD PRESSURE: 78 MMHG

## 2024-04-02 DIAGNOSIS — R07.9 CHRONIC CHEST PAIN WITH LOW TO MODERATE RISK FOR CAD: ICD-10-CM

## 2024-04-02 DIAGNOSIS — Z91.89 CHRONIC CHEST PAIN WITH LOW TO MODERATE RISK FOR CAD: ICD-10-CM

## 2024-04-02 DIAGNOSIS — G89.29 CHRONIC CHEST PAIN WITH LOW TO MODERATE RISK FOR CAD: ICD-10-CM

## 2024-04-02 PROCEDURE — 93306 TTE W/DOPPLER COMPLETE: CPT

## 2024-04-02 PROCEDURE — 93356 MYOCRD STRAIN IMG SPCKL TRCK: CPT

## 2024-04-04 LAB
ASCENDING AORTA: 4.4 CM
BH CV ECHO LEFT VENTRICLE GLOBAL LONGITUDINAL STRAIN: -19.6 %
BH CV ECHO MEAS - ACS: 1.6 CM
BH CV ECHO MEAS - AO MAX PG: 4 MMHG
BH CV ECHO MEAS - AO MEAN PG: 2 MMHG
BH CV ECHO MEAS - AO ROOT DIAM: 4.1 CM
BH CV ECHO MEAS - AO V2 MAX: 99.7 CM/SEC
BH CV ECHO MEAS - AO V2 VTI: 21.1 CM
BH CV ECHO MEAS - AVA(I,D): 2.14 CM2
BH CV ECHO MEAS - EDV(CUBED): 110.6 ML
BH CV ECHO MEAS - EDV(MOD-SP4): 74.4 ML
BH CV ECHO MEAS - EF(MOD-BP): 54 %
BH CV ECHO MEAS - EF(MOD-SP4): 54.2 %
BH CV ECHO MEAS - ESV(CUBED): 35.9 ML
BH CV ECHO MEAS - ESV(MOD-SP4): 34.1 ML
BH CV ECHO MEAS - FS: 31.3 %
BH CV ECHO MEAS - IVS/LVPW: 1 CM
BH CV ECHO MEAS - IVSD: 1.4 CM
BH CV ECHO MEAS - LA DIMENSION: 4.3 CM
BH CV ECHO MEAS - LAT PEAK E' VEL: 9 CM/SEC
BH CV ECHO MEAS - LV DIASTOLIC VOL/BSA (35-75): 33.6 CM2
BH CV ECHO MEAS - LV MASS(C)D: 273.8 GRAMS
BH CV ECHO MEAS - LV MAX PG: 2 MMHG
BH CV ECHO MEAS - LV MEAN PG: 1 MMHG
BH CV ECHO MEAS - LV SYSTOLIC VOL/BSA (12-30): 15.4 CM2
BH CV ECHO MEAS - LV V1 MAX: 70.7 CM/SEC
BH CV ECHO MEAS - LV V1 VTI: 14.4 CM
BH CV ECHO MEAS - LVIDD: 4.8 CM
BH CV ECHO MEAS - LVIDS: 3.3 CM
BH CV ECHO MEAS - LVOT AREA: 3.1 CM2
BH CV ECHO MEAS - LVOT DIAM: 2 CM
BH CV ECHO MEAS - LVPWD: 1.4 CM
BH CV ECHO MEAS - MED PEAK E' VEL: 8.2 CM/SEC
BH CV ECHO MEAS - MV A MAX VEL: 56.6 CM/SEC
BH CV ECHO MEAS - MV DEC SLOPE: 133 CM/SEC2
BH CV ECHO MEAS - MV DEC TIME: 0.3 SEC
BH CV ECHO MEAS - MV E MAX VEL: 55.4 CM/SEC
BH CV ECHO MEAS - MV E/A: 0.98
BH CV ECHO MEAS - MV MAX PG: 1.46 MMHG
BH CV ECHO MEAS - MV MEAN PG: 1 MMHG
BH CV ECHO MEAS - MV P1/2T: 134.3 MSEC
BH CV ECHO MEAS - MV V2 VTI: 23.4 CM
BH CV ECHO MEAS - MVA(P1/2T): 1.64 CM2
BH CV ECHO MEAS - MVA(VTI): 1.93 CM2
BH CV ECHO MEAS - PA V2 MAX: 67 CM/SEC
BH CV ECHO MEAS - RAP SYSTOLE: 8 MMHG
BH CV ECHO MEAS - RV MAX PG: 0.75 MMHG
BH CV ECHO MEAS - RV V1 MAX: 43.2 CM/SEC
BH CV ECHO MEAS - RV V1 VTI: 8 CM
BH CV ECHO MEAS - SI(MOD-SP4): 18.2 ML/M2
BH CV ECHO MEAS - SV(LVOT): 45.2 ML
BH CV ECHO MEAS - SV(MOD-SP4): 40.3 ML
BH CV ECHO MEAS - TAPSE (>1.6): 1.92 CM
BH CV ECHO MEASUREMENTS AVERAGE E/E' RATIO: 6.44
BH CV XLRA - TDI S': 9.5 CM/SEC
LEFT ATRIUM VOLUME INDEX: 24.3 ML/M2
SINUS: 4.1 CM

## 2024-04-08 ENCOUNTER — TRANSCRIBE ORDERS (OUTPATIENT)
Dept: LAB | Facility: HOSPITAL | Age: 64
End: 2024-04-08
Payer: MEDICARE

## 2024-04-08 ENCOUNTER — LAB (OUTPATIENT)
Dept: LAB | Facility: HOSPITAL | Age: 64
End: 2024-04-08
Payer: COMMERCIAL

## 2024-04-08 DIAGNOSIS — Z94.0 KIDNEY REPLACED BY TRANSPLANT: ICD-10-CM

## 2024-04-08 DIAGNOSIS — S83.241A ACUTE MEDIAL MENISCUS TEAR OF RIGHT KNEE, INITIAL ENCOUNTER: Primary | ICD-10-CM

## 2024-04-08 DIAGNOSIS — E55.9 VITAMIN D DEFICIENCY: ICD-10-CM

## 2024-04-08 DIAGNOSIS — E11.8 TYPE II DIABETES MELLITUS WITH COMPLICATION: ICD-10-CM

## 2024-04-08 DIAGNOSIS — N18.30 STAGE 3 CHRONIC KIDNEY DISEASE, UNSPECIFIED WHETHER STAGE 3A OR 3B CKD: ICD-10-CM

## 2024-04-08 DIAGNOSIS — Z94.0 TRANSPLANTED KIDNEY: ICD-10-CM

## 2024-04-08 DIAGNOSIS — R79.89 HYPOURICEMIA: ICD-10-CM

## 2024-04-08 DIAGNOSIS — R80.9 PROTEINURIA, UNSPECIFIED TYPE: ICD-10-CM

## 2024-04-08 DIAGNOSIS — N18.30 STAGE 3 CHRONIC KIDNEY DISEASE, UNSPECIFIED WHETHER STAGE 3A OR 3B CKD: Primary | ICD-10-CM

## 2024-04-08 DIAGNOSIS — R39.9 GENITOURINARY SYMPTOMS: ICD-10-CM

## 2024-04-08 DIAGNOSIS — N25.81 SECONDARY HYPERPARATHYROIDISM OF RENAL ORIGIN: ICD-10-CM

## 2024-04-08 DIAGNOSIS — S83.241A ACUTE MEDIAL MENISCUS TEAR OF RIGHT KNEE, INITIAL ENCOUNTER: ICD-10-CM

## 2024-04-08 DIAGNOSIS — E83.42 HYPOMAGNESEMIA: ICD-10-CM

## 2024-04-08 DIAGNOSIS — D64.9 ANEMIA, UNSPECIFIED TYPE: ICD-10-CM

## 2024-04-08 LAB
25(OH)D3 SERPL-MCNC: 26.7 NG/ML (ref 30–100)
ALBUMIN SERPL-MCNC: 4.2 G/DL (ref 3.5–5.2)
ALBUMIN/GLOB SERPL: 1.7 G/DL
ALP SERPL-CCNC: 122 U/L (ref 39–117)
ALT SERPL W P-5'-P-CCNC: 14 U/L (ref 1–41)
ANION GAP SERPL CALCULATED.3IONS-SCNC: 8.5 MMOL/L (ref 5–15)
AST SERPL-CCNC: 13 U/L (ref 1–40)
BASOPHILS # BLD AUTO: 0.02 10*3/MM3 (ref 0–0.2)
BASOPHILS NFR BLD AUTO: 0.4 % (ref 0–1.5)
BILIRUB SERPL-MCNC: 0.6 MG/DL (ref 0–1.2)
BILIRUB UR QL STRIP: NEGATIVE
BUN SERPL-MCNC: 34 MG/DL (ref 8–23)
BUN/CREAT SERPL: 16.5 (ref 7–25)
CALCIUM SPEC-SCNC: 9.5 MG/DL (ref 8.6–10.5)
CHLORIDE SERPL-SCNC: 107 MMOL/L (ref 98–107)
CLARITY UR: CLEAR
CO2 SERPL-SCNC: 23.5 MMOL/L (ref 22–29)
COLOR UR: YELLOW
CREAT SERPL-MCNC: 2.06 MG/DL (ref 0.76–1.27)
CREAT UR-MCNC: 119.2 MG/DL
CREAT UR-MCNC: 120.9 MG/DL
DEPRECATED RDW RBC AUTO: 42.6 FL (ref 37–54)
EGFRCR SERPLBLD CKD-EPI 2021: 35.8 ML/MIN/1.73
EOSINOPHIL # BLD AUTO: 0.06 10*3/MM3 (ref 0–0.4)
EOSINOPHIL NFR BLD AUTO: 1.3 % (ref 0.3–6.2)
ERYTHROCYTE [DISTWIDTH] IN BLOOD BY AUTOMATED COUNT: 13.3 % (ref 12.3–15.4)
GLOBULIN UR ELPH-MCNC: 2.5 GM/DL
GLUCOSE SERPL-MCNC: 121 MG/DL (ref 65–99)
GLUCOSE UR STRIP-MCNC: NEGATIVE MG/DL
HBA1C MFR BLD: 6.7 % (ref 4.8–5.6)
HCT VFR BLD AUTO: 44.4 % (ref 37.5–51)
HGB BLD-MCNC: 14.5 G/DL (ref 13–17.7)
HGB UR QL STRIP.AUTO: NEGATIVE
HOLD SPECIMEN: NORMAL
IMM GRANULOCYTES # BLD AUTO: 0.01 10*3/MM3 (ref 0–0.05)
IMM GRANULOCYTES NFR BLD AUTO: 0.2 % (ref 0–0.5)
KETONES UR QL STRIP: NEGATIVE
LEUKOCYTE ESTERASE UR QL STRIP.AUTO: NEGATIVE
LYMPHOCYTES # BLD AUTO: 1.07 10*3/MM3 (ref 0.7–3.1)
LYMPHOCYTES NFR BLD AUTO: 24 % (ref 19.6–45.3)
MAGNESIUM SERPL-MCNC: 1.7 MG/DL (ref 1.6–2.4)
MCH RBC QN AUTO: 28.4 PG (ref 26.6–33)
MCHC RBC AUTO-ENTMCNC: 32.7 G/DL (ref 31.5–35.7)
MCV RBC AUTO: 87.1 FL (ref 79–97)
MONOCYTES # BLD AUTO: 0.36 10*3/MM3 (ref 0.1–0.9)
MONOCYTES NFR BLD AUTO: 8.1 % (ref 5–12)
NEUTROPHILS NFR BLD AUTO: 2.94 10*3/MM3 (ref 1.7–7)
NEUTROPHILS NFR BLD AUTO: 66 % (ref 42.7–76)
NITRITE UR QL STRIP: NEGATIVE
NRBC BLD AUTO-RTO: 0 /100 WBC (ref 0–0.2)
PH UR STRIP.AUTO: 6 [PH] (ref 5–8)
PHOSPHATE SERPL-MCNC: 2.4 MG/DL (ref 2.5–4.5)
PLATELET # BLD AUTO: 137 10*3/MM3 (ref 140–450)
PMV BLD AUTO: 9.5 FL (ref 6–12)
POTASSIUM SERPL-SCNC: 4.9 MMOL/L (ref 3.5–5.2)
PROT ?TM UR-MCNC: 14.2 MG/DL
PROT ?TM UR-MCNC: 14.9 MG/DL
PROT SERPL-MCNC: 6.7 G/DL (ref 6–8.5)
PROT UR QL STRIP: ABNORMAL
PROT/CREAT UR: 119.1 MG/G CREA (ref 0–200)
PROT/CREAT UR: 123.2 MG/G CREA (ref 0–200)
PTH-INTACT SERPL-MCNC: 144 PG/ML (ref 15–65)
RBC # BLD AUTO: 5.1 10*6/MM3 (ref 4.14–5.8)
SODIUM SERPL-SCNC: 139 MMOL/L (ref 136–145)
SP GR UR STRIP: 1.02 (ref 1–1.03)
URATE SERPL-MCNC: 7.2 MG/DL (ref 3.4–7)
UROBILINOGEN UR QL STRIP: ABNORMAL
WBC NRBC COR # BLD AUTO: 4.46 10*3/MM3 (ref 3.4–10.8)

## 2024-04-08 PROCEDURE — 80053 COMPREHEN METABOLIC PANEL: CPT

## 2024-04-08 PROCEDURE — 82570 ASSAY OF URINE CREATININE: CPT

## 2024-04-08 PROCEDURE — 80197 ASSAY OF TACROLIMUS: CPT

## 2024-04-08 PROCEDURE — 36415 COLL VENOUS BLD VENIPUNCTURE: CPT

## 2024-04-08 PROCEDURE — 85025 COMPLETE CBC W/AUTO DIFF WBC: CPT

## 2024-04-08 PROCEDURE — 87086 URINE CULTURE/COLONY COUNT: CPT

## 2024-04-08 PROCEDURE — 81003 URINALYSIS AUTO W/O SCOPE: CPT

## 2024-04-08 PROCEDURE — 83970 ASSAY OF PARATHORMONE: CPT

## 2024-04-08 PROCEDURE — 83735 ASSAY OF MAGNESIUM: CPT

## 2024-04-08 PROCEDURE — 83036 HEMOGLOBIN GLYCOSYLATED A1C: CPT

## 2024-04-08 PROCEDURE — 84156 ASSAY OF PROTEIN URINE: CPT

## 2024-04-08 PROCEDURE — 84550 ASSAY OF BLOOD/URIC ACID: CPT

## 2024-04-08 PROCEDURE — 84100 ASSAY OF PHOSPHORUS: CPT

## 2024-04-08 PROCEDURE — 82306 VITAMIN D 25 HYDROXY: CPT

## 2024-04-09 LAB — BACTERIA SPEC AEROBE CULT: NO GROWTH

## 2024-04-10 LAB — TACROLIMUS BLD LC/MS/MS-MCNC: 5.8 NG/ML (ref 2–20)

## 2024-04-24 ENCOUNTER — LAB (OUTPATIENT)
Dept: LAB | Facility: HOSPITAL | Age: 64
End: 2024-04-24
Payer: COMMERCIAL

## 2024-04-24 DIAGNOSIS — R79.89 HYPOURICEMIA: ICD-10-CM

## 2024-04-24 DIAGNOSIS — R39.9 GENITOURINARY SYMPTOMS: ICD-10-CM

## 2024-04-24 DIAGNOSIS — D64.9 ANEMIA, UNSPECIFIED TYPE: ICD-10-CM

## 2024-04-24 DIAGNOSIS — Z94.0 KIDNEY REPLACED BY TRANSPLANT: ICD-10-CM

## 2024-04-24 LAB
ALBUMIN SERPL-MCNC: 4.2 G/DL (ref 3.5–5.2)
ANION GAP SERPL CALCULATED.3IONS-SCNC: 9 MMOL/L (ref 5–15)
BACTERIA UR QL AUTO: NORMAL /HPF
BASOPHILS # BLD AUTO: 0.02 10*3/MM3 (ref 0–0.2)
BASOPHILS NFR BLD AUTO: 0.3 % (ref 0–1.5)
BILIRUB UR QL STRIP: NEGATIVE
BUN SERPL-MCNC: 33 MG/DL (ref 8–23)
BUN/CREAT SERPL: 18.3 (ref 7–25)
CALCIUM SPEC-SCNC: 9.9 MG/DL (ref 8.6–10.5)
CHLORIDE SERPL-SCNC: 106 MMOL/L (ref 98–107)
CLARITY UR: CLEAR
CO2 SERPL-SCNC: 23 MMOL/L (ref 22–29)
COLOR UR: YELLOW
CREAT SERPL-MCNC: 1.8 MG/DL (ref 0.76–1.27)
CREAT UR-MCNC: 141.5 MG/DL
DEPRECATED RDW RBC AUTO: 43.7 FL (ref 37–54)
EGFRCR SERPLBLD CKD-EPI 2021: 42 ML/MIN/1.73
EOSINOPHIL # BLD AUTO: 0.11 10*3/MM3 (ref 0–0.4)
EOSINOPHIL NFR BLD AUTO: 1.6 % (ref 0.3–6.2)
ERYTHROCYTE [DISTWIDTH] IN BLOOD BY AUTOMATED COUNT: 13.6 % (ref 12.3–15.4)
GLUCOSE SERPL-MCNC: 134 MG/DL (ref 65–99)
GLUCOSE UR STRIP-MCNC: NEGATIVE MG/DL
HCT VFR BLD AUTO: 44.9 % (ref 37.5–51)
HGB BLD-MCNC: 14.8 G/DL (ref 13–17.7)
HGB UR QL STRIP.AUTO: NEGATIVE
HYALINE CASTS UR QL AUTO: NORMAL /LPF
IMM GRANULOCYTES # BLD AUTO: 0.03 10*3/MM3 (ref 0–0.05)
IMM GRANULOCYTES NFR BLD AUTO: 0.4 % (ref 0–0.5)
KETONES UR QL STRIP: NEGATIVE
LEUKOCYTE ESTERASE UR QL STRIP.AUTO: NEGATIVE
LYMPHOCYTES # BLD AUTO: 1.48 10*3/MM3 (ref 0.7–3.1)
LYMPHOCYTES NFR BLD AUTO: 21.2 % (ref 19.6–45.3)
MAGNESIUM SERPL-MCNC: 1.6 MG/DL (ref 1.6–2.4)
MCH RBC QN AUTO: 29.2 PG (ref 26.6–33)
MCHC RBC AUTO-ENTMCNC: 33 G/DL (ref 31.5–35.7)
MCV RBC AUTO: 88.7 FL (ref 79–97)
MONOCYTES # BLD AUTO: 0.39 10*3/MM3 (ref 0.1–0.9)
MONOCYTES NFR BLD AUTO: 5.6 % (ref 5–12)
NEUTROPHILS NFR BLD AUTO: 4.96 10*3/MM3 (ref 1.7–7)
NEUTROPHILS NFR BLD AUTO: 70.9 % (ref 42.7–76)
NITRITE UR QL STRIP: NEGATIVE
NRBC BLD AUTO-RTO: 0 /100 WBC (ref 0–0.2)
PH UR STRIP.AUTO: <=5 [PH] (ref 5–8)
PHOSPHATE SERPL-MCNC: 2.4 MG/DL (ref 2.5–4.5)
PLATELET # BLD AUTO: 124 10*3/MM3 (ref 140–450)
PMV BLD AUTO: 9.6 FL (ref 6–12)
POTASSIUM SERPL-SCNC: 4.7 MMOL/L (ref 3.5–5.2)
PROT ?TM UR-MCNC: 16.5 MG/DL
PROT UR QL STRIP: ABNORMAL
PROT/CREAT UR: 116.6 MG/G CREA (ref 0–200)
RBC # BLD AUTO: 5.06 10*6/MM3 (ref 4.14–5.8)
RBC # UR STRIP: NORMAL /HPF
REF LAB TEST METHOD: NORMAL
SODIUM SERPL-SCNC: 138 MMOL/L (ref 136–145)
SP GR UR STRIP: 1.02 (ref 1–1.03)
SQUAMOUS #/AREA URNS HPF: NORMAL /HPF
UROBILINOGEN UR QL STRIP: ABNORMAL
WBC # UR STRIP: NORMAL /HPF
WBC NRBC COR # BLD AUTO: 6.99 10*3/MM3 (ref 3.4–10.8)

## 2024-04-24 PROCEDURE — 81001 URINALYSIS AUTO W/SCOPE: CPT

## 2024-04-24 PROCEDURE — 85025 COMPLETE CBC W/AUTO DIFF WBC: CPT

## 2024-04-24 PROCEDURE — 36415 COLL VENOUS BLD VENIPUNCTURE: CPT

## 2024-04-24 PROCEDURE — 80197 ASSAY OF TACROLIMUS: CPT

## 2024-04-24 PROCEDURE — 80069 RENAL FUNCTION PANEL: CPT

## 2024-04-24 PROCEDURE — 87086 URINE CULTURE/COLONY COUNT: CPT

## 2024-04-24 PROCEDURE — 83735 ASSAY OF MAGNESIUM: CPT

## 2024-04-24 PROCEDURE — 84156 ASSAY OF PROTEIN URINE: CPT

## 2024-04-24 PROCEDURE — 82570 ASSAY OF URINE CREATININE: CPT

## 2024-04-25 LAB — BACTERIA SPEC AEROBE CULT: NO GROWTH

## 2024-04-27 LAB — TACROLIMUS BLD LC/MS/MS-MCNC: 4.4 NG/ML (ref 2–20)

## 2024-05-14 ENCOUNTER — OFFICE VISIT (OUTPATIENT)
Dept: ENDOCRINOLOGY | Facility: CLINIC | Age: 64
End: 2024-05-14
Payer: MEDICARE

## 2024-05-14 VITALS
WEIGHT: 222 LBS | BODY MASS INDEX: 31.08 KG/M2 | DIASTOLIC BLOOD PRESSURE: 75 MMHG | SYSTOLIC BLOOD PRESSURE: 140 MMHG | HEIGHT: 71 IN | OXYGEN SATURATION: 98 % | HEART RATE: 68 BPM

## 2024-05-14 DIAGNOSIS — E11.9 CONTROLLED TYPE 2 DIABETES MELLITUS WITHOUT COMPLICATION, WITHOUT LONG-TERM CURRENT USE OF INSULIN: Primary | ICD-10-CM

## 2024-05-14 LAB — GLUCOSE BLDC GLUCOMTR-MCNC: 171 MG/DL (ref 70–105)

## 2024-05-14 PROCEDURE — 82948 REAGENT STRIP/BLOOD GLUCOSE: CPT | Performed by: INTERNAL MEDICINE

## 2024-05-14 RX ORDER — DAPAGLIFLOZIN 5 MG/1
TABLET, FILM COATED ORAL
Start: 2024-05-14

## 2024-05-14 RX ORDER — ATORVASTATIN CALCIUM 10 MG/1
10 TABLET, FILM COATED ORAL DAILY
Qty: 90 TABLET | Refills: 3 | Status: SHIPPED | OUTPATIENT
Start: 2024-05-14

## 2024-05-14 NOTE — PATIENT INSTRUCTIONS
Keep up the good work!  Continue exercise.  Start atorvastatin 10 mg one day per week.  Take Farxiga 5 mg one tab before breakfast.  Call if blood sugars are running under 100 or over 200.  F/u in 6 months, with fasting labs prior.

## 2024-05-26 NOTE — PROGRESS NOTES
[facility]        Patient Care Team:  Jazmine Pastrana APRN as PCP - General (Nurse Practitioner)  Kvng Goff MD (Infectious Diseases)  Ana Maria Thacker APRN ([facility] [place])  Darrell Gonzalez MD as Consulting Physician (Nephrology)    Chief Complaint:    Chief Complaint   Patient presents with    Diabetes     FU / DM Type 2 /  / Ate 9:10a          Subjective   Here for diabetes f/u  Blood sugars: low 100's  Exercise program: yard work  Not taking vit D  Not on a statin    Interval History:     Patient Complaints:  no insulin x 3 mo  Patient Denies: hypoglycemia  History taken from: patient    Review of Systems:   Review of Systems   HENT:  Positive for hearing loss.    Eyes:  Negative for blurred vision.   Cardiovascular:  Negative for leg swelling.   Gastrointestinal:  Negative for nausea.   Endocrine: Negative for polyuria.   Neurological:  Negative for headache.   Gained 8 lb since last visit    Objective     Vital Signs     Vitals:    05/14/24 1110   BP: 140/75   Pulse: 68   SpO2: 98%         Physical Exam:     General Appearance:    Alert, cooperative, in no acute distress   Head:    Normocephalic, hearing aids   Eyes:            Lids and lashes normal, conjunctivae and sclerae normal, no   icterus, no pallor, corneas clear, PERRLA   Throat:   No oral lesions,  oral mucosa moist   Neck:   No adenopathy, supple,  no thyromegaly, no carotid bruit   Lungs:     Clear    Heart:    Regular rhythm and normal rate   Chest Wall:    No abnormalities observed   Abdomen:     Normal bowel sounds, soft                 Extremities:   Moves all extremities well, no edema               Pulses:   Pulses palpable and equal bilaterally   Skin:   Dry   Neurologic:  DTR absent, able to feel the 10g monofilament          Results Review:    I have reviewed the patient's new clinical results, labs & imaging.    Medication Review:   Prior to Admission medications    Medication Sig  Start Date End Date Taking? Authorizing Provider   Accu-Chek Softclix Lancets lancets 1 each by Other route 4 (Four) Times a Day Before Meals & at Bedtime. Dx: E11.65. Use as instructed 10/2/23  Yes Gabbie Booth MD   b complex-vitamin c-folic acid (NEPHRO-MARK) 0.8 MG tablet tablet Take 1 tablet by mouth Daily.   Yes Gunnar Pineda MD   cinacalcet (SENSIPAR) 30 MG tablet Take 1 tablet by mouth Daily.   Yes Gunnar Pineda MD   glucose blood (Accu-Chek Guide) test strip 1 each by Other route 4 (Four) Times a Day Before Meals & at Bedtime. Dx: E11.65. Use as instructed 10/2/23  Yes Gabbie Booth MD   hydrALAZINE (APRESOLINE) 25 MG tablet Take 2 tablets by mouth 3 (Three) Times a Day.   Yes Gunnar Pineda MD   Insulin Glargine (LANTUS SOLOSTAR) 100 UNIT/ML injection pen Inject 24 units daily 10/24/23  Yes Daphne Sanchez MD   Insulin Lispro, 1 Unit Dial, (HumaLOG KwikPen) 100 UNIT/ML solution pen-injector 5 units with each meal plus sliding scale for correction. MDD 30 units 10/24/23  Yes Daphne Sanchez MD   Insulin Pen Needle (Pen Needles) 32G X 4 MM misc Use 1 each 4 (Four) Times a Day. 10/2/23  Yes Gabbie Booth MD   magnesium oxide (MAG-OX) 400 MG tablet Take 1 tablet by mouth 2 (Two) Times a Day.   Yes Gunnar Pineda MD   mycophenolate (CELLCEPT) 250 MG capsule Take 2 capsules by mouth 2 (Two) Times a Day.   Yes Gunnar Pineda MD   sodium bicarbonate 650 MG tablet Take 1 tablet by mouth 1 (One) Time.   Yes Gunnar Pineda MD   tacrolimus (PROGRAF) 1 MG capsule Take 1 capsule by mouth Every Night. Take 3 caps every morning and 3 caps every evening   Yes Gunnar Pineda MD   atorvastatin (LIPITOR) 10 MG tablet Take 1 tablet by mouth Daily. 5/14/24   Daphne Sanchez MD   dapagliflozin (Farxiga) 5 MG tablet tablet Take one before breakfast daily. 5/14/24   Daphne Sanchez MD       Lab Results (most recent)       Procedure Component Value Units Date/Time    POC  Glucose [[id number]]  (Abnormal) Collected: 05/14/24 1109    Specimen: Blood Updated: 05/14/24 1111     Glucose 171 mg/dL      Comment: Serial Number: [id number]:  [id number]          Lab Results   Component Value Date    HGBA1C 6.70 (H) 04/08/2024    HGBA1C 6.30 (H) 01/17/2024    HGBA1C 12.10 (H) 09/30/2023      Lab Results   Component Value Date    GLUCOSE 134 (H) 04/24/2024    BUN 33 (H) 04/24/2024    CREATININE 1.80 (H) 04/24/2024    EGFRIFNONA 39 (L) 02/23/2022    EGFRIFAFRI 20 04/02/2021    BCR 18.3 04/24/2024    K 4.7 04/24/2024    CO2 23.0 04/24/2024    CALCIUM 9.9 04/24/2024    ALBUMIN 4.2 04/24/2024    AST 13 04/08/2024    ALT 14 04/08/2024    CHOL 169 01/17/2024     (H) 01/17/2024    HDL 50 01/17/2024    TRIG 72 01/17/2024     Lab Results   Component Value Date    TSH 2.130 10/01/2023    GCVO56VA 26.7 (L) 04/08/2024     Prot/cr ratio 116.6    Assessment & Plan     Diagnoses and all orders for this visit:    1. Controlled type 2 diabetes mellitus without complication, without long-term current use of insulin (Primary)  -     Hemoglobin A1c; Future  -     Microalbumin / Creatinine Urine Ratio - Urine, Clean Catch; Future  -     Comprehensive Metabolic Panel; Future  -     Lipid Panel; Future  -     TSH; Future  -     dapagliflozin (Farxiga) 5 MG tablet tablet; Take one before breakfast daily.  -     atorvastatin (LIPITOR) 10 MG tablet; Take 1 tablet by mouth Daily.  Dispense: 90 tablet; Refill: 3    Other orders  -     POC Glucose    Glucose control improved.    Continue exercise.  Start atorvastatin 10 mg one day per week.  Take Farxiga 5 mg one tab before breakfast. Samples given to pt.  Call if blood sugars are running under 100 or over 200.  Discard used insulin pens after 1 month.        Daphne Sanchez MD  05/26/24  19:31 EDT

## 2024-05-31 ENCOUNTER — LAB (OUTPATIENT)
Dept: LAB | Facility: HOSPITAL | Age: 64
End: 2024-05-31
Payer: MEDICARE

## 2024-05-31 DIAGNOSIS — Z94.0 KIDNEY REPLACED BY TRANSPLANT: ICD-10-CM

## 2024-05-31 DIAGNOSIS — R39.9 GENITOURINARY SYMPTOMS: ICD-10-CM

## 2024-05-31 DIAGNOSIS — D64.9 ANEMIA, UNSPECIFIED TYPE: ICD-10-CM

## 2024-05-31 DIAGNOSIS — R79.89 HYPOURICEMIA: ICD-10-CM

## 2024-05-31 LAB
ALBUMIN SERPL-MCNC: 4.1 G/DL (ref 3.5–5.2)
ANION GAP SERPL CALCULATED.3IONS-SCNC: 7.2 MMOL/L (ref 5–15)
BACTERIA UR QL AUTO: NORMAL /HPF
BASOPHILS # BLD AUTO: 0.02 10*3/MM3 (ref 0–0.2)
BASOPHILS NFR BLD AUTO: 0.4 % (ref 0–1.5)
BILIRUB UR QL STRIP: NEGATIVE
BUN SERPL-MCNC: 24 MG/DL (ref 8–23)
BUN/CREAT SERPL: 13.6 (ref 7–25)
CALCIUM SPEC-SCNC: 9.6 MG/DL (ref 8.6–10.5)
CHLORIDE SERPL-SCNC: 105 MMOL/L (ref 98–107)
CLARITY UR: CLEAR
CO2 SERPL-SCNC: 24.8 MMOL/L (ref 22–29)
COLOR UR: YELLOW
CREAT SERPL-MCNC: 1.76 MG/DL (ref 0.76–1.27)
CREAT UR-MCNC: 122 MG/DL
DEPRECATED RDW RBC AUTO: 44 FL (ref 37–54)
EGFRCR SERPLBLD CKD-EPI 2021: 43.2 ML/MIN/1.73
EOSINOPHIL # BLD AUTO: 0.07 10*3/MM3 (ref 0–0.4)
EOSINOPHIL NFR BLD AUTO: 1.3 % (ref 0.3–6.2)
ERYTHROCYTE [DISTWIDTH] IN BLOOD BY AUTOMATED COUNT: 13.4 % (ref 12.3–15.4)
GLUCOSE SERPL-MCNC: 154 MG/DL (ref 65–99)
GLUCOSE UR STRIP-MCNC: NEGATIVE MG/DL
HCT VFR BLD AUTO: 45.1 % (ref 37.5–51)
HGB BLD-MCNC: 14.5 G/DL (ref 13–17.7)
HGB UR QL STRIP.AUTO: NEGATIVE
HYALINE CASTS UR QL AUTO: NORMAL /LPF
IMM GRANULOCYTES # BLD AUTO: 0.02 10*3/MM3 (ref 0–0.05)
IMM GRANULOCYTES NFR BLD AUTO: 0.4 % (ref 0–0.5)
KETONES UR QL STRIP: NEGATIVE
LEUKOCYTE ESTERASE UR QL STRIP.AUTO: NEGATIVE
LYMPHOCYTES # BLD AUTO: 1.28 10*3/MM3 (ref 0.7–3.1)
LYMPHOCYTES NFR BLD AUTO: 24.4 % (ref 19.6–45.3)
MAGNESIUM SERPL-MCNC: 1.6 MG/DL (ref 1.6–2.4)
MCH RBC QN AUTO: 29.1 PG (ref 26.6–33)
MCHC RBC AUTO-ENTMCNC: 32.2 G/DL (ref 31.5–35.7)
MCV RBC AUTO: 90.4 FL (ref 79–97)
MONOCYTES # BLD AUTO: 0.41 10*3/MM3 (ref 0.1–0.9)
MONOCYTES NFR BLD AUTO: 7.8 % (ref 5–12)
NEUTROPHILS NFR BLD AUTO: 3.45 10*3/MM3 (ref 1.7–7)
NEUTROPHILS NFR BLD AUTO: 65.7 % (ref 42.7–76)
NITRITE UR QL STRIP: NEGATIVE
NRBC BLD AUTO-RTO: 0 /100 WBC (ref 0–0.2)
PH UR STRIP.AUTO: 6 [PH] (ref 5–8)
PHOSPHATE SERPL-MCNC: 2.2 MG/DL (ref 2.5–4.5)
PLATELET # BLD AUTO: 131 10*3/MM3 (ref 140–450)
PMV BLD AUTO: 9.8 FL (ref 6–12)
POTASSIUM SERPL-SCNC: 4.8 MMOL/L (ref 3.5–5.2)
PROT ?TM UR-MCNC: 23.6 MG/DL
PROT UR QL STRIP: ABNORMAL
PROT/CREAT UR: 193.4 MG/G CREA (ref 0–200)
RBC # BLD AUTO: 4.99 10*6/MM3 (ref 4.14–5.8)
RBC # UR STRIP: NORMAL /HPF
REF LAB TEST METHOD: NORMAL
SODIUM SERPL-SCNC: 137 MMOL/L (ref 136–145)
SP GR UR STRIP: 1.02 (ref 1–1.03)
SQUAMOUS #/AREA URNS HPF: NORMAL /HPF
UROBILINOGEN UR QL STRIP: ABNORMAL
WBC # UR STRIP: NORMAL /HPF
WBC NRBC COR # BLD AUTO: 5.25 10*3/MM3 (ref 3.4–10.8)

## 2024-05-31 PROCEDURE — 84156 ASSAY OF PROTEIN URINE: CPT

## 2024-05-31 PROCEDURE — 80069 RENAL FUNCTION PANEL: CPT

## 2024-05-31 PROCEDURE — 81001 URINALYSIS AUTO W/SCOPE: CPT

## 2024-05-31 PROCEDURE — 82570 ASSAY OF URINE CREATININE: CPT

## 2024-05-31 PROCEDURE — 83735 ASSAY OF MAGNESIUM: CPT

## 2024-05-31 PROCEDURE — 87086 URINE CULTURE/COLONY COUNT: CPT

## 2024-05-31 PROCEDURE — 85025 COMPLETE CBC W/AUTO DIFF WBC: CPT

## 2024-05-31 PROCEDURE — 80197 ASSAY OF TACROLIMUS: CPT

## 2024-05-31 PROCEDURE — 36415 COLL VENOUS BLD VENIPUNCTURE: CPT

## 2024-06-01 LAB — BACTERIA SPEC AEROBE CULT: NO GROWTH

## 2024-06-04 LAB — TACROLIMUS BLD LC/MS/MS-MCNC: 5.3 NG/ML (ref 2–20)

## 2024-08-12 ENCOUNTER — HOSPITAL ENCOUNTER (OUTPATIENT)
Facility: HOSPITAL | Age: 64
Discharge: HOME | End: 2024-08-12
Attending: EMERGENCY MEDICINE | Admitting: EMERGENCY MEDICINE
Payer: MEDICARE

## 2024-08-12 ENCOUNTER — APPOINTMENT (OUTPATIENT)
Dept: GENERAL RADIOLOGY | Facility: HOSPITAL | Age: 64
End: 2024-08-12
Payer: MEDICARE

## 2024-08-12 VITALS
HEART RATE: 100 BPM | WEIGHT: 216 LBS | RESPIRATION RATE: 20 BRPM | TEMPERATURE: 98.3 F | DIASTOLIC BLOOD PRESSURE: 90 MMHG | OXYGEN SATURATION: 95 % | BODY MASS INDEX: 27.72 KG/M2 | SYSTOLIC BLOOD PRESSURE: 142 MMHG | HEIGHT: 74 IN

## 2024-08-12 DIAGNOSIS — J20.9 ACUTE BRONCHITIS, UNSPECIFIED ORGANISM: Primary | ICD-10-CM

## 2024-08-12 LAB
FLUAV SUBTYP SPEC NAA+PROBE: NOT DETECTED
FLUBV RNA ISLT QL NAA+PROBE: NOT DETECTED
SARS-COV-2 RNA RESP QL NAA+PROBE: NOT DETECTED

## 2024-08-12 PROCEDURE — 71046 X-RAY EXAM CHEST 2 VIEWS: CPT

## 2024-08-12 PROCEDURE — 87636 SARSCOV2 & INF A&B AMP PRB: CPT | Performed by: EMERGENCY MEDICINE

## 2024-08-12 PROCEDURE — G0463 HOSPITAL OUTPT CLINIC VISIT: HCPCS | Performed by: PHYSICIAN ASSISTANT

## 2024-08-12 RX ORDER — ALBUTEROL SULFATE 90 UG/1
2 AEROSOL, METERED RESPIRATORY (INHALATION) EVERY 4 HOURS PRN
Qty: 18 G | Refills: 0 | Status: SHIPPED | OUTPATIENT
Start: 2024-08-12

## 2024-08-12 RX ORDER — DOXYCYCLINE 100 MG/1
100 CAPSULE ORAL 2 TIMES DAILY
Qty: 20 CAPSULE | Refills: 0 | Status: SHIPPED | OUTPATIENT
Start: 2024-08-12 | End: 2024-08-22

## 2024-08-12 RX ORDER — DEXTROMETHORPHAN HYDROBROMIDE AND PROMETHAZINE HYDROCHLORIDE 15; 6.25 MG/5ML; MG/5ML
5 SYRUP ORAL 4 TIMES DAILY PRN
Qty: 180 ML | Refills: 0 | Status: SHIPPED | OUTPATIENT
Start: 2024-08-12

## 2024-08-12 RX ORDER — PREDNISONE 20 MG/1
20 TABLET ORAL 2 TIMES DAILY
Qty: 10 TABLET | Refills: 0 | Status: SHIPPED | OUTPATIENT
Start: 2024-08-12 | End: 2024-08-17

## 2024-08-12 NOTE — FSED PROVIDER NOTE
EMERGENCY DEPARTMENT ENCOUNTER    Room Number:  03/03  Date seen:  8/12/2024  Time seen: 19:02 EDT  PCP: Jazmine Pastrana APRN  Historian: Patient    Discussed/obtained information from independent historians: N/A    HPI:  Chief complaint: Cough congestion  A complete HPI/ROS/PMH/PSH/SH/FH are unobtainable due to: Nothing  Context:Jose Miguel Martinez is a 64 y.o. male sniffing a past medical history of STEMI, hypertension, hyperlipidemia diabetes who presents to the ED with c/o cough and congestion has been ongoing for the past 3 weeks.  No fever or chills.  Cough is said to be worse at night in the morning.  No chest pain, palpitations, shortness of breath.  Patient reports he was treated with OTC medications a few weeks ago without any improvement.  No unilateral leg swelling or pedal edema, palpitations, No orthopnea.  Patient is here for further evaluation.        Chronic or social conditions impacting care:    ALLERGIES  Patient has no known allergies.    PAST MEDICAL HISTORY  Active Ambulatory Problems     Diagnosis Date Noted    STEMI (ST elevation myocardial infarction) 08/22/2019    ESRD (end stage renal disease) 08/22/2019    HTN (hypertension) 08/22/2019    PVD (peripheral vascular disease) 08/22/2019    Tobacco abuse 08/22/2019    Chest pain on breathing 09/09/2019    Clinical diagnosis of severe acute respiratory syndrome coronavirus 2 (SARS-CoV-2) disease 01/19/2022    DKA (diabetic ketoacidosis) 09/30/2023    Type 1 diabetes mellitus with hyperglycemia 10/10/2023    Status post kidney transplant 03/27/2024    Controlled type 2 diabetes mellitus without complication, without long-term current use of insulin 05/14/2024     Resolved Ambulatory Problems     Diagnosis Date Noted    Chest pain in adult 08/22/2019     Past Medical History:   Diagnosis Date    CKD (chronic kidney disease) requiring chronic dialysis     COPD (chronic obstructive pulmonary disease)     Diabetes mellitus type I 09/30/2023     Hyperparathyroidism     Hypertension     Hypothyroidism     Kidney failure     Thyroid nodule        PAST SURGICAL HISTORY  Past Surgical History:   Procedure Laterality Date    ARTERIOVENOUS FISTULA REPAIR      ARTERIOVENOUS FISTULA/SHUNT SURGERY      CARDIAC CATHETERIZATION N/A 08/22/2019    Procedure: Left Heart Cath;  Surgeon: Juanpablo Garcia MD;  Location:  [facility] CATH INVASIVE LOCATION;  Service: Cardiology    CARDIAC CATHETERIZATION N/A 08/22/2019    Procedure: Aortic root aortogram;  Surgeon: Juanpablo Garcia MD;  Location: [facility] CATH INVASIVE LOCATION;  Service: Cardiology    LYMPH NODE BIOPSY  2002    ORCHIECTOMY         FAMILY HISTORY  Family History   Problem Relation Age of Onset    Heart attack Mother     Diabetes Mother     Diabetes Father     Kidney disease Father        SOCIAL HISTORY  Social History     Socioeconomic History    Marital status:    Tobacco Use    Smoking status: Every Day     Current packs/day: 0.50     Average packs/day: 0.5 packs/day for 54.6 years (27.3 ttl pk-yrs)     Types: Cigarettes     Start date: 1970     Passive exposure: Past    Smokeless tobacco: Never   Vaping Use    Vaping status: Never Used   Substance and Sexual Activity    Alcohol use: No    Drug use: No    Sexual activity: Yes     Partners: Female     Birth control/protection: None       REVIEW OF SYSTEMS  Review of Systems    All systems reviewed and negative except for those discussed in HPI.     PHYSICAL EXAM    I have reviewed the triage vital signs and nursing notes.  Vitals:    08/12/24 1703   BP: 142/90   Pulse: 100   Resp: 20   Temp: 98.3 °F (36.8 °C)   SpO2: 95%     Physical Exam    GENERAL: WDWN male, not distressed  HENT: nares patent  EYES: no scleral icterus  NECK: no ROM limitations  CV: regular rhythm, regular rate, normal S1-S2  RESPIRATORY: Faint rhonchi bases of both lungs.  No rales or wheezes  ABDOMEN: soft, nontender  : deferred  MUSCULOSKELETAL: no  deformity  NEURO: alert, moves all extremities, follows commands  SKIN: warm, dry    LAB RESULTS  Recent Results (from the past 24 hour(s))   COVID-19 and FLU A/B PCR, 1 HR TAT - Swab, Nasopharynx    Collection Time: 08/12/24  5:06 PM    Specimen: Nasopharynx; Swab   Result Value Ref Range    COVID19 Not Detected Not Detected - Ref. Range    Influenza A PCR Not Detected Not Detected    Influenza B PCR Not Detected Not Detected       Ordered the above labs and independently interpreted results.  My findings will be discussed in the ED course or medical decision making section below    RADIOLOGY RESULTS  XR Chest PA & Lateral    Result Date: 8/12/2024  XR CHEST PA AND LATERAL Date of Exam: 8/12/2024 5:40 PM EDT Indication: cough Comparison: 3/4/2024 Findings: Heart size and pulmonary vessels are within normal limits. Lungs are clear. No pleural effusion. No pneumothorax. There is a 1 cm nodular density projecting over the left lung base, likely nipple shadow. There are degenerative changes of the spine.     Impression: 1. No acute cardiopulmonary disease. Electronically Signed: Fermin Guzman MD  8/12/2024 5:56 PM EDT  Workstation ID: [id number]      Ordered the above noted radiological studies.  Independently interpreted by me.  My findings will be discussed in the medical decision section below.     PROGRESS, DATA ANALYSIS, CONSULTS AND MEDICAL DECISION MAKING    Please note that this section constitutes my independent interpretation of clinical data including lab results, radiology, EKG's.  This constitutes my independent professional opinion regarding differential diagnosis and management of this patient.  It may include any factors such as history from outside sources, review of external records, social determinants of health, management of medications, response to those treatments, and discussions with other providers.       Orders placed during this visit:  Orders Placed This Encounter   Procedures    COVID-19  and FLU A/B PCR, 1 HR TAT - Swab, Nasopharynx    XR Chest PA & Lateral            Medical Decision Making  Amount and/or Complexity of Data Reviewed  Radiology: ordered.      Patient's symptoms most consistent with acute bronchitis.  Patient has a longstanding history of smoking.  He is immunosuppressed due to past renal transplant.  Will go ahead and treat with prednisone x 5 days and cover with doxycycline.  Will treat with albuterol.  Will have the patient follow-up closely with his primary care physician for further evaluation if no significant improvement in weeks time.  Alternatively he knows to return to the ED should his symptoms not improve or should he develop any new symptoms we did not address..      DIAGNOSIS  Final diagnoses:   Acute bronchitis, unspecified organism          Medication List        New Prescriptions      albuterol sulfate  (90 Base) MCG/ACT inhaler  Commonly known as: PROVENTIL HFA;VENTOLIN HFA;PROAIR HFA  Inhale 2 puffs Every 4 (Four) Hours As Needed for Wheezing or Shortness of Air.     doxycycline 100 MG capsule  Commonly known as: MONODOX  Take 1 capsule by mouth 2 (Two) Times a Day for 10 days.     predniSONE 20 MG tablet  Commonly known as: DELTASONE  Take 1 tablet by mouth 2 (Two) Times a Day for 5 days.     promethazine-dextromethorphan 6.25-15 MG/5ML syrup  Commonly known as: PROMETHAZINE-DM  Take 5 mL by mouth 4 (Four) Times a Day As Needed for Cough.               Where to Get Your Medications        These medications were sent to [facility] [id number] - [place], [place] - [address] - [phone]  - [phone] FX  [address] [place] [address]      Phone: [phone]   albuterol sulfate  (90 Base) MCG/ACT inhaler  doxycycline 100 MG capsule  predniSONE 20 MG tablet  promethazine-dextromethorphan 6.25-15 MG/5ML syrup         FOLLOW-UP  Jazmine Pastrana APRN  [address] [place] [address] [phone]    Schedule an  appointment as soon as possible for a visit in 1 week  For further evaluation and treatment, As needed        Latest Documented Vital Signs:  As of 19:08 EDT  BP- 142/90 HR- 100 Temp- 98.3 °F (36.8 °C) (Oral) O2 sat- 95%    Appropriate PPE utilized throughout this patient encounter to include mask, if indicated, per current protocol. Hand hygiene was performed before donning PPE and after removal when leaving the room.    Please note that portions of this were completed with a voice recognition program.     Note Disclaimer: At [facility], we believe that sharing information builds trust and better relationships. You are receiving this note because you are receiving care at [facility] or recently visited. It is possible you will see health information before a provider has talked with you about it. This kind of information can be easy to misunderstand. To help you fully understand what it means for your health, we urge you to discuss this note with your provider.

## 2024-09-24 ENCOUNTER — OFFICE VISIT (OUTPATIENT)
Dept: CARDIOLOGY | Facility: CLINIC | Age: 64
End: 2024-09-24
Payer: MEDICARE

## 2024-09-24 VITALS
OXYGEN SATURATION: 96 % | HEIGHT: 74 IN | RESPIRATION RATE: 16 BRPM | BODY MASS INDEX: 27.85 KG/M2 | DIASTOLIC BLOOD PRESSURE: 94 MMHG | HEART RATE: 75 BPM | SYSTOLIC BLOOD PRESSURE: 153 MMHG | WEIGHT: 217 LBS

## 2024-09-24 DIAGNOSIS — I77.810 DILATED AORTIC ROOT: Primary | ICD-10-CM

## 2024-09-24 DIAGNOSIS — I15.1 HYPERTENSION SECONDARY TO OTHER RENAL DISORDERS: Chronic | ICD-10-CM

## 2024-09-24 PROCEDURE — 3077F SYST BP >= 140 MM HG: CPT | Performed by: NURSE PRACTITIONER

## 2024-09-24 PROCEDURE — 3080F DIAST BP >= 90 MM HG: CPT | Performed by: NURSE PRACTITIONER

## 2024-09-24 PROCEDURE — 99214 OFFICE O/P EST MOD 30 MIN: CPT | Performed by: NURSE PRACTITIONER

## 2024-09-24 PROCEDURE — 93000 ELECTROCARDIOGRAM COMPLETE: CPT | Performed by: NURSE PRACTITIONER

## 2024-10-14 ENCOUNTER — TELEPHONE (OUTPATIENT)
Dept: CARDIOLOGY | Facility: CLINIC | Age: 64
End: 2024-10-14
Payer: MEDICARE

## 2024-10-14 NOTE — TELEPHONE ENCOUNTER
Caller: Jose Miguel Martinez    Relationship to patient: Self    Best call back number:  [phone]    Patient is needing: PATIENT CURRENTLY SCHEDULED ON THE 21ST FOR HIS CT CHEST IN [place] BUT PATIENT IS REQUESTING ORDERS TO HAVE THOSE COMPLETED AT [facility]. PHONE [phone]

## 2024-12-10 ENCOUNTER — OFFICE VISIT (OUTPATIENT)
Dept: ENDOCRINOLOGY | Facility: CLINIC | Age: 64
End: 2024-12-10
Payer: MEDICARE

## 2024-12-10 VITALS
HEART RATE: 63 BPM | DIASTOLIC BLOOD PRESSURE: 82 MMHG | HEIGHT: 74 IN | OXYGEN SATURATION: 96 % | SYSTOLIC BLOOD PRESSURE: 150 MMHG | BODY MASS INDEX: 27.85 KG/M2 | WEIGHT: 217 LBS

## 2024-12-10 DIAGNOSIS — Z53.21 PATIENT LEFT WITHOUT BEING SEEN: Primary | ICD-10-CM

## 2024-12-10 LAB — GLUCOSE BLDC GLUCOMTR-MCNC: 215 MG/DL (ref 70–105)

## 2024-12-10 PROCEDURE — 82948 REAGENT STRIP/BLOOD GLUCOSE: CPT | Performed by: INTERNAL MEDICINE

## 2024-12-10 RX ORDER — ALLOPURINOL 100 MG/1
100 TABLET ORAL DAILY
COMMUNITY
Start: 2024-11-14

## 2024-12-11 NOTE — PROGRESS NOTES
The patient left the office before care was provided and did not complete the visit because of the wait time.   The patient left the office before care was provided and did not complete the visit because of the wait time.

## 2024-12-29 ENCOUNTER — APPOINTMENT (OUTPATIENT)
Dept: CT IMAGING | Facility: HOSPITAL | Age: 64
End: 2024-12-29
Payer: MEDICARE

## 2024-12-29 ENCOUNTER — HOSPITAL ENCOUNTER (OUTPATIENT)
Facility: HOSPITAL | Age: 64
Setting detail: OBSERVATION
Discharge: HOME | End: 2024-12-30
Attending: EMERGENCY MEDICINE | Admitting: INTERNAL MEDICINE
Payer: MEDICARE

## 2024-12-29 ENCOUNTER — APPOINTMENT (OUTPATIENT)
Dept: GENERAL RADIOLOGY | Facility: HOSPITAL | Age: 64
End: 2024-12-29
Payer: MEDICARE

## 2024-12-29 ENCOUNTER — APPOINTMENT (OUTPATIENT)
Dept: MRI IMAGING | Facility: HOSPITAL | Age: 64
End: 2024-12-29
Payer: MEDICARE

## 2024-12-29 DIAGNOSIS — I63.81 LACUNAR INFARCT, ACUTE: ICD-10-CM

## 2024-12-29 DIAGNOSIS — R29.898 RIGHT ARM WEAKNESS: Primary | ICD-10-CM

## 2024-12-29 DIAGNOSIS — I63.9 CEREBROVASCULAR ACCIDENT (CVA), UNSPECIFIED MECHANISM: ICD-10-CM

## 2024-12-29 LAB
ALBUMIN SERPL-MCNC: 4 G/DL (ref 3.5–5.2)
ALBUMIN SERPL-MCNC: 4 G/DL (ref 3.5–5.2)
ALBUMIN/GLOB SERPL: 1.5 G/DL
ALBUMIN/GLOB SERPL: 1.5 G/DL
ALP SERPL-CCNC: 116 U/L (ref 39–117)
ALP SERPL-CCNC: 123 U/L (ref 39–117)
ALT SERPL W P-5'-P-CCNC: 12 U/L (ref 1–41)
ALT SERPL W P-5'-P-CCNC: 12 U/L (ref 1–41)
ANION GAP SERPL CALCULATED.3IONS-SCNC: 7.1 MMOL/L (ref 5–15)
ANION GAP SERPL CALCULATED.3IONS-SCNC: 9 MMOL/L (ref 5–15)
AST SERPL-CCNC: 12 U/L (ref 1–40)
AST SERPL-CCNC: 18 U/L (ref 1–40)
BASOPHILS # BLD AUTO: 0.01 10*3/MM3 (ref 0–0.2)
BASOPHILS NFR BLD AUTO: 0.2 % (ref 0–1.5)
BILIRUB SERPL-MCNC: 0.6 MG/DL (ref 0–1.2)
BILIRUB SERPL-MCNC: 0.6 MG/DL (ref 0–1.2)
BUN SERPL-MCNC: 28 MG/DL (ref 8–23)
BUN SERPL-MCNC: 28 MG/DL (ref 8–23)
BUN/CREAT SERPL: 17.7 (ref 7–25)
BUN/CREAT SERPL: 17.9 (ref 7–25)
CALCIUM SPEC-SCNC: 9.6 MG/DL (ref 8.6–10.5)
CALCIUM SPEC-SCNC: 9.7 MG/DL (ref 8.6–10.5)
CHLORIDE SERPL-SCNC: 106 MMOL/L (ref 98–107)
CHLORIDE SERPL-SCNC: 106 MMOL/L (ref 98–107)
CHOLEST SERPL-MCNC: 120 MG/DL (ref 0–200)
CO2 SERPL-SCNC: 21 MMOL/L (ref 22–29)
CO2 SERPL-SCNC: 24.9 MMOL/L (ref 22–29)
CREAT SERPL-MCNC: 1.56 MG/DL (ref 0.76–1.27)
CREAT SERPL-MCNC: 1.58 MG/DL (ref 0.76–1.27)
DEPRECATED RDW RBC AUTO: 42.1 FL (ref 37–54)
DEPRECATED RDW RBC AUTO: 42.8 FL (ref 37–54)
EGFRCR SERPLBLD CKD-EPI 2021: 49.2 ML/MIN/1.73
EGFRCR SERPLBLD CKD-EPI 2021: 49.9 ML/MIN/1.73
EOSINOPHIL # BLD AUTO: 0.02 10*3/MM3 (ref 0–0.4)
EOSINOPHIL NFR BLD AUTO: 0.4 % (ref 0.3–6.2)
ERYTHROCYTE [DISTWIDTH] IN BLOOD BY AUTOMATED COUNT: 13.1 % (ref 12.3–15.4)
ERYTHROCYTE [DISTWIDTH] IN BLOOD BY AUTOMATED COUNT: 13.3 % (ref 12.3–15.4)
ERYTHROCYTE [SEDIMENTATION RATE] IN BLOOD: 15 MM/HR (ref 0–20)
GLOBULIN UR ELPH-MCNC: 2.6 GM/DL
GLOBULIN UR ELPH-MCNC: 2.6 GM/DL
GLUCOSE BLDC GLUCOMTR-MCNC: 132 MG/DL (ref 70–130)
GLUCOSE SERPL-MCNC: 154 MG/DL (ref 65–99)
GLUCOSE SERPL-MCNC: 198 MG/DL (ref 65–99)
HBA1C MFR BLD: 7.24 % (ref 4.8–5.6)
HCT VFR BLD AUTO: 40.4 % (ref 37.5–51)
HCT VFR BLD AUTO: 41.3 % (ref 37.5–51)
HDLC SERPL-MCNC: 41 MG/DL (ref 40–60)
HGB BLD-MCNC: 13.3 G/DL (ref 13–17.7)
HGB BLD-MCNC: 13.5 G/DL (ref 13–17.7)
IMM GRANULOCYTES # BLD AUTO: 0.01 10*3/MM3 (ref 0–0.05)
IMM GRANULOCYTES NFR BLD AUTO: 0.2 % (ref 0–0.5)
INR PPP: 1 (ref 0.8–1.2)
LDLC SERPL CALC-MCNC: 64 MG/DL (ref 0–100)
LDLC/HDLC SERPL: 1.59 {RATIO}
LYMPHOCYTES # BLD AUTO: 1.22 10*3/MM3 (ref 0.7–3.1)
LYMPHOCYTES NFR BLD AUTO: 22.2 % (ref 19.6–45.3)
MCH RBC QN AUTO: 28.5 PG (ref 26.6–33)
MCH RBC QN AUTO: 28.9 PG (ref 26.6–33)
MCHC RBC AUTO-ENTMCNC: 32.7 G/DL (ref 31.5–35.7)
MCHC RBC AUTO-ENTMCNC: 32.9 G/DL (ref 31.5–35.7)
MCV RBC AUTO: 87.3 FL (ref 79–97)
MCV RBC AUTO: 87.8 FL (ref 79–97)
MONOCYTES # BLD AUTO: 0.35 10*3/MM3 (ref 0.1–0.9)
MONOCYTES NFR BLD AUTO: 6.4 % (ref 5–12)
NEUTROPHILS NFR BLD AUTO: 3.89 10*3/MM3 (ref 1.7–7)
NEUTROPHILS NFR BLD AUTO: 70.6 % (ref 42.7–76)
PLATELET # BLD AUTO: 118 10*3/MM3 (ref 140–450)
PLATELET # BLD AUTO: 123 10*3/MM3 (ref 140–450)
PMV BLD AUTO: 9.2 FL (ref 6–12)
PMV BLD AUTO: 9.2 FL (ref 6–12)
POTASSIUM SERPL-SCNC: 4.4 MMOL/L (ref 3.5–5.2)
POTASSIUM SERPL-SCNC: 4.5 MMOL/L (ref 3.5–5.2)
PROT SERPL-MCNC: 6.6 G/DL (ref 6–8.5)
PROT SERPL-MCNC: 6.6 G/DL (ref 6–8.5)
PROTHROMBIN TIME: 11.9 SECONDS
RBC # BLD AUTO: 4.6 10*6/MM3 (ref 4.14–5.8)
RBC # BLD AUTO: 4.73 10*6/MM3 (ref 4.14–5.8)
SODIUM SERPL-SCNC: 136 MMOL/L (ref 136–145)
SODIUM SERPL-SCNC: 138 MMOL/L (ref 136–145)
TRIGL SERPL-MCNC: 70 MG/DL (ref 0–150)
TSH SERPL DL<=0.05 MIU/L-ACNC: 2.57 UIU/ML (ref 0.27–4.2)
VLDLC SERPL-MCNC: 15 MG/DL (ref 5–40)
WBC NRBC COR # BLD AUTO: 4.47 10*3/MM3 (ref 3.4–10.8)
WBC NRBC COR # BLD AUTO: 5.5 10*3/MM3 (ref 3.4–10.8)

## 2024-12-29 PROCEDURE — G0378 HOSPITAL OBSERVATION PER HR: HCPCS

## 2024-12-29 PROCEDURE — 36415 COLL VENOUS BLD VENIPUNCTURE: CPT

## 2024-12-29 PROCEDURE — 70450 CT HEAD/BRAIN W/O DYE: CPT

## 2024-12-29 PROCEDURE — 70551 MRI BRAIN STEM W/O DYE: CPT

## 2024-12-29 PROCEDURE — 99285 EMERGENCY DEPT VISIT HI MDM: CPT

## 2024-12-29 PROCEDURE — 82607 VITAMIN B-12: CPT | Performed by: PSYCHIATRY & NEUROLOGY

## 2024-12-29 PROCEDURE — 85652 RBC SED RATE AUTOMATED: CPT | Performed by: PSYCHIATRY & NEUROLOGY

## 2024-12-29 PROCEDURE — 84443 ASSAY THYROID STIM HORMONE: CPT | Performed by: PSYCHIATRY & NEUROLOGY

## 2024-12-29 PROCEDURE — 83036 HEMOGLOBIN GLYCOSYLATED A1C: CPT | Performed by: PSYCHIATRY & NEUROLOGY

## 2024-12-29 PROCEDURE — 85025 COMPLETE CBC W/AUTO DIFF WBC: CPT | Performed by: EMERGENCY MEDICINE

## 2024-12-29 PROCEDURE — 71045 X-RAY EXAM CHEST 1 VIEW: CPT

## 2024-12-29 PROCEDURE — 80061 LIPID PANEL: CPT | Performed by: PSYCHIATRY & NEUROLOGY

## 2024-12-29 PROCEDURE — 85610 PROTHROMBIN TIME: CPT | Performed by: EMERGENCY MEDICINE

## 2024-12-29 PROCEDURE — 82948 REAGENT STRIP/BLOOD GLUCOSE: CPT | Performed by: EMERGENCY MEDICINE

## 2024-12-29 PROCEDURE — 80053 COMPREHEN METABOLIC PANEL: CPT | Performed by: PSYCHIATRY & NEUROLOGY

## 2024-12-29 PROCEDURE — 93005 ELECTROCARDIOGRAM TRACING: CPT | Performed by: EMERGENCY MEDICINE

## 2024-12-29 PROCEDURE — 82746 ASSAY OF FOLIC ACID SERUM: CPT | Performed by: PSYCHIATRY & NEUROLOGY

## 2024-12-29 PROCEDURE — 85027 COMPLETE CBC AUTOMATED: CPT | Performed by: PSYCHIATRY & NEUROLOGY

## 2024-12-29 PROCEDURE — 80053 COMPREHEN METABOLIC PANEL: CPT | Performed by: EMERGENCY MEDICINE

## 2024-12-29 RX ORDER — FAMOTIDINE 20 MG/1
20 TABLET, FILM COATED ORAL
Status: DISCONTINUED | OUTPATIENT
Start: 2024-12-29 | End: 2024-12-30 | Stop reason: HOSPADM

## 2024-12-29 RX ORDER — ALUMINA, MAGNESIA, AND SIMETHICONE 2400; 2400; 240 MG/30ML; MG/30ML; MG/30ML
7.5 SUSPENSION ORAL EVERY 4 HOURS PRN
Status: DISCONTINUED | OUTPATIENT
Start: 2024-12-29 | End: 2024-12-30 | Stop reason: HOSPADM

## 2024-12-29 RX ORDER — TACROLIMUS 1 MG/1
2 CAPSULE ORAL EVERY EVENING
COMMUNITY

## 2024-12-29 RX ORDER — ONDANSETRON 2 MG/ML
4 INJECTION INTRAMUSCULAR; INTRAVENOUS EVERY 6 HOURS PRN
Status: DISCONTINUED | OUTPATIENT
Start: 2024-12-29 | End: 2024-12-30 | Stop reason: HOSPADM

## 2024-12-29 RX ORDER — ATORVASTATIN CALCIUM 10 MG/1
10 TABLET, FILM COATED ORAL 3 TIMES WEEKLY
COMMUNITY
End: 2024-12-30 | Stop reason: HOSPADM

## 2024-12-29 RX ORDER — CLOPIDOGREL 300 MG/1
300 TABLET, FILM COATED ORAL ONCE
Status: COMPLETED | OUTPATIENT
Start: 2024-12-29 | End: 2024-12-29

## 2024-12-29 RX ORDER — SODIUM CHLORIDE 0.9 % (FLUSH) 0.9 %
10 SYRINGE (ML) INJECTION EVERY 12 HOURS SCHEDULED
Status: DISCONTINUED | OUTPATIENT
Start: 2024-12-29 | End: 2024-12-30 | Stop reason: HOSPADM

## 2024-12-29 RX ORDER — ACETAMINOPHEN 650 MG/1
650 SUPPOSITORY RECTAL EVERY 4 HOURS PRN
Status: DISCONTINUED | OUTPATIENT
Start: 2024-12-29 | End: 2024-12-30 | Stop reason: HOSPADM

## 2024-12-29 RX ORDER — ACETAMINOPHEN 325 MG/1
650 TABLET ORAL EVERY 4 HOURS PRN
Status: DISCONTINUED | OUTPATIENT
Start: 2024-12-29 | End: 2024-12-30 | Stop reason: HOSPADM

## 2024-12-29 RX ORDER — TACROLIMUS 1 MG/1
3 CAPSULE ORAL EVERY MORNING
COMMUNITY

## 2024-12-29 RX ORDER — ASPIRIN 325 MG
325 TABLET ORAL ONCE
Status: COMPLETED | OUTPATIENT
Start: 2024-12-29 | End: 2024-12-29

## 2024-12-29 RX ORDER — SODIUM CHLORIDE 0.9 % (FLUSH) 0.9 %
10 SYRINGE (ML) INJECTION AS NEEDED
Status: DISCONTINUED | OUTPATIENT
Start: 2024-12-29 | End: 2024-12-30 | Stop reason: HOSPADM

## 2024-12-29 RX ORDER — ASPIRIN 300 MG/1
300 SUPPOSITORY RECTAL DAILY
Status: DISCONTINUED | OUTPATIENT
Start: 2024-12-30 | End: 2024-12-30 | Stop reason: HOSPADM

## 2024-12-29 RX ORDER — ATORVASTATIN CALCIUM 20 MG/1
20 TABLET, FILM COATED ORAL NIGHTLY
Status: DISCONTINUED | OUTPATIENT
Start: 2024-12-29 | End: 2024-12-30 | Stop reason: HOSPADM

## 2024-12-29 RX ORDER — BISACODYL 10 MG
10 SUPPOSITORY, RECTAL RECTAL DAILY PRN
Status: DISCONTINUED | OUTPATIENT
Start: 2024-12-29 | End: 2024-12-30 | Stop reason: HOSPADM

## 2024-12-29 RX ORDER — ASPIRIN 81 MG/1
81 TABLET, CHEWABLE ORAL DAILY
Status: DISCONTINUED | OUTPATIENT
Start: 2024-12-30 | End: 2024-12-30 | Stop reason: HOSPADM

## 2024-12-29 RX ORDER — SODIUM CHLORIDE 9 MG/ML
40 INJECTION, SOLUTION INTRAVENOUS AS NEEDED
Status: DISCONTINUED | OUTPATIENT
Start: 2024-12-29 | End: 2024-12-30 | Stop reason: HOSPADM

## 2024-12-29 RX ORDER — CLOPIDOGREL BISULFATE 75 MG/1
75 TABLET ORAL DAILY
Status: DISCONTINUED | OUTPATIENT
Start: 2024-12-30 | End: 2024-12-30 | Stop reason: HOSPADM

## 2024-12-29 RX ADMIN — CLOPIDOGREL BISULFATE 300 MG: 300 TABLET, FILM COATED ORAL at 18:12

## 2024-12-29 RX ADMIN — ASPIRIN 325 MG ORAL TABLET 325 MG: 325 PILL ORAL at 12:00
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
## 2024-12-29 NOTE — Clinical Note
Level of Care: Telemetry [5]   Admitting Physician: KORI LEWIS [[id number]]   Bed Request Comments: [email or web address]
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
## 2024-12-29 NOTE — ED NOTES
Tele Neuro notified of code stroke at our facility, she is in with another code stroke at this time and is notifying her back up doc, they will call charge phone back.

## 2024-12-30 ENCOUNTER — APPOINTMENT (OUTPATIENT)
Dept: CARDIOLOGY | Facility: HOSPITAL | Age: 64
End: 2024-12-30
Payer: MEDICARE

## 2024-12-30 ENCOUNTER — APPOINTMENT (OUTPATIENT)
Dept: MRI IMAGING | Facility: HOSPITAL | Age: 64
End: 2024-12-30
Payer: MEDICARE

## 2024-12-30 ENCOUNTER — READMISSION MANAGEMENT (OUTPATIENT)
Dept: CALL CENTER | Facility: HOSPITAL | Age: 64
End: 2024-12-30
Payer: MEDICARE

## 2024-12-30 VITALS
BODY MASS INDEX: 29.26 KG/M2 | DIASTOLIC BLOOD PRESSURE: 96 MMHG | HEART RATE: 66 BPM | SYSTOLIC BLOOD PRESSURE: 162 MMHG | HEIGHT: 72 IN | OXYGEN SATURATION: 96 % | TEMPERATURE: 97 F | RESPIRATION RATE: 11 BRPM | WEIGHT: 216 LBS

## 2024-12-30 PROBLEM — I63.9 STROKE: Status: ACTIVE | Noted: 2024-12-30

## 2024-12-30 LAB
AV MEAN PRESS GRAD SYS DOP V1V2: 4 MMHG
AV VMAX SYS DOP: 135 CM/SEC
BH CV ECHO MEAS - AO MAX PG: 7.3 MMHG
BH CV ECHO MEAS - AO V2 VTI: 33.6 CM
BH CV ECHO MEAS - AVA(I,D): 2.7 CM2
BH CV ECHO MEAS - EDV(CUBED): 110.6 ML
BH CV ECHO MEAS - EDV(MOD-SP4): 81.1 ML
BH CV ECHO MEAS - EF(MOD-SP4): 57.8 %
BH CV ECHO MEAS - ESV(CUBED): 32.8 ML
BH CV ECHO MEAS - ESV(MOD-SP4): 34.2 ML
BH CV ECHO MEAS - FS: 33.3 %
BH CV ECHO MEAS - IVS/LVPW: 1 CM
BH CV ECHO MEAS - IVSD: 1.1 CM
BH CV ECHO MEAS - LA DIMENSION: 4.4 CM
BH CV ECHO MEAS - LAT PEAK E' VEL: 8.5 CM/SEC
BH CV ECHO MEAS - LV DIASTOLIC VOL/BSA (35-75): 36.8 CM2
BH CV ECHO MEAS - LV MASS(C)D: 194 GRAMS
BH CV ECHO MEAS - LV MAX PG: 4.1 MMHG
BH CV ECHO MEAS - LV MEAN PG: 2 MMHG
BH CV ECHO MEAS - LV SYSTOLIC VOL/BSA (12-30): 15.5 CM2
BH CV ECHO MEAS - LV V1 MAX: 101 CM/SEC
BH CV ECHO MEAS - LV V1 VTI: 21.5 CM
BH CV ECHO MEAS - LVIDD: 4.8 CM
BH CV ECHO MEAS - LVIDS: 3.2 CM
BH CV ECHO MEAS - LVOT AREA: 4.2 CM2
BH CV ECHO MEAS - LVOT DIAM: 2.3 CM
BH CV ECHO MEAS - LVPWD: 1.1 CM
BH CV ECHO MEAS - MED PEAK E' VEL: 6.3 CM/SEC
BH CV ECHO MEAS - MR MAX PG: 43 MMHG
BH CV ECHO MEAS - MR MAX VEL: 328 CM/SEC
BH CV ECHO MEAS - MV A MAX VEL: 56.1 CM/SEC
BH CV ECHO MEAS - MV DEC SLOPE: 114 CM/SEC2
BH CV ECHO MEAS - MV DEC TIME: 0.3 SEC
BH CV ECHO MEAS - MV E MAX VEL: 55 CM/SEC
BH CV ECHO MEAS - MV E/A: 0.98
BH CV ECHO MEAS - MV MAX PG: 2.21 MMHG
BH CV ECHO MEAS - MV MEAN PG: 1 MMHG
BH CV ECHO MEAS - MV P1/2T: 148.5 MSEC
BH CV ECHO MEAS - MV V2 VTI: 31.6 CM
BH CV ECHO MEAS - MVA(P1/2T): 1.48 CM2
BH CV ECHO MEAS - MVA(VTI): 2.8 CM2
BH CV ECHO MEAS - PA V2 MAX: 83.4 CM/SEC
BH CV ECHO MEAS - RV MAX PG: 1.08 MMHG
BH CV ECHO MEAS - RV V1 MAX: 51.9 CM/SEC
BH CV ECHO MEAS - RV V1 VTI: 9.8 CM
BH CV ECHO MEAS - SV(LVOT): 89.3 ML
BH CV ECHO MEAS - SV(MOD-SP4): 46.9 ML
BH CV ECHO MEAS - SVI(LVOT): 40.6 ML/M2
BH CV ECHO MEAS - SVI(MOD-SP4): 21.3 ML/M2
BH CV ECHO MEAS - TAPSE (>1.6): 2.06 CM
BH CV ECHO MEASUREMENTS AVERAGE E/E' RATIO: 7.43
BH CV ECHO SHUNT ASSESSMENT PERFORMED (HIDDEN SCRIPTING): 1
BH CV XLRA - TDI S': 12.6 CM/SEC
BH CV XLRA MEAS LEFT CAROTID BULB PSV: -25 CM/SEC
BH CV XLRA MEAS LEFT DIST CCA EDV: 14 CM/SEC
BH CV XLRA MEAS LEFT DIST CCA PSV: 48.3 CM/SEC
BH CV XLRA MEAS LEFT DIST ICA EDV: -19.3 CM/SEC
BH CV XLRA MEAS LEFT DIST ICA PSV: -51.4 CM/SEC
BH CV XLRA MEAS LEFT ICA/CCA RATIO: 0.69
BH CV XLRA MEAS LEFT PROX CCA EDV: 14.8 CM/SEC
BH CV XLRA MEAS LEFT PROX CCA PSV: 74.1 CM/SEC
BH CV XLRA MEAS LEFT PROX ECA PSV: -50 CM/SEC
BH CV XLRA MEAS LEFT PROX ICA EDV: -19.8 CM/SEC
BH CV XLRA MEAS LEFT PROX ICA PSV: -47.9 CM/SEC
BH CV XLRA MEAS LEFT PROX SCLA PSV: 66.6 CM/SEC
BH CV XLRA MEAS LEFT VERTEBRAL A PSV: 38.2 CM/SEC
BH CV XLRA MEAS RIGHT CAROTID BULB PSV: 35.3 CM/SEC
BH CV XLRA MEAS RIGHT DIST CCA EDV: -14.7 CM/SEC
BH CV XLRA MEAS RIGHT DIST CCA PSV: -50.6 CM/SEC
BH CV XLRA MEAS RIGHT DIST ICA EDV: -20.8 CM/SEC
BH CV XLRA MEAS RIGHT DIST ICA PSV: -56.4 CM/SEC
BH CV XLRA MEAS RIGHT ICA/CCA RATIO: 0.82
BH CV XLRA MEAS RIGHT PROX CCA EDV: 13 CM/SEC
BH CV XLRA MEAS RIGHT PROX CCA PSV: 70.2 CM/SEC
BH CV XLRA MEAS RIGHT PROX ECA PSV: -50.2 CM/SEC
BH CV XLRA MEAS RIGHT PROX ICA EDV: -16.9 CM/SEC
BH CV XLRA MEAS RIGHT PROX ICA PSV: -57.6 CM/SEC
BH CV XLRA MEAS RIGHT PROX SCLA PSV: 77.4 CM/SEC
BH CV XLRA MEAS RIGHT VERTEBRAL A PSV: -33.3 CM/SEC
FOLATE SERPL-MCNC: 19.6 NG/ML (ref 4.78–24.2)
GLUCOSE BLDC GLUCOMTR-MCNC: 137 MG/DL (ref 70–105)
GLUCOSE BLDC GLUCOMTR-MCNC: 138 MG/DL (ref 70–105)
GLUCOSE BLDC GLUCOMTR-MCNC: 139 MG/DL (ref 70–105)
GLUCOSE BLDC GLUCOMTR-MCNC: 220 MG/DL (ref 70–105)
GLUCOSE BLDC GLUCOMTR-MCNC: 92 MG/DL (ref 70–105)
LEFT ATRIUM VOLUME INDEX: 27 ML/M2
LV EF BIPLANE MOD: 58 %
QT INTERVAL: 396 MS
QTC INTERVAL: 387 MS
RIGHT ARM BP: NORMAL MMHG
SINUS: 4.1 CM
VIT B12 BLD-MCNC: 567 PG/ML (ref 211–946)

## 2024-12-30 PROCEDURE — G0378 HOSPITAL OBSERVATION PER HR: HCPCS

## 2024-12-30 PROCEDURE — 63710000001 INSULIN LISPRO (HUMAN) PER 5 UNITS

## 2024-12-30 PROCEDURE — 63710000001 TACROLIMUS PER 1 MG

## 2024-12-30 PROCEDURE — 63710000001 MYCOPHENOLATE MOFETIL PER 250 MG

## 2024-12-30 PROCEDURE — 82948 REAGENT STRIP/BLOOD GLUCOSE: CPT

## 2024-12-30 PROCEDURE — 82948 REAGENT STRIP/BLOOD GLUCOSE: CPT | Performed by: PSYCHIATRY & NEUROLOGY

## 2024-12-30 PROCEDURE — 70544 MR ANGIOGRAPHY HEAD W/O DYE: CPT

## 2024-12-30 PROCEDURE — 97165 OT EVAL LOW COMPLEX 30 MIN: CPT

## 2024-12-30 PROCEDURE — 93880 EXTRACRANIAL BILAT STUDY: CPT

## 2024-12-30 PROCEDURE — 93306 TTE W/DOPPLER COMPLETE: CPT

## 2024-12-30 RX ORDER — TACROLIMUS 1 MG/1
2 CAPSULE ORAL EVERY EVENING
Status: DISCONTINUED | OUTPATIENT
Start: 2024-12-30 | End: 2024-12-30 | Stop reason: HOSPADM

## 2024-12-30 RX ORDER — DEXTROSE MONOHYDRATE 25 G/50ML
25 INJECTION, SOLUTION INTRAVENOUS
Status: DISCONTINUED | OUTPATIENT
Start: 2024-12-30 | End: 2024-12-30 | Stop reason: HOSPADM

## 2024-12-30 RX ORDER — CINACALCET 30 MG/1
30 TABLET, FILM COATED ORAL DAILY
Status: DISCONTINUED | OUTPATIENT
Start: 2024-12-30 | End: 2024-12-30 | Stop reason: HOSPADM

## 2024-12-30 RX ORDER — SODIUM BICARBONATE 650 MG/1
650 TABLET ORAL DAILY
Status: DISCONTINUED | OUTPATIENT
Start: 2024-12-30 | End: 2024-12-30 | Stop reason: HOSPADM

## 2024-12-30 RX ORDER — TACROLIMUS 1 MG/1
3 CAPSULE ORAL EVERY MORNING
Status: DISCONTINUED | OUTPATIENT
Start: 2024-12-30 | End: 2024-12-30 | Stop reason: HOSPADM

## 2024-12-30 RX ORDER — ASPIRIN 81 MG/1
81 TABLET, CHEWABLE ORAL DAILY
Qty: 30 TABLET | Refills: 1 | Status: SHIPPED | OUTPATIENT
Start: 2024-12-31

## 2024-12-30 RX ORDER — ATORVASTATIN CALCIUM 20 MG/1
20 TABLET, FILM COATED ORAL NIGHTLY
Qty: 90 TABLET | Refills: 1 | Status: SHIPPED | OUTPATIENT
Start: 2024-12-30

## 2024-12-30 RX ORDER — INSULIN LISPRO 100 [IU]/ML
2-9 INJECTION, SOLUTION INTRAVENOUS; SUBCUTANEOUS
Status: DISCONTINUED | OUTPATIENT
Start: 2024-12-30 | End: 2024-12-30 | Stop reason: HOSPADM

## 2024-12-30 RX ORDER — NICOTINE POLACRILEX 4 MG
15 LOZENGE BUCCAL
Status: DISCONTINUED | OUTPATIENT
Start: 2024-12-30 | End: 2024-12-30 | Stop reason: HOSPADM

## 2024-12-30 RX ORDER — ALLOPURINOL 100 MG/1
100 TABLET ORAL 3 TIMES WEEKLY
Status: DISCONTINUED | OUTPATIENT
Start: 2024-12-30 | End: 2024-12-30 | Stop reason: HOSPADM

## 2024-12-30 RX ORDER — IBUPROFEN 600 MG/1
1 TABLET ORAL
Status: DISCONTINUED | OUTPATIENT
Start: 2024-12-30 | End: 2024-12-30 | Stop reason: HOSPADM

## 2024-12-30 RX ORDER — IPRATROPIUM BROMIDE AND ALBUTEROL SULFATE 2.5; .5 MG/3ML; MG/3ML
3 SOLUTION RESPIRATORY (INHALATION) EVERY 6 HOURS PRN
Status: DISCONTINUED | OUTPATIENT
Start: 2024-12-29 | End: 2024-12-30 | Stop reason: HOSPADM

## 2024-12-30 RX ORDER — MYCOPHENOLATE MOFETIL 250 MG/1
500 CAPSULE ORAL 2 TIMES DAILY
Status: DISCONTINUED | OUTPATIENT
Start: 2024-12-30 | End: 2024-12-30 | Stop reason: HOSPADM

## 2024-12-30 RX ORDER — CLOPIDOGREL BISULFATE 75 MG/1
75 TABLET ORAL DAILY
Qty: 30 TABLET | Refills: 1 | Status: SHIPPED | OUTPATIENT
Start: 2024-12-31

## 2024-12-30 RX ADMIN — ALLOPURINOL 100 MG: 100 TABLET ORAL at 08:55

## 2024-12-30 RX ADMIN — SODIUM BICARBONATE 650 MG: 650 TABLET ORAL at 08:54

## 2024-12-30 RX ADMIN — Medication 400 MG: at 09:03

## 2024-12-30 RX ADMIN — MYCOPHENOLATE MOFETIL 500 MG: 250 CAPSULE ORAL at 09:03

## 2024-12-30 RX ADMIN — Medication 10 ML: at 09:04

## 2024-12-30 RX ADMIN — TACROLIMUS 3 MG: 1 CAPSULE ORAL at 08:54

## 2024-12-30 RX ADMIN — CINACALCET HYDROCHLORIDE 30 MG: 30 TABLET, FILM COATED ORAL at 08:53

## 2024-12-30 RX ADMIN — INSULIN LISPRO 4 UNITS: 100 INJECTION, SOLUTION INTRAVENOUS; SUBCUTANEOUS at 12:22

## 2024-12-30 RX ADMIN — ASPIRIN 81 MG CHEWABLE TABLET 81 MG: 81 TABLET CHEWABLE at 08:55

## 2024-12-30 RX ADMIN — FAMOTIDINE 20 MG: 20 TABLET, FILM COATED ORAL at 08:54

## 2024-12-30 RX ADMIN — CLOPIDOGREL BISULFATE 75 MG: 75 TABLET ORAL at 08:54

## 2024-12-30 NOTE — PLAN OF CARE
Problem: Adult Inpatient Plan of Care  Goal: Plan of Care Review  Outcome: Progressing  Goal: Patient-Specific Goal (Individualized)  Outcome: Progressing  Goal: Absence of Hospital-Acquired Illness or Injury  Outcome: Progressing  Intervention: Identify and Manage Fall Risk  Recent Flowsheet Documentation  Taken 12/30/2024 0400 by Rody Yun RN  Safety Promotion/Fall Prevention:   activity supervised   assistive device/personal items within reach   clutter free environment maintained   fall prevention program maintained   lighting adjusted   nonskid shoes/slippers when out of bed   room organization consistent   safety round/check completed  Taken 12/30/2024 0200 by Rody Yun RN  Safety Promotion/Fall Prevention:   activity supervised   assistive device/personal items within reach   clutter free environment maintained   fall prevention program maintained   lighting adjusted   nonskid shoes/slippers when out of bed   room organization consistent   safety round/check completed  Taken 12/30/2024 0000 by Rody Yun RN  Safety Promotion/Fall Prevention:   activity supervised   clutter free environment maintained   assistive device/personal items within reach   lighting adjusted   nonskid shoes/slippers when out of bed   room organization consistent   safety round/check completed  Taken 12/29/2024 2200 by Rody Yun RN  Safety Promotion/Fall Prevention:   activity supervised   assistive device/personal items within reach   clutter free environment maintained   fall prevention program maintained   lighting adjusted   nonskid shoes/slippers when out of bed   room organization consistent   safety round/check completed  Intervention: Prevent Skin Injury  Recent Flowsheet Documentation  Taken 12/30/2024 0400 by Rody Yun RN  Body Position: 30 degrees lateral  Skin Protection: transparent dressing maintained  Taken 12/30/2024 0000 by Rody Yun RN  Body Position: 30 degrees lateral  Skin  Protection: transparent dressing maintained  Taken 12/29/2024 2200 by Rody Yun RN  Body Position: 30 degrees lateral  Skin Protection: transparent dressing maintained  Intervention: Prevent Infection  Recent Flowsheet Documentation  Taken 12/30/2024 0400 by Rody Yun RN  Infection Prevention:   single patient room provided   rest/sleep promoted   personal protective equipment utilized   hand hygiene promoted   equipment surfaces disinfected   environmental surveillance performed  Taken 12/30/2024 0200 by Rody Yun RN  Infection Prevention:   single patient room provided   rest/sleep promoted   personal protective equipment utilized   hand hygiene promoted   equipment surfaces disinfected   environmental surveillance performed  Taken 12/30/2024 0000 by Rody Yun RN  Infection Prevention: single patient room provided  Taken 12/29/2024 2200 by Rody Yun RN  Infection Prevention:   single patient room provided   rest/sleep promoted   personal protective equipment utilized   hand hygiene promoted   equipment surfaces disinfected   environmental surveillance performed  Goal: Optimal Comfort and Wellbeing  Outcome: Progressing  Intervention: Monitor Pain and Promote Comfort  Recent Flowsheet Documentation  Taken 12/30/2024 0400 by Rody Yun RN  Pain Management Interventions:   pillow support provided   prayer utilized   no interventions per patient request   quiet environment facilitated  Taken 12/30/2024 0000 by Rody Yun RN  Pain Management Interventions: pillow support provided  Taken 12/29/2024 2200 by Rody Yun RN  Pain Management Interventions:   pillow support provided   position adjusted   prayer utilized  Intervention: Provide Person-Centered Care  Recent Flowsheet Documentation  Taken 12/30/2024 0400 by Rody Yun RN  Trust Relationship/Rapport:   care explained   choices provided   emotional support provided   empathic listening provided   questions  answered   questions encouraged   reassurance provided   thoughts/feelings acknowledged  Taken 12/30/2024 0000 by Rody Yun RN  Trust Relationship/Rapport:   care explained   choices provided   emotional support provided   empathic listening provided   questions answered   questions encouraged   reassurance provided   thoughts/feelings acknowledged  Taken 12/29/2024 2200 by Rody Yun RN  Trust Relationship/Rapport:   thoughts/feelings acknowledged   reassurance provided   questions encouraged   questions answered   empathic listening provided   emotional support provided   choices provided   care explained  Goal: Readiness for Transition of Care  Outcome: Progressing  Intervention: Mutually Develop Transition Plan  Recent Flowsheet Documentation  Taken 12/29/2024 2203 by Rody Yun RN  Transportation Anticipated: car, drives self  Patient/Family Anticipated Services at Transition: none  Patient/Family Anticipates Transition to: home with family  Taken 12/29/2024 2200 by Rody Yun RN  Equipment Currently Used at Home:   bp cuff   glucometer     Problem: Comorbidity Management  Goal: Maintenance of Asthma Control  Outcome: Progressing  Intervention: Maintain Asthma Symptom Control  Recent Flowsheet Documentation  Taken 12/30/2024 0400 by Rody Yun RN  Medication Review/Management: medications reviewed  Taken 12/30/2024 0200 by Rody Yun RN  Medication Review/Management: medications reviewed  Taken 12/30/2024 0000 by Rody Yun RN  Medication Review/Management: medications reviewed  Taken 12/29/2024 2200 by Rody Yun RN  Medication Review/Management: medications reviewed  Goal: Maintenance of Behavioral Health Symptom Control  Outcome: Progressing  Intervention: Maintain Behavioral Health Symptom Control  Recent Flowsheet Documentation  Taken 12/30/2024 0400 by Rody Yun RN  Medication Review/Management: medications reviewed  Taken 12/30/2024 0200 by Court  LASHELL Fine  Medication Review/Management: medications reviewed  Taken 12/30/2024 0000 by Rody Yun RN  Medication Review/Management: medications reviewed  Taken 12/29/2024 2200 by Rody Yun RN  Medication Review/Management: medications reviewed  Goal: Maintenance of COPD Symptom Control  Outcome: Progressing  Intervention: Maintain COPD (Chronic Obstructive Pulmonary Disease) Symptom Control  Recent Flowsheet Documentation  Taken 12/30/2024 0400 by Rody Yun RN  Medication Review/Management: medications reviewed  Taken 12/30/2024 0200 by Rody Yun RN  Medication Review/Management: medications reviewed  Taken 12/30/2024 0000 by Rody Yun RN  Medication Review/Management: medications reviewed  Taken 12/29/2024 2200 by Rody Yun RN  Medication Review/Management: medications reviewed  Goal: Blood Glucose Level Within Target Range  Outcome: Progressing  Intervention: Monitor and Manage Glycemia  Recent Flowsheet Documentation  Taken 12/30/2024 0400 by Rody Yun RN  Medication Review/Management: medications reviewed  Taken 12/30/2024 0200 by Rody Yun RN  Medication Review/Management: medications reviewed  Taken 12/30/2024 0000 by Rody Yun RN  Medication Review/Management: medications reviewed  Taken 12/29/2024 2200 by Rody Yun RN  Medication Review/Management: medications reviewed  Goal: Maintenance of Heart Failure Symptom Control  Outcome: Progressing  Intervention: Maintain Heart Failure Management  Recent Flowsheet Documentation  Taken 12/30/2024 0400 by Rody Yun RN  Medication Review/Management: medications reviewed  Taken 12/30/2024 0200 by Rody Yun RN  Medication Review/Management: medications reviewed  Taken 12/30/2024 0000 by Rody Yun RN  Medication Review/Management: medications reviewed  Taken 12/29/2024 2200 by Rody Yun RN  Medication Review/Management: medications reviewed  Goal: Blood Pressure in Desired  Range  Outcome: Progressing  Intervention: Maintain Blood Pressure Management  Recent Flowsheet Documentation  Taken 12/30/2024 0400 by Rody Yun RN  Medication Review/Management: medications reviewed  Taken 12/30/2024 0200 by Rody Yun RN  Medication Review/Management: medications reviewed  Taken 12/30/2024 0000 by Rody Yun RN  Medication Review/Management: medications reviewed  Taken 12/29/2024 2200 by Rody Yun RN  Medication Review/Management: medications reviewed  Goal: Maintenance of Osteoarthritis Symptom Control  Outcome: Progressing  Intervention: Maintain Osteoarthritis Symptom Control  Recent Flowsheet Documentation  Taken 12/30/2024 0400 by Rody Yun RN  Activity Management: up ad travis  Adaptive Equipment Use: used independently  Medication Review/Management: medications reviewed  Taken 12/30/2024 0200 by Rody Yun RN  Medication Review/Management: medications reviewed  Taken 12/30/2024 0000 by Rody Yun RN  Activity Management: up ad travis  Adaptive Equipment Use: used independently  Medication Review/Management: medications reviewed  Taken 12/29/2024 2200 by Rdoy Yun RN  Activity Management: up ad travis  Adaptive Equipment Use: used independently  Medication Review/Management: medications reviewed  Goal: Bariatric Home Regimen Maintained  Outcome: Progressing  Intervention: Maintain and Manage Postbariatric Surgery Care  Recent Flowsheet Documentation  Taken 12/30/2024 0400 by Rody Yun RN  Medication Review/Management: medications reviewed  Taken 12/30/2024 0200 by Rody Yun RN  Medication Review/Management: medications reviewed  Taken 12/30/2024 0000 by Rody Yun RN  Medication Review/Management: medications reviewed  Taken 12/29/2024 2200 by Rody Yun RN  Medication Review/Management: medications reviewed  Goal: Maintenance of Seizure Control  Outcome: Progressing  Intervention: Maintain Seizure Symptom Control  Recent  Flowsheet Documentation  Taken 12/30/2024 0400 by Rody Yun, RN  Medication Review/Management: medications reviewed  Taken 12/30/2024 0200 by Rody Yun RN  Medication Review/Management: medications reviewed  Taken 12/30/2024 0000 by Rody Yun, RN  Medication Review/Management: medications reviewed  Taken 12/29/2024 2200 by Rody Yun, RN  Medication Review/Management: medications reviewed   Goal Outcome Evaluation:

## 2024-12-30 NOTE — PLAN OF CARE
Problem: Adult Inpatient Plan of Care  Goal: Plan of Care Review  Outcome: Met  Goal: Patient-Specific Goal (Individualized)  Outcome: Met  Goal: Absence of Hospital-Acquired Illness or Injury  Outcome: Met  Intervention: Identify and Manage Fall Risk  Recent Flowsheet Documentation  Taken 12/30/2024 1400 by Reta Hagan RN  Safety Promotion/Fall Prevention: patient off unit  Taken 12/30/2024 1207 by Reta Hagan RN  Safety Promotion/Fall Prevention:   safety round/check completed   room organization consistent   nonskid shoes/slippers when out of bed   fall prevention program maintained   clutter free environment maintained   assistive device/personal items within reach  Taken 12/30/2024 1000 by Reta Hagan RN  Safety Promotion/Fall Prevention:   safety round/check completed   room organization consistent   nonskid shoes/slippers when out of bed   fall prevention program maintained   clutter free environment maintained   assistive device/personal items within reach   activity supervised  Taken 12/30/2024 0848 by Reta Hagan RN  Safety Promotion/Fall Prevention:   safety round/check completed   room organization consistent   nonskid shoes/slippers when out of bed   fall prevention program maintained   clutter free environment maintained   assistive device/personal items within reach   activity supervised  Intervention: Prevent Skin Injury  Recent Flowsheet Documentation  Taken 12/30/2024 1207 by Reta Hagan RN  Body Position: position changed independently  Skin Protection: transparent dressing maintained  Taken 12/30/2024 0848 by Reta Hagan RN  Body Position: position changed independently  Skin Protection: transparent dressing maintained  Intervention: Prevent Infection  Recent Flowsheet Documentation  Taken 12/30/2024 1207 by Reta Hagan RN  Infection Prevention:   single patient room provided   rest/sleep promoted   personal protective equipment utilized   hand  hygiene promoted  Taken 12/30/2024 1000 by Reta Hagan RN  Infection Prevention:   single patient room provided   rest/sleep promoted   personal protective equipment utilized   hand hygiene promoted  Taken 12/30/2024 0848 by Reta Hagan RN  Infection Prevention:   single patient room provided   rest/sleep promoted   personal protective equipment utilized   hand hygiene promoted  Goal: Optimal Comfort and Wellbeing  Outcome: Met  Intervention: Provide Person-Centered Care  Recent Flowsheet Documentation  Taken 12/30/2024 1207 by Reta Hagan RN  Trust Relationship/Rapport:   care explained   choices provided   questions answered  Taken 12/30/2024 0848 by Reta Hagan RN  Trust Relationship/Rapport:   care explained   choices provided   questions answered  Goal: Readiness for Transition of Care  Outcome: Met   Goal Outcome Evaluation:

## 2024-12-30 NOTE — DISCHARGE SUMMARY
Date of Discharge:  12/30/2024    Discharge Diagnosis:   Acute stroke  Hypertension  CKD  Tobacco abuse  DM type one    Presenting Problem/History of Present Illness  Active Hospital Problems    Diagnosis  POA    **Lacunar infarct, acute [I63.81]  Yes      Resolved Hospital Problems   No resolved problems to display.          Hospital Course    Patient is a 64 y.o. male presented with  past medical history of CKD/ESRD, DM 1, HTN, s/p kidney transplant, and tobacco use who presented to the emergency room on Sunday morning after awakening at 7am with right sided arm weakness. He reported that he was unable to grasp any thing.  He denies headache, shortness of breath, numbness and tingling, change of speech. MRI which was completed and showed a small infarct of the left frontal lobe. MRA and echo; patient will check with nephrologist in regard to adding asa. He has been stable with only mild weakness in right arm specifically fine motor objects and will have outpatient OT    Procedures Performed         Consults:   Consults       Date and Time Order Name Status Description    12/29/2024  5:33 PM Inpatient Neurology Consult Stroke Completed             Pertinent Test Results:    Lab Results (most recent)       Procedure Component Value Units Date/Time    POC Glucose 4x Daily Before Meals & at Bedtime [[id number]]  (Abnormal) Collected: 12/30/24 1203    Specimen: Blood Updated: 12/30/24 1204     Glucose 220 mg/dL      Comment: Serial Number: [id number]:  [id number]       Vitamin B12 [[id number]]  (Normal) Collected: 12/29/24 2222    Specimen: Blood from Arm, Right Updated: 12/30/24 1112     Vitamin B-12 567 pg/mL     Narrative:      Results may be falsely increased if patient taking Biotin.      Folate [[id number]]  (Normal) Collected: 12/29/24 2222    Specimen: Blood from Arm, Right Updated: 12/30/24 1112     Folate 19.60 ng/mL     Narrative:      Results may be falsely increased if patient taking Biotin.      POC  Glucose 4x Daily Before Meals & at Bedtime [[id number]]  (Abnormal) Collected: 12/30/24 0845    Specimen: Blood Updated: 12/30/24 0847     Glucose 138 mg/dL      Comment: Serial Number: [id number]:  [id number]       Hemoglobin A1c [[id number]]  (Abnormal) Collected: 12/29/24 2222    Specimen: Blood from Arm, Right Updated: 12/29/24 2314     Hemoglobin A1C 7.24 %     Comprehensive Metabolic Panel [[id number]]  (Abnormal) Collected: 12/29/24 2222    Specimen: Blood from Arm, Right Updated: 12/29/24 2305     Glucose 198 mg/dL      BUN 28 mg/dL      Creatinine 1.56 mg/dL      Sodium 136 mmol/L      Potassium 4.5 mmol/L      Chloride 106 mmol/L      CO2 21.0 mmol/L      Calcium 9.7 mg/dL      Total Protein 6.6 g/dL      Albumin 4.0 g/dL      ALT (SGPT) 12 U/L      AST (SGOT) 18 U/L      Alkaline Phosphatase 116 U/L      Total Bilirubin 0.6 mg/dL      Globulin 2.6 gm/dL      A/G Ratio 1.5 g/dL      BUN/Creatinine Ratio 17.9     Anion Gap 9.0 mmol/L      eGFR 49.9 mL/min/1.73     Narrative:      GFR Categories in Chronic Kidney Disease (CKD)      GFR Category          GFR (mL/min/1.73)    Interpretation  G1                     90 or greater         Normal or high (1)  G2                      60-89                Mild decrease (1)  G3a                   45-59                Mild to moderate decrease  G3b                   30-44                Moderate to severe decrease  G4                    15-29                Severe decrease  G5                    14 or less           Kidney failure          (1)In the absence of evidence of kidney disease, neither GFR category G1 or G2 fulfill the criteria for CKD.    eGFR calculation 2021 CKD-EPI creatinine equation, which does not include race as a factor    Lipid Panel [[id number]] Collected: 12/29/24 2222    Specimen: Blood from Arm, Right Updated: 12/29/24 2305     Total Cholesterol 120 mg/dL      Triglycerides 70 mg/dL      HDL Cholesterol 41 mg/dL      LDL Cholesterol  64 mg/dL       VLDL Cholesterol 15 mg/dL      LDL/HDL Ratio 1.59    Narrative:      Cholesterol Reference Ranges  ([place] Department of Health and Human Services ATP III Classifications)    Desirable          <200 mg/dL  Borderline High    200-239 mg/dL  High Risk          >240 mg/dL      Triglyceride Reference Ranges  ([place].S. Department of Health and Human Services ATP III Classifications)    Normal           <150 mg/dL  Borderline High  150-199 mg/dL  High             200-499 mg/dL  Very High        >500 mg/dL    HDL Reference Ranges  ([place].S. Department of Health and Human Services ATP III Classifications)    Low     <40 mg/dl (major risk factor for CHD)  High    >60 mg/dl ('negative' risk factor for CHD)        LDL Reference Ranges  ([place].S. Department of Health and Human Services ATP III Classifications)    Optimal          <100 mg/dL  Near Optimal     100-129 mg/dL  Borderline High  130-159 mg/dL  High             160-189 mg/dL  Very High        >189 mg/dL    TSH [[id number]]  (Normal) Collected: 12/29/24 2222    Specimen: Blood from Arm, Right Updated: 12/29/24 2305     TSH 2.570 uIU/mL     Sedimentation Rate [[id number]]  (Normal) Collected: 12/29/24 2222    Specimen: Blood from Arm, Right Updated: 12/29/24 2258     Sed Rate 15 mm/hr     CBC (No Diff) [[id number]]  (Abnormal) Collected: 12/29/24 2222    Specimen: Blood from Arm, Right Updated: 12/29/24 2254     WBC 4.47 10*3/mm3      RBC 4.60 10*6/mm3      Hemoglobin 13.3 g/dL      Hematocrit 40.4 %      MCV 87.8 fL      MCH 28.9 pg      MCHC 32.9 g/dL      RDW 13.1 %      RDW-SD 42.1 fl      MPV 9.2 fL      Platelets 123 10*3/mm3     Comprehensive Metabolic Panel [[id number]]  (Abnormal) Collected: 12/29/24 1152    Specimen: Blood Updated: 12/29/24 1225     Glucose 154 mg/dL      BUN 28 mg/dL      Creatinine 1.58 mg/dL      Sodium 138 mmol/L      Potassium 4.4 mmol/L      Chloride 106 mmol/L      CO2 24.9 mmol/L      Calcium 9.6 mg/dL      Total Protein 6.6 g/dL      Albumin 4.0  g/dL      ALT (SGPT) 12 U/L      AST (SGOT) 12 U/L      Alkaline Phosphatase 123 U/L      Total Bilirubin 0.6 mg/dL      Globulin 2.6 gm/dL      A/G Ratio 1.5 g/dL      BUN/Creatinine Ratio 17.7     Anion Gap 7.1 mmol/L      eGFR 49.2 mL/min/1.73     Narrative:      GFR Categories in Chronic Kidney Disease (CKD)      GFR Category          GFR (mL/min/1.73)    Interpretation  G1                     90 or greater         Normal or high (1)  G2                      60-89                Mild decrease (1)  G3a                   45-59                Mild to moderate decrease  G3b                   30-44                Moderate to severe decrease  G4                    15-29                Severe decrease  G5                    14 or less           Kidney failure          (1)In the absence of evidence of kidney disease, neither GFR category G1 or G2 fulfill the criteria for CKD.    eGFR calculation 2021 CKD-EPI creatinine equation, which does not include race as a factor    CBC & Differential [[id number]]  (Abnormal) Collected: 12/29/24 1152    Specimen: Blood Updated: 12/29/24 1205    Narrative:      The following orders were created for panel order CBC & Differential.  Procedure                               Abnormality         Status                     ---------                               -----------         ------                     CBC Auto Differential[[id number]]        Abnormal            Final result                 Please view results for these tests on the individual orders.    CBC Auto Differential [[id number]]  (Abnormal) Collected: 12/29/24 1152    Specimen: Blood Updated: 12/29/24 1205     WBC 5.50 10*3/mm3      RBC 4.73 10*6/mm3      Hemoglobin 13.5 g/dL      Hematocrit 41.3 %      MCV 87.3 fL      MCH 28.5 pg      MCHC 32.7 g/dL      RDW 13.3 %      RDW-SD 42.8 fl      MPV 9.2 fL      Platelets 118 10*3/mm3      Neutrophil % 70.6 %      Lymphocyte % 22.2 %      Monocyte % 6.4 %      Eosinophil % 0.4 %       Basophil % 0.2 %      Immature Grans % 0.2 %      Neutrophils, Absolute 3.89 10*3/mm3      Lymphocytes, Absolute 1.22 10*3/mm3      Monocytes, Absolute 0.35 10*3/mm3      Eosinophils, Absolute 0.02 10*3/mm3      Basophils, Absolute 0.01 10*3/mm3      Immature Grans, Absolute 0.01 10*3/mm3     POC Protime / INR [[id number]] Collected: 12/29/24 1201    Specimen: Blood Updated: 12/29/24 1202     Protime 11.9 seconds      INR 1.0             Results for orders placed during the hospital encounter of 12/29/24    Adult Transthoracic Echo Complete W/ Cont if Necessary Per Protocol (With Agitated Saline)    Interpretation Summary    Left ventricular systolic function is normal. Calculated left ventricular EF = 58%    Left ventricular diastolic function was normal.    Normal RV systolic function.    No significant valvular disease.    Mild dilation of the aortic root is present (4.1 cm), and ascending aorta (4.4 cm).    Saline test results are negative.    Estimated right ventricular systolic pressure from tricuspid regurgitation is normal (<35 mmHg).    Electronically signed by Kenneth Brown MD, 12/30/24, 12:15 PM EST.       Condition on Discharge:  Stable    Vital Signs  Temp:  [97.5 °F (36.4 °C)-98.1 °F (36.7 °C)] 97.7 °F (36.5 °C)  Heart Rate:  [53-66] 60  Resp:  [11-16] 16  BP: (131-169)/() 144/89      Physical Exam  Vitals reviewed.   Constitutional:       Appearance: He is not ill-appearing.   HENT:      Head: Normocephalic and atraumatic.      Right Ear: External ear normal.      Left Ear: External ear normal.      Nose: Nose normal.      Mouth/Throat:      Mouth: Mucous membranes are moist.   Eyes:      General:         Right eye: No discharge.         Left eye: No discharge.   Cardiovascular:      Rate and Rhythm: Normal rate and regular rhythm.      Pulses: Normal pulses.      Heart sounds: Normal heart sounds.   Pulmonary:      Effort: Pulmonary effort is normal.      Breath  sounds: Normal breath sounds.   Abdominal:      General: Bowel sounds are normal.      Palpations: Abdomen is soft.   Musculoskeletal:         General: Normal range of motion.      Cervical back: Normal range of motion.   Skin:     General: Skin is warm and dry.   Neurological:      Mental Status: He is alert and oriented to person, place, and time.   Psychiatric:         Behavior: Behavior normal.              Discharge Disposition      Discharge Medications     Discharge Medications        Continue These Medications        Instructions Start Date   albuterol sulfate  (90 Base) MCG/ACT inhaler  Commonly known as: PROVENTIL HFA;VENTOLIN HFA;PROAIR HFA   2 puffs, Inhalation, Every 4 Hours PRN      allopurinol 100 MG tablet  Commonly known as: ZYLOPRIM   100 mg, 3 Times Weekly      b complex-vitamin c-folic acid 0.8 MG tablet tablet   1 tablet, Daily      cinacalcet 30 MG tablet  Commonly known as: SENSIPAR   30 mg, Daily      hydrALAZINE 50 MG tablet  Commonly known as: APRESOLINE   50 mg, 3 Times Daily      magnesium oxide 400 MG tablet  Commonly known as: MAG-OX   400 mg, 2 Times Daily      mycophenolate 250 MG capsule  Commonly known as: CELLCEPT   500 mg, 2 Times Daily      sodium bicarbonate 650 MG tablet   650 mg, Daily             ASK your doctor about these medications        Instructions Start Date   atorvastatin 10 MG tablet  Commonly known as: LIPITOR  Ask about: Which instructions should I use?   10 mg, 3 Times Weekly      tacrolimus 1 MG capsule  Commonly known as: PROGRAF  Ask about: Which instructions should I use?   3 mg, Every Morning      tacrolimus 1 MG capsule  Commonly known as: PROGRAF  Ask about: Which instructions should I use?   2 mg, Every Evening               Discharge Diet:     Activity at Discharge:     Follow-up Appointments  Future Appointments   Date Time Provider Department Center   3/31/2025  1:15 PM Price Calderon MD [facility]         Test Results  Pending at Discharge  Pending Results       Procedure [Order ID] Specimen - Date/Time    MRI Angiogram Head Without Contrast [[id number]]              JENIFFER Diana  12/30/24  13:15 EST    Time: Discharge 25 min

## 2024-12-30 NOTE — THERAPY EVALUATION
Patient Name: Jose Miguel Martinez  : 1960    MRN: [id number]                              Today's Date: 2024       Admit Date: 2024    Visit Dx:     ICD-10-CM ICD-9-CM   1. Right arm weakness  R29.898 729.89   2. Cerebrovascular accident (CVA), unspecified mechanism  I63.9 434.91   3. Lacunar infarct, acute  I63.81 434.91     Patient Active Problem List   Diagnosis    STEMI (ST elevation myocardial infarction)    ESRD (end stage renal disease)    HTN (hypertension)    PVD (peripheral vascular disease)    Tobacco abuse    Chest pain on breathing    Clinical diagnosis of severe acute respiratory syndrome coronavirus 2 (SARS-CoV-2) disease    DKA (diabetic ketoacidosis)    Type 1 diabetes mellitus with hyperglycemia    Status post kidney transplant    Controlled type 2 diabetes mellitus without complication, without long-term current use of insulin    Lacunar infarct, acute    Stroke     Past Medical History:   Diagnosis Date    CKD (chronic kidney disease) requiring chronic dialysis     Controlled type 2 diabetes mellitus without complication, without long-term current use of insulin 2024    COPD (chronic obstructive pulmonary disease)     Diabetes mellitus type I 2023    DKA (diabetic ketoacidosis) 2023    Hyperparathyroidism     Dr. Gonzalez    Hypertension     Hypothyroidism     Kidney failure     Stroke 2024    Thyroid nodule     Lisa     Past Surgical History:   Procedure Laterality Date    ARTERIOVENOUS FISTULA REPAIR      ARTERIOVENOUS FISTULA/SHUNT SURGERY      CARDIAC CATHETERIZATION N/A 2019    Procedure: Left Heart Cath;  Surgeon: Juanpablo Garcia MD;  Location: [facility] CATH INVASIVE LOCATION;  Service: Cardiology    CARDIAC CATHETERIZATION N/A 2019    Procedure: Aortic root aortogram;  Surgeon: Juanpablo Garcia MD;  Location: [facility] CATH INVASIVE LOCATION;  Service: Cardiology    LYMPH NODE BIOPSY  2002    ORCHIECTOMY        General  Information       Row Name 12/30/24 1542          OT Time and Intention    Subjective Information complains of;weakness  -MS     Document Type evaluation  -MS     Mode of Treatment occupational therapy  -MS     Patient Effort excellent  -MS       Row Name 12/30/24 1542          General Information    Patient Profile Reviewed yes  -MS     Prior Level of Function independent:;ADL's;all household mobility;driving  -MS     Existing Precautions/Restrictions no known precautions/restrictions  -MS     Barriers to Rehab none identified  -MS       Row Name 12/30/24 1542          Occupational Profile    Reason for Services/Referral (Occupational Profile) Pt is a 65 y/o M admitted to [facility] 12/29/24 with RUE weakness. PMHx significant for HTN and DMII. At baseline pt resides with spouse in [facility] with 2 CANDACE. Pt typically (I) with ADLs, (I) with mobility without AD and is an active .  -MS       Row Name 12/30/24 1542          Living Environment    People in Home spouse  -MS       Row Name 12/30/24 1542          Home Main Entrance    Number of Stairs, Main Entrance two  -MS       Row Name 12/30/24 1542          Stairs Within Home, Primary    Number of Stairs, Within Home, Primary none  -MS       Row Name 12/30/24 1542          Cognition    Orientation Status (Cognition) oriented x 4  -MS               User Key  (r) = Recorded By, (t) = Taken By, (c) = Cosigned By      Initials Name Provider Type    MS Jessy Fragoso, JOSE MANUEL Occupational Therapist                     Mobility/ADL's       Row Name 12/30/24 1543          Bed Mobility    Bed Mobility bed mobility (all) activities  -MS     All Activities, [place] (Bed Mobility) modified independence  -MS       Row Name 12/30/24 1543          Transfers    Transfers sit-stand transfer  -MS       Row Name 12/30/24 1543          Sit-Stand Transfer    Sit-Stand [place] (Transfers) modified independence  -MS       Row Name 12/30/24 1543          Functional Mobility    Patient was  able to Ambulate yes  -MS               User Key  (r) = Recorded By, (t) = Taken By, (c) = Cosigned By      Initials Name Provider Type    Jessy Upton OT Occupational Therapist                   Obj/Interventions       Row Name 12/30/24 1543          Sensory Assessment (Somatosensory)    Sensory Assessment (Somatosensory) sensation intact  -MS       Row Name 12/30/24 1543          Vision Assessment/Intervention    Visual Impairment/Limitations WNL  -MS       Row Name 12/30/24 1543          Range of Motion Comprehensive    General Range of Motion bilateral upper extremity ROM WNL  -MS     Comment, General Range of Motion BUE WNL  -MS       Row Name 12/30/24 1543          Strength Comprehensive (MMT)    General Manual Muscle Testing (MMT) Assessment hand strength deficits identified  -MS     Comment, General Manual Muscle Testing (MMT) Assessment R wrist and hand slightly weaker  -MS       Row Name 12/30/24 1543          Motor Skills    Therapeutic Exercise wrist  -MS               User Key  (r) = Recorded By, (t) = Taken By, (c) = Cosigned By      Initials Name Provider Type    Jessy Upton OT Occupational Therapist                   Goals/Plan    No documentation.                  Clinical Impression       Row Name 12/30/24 1543          Pain Assessment    Pretreatment Pain Rating 0/10 - no pain  -MS     Posttreatment Pain Rating 0/10 - no pain  -MS       Row Name 12/30/24 1540          Plan of Care Review    Plan of Care Reviewed With patient;spouse  -MS     Progress no change  -MS     Outcome Evaluation Pt is a 65 y/o M admitted to [facility] 12/29/24 with RUE weakness. MRI brain indicates small infarct of L frontal lobe. Duplex carptod <50% stenosis. PMHx significant for HTN and DMII. At baseline pt resides with spouse in [facility] with 2 CANDACE. Pt typically (I) with ADLs, (I) with mobility without AD and is an active . Pt cleared for OT by nursing, oriented x4 on RA standing in room upon arrival. Pt demo (I)  with functional mobilty. Pt demo RUE weakness, specifically wrist and hand weakness. Pt demo ataxia and dysmetria with R hand, poor thumb/pinky opposition and weak functional grasp. Pt demo fine motor deficits that impact (I) with ADL tasks, anticipate requiring increased assist with ADL routine. Pt educated functional task and sensory stimuli for neuro-reeducation sensory input. OT recommend OP OT to increase (I) to max potential. No skilled needs within acute setting at this time, encourage to contiue to mobilize within acute setting.  -MS       Row Name 12/30/24 1545          Therapy Assessment/Plan (OT)    Rehab Potential (OT) good  -MS     Criteria for Skilled Therapeutic Interventions Met (OT) does not meet criteria for skilled intervention  -MS     Therapy Frequency (OT) evaluation only  -MS       Row Name 12/30/24 1540          Therapy Plan Review/Discharge Plan (OT)    Anticipated Discharge Disposition (OT) home  -MS       Row Name 12/30/24 5672          Vital Signs    O2 Delivery Pre Treatment room air  -MS     O2 Delivery Intra Treatment room air  -MS     O2 Delivery Post Treatment room air  -MS     Pre Patient Position Standing  -MS     Intra Patient Position Sitting  -MS     Post Patient Position Sitting  -MS       Row Name 12/30/24 7040          Positioning and Restraints    Pre-Treatment Position standing in room  -MS     Post Treatment Position bed  -MS     In Bed notified nsg;sitting;with family/caregiver  -MS               User Key  (r) = Recorded By, (t) = Taken By, (c) = Cosigned By      Initials Name Provider Type    Jessy Upton, OT Occupational Therapist                   Outcome Measures       Row Name 12/30/24 5365          How much help from another is currently needed...    Putting on and taking off regular lower body clothing? 3  -MS     Bathing (including washing, rinsing, and drying) 3  -MS     Toileting (which includes using toilet bed pan or urinal) 3  -MS     Putting on and  taking off regular upper body clothing 3  -MS     Taking care of personal grooming (such as brushing teeth) 3  -MS     Eating meals 3  -MS     AM-PAC 6 Clicks Score (OT) 18  -MS       Row Name 12/30/24 1207 12/30/24 0848       How much help from another person do you currently need...    Turning from your back to your side while in flat bed without using bedrails? 4  -ME 4  -ME    Moving from lying on back to sitting on the side of a flat bed without bedrails? 4  -ME 4  -ME    Moving to and from a bed to a chair (including a wheelchair)? 4  -ME 4  -ME    Standing up from a chair using your arms (e.g., wheelchair, bedside chair)? 4  -ME 4  -ME    Climbing 3-5 steps with a railing? 4  -ME 4  -ME    To walk in hospital room? 4  -ME 4  -ME    AM-PAC 6 Clicks Score (PT) 24  -ME 24  -ME      Row Name 12/30/24 0400          How much help from another person do you currently need...    Turning from your back to your side while in flat bed without using bedrails? 4  -KM     Moving from lying on back to sitting on the side of a flat bed without bedrails? 4  -KM     Moving to and from a bed to a chair (including a wheelchair)? 4  -KM     Standing up from a chair using your arms (e.g., wheelchair, bedside chair)? 4  -KM     Climbing 3-5 steps with a railing? 4  -KM     To walk in hospital room? 4  -KM     AM-PAC 6 Clicks Score (PT) 24  -KM       Row Name 12/30/24 1552 12/30/24 1251       Modified Salbador Scale    Pre-Stroke Modified Barber Scale 0 - No Symptoms at all.  -MS --    Modified Salbador Scale 1 - No significant disability despite symptoms.  Able to carry out all usual duties and activities.  -MS 1 - No significant disability despite symptoms.  Able to carry out all usual duties and activities.  -      Row Name 12/30/24 1552 12/30/24 1251       Functional Assessment    Outcome Measure Options AM-PAC 6 Clicks Daily Activity (OT);Modified Barber  -MS Modified Barber  -              User Key  (r) = Recorded By, (t) =  Taken By, (c) = Cosigned By      Initials Name Provider Type     Maura Kramer, PT Physical Therapist    MS Jessy Fragoso, OT Occupational Therapist    Reta Francisco, RN Registered Nurse    Rody Marr, RN Registered Nurse                    Occupational Therapy Education       Title: PT OT SLP Therapies (Done)       Topic: Occupational Therapy (Done)       Point: ADL training (Done)       Description:   Instruct learner(s) on proper safety adaptation and remediation techniques during self care or transfers.   Instruct in proper use of assistive devices.                  Learning Progress Summary            Patient Acceptance, E,TB, VU by MS at 12/30/2024 1552                      Point: Precautions (Done)       Description:   Instruct learner(s) on prescribed precautions during self-care and functional transfers.                  Learning Progress Summary            Patient Acceptance, E,TB, VU by MS at 12/30/2024 1552                      Point: Body mechanics (Done)       Description:   Instruct learner(s) on proper positioning and spine alignment during self-care, functional mobility activities and/or exercises.                  Learning Progress Summary            Patient Acceptance, E,TB, VU by MS at 12/30/2024 1552                                      User Key       Initials Effective Dates Name Provider Type Discipline    MS 07/13/22 -  Jessy Fragoso OT Occupational Therapist OT                  OT Recommendation and Plan  Therapy Frequency (OT): evaluation only  Plan of Care Review  Plan of Care Reviewed With: patient, spouse  Progress: no change  Outcome Evaluation: Pt is a 63 y/o M admitted to [facility] 12/29/24 with RUE weakness. MRI brain indicates small infarct of L frontal lobe. Duplex carptod <50% stenosis. PMHx significant for HTN and DMII. At baseline pt resides with spouse in [facility] with 2 CANDACE. Pt typically (I) with ADLs, (I) with mobility without AD and is an active . Pt cleared  for OT by nursing, oriented x4 on RA standing in room upon arrival. Pt demo (I) with functional mobilty. Pt demo RUE weakness, specifically wrist and hand weakness. Pt demo ataxia and dysmetria with R hand, poor thumb/pinky opposition and weak functional grasp. Pt demo fine motor deficits that impact (I) with ADL tasks, anticipate requiring increased assist with ADL routine. Pt educated functional task and sensory stimuli for neuro-reeducation sensory input. OT recommend OP OT to increase (I) to max potential. No skilled needs within acute setting at this time, encourage to contiue to mobilize within acute setting.     Time Calculation:         Time Calculation- OT       Row Name 12/30/24 1553             Time Calculation- OT    OT Start Time 1058  -MS      OT Stop Time 1110  -MS      OT Time Calculation (min) 12 min  -MS      Total Timed Code Minutes- OT 0 minute(s)  -MS      OT Received On 12/30/24  -MS                User Key  (r) = Recorded By, (t) = Taken By, (c) = Cosigned By      Initials Name Provider Type    Jessy Upton OT Occupational Therapist                  Therapy Charges for Today       Code Description Service Date Service Provider Modifiers Qty    76509414818 HC OT EVAL LOW COMPLEXITY 4 12/30/2024 Jessy Fragoso OT GO 1                 Jessy Fragoso OT  12/30/2024

## 2024-12-30 NOTE — NURSING NOTE
Patient refused scheduled PO Magnesium and CellCept. Patient stated he took his home medication before arriving on the unit. Patient was educated on not taking home medication while inpatient in the hospital. Patient verbalized understanding.

## 2024-12-30 NOTE — DISCHARGE PLACEMENT REQUEST
"Jose Miguel Pinzon (64 y.o. Male)       Date of Birth   1960    Social Security Number       Address   [address] [place] [address]    Home Phone   [phone]    MRN   [id number]       Gnosticist   None    Marital Status                               Admission Date   24    Admission Type   Urgent    Admitting Provider   Gabbie Booth MD    Attending Provider   Gabbie Booth MD    Department, Room/Bed   [facility], 249/1       Discharge Date       Discharge Disposition   Home or Self Care    Discharge Destination                                 Attending Provider: Gabbie Booth MD    Allergies: No Known Allergies    Isolation: None   Infection: None   Code Status: CPR    Ht: 182.9 cm (72\")   Wt: 98 kg (216 lb)    Admission Cmt: None   Principal Problem: Lacunar infarct, acute [I63.81]                   Active Insurance as of 2024       Primary Coverage       Payor Plan Insurance Group Employer/Plan Group    [employer] MED[employer]ARE REPLACEMENT AN[employer] MED ADV HMO [id number]       Payor Plan Address Payor Plan Phone Number Payor Plan Fax Number Effective Dates    [address] [phone]  2024 - None Entered    [facility] [address]         Subscriber Name Subscriber Birth Date Member ID       JOSE MIGUEL PINZON 1960 [id number]                     Emergency Contacts        (Rel.) Home Phone Work Phone Mobile Phone    Louann Pinzon (Spouse) [phone] -- [phone]             [address] [place] [address]  Phone:  [phone]  Fax:  [phone] Date: Dec 30, 2024      Ambulatory Referral to Occupational Therapy for Evaluation & Treatment     Patient:  Jose Miguel Pinzon MRN:  [id number]   [address] [place] [address] :  1960  SSN:    Phone: [phone] Sex:  M      INSURANCE PAYOR PLAN GROUP # SUBSCRIBER ID   Primary:    [employer] REPLACEMENT [id number]    "   Referring Provider Information:  ERIKA WING Phone: [phone] Fax: [phone]       Referral Information:   # Visits:  1 Referral Type: Occupational Therapy [AD1]   Urgency:  Routine Referral Reason: Specialty Services Required   Start Date: Dec 30, 2024 End Date:  To be determined by Insurer   Diagnosis: Lacunar infarct, acute (I63.81 [ICD-10-CM] 434.91 [ICD-9-CM])      Refer to Dept:   Refer to Provider:   Refer to Provider Phone:   Refer to Facility:       Follow-up needed: Yes     This document serves as a request of services and does not constitute Insurance authorization or approval of services.  To determine eligibility, please contact the members Insurance carrier to verify and review coverage.     If you have medical questions regarding this request for services. Please contact [facility] at [phone] during normal business hours.        Authorizing Provider:Erika Wing APRN  Authorizing Provider's NPI: [id number]  Order Entered By: Erika Wing APRN 12/30/2024  1:29 PM     Electronically signed by: Erika Wing APRN 12/30/2024  1:29 PM

## 2024-12-30 NOTE — PLAN OF CARE
Goal Outcome Evaluation:   Orders received for communication evaluation in the setting of positive MRI. Attempted to see patient two times today to complete speech/language assessment. Patient unavailable on first attempt and soundly sleeping the second trial, unable to be aroused. Will continue to follow while in-house with a speech/language evaluation completed as able. Thank you for this referral.
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
## 2024-12-30 NOTE — CONSULTS
"Diabetes Education  Assessment/Teaching    Patient Name:  Jose Miguel Martinez  YOB: 1960  MRN: [id number]  Admit Date:  12/29/2024      Assessment Date:  12/30/2024  Flowsheet Row Most Recent Value   General Information     Referral From: , MD order  [MD consult per stroke protocol and on 12/29/2024 A1c was 7.24%.]   Height 182.9 cm (72\")   Height Method Stated   Weight 98 kg (216 lb)   Weight Method Standing scale   Pregnancy Assessment    Diabetes History    What type of diabetes do you have? Type 2   Length of Diabetes Diagnosis 1 - 5 years  [Diagnosed March 2023]   Current DM knowledge fair   Have you had diabetes education/teaching in the past? yes   When and where was your diabetes education? Inpatient hospitalization at [facility] on 10/2/2023   Do you test your blood sugar at home? yes   Frequency of checks every morning   Meter type Accu-chek 1 1/2 years old   Who performs the test? patient   Typical readings 117-130s   Feeling down, depressed, or hopeless Not at all   Little interest or pleasure in doing things Not at all   Education Preferences    What areas of diabetes would you like to learn about? diabetes complications, testing my blood sugar at home   Nutrition Information    Assessment Topics    Reducing Risk - Assessment Needs education   Monitoring - Assessment Needs education   DM Goals    Reducing Risk - Goal Today   Monitoring - Goal Today            Flowsheet Row Most Recent Value   DM Education Needs    Meter Has own   Meter Type Accuchek   Frequency of Testing Daily  [Discussed with patient about trying to vary the times of checking his blood sugar with before meals and at bedtime.]   Reducing Risks A1C testing  [On 12/29/2024 A1c was 7.24%.]   Discharge Plan Home   Motivation Moderate   Teaching Method Discussion, Handouts   Patient Response Verbalized understanding              Other Comments:  A1c info sheet given with discussion on A1c target and healthy blood sugar range. Patient " stated he sees Dr. Sanchez for his diabetes and is now diet controlled. Patient stated he was on insulin but was able to get off the insulin. Patient has no further questions or concerns related to diabetes at this time.        Electronically signed by:  Richa Ridley RN  12/30/24 12:01 EST

## 2024-12-30 NOTE — PLAN OF CARE
Goal Outcome Evaluation:  Plan of Care Reviewed With: patient, spouse        Progress: no change  Outcome Evaluation: Pt is a 65 y/o M admitted to [facility] 12/29/24 with RUE weakness. MRI brain indicates small infarct of L frontal lobe. Duplex carptod <50% stenosis. PMHx significant for HTN and DMII. At baseline pt resides with spouse in [facility] with 2 CANDACE. Pt typically (I) with ADLs, (I) with mobility without AD and is an active . Pt cleared for OT by nursing, oriented x4 on RA standing in room upon arrival. Pt demo (I) with functional mobilty. Pt demo RUE weakness, specifically wrist and hand weakness. Pt demo ataxia and dysmetria with R hand, poor thumb/pinky opposition and weak functional grasp. Pt demo fine motor deficits that impact (I) with ADL tasks, anticipate requiring increased assist with ADL routine. Pt educated functional task and sensory stimuli for neuro-reeducation sensory input. OT recommend OP OT to increase (I) to max potential. No skilled needs within acute setting at this time, encourage to contiue to mobilize within acute setting.    Anticipated Discharge Disposition (OT): home

## 2024-12-30 NOTE — SIGNIFICANT NOTE
12/30/24 1249   OTHER   Discipline physical therapist   Rehab Time/Intention   Session Not Performed other (see comments)  (Spoke with OT after they evaluated pt, no skilled PT needs at this time.  Pt only with fine motor deficits in R hand and OT addressing this.  Will sign off at this time.)   Therapy Assessment/Plan (PT)   Criteria for Skilled Interventions Met (PT) no;does not meet criteria for skilled intervention

## 2024-12-31 NOTE — OUTREACH NOTE
Prep Survey      Flowsheet Row Responses   [facility] from? [facility]   Is LACE score < 7 ? No   Eligibility Readm Mgmt   Discharge diagnosis Lacunar infarct, acute   Does the patient have one of the following disease processes/diagnoses(primary or secondary)? Stroke   Does the patient have Home health ordered? No   Is there a DME ordered? No   Comments regarding appointments out pt OT   Prep survey completed? Yes            Sivan LORD - Registered Nurse

## 2024-12-31 NOTE — CASE MANAGEMENT/SOCIAL WORK
Case Management Discharge Note      Final Note: Home         Selected Continued Care - Discharged on 12/30/2024 Admission date: 12/29/2024 - Discharge disposition: Home or Self Care          Therapy Coordination complete.      Service Provider Services Address Phone Fax Patient Preferred    [facility] [address] [facility] [place] [address] [phone] --               Transportation Services  Private: Car    Final Discharge Disposition Code: 01 - home or self-care

## 2025-01-10 ENCOUNTER — READMISSION MANAGEMENT (OUTPATIENT)
Dept: CALL CENTER | Facility: HOSPITAL | Age: 65
End: 2025-01-10
Payer: MEDICARE

## 2025-01-10 NOTE — OUTREACH NOTE
Stroke Week 2 Survey      Flowsheet Row Responses   [facility] patient discharged from? [facility]   Does the patient have one of the following disease processes/diagnoses(primary or secondary)? Stroke   Week 2 attempt successful? Yes   Call start time 1634   Call end time 1641   Discharge diagnosis Lacunar infarct, acute   Meds reviewed with patient/caregiver? Yes   Is the patient taking all medications as directed (includes completed medication regime)? Yes   Does the patient have a primary care provider?  Yes   Does the patient have an appointment with their PCP within 7 days of discharge? Greater than 7 days   What is preventing the patient from scheduling follow up appointments within 7 days of discharge? Earlier appointment not available   Nursing Interventions Verified appointment date/time/provider   Has the patient kept scheduled appointments due by today? N/A   Does the patient require any assistance with activities of daily living such as eating, bathing, dressing, walking, etc.? No   Does the patient have any residual symptoms from stroke/TIA? Yes   Residual symptoms comments R. hand remains weak and having issues with coordination per pt report.   Does the patient understand the diet ordered at discharge? Yes   Comments Patient reports no issues with CP, dizziness, speech impairment, vision chg or HA. Pt denies issues with po intake. Reports no needs at this time.   What is the patient's perception of their health status since discharge? Improving   Nursing interventions Nurse provided patient education   Is the patient able to teach back FAST for Stroke? B alance: Watch for sudden loss of balance, E yes: Check for vision loss, F ace: Look for an uneven smile, A rm: Check if one arm is weak, S peech: Listen for slurred speech, T [facility]: Call 9-1-1 right away   Is the patient/caregiver able to teach back the risk factors for a stroke? Smoking, Diabetes   Is the patient/caregiver able to teach back signs  and symptoms related to disease process for when to call PCP? Yes   Is the patient/caregiver able to teach back signs and symptoms related to disease process for when to call 911? Yes   If the patient is a current smoker, are they able to teach back resources for cessation? --  [Pt used to smoke 1pk per day, now he smokes 1pk every 4 days per his report. RN encouraged pt and educated on smoking cessation.]   Is the patient/caregiver able to teach back the hierarchy of who to call/visit for symptoms/problems? PCP, Specialist, Home health nurse, Urgent Care, ED, 911 Yes   Week 2 call completed? Yes   Revoked No further contact(revokes)-requires comment   Call end time 1641            Mely Rosas Registered Nurse

## 2025-01-16 NOTE — PROGRESS NOTES
Subjective   Patient ID: Jose Miguel Martinez is a 64 y.o. male is being seen for consultation today at the request of Razia Maldonado NP for   NEW PATIENT: Other cerebral infarction due to occlusion or stenosis of small artery, Cerebral aneurysm, nonruptured,   Nicotine dependence, cigarettes, with other nicotine-induced disorders,  MRI ANGIO HEAD 12/30/24 @ [facility],  DUPLEX CAROTID 12/30/24 @[facility]     History of Present Illness  64 y.o. male who presented to a free standing emergency room on the morning of 12/29/24 after awakening at 7am with right sided arm weakness. He reported that he was unable to grasp any thing.  He denied any headache, shortness of breath, numbness and tingling, or change of speech.      In the ER, a CT of the head was completed and showed no acute intracranial abnormality. A code stroke had been activated and pt was evaluated by tele-stroke neurologist, Dr. Gracia. Per documentation, pt initially had some mild right arm weakness, but improved with NIH 0. He was outside the treatment window for IV thrombolytics with wake up symptoms and last known well being the night before.   Pt was transferred to [facility] from the free standing [facility] location for an MRI which was completed and showed a small infarct of the left frontal lobe.  An MRA was also obtained which demonstrated an intracranial aneurysm and the patient presents today to discuss the natural history of aneurysms and what follow-up care he will need.      The following portions of the patient's history were reviewed and updated as appropriate: He  has a past medical history of CKD (chronic kidney disease) requiring chronic dialysis, Controlled type 2 diabetes mellitus without complication, without long-term current use of insulin (5/14/2024), COPD (chronic obstructive pulmonary disease), Diabetes mellitus type I (09/30/2023), DKA (diabetic ketoacidosis) (09/30/2023), Hyperparathyroidism, Hypertension, Hypothyroidism,  Kidney failure, Stroke (12/30/2024), and Thyroid nodule.  He does not have any pertinent problems on file.  He  has a past surgical history that includes Orchiectomy; AV fistula repair; arteriovenous fistula/shunt surgery; Lymph node biopsy (2002); Cardiac catheterization (N/A, 08/22/2019); and Cardiac catheterization (N/A, 08/22/2019).  His family history includes Diabetes in his father and mother; Heart attack in his mother; Kidney disease in his father.  He  reports that he has been smoking cigarettes. He started smoking about 55 years ago. He has a 27.5 pack-year smoking history. He has been exposed to tobacco smoke. He has never used smokeless tobacco. He reports current alcohol use. He reports that he does not use drugs.  Current Outpatient Medications   Medication Sig Dispense Refill    albuterol sulfate  (90 Base) MCG/ACT inhaler Inhale 2 puffs Every 4 (Four) Hours As Needed for Wheezing or Shortness of Air. 18 g 0    allopurinol (ZYLOPRIM) 100 MG tablet Take 1 tablet by mouth 3 (Three) Times a Week. Monday, Wednesday, Friday      aspirin 81 MG chewable tablet Chew 1 tablet Daily. 30 tablet 1    atorvastatin (LIPITOR) 20 MG tablet Take 1 tablet by mouth Every Night. 90 tablet 1    b complex-vitamin c-folic acid (NEPHRO-MARK) 0.8 MG tablet tablet Take 1 tablet by mouth Daily.      cinacalcet (SENSIPAR) 30 MG tablet Take 1 tablet by mouth Daily.      clopidogrel (PLAVIX) 75 MG tablet Take 1 tablet by mouth Daily. 30 tablet 1    hydrALAZINE (APRESOLINE) 50 MG tablet Take 1 tablet by mouth 3 (Three) Times a Day.      magnesium oxide (MAG-OX) 400 MG tablet Take 1 tablet by mouth 2 (Two) Times a Day.      mycophenolate (CELLCEPT) 250 MG capsule Take 2 capsules by mouth 2 (Two) Times a Day.      sodium bicarbonate 650 MG tablet Take 1 tablet by mouth Daily.      tacrolimus (PROGRAF) 1 MG capsule Take 3 capsules by mouth Every Morning.      tacrolimus (PROGRAF) 1 MG capsule Take 2 capsules by mouth Every  "Evening.       No current facility-administered medications for this visit.     Current Outpatient Medications on File Prior to Visit   Medication Sig    albuterol sulfate  (90 Base) MCG/ACT inhaler Inhale 2 puffs Every 4 (Four) Hours As Needed for Wheezing or Shortness of Air.    allopurinol (ZYLOPRIM) 100 MG tablet Take 1 tablet by mouth 3 (Three) Times a Week. Monday, Wednesday, Friday    aspirin 81 MG chewable tablet Chew 1 tablet Daily.    atorvastatin (LIPITOR) 20 MG tablet Take 1 tablet by mouth Every Night.    b complex-vitamin c-folic acid (NEPHRO-MARK) 0.8 MG tablet tablet Take 1 tablet by mouth Daily.    cinacalcet (SENSIPAR) 30 MG tablet Take 1 tablet by mouth Daily.    clopidogrel (PLAVIX) 75 MG tablet Take 1 tablet by mouth Daily.    hydrALAZINE (APRESOLINE) 50 MG tablet Take 1 tablet by mouth 3 (Three) Times a Day.    magnesium oxide (MAG-OX) 400 MG tablet Take 1 tablet by mouth 2 (Two) Times a Day.    mycophenolate (CELLCEPT) 250 MG capsule Take 2 capsules by mouth 2 (Two) Times a Day.    sodium bicarbonate 650 MG tablet Take 1 tablet by mouth Daily.    tacrolimus (PROGRAF) 1 MG capsule Take 3 capsules by mouth Every Morning.    tacrolimus (PROGRAF) 1 MG capsule Take 2 capsules by mouth Every Evening.     No current facility-administered medications on file prior to visit.     He has No Known Allergies..    Review of Systems   Neurological:  Positive for dizziness and headaches.   All other systems reviewed and are negative.    Objective     Vitals:    01/21/25 1351   BP: 110/70   Pulse: 91   Temp: 97.8 °F (36.6 °C)   SpO2: 96%   Weight: 95.3 kg (210 lb)   Height: 182 cm (71.65\")     Body mass index is 28.76 kg/m².    Physical Exam  Vitals and nursing note reviewed.   Constitutional:       General: He is not in acute distress.     Appearance: Normal appearance.   HENT:      Head: Normocephalic.      Nose: Nose normal.      Mouth/Throat:      Mouth: Mucous membranes are moist.   Eyes:      " Extraocular Movements: Extraocular movements intact.   Cardiovascular:      Rate and Rhythm: Normal rate.   Pulmonary:      Effort: Pulmonary effort is normal.   Musculoskeletal:         General: Normal range of motion.   Skin:     General: Skin is warm and dry.   Neurological:      Mental Status: He is alert and oriented to person, place, and time.      Cranial Nerves: No cranial nerve deficit.      Sensory: No sensory deficit.      Motor: Weakness present.      Coordination: Coordination normal.     Neurological Exam  Mental Status  Alert. Oriented to person, place, and time.    Cranial Nerves  CN III, IV, VI: Extraocular movements intact bilaterally.    Assessment & Plan   Independent Review of Radiographic Studies:      I personally reviewed the images from the following studies.    The MR angiogram dated 2024 shows no significant cerebrovascular stenosis but does reveal a 2.5 to 5 mm right MCA bifurcation aneurysm.    Medical Decision Makin-year-old male with history of a small stroke at the end of 2024 likely secondary to his diabetes with a lacunar type infarct.  During workup for this an intracranial aneurysm was discovered at the right MCA bifurcation with question of its actual size given the MRA technique.  Patient does have headaches, but has no family history of aneurysms or subarachnoid hemorrhage that he is aware of.  He is status post a renal transplant 4 years ago which makes the use of contrast restricted.  A CTA of the head and neck was recommended if allowed by his renal team.  This can be done after some hydration and is only about 100 cc of contrast.  This would better define the aneurysm and if larger than the reported size, he may need treatment with possible WEB embolization.  Patient is a smoker and was instructed to quit which she is done for 3 days.  His other cerebrovascular risk factors are medically managed including hypertension.  Patient will instruct us on  his renal teams allowances after his office visit tomorrow.  Diagnoses and all orders for this visit:    1. Cerebral aneurysm, nonruptured (Primary)    2. Hypertension secondary to other renal disorders    3. Tobacco abuse    4. Controlled type 2 diabetes mellitus without complication, without long-term current use of insulin    5. PVD (peripheral vascular disease)    6. Status post kidney transplant      Return in about 1 week (around 1/28/2025) for Recheck, CTA Head/ Neck.     I spent 45 minutes caring for Jose Miguel on this date of service. This time includes time spent by me in the following activities: preparing for the visit, reviewing tests, performing a medically appropriate examination and/or evaluation, counseling and educating the patient/family/caregiver, documenting information in the medical record, independently interpreting results and communicating that information with the patient/family/caregiver, ordering test(s), and obtaining a separately obtained history.

## 2025-01-21 ENCOUNTER — OFFICE VISIT (OUTPATIENT)
Dept: NEUROSURGERY | Facility: CLINIC | Age: 65
End: 2025-01-21
Payer: MEDICARE

## 2025-01-21 VITALS
SYSTOLIC BLOOD PRESSURE: 110 MMHG | BODY MASS INDEX: 28.44 KG/M2 | DIASTOLIC BLOOD PRESSURE: 70 MMHG | HEART RATE: 91 BPM | TEMPERATURE: 97.8 F | WEIGHT: 210 LBS | HEIGHT: 72 IN | OXYGEN SATURATION: 96 %

## 2025-01-21 DIAGNOSIS — I67.1 CEREBRAL ANEURYSM, NONRUPTURED: Primary | ICD-10-CM

## 2025-01-21 DIAGNOSIS — E11.9 CONTROLLED TYPE 2 DIABETES MELLITUS WITHOUT COMPLICATION, WITHOUT LONG-TERM CURRENT USE OF INSULIN: ICD-10-CM

## 2025-01-21 DIAGNOSIS — Z72.0 TOBACCO ABUSE: Chronic | ICD-10-CM

## 2025-01-21 DIAGNOSIS — I73.9 PVD (PERIPHERAL VASCULAR DISEASE): Chronic | ICD-10-CM

## 2025-01-21 DIAGNOSIS — I15.1 HYPERTENSION SECONDARY TO OTHER RENAL DISORDERS: Chronic | ICD-10-CM

## 2025-01-21 DIAGNOSIS — Z94.0 STATUS POST KIDNEY TRANSPLANT: ICD-10-CM

## 2025-01-24 ENCOUNTER — TELEPHONE (OUTPATIENT)
Dept: NEUROSURGERY | Facility: CLINIC | Age: 65
End: 2025-01-24
Payer: MEDICARE

## 2025-01-24 NOTE — TELEPHONE ENCOUNTER
Patient called today regarding his recent nephrologist appt. He states that Dr. Bro is ok with CTA with contrast however his office would like to be called when it is scheduled so that hydration can be ordered. 's phone number is [phone].

## 2025-01-24 NOTE — ADDENDUM NOTE
Addended by: MARISABEL HUTCHISON on: 1/24/2025 03:08 PM     Modules accepted: Orders    
71 F     no pmhx    Pt stubbed R foot 4th toe last night on door. Now with pain and swelling. denies weakness/numbnes
yes

## 2025-02-12 ENCOUNTER — TELEPHONE (OUTPATIENT)
Dept: NEUROSURGERY | Facility: CLINIC | Age: 65
End: 2025-02-12
Payer: MEDICARE

## 2025-02-12 NOTE — TELEPHONE ENCOUNTER
Called patient to see if he still needed a answer to his message and he said no that his wife had wrote it all down.

## 2025-02-14 ENCOUNTER — TELEPHONE (OUTPATIENT)
Dept: NEUROSURGERY | Facility: CLINIC | Age: 65
End: 2025-02-14
Payer: MEDICARE

## 2025-02-14 NOTE — TELEPHONE ENCOUNTER
Caller: ASTRID    Relationship: [employer]    Best call back number: [phone]    What is the best time to reach you: ANYTIME    Who are you requesting to speak with (clinical staff, provider,  specific staff member): CLINICAL     Do you know the name of the person who called: NA    What was the call regarding: ASTRID FROM [employer] CALLED AND THEY ARE WANTING TO CHECK WITH  IF IT IS OK FOR PATIENT TO PROCEED WITH SURGERY ON 02/21/25-ANESTHESIA WAS CONCERNED DUE TO PATIENT HAVING A CTA SCHEDULED FOR 02/19/25-THEY WERE WANTING TO KNOW IF THE RESULTS WERE NEEDED BEFORE SURGERY-SENDING TO OFFICE TO [facility]     Is it okay if the provider responds through [employer]:

## 2025-02-17 RX ORDER — SODIUM CHLORIDE 9 MG/ML
100 INJECTION, SOLUTION INTRAVENOUS CONTINUOUS
Status: CANCELLED
Start: 2025-02-19

## 2025-02-17 RX ORDER — SODIUM CHLORIDE 9 MG/ML
100 INJECTION, SOLUTION INTRAVENOUS CONTINUOUS
Status: CANCELLED | OUTPATIENT
Start: 2025-02-19

## 2025-02-17 NOTE — TELEPHONE ENCOUNTER
Caller: ASTRID    Relationship: [employer]    Best call back number: [phone]    What is the best time to reach you: ANYTIME    Who are you requesting to speak with (clinical staff, provider,  specific staff member): CLINICAL     Do you know the name of the person who called: NA    What was the call regarding: ASTRID FROM [employer] CALLED AND THEY ARE WANTING TO CHECK WITH  IF IT IS OK FOR PATIENT TO PROCEED WITH SURGERY ON 02/21/25-ANESTHESIA WAS CONCERNED DUE TO PATIENT HAVING A CTA SCHEDULED FOR 02/19/25-THEY WERE WANTING TO KNOW IF THE RESULTS WERE NEEDED BEFORE SURGERY-SENDING TO OFFICE TO [facility]     Is it okay if the provider responds through [employer]:

## 2025-02-17 NOTE — TELEPHONE ENCOUNTER
Called Silvana at [facility] to clarify that  per Dr. Allen he said to wait on the results first.     She understood

## 2025-02-18 ENCOUNTER — OFFICE VISIT (OUTPATIENT)
Dept: CARDIOLOGY | Facility: CLINIC | Age: 65
End: 2025-02-18
Payer: MEDICARE

## 2025-02-18 VITALS
WEIGHT: 212.6 LBS | BODY MASS INDEX: 28.79 KG/M2 | SYSTOLIC BLOOD PRESSURE: 146 MMHG | RESPIRATION RATE: 18 BRPM | DIASTOLIC BLOOD PRESSURE: 89 MMHG | HEART RATE: 78 BPM | HEIGHT: 72 IN | OXYGEN SATURATION: 95 %

## 2025-02-18 DIAGNOSIS — I15.1 HYPERTENSION SECONDARY TO OTHER RENAL DISORDERS: Chronic | ICD-10-CM

## 2025-02-18 DIAGNOSIS — I67.1 CEREBRAL ANEURYSM, NONRUPTURED: ICD-10-CM

## 2025-02-18 DIAGNOSIS — I63.9 CEREBROVASCULAR ACCIDENT (CVA), UNSPECIFIED MECHANISM: ICD-10-CM

## 2025-02-18 DIAGNOSIS — Z94.0 STATUS POST KIDNEY TRANSPLANT: ICD-10-CM

## 2025-02-18 DIAGNOSIS — Z01.810 PREOP CARDIOVASCULAR EXAM: Primary | ICD-10-CM

## 2025-02-18 PROBLEM — K62.89 RECTAL CYST: Status: ACTIVE | Noted: 2025-02-18

## 2025-02-18 NOTE — PROGRESS NOTES
Cardiology Office Follow Up Visit      Primary Care Provider:  Razia Maldonado NP    Reason for f/u:     Preoperative cardiovascular risk assessment  Rectal cyst  Hypertension  Diabetes      Subjective     CC:    Denies chest pain or dyspnea    History of Present Illness       Jose Miguel Martinez is a 65 y.o. male. Patient is a 65-year-old male who was a previously evaluated by Dr. Perrin.  He is known to have type 1 diabetes, obesity, hypertension, end-stage renal disease with previous renal transplant, tobacco use disorder.     Patient was seen in the office in March 2024 requesting preoperative cardiovascular risk assessment prior to right knee surgery.     Patient had a stress Myoview in 2020 that showed normal myocardial perfusion.     In August 2019 the patient had a cardiac catheterization.  At that time his left main was normal, LAD normal, ramus normal, left circumflex normal, RCA normal.  EF was 55%.     Echocardiogram completed in April 2024 showed normal LV and RV size and function dilated left atrium aortic valve sclerosis without stenosis.  Aortic root was dilated measuring 4.1 cm proximal ascending aorta dilated measuring 4.3 cm.    Echocardiogram was repeated in December 2024.  EF was 58%.  There was normal RV systolic function.  No significant valvular flow abnormalities.  Mild dilatation of the aortic root present at 4.1 cm.     CT of his chest without contrast was performed at [facility] 20[facility].  This showed his thoracic aorta to be dilated up to 4.3 cm in the mid ascending aorta.  There were pulmonary nodules present recommended CT to be repeated in 6 to 12 months    Patient is scheduled for CTA of his brain tomorrow to further assess aneurysm that was noted on MRI back in December.    He was at [facility] at that time and was found to haveA 2.5 mm aneurysm along the right MCA bifurcation.  Patient was also found to have a small infarct of the left frontal lobe.  He was seen  by neurology and started on Plavix.  Review of their note recommended continuing Plavix for 21 days then transition to aspirin only.    Patient is here today requesting clearance to hold Plavix prior to procedure that is scheduled on February 21 which is a rectal dilatation to assess some cyst he has by colorectal surgery.  Patient has already been off of his Plavix for 3 days in preparation of this procedure.            ASSESSMENT/PLAN:      Diagnoses and all orders for this visit:    1. Preop cardiovascular exam (Primary)    2. Hypertension secondary to other renal disorders    3. Cerebral aneurysm, nonruptured    4. Status post kidney transplant    5. Cerebrovascular accident (CVA), unspecified mechanism    Other orders  -     ECG 12 Lead            MEDICAL DECISION MAKING:      Patient is not having any current symptoms suggestive of angina or heart failure on exam.    His blood pressure today is mildly elevated but he has not had his morning medications.  He is already stopped his Plavix for the last 3 days.    I did review his previous neurology note which did state he could stop Plavix after 21 days and transition to aspirin only.    He is undergoing further workup by neurosurgery due to cerebral aneurysm.    We did discuss risk of being off of Plavix the patient is aware and would like to proceed with surgery.    He does not have any cardiac prohibitive risk from proceeding.  He is in sinus rhythm with no acute ST or T wave segment abnormalities and no symptoms suggestive of heart failure, recent MI or unstable angina.    We will plan on seeing him back for scheduled follow-up in 6 months.  Will plan on repeating a CT of his chest without contrast at that time.  If he develops any new or worsening problems of asked him to contact the office sooner.  I have asked him to continue to check his blood pressure at home goal systolic blood pressure will be in the 130s or below systolic and diastolic less than  80.      Past Medical History:   Diagnosis Date    CKD (chronic kidney disease) requiring chronic dialysis     Controlled type 2 diabetes mellitus without complication, without long-term current use of insulin 05/14/2024    COPD (chronic obstructive pulmonary disease)     Diabetes mellitus type I 09/30/2023    DKA (diabetic ketoacidosis) 09/30/2023    Hyperparathyroidism     Dr. Gonzalez    Hypertension     Hypothyroidism     Kidney failure     Stroke 12/30/2024    Thyroid nodule     Lisa       Past Surgical History:   Procedure Laterality Date    ARTERIOVENOUS FISTULA REPAIR      ARTERIOVENOUS FISTULA/SHUNT SURGERY      CARDIAC CATHETERIZATION N/A 08/22/2019    Procedure: Left Heart Cath;  Surgeon: Juanpablo Garcia MD;  Location:  [facility];  Service: Cardiology    CARDIAC CATHETERIZATION N/A 08/22/2019    Procedure: Aortic root aortogram;  Surgeon: Juanpablo Garcia MD;  Location:  [facility];  Service: Cardiology    LYMPH NODE BIOPSY  2002    ORCHIECTOMY           Current Outpatient Medications:     albuterol sulfate  (90 Base) MCG/ACT inhaler, Inhale 2 puffs Every 4 (Four) Hours As Needed for Wheezing or Shortness of Air., Disp: 18 g, Rfl: 0    allopurinol (ZYLOPRIM) 100 MG tablet, Take 1 tablet by mouth 3 (Three) Times a Week. Monday, Wednesday, Friday, Disp: , Rfl:     aspirin 81 MG chewable tablet, Chew 1 tablet Daily., Disp: 30 tablet, Rfl: 1    atorvastatin (LIPITOR) 20 MG tablet, Take 1 tablet by mouth Every Night., Disp: 90 tablet, Rfl: 1    b complex-vitamin c-folic acid (NEPHRO-MARK) 0.8 MG tablet tablet, Take 1 tablet by mouth Daily., Disp: , Rfl:     cinacalcet (SENSIPAR) 30 MG tablet, Take 1 tablet by mouth Daily., Disp: , Rfl:     clopidogrel (PLAVIX) 75 MG tablet, Take 1 tablet by mouth Daily., Disp: 30 tablet, Rfl: 1    hydrALAZINE (APRESOLINE) 50 MG tablet, Take 1 tablet by mouth 3 (Three) Times a Day., Disp: , Rfl:     magnesium  "oxide (MAG-OX) 400 MG tablet, Take 1 tablet by mouth 2 (Two) Times a Day., Disp: , Rfl:     mycophenolate (CELLCEPT) 250 MG capsule, Take 2 capsules by mouth 2 (Two) Times a Day., Disp: , Rfl:     sodium bicarbonate 650 MG tablet, Take 1 tablet by mouth Daily., Disp: , Rfl:     tacrolimus (PROGRAF) 1 MG capsule, Take 2 capsules by mouth Every Evening., Disp: , Rfl:     Social History     Socioeconomic History    Marital status:    Tobacco Use    Smoking status: Every Day     Current packs/day: 0.50     Average packs/day: 0.5 packs/day for 55.1 years (27.6 ttl pk-yrs)     Types: Cigarettes     Start date: 1970     Passive exposure: Past    Smokeless tobacco: Never    Tobacco comments:     Patient advised to stop smoking   Vaping Use    Vaping status: Never Used   Substance and Sexual Activity    Alcohol use: Yes     Comment: rare    Drug use: No    Sexual activity: Yes     Partners: Female     Birth control/protection: None       Family History   Problem Relation Age of Onset    Heart attack Mother     Diabetes Mother     Diabetes Father     Kidney disease Father        The following portions of the patient's history were reviewed and updated as appropriate: allergies, current medications, past family history, past medical history, past social history, past surgical history and problem list.    Review of Systems   All other systems reviewed and are negative.      Pertinent items are noted in HPI, all other systems reviewed and negative    /89 (BP Location: Right arm, Patient Position: Sitting, Cuff Size: Large Adult)   Pulse 78   Resp 18   Ht 182 cm (71.65\")   Wt 96.4 kg (212 lb 9.6 oz)   SpO2 95%   BMI 29.12 kg/m² .  Objective     Vitals reviewed.   Constitutional:       General: Not in acute distress.     Appearance: Normal appearance. Well-developed.   Eyes:      Pupils: Pupils are equal, round, and reactive to light.   HENT:      Head: Normocephalic and atraumatic.   Neck:      Vascular: No " JVD.   Pulmonary:      Effort: Pulmonary effort is normal.      Breath sounds: Normal breath sounds.   Cardiovascular:      Normal rate. Regular rhythm.   Edema:     Peripheral edema absent.   Abdominal:      General: There is no distension.      Palpations: Abdomen is soft.      Tenderness: There is no abdominal tenderness.   Musculoskeletal: Normal range of motion.      Cervical back: Normal range of motion and neck supple. Skin:     General: Skin is warm and dry.   Neurological:      Mental Status: Alert and oriented to person, place, and time.             ECG 12 Lead    Date/Time: 2/18/2025 9:00 AM  Performed by: Philomena Motta APRN    Authorized by: Philomena Motta APRN  Comparison: compared with previous ECG   Similar to previous ECG  Rhythm: sinus rhythm  Rate: normal  BPM: 78  Conduction: conduction normal  ST Segments: ST segments normal  T Waves: T waves normal  QRS axis: normal  Other: no other findings          EKG ordered by and reviewed by me in office

## 2025-02-19 ENCOUNTER — HOSPITAL ENCOUNTER (OUTPATIENT)
Dept: CT IMAGING | Facility: HOSPITAL | Age: 65
Discharge: HOME | End: 2025-02-19
Payer: MEDICARE

## 2025-02-19 ENCOUNTER — HOSPITAL ENCOUNTER (OUTPATIENT)
Dept: INFUSION THERAPY | Facility: HOSPITAL | Age: 65
Discharge: HOME | End: 2025-02-19
Payer: MEDICARE

## 2025-02-19 VITALS
OXYGEN SATURATION: 98 % | TEMPERATURE: 97.8 F | RESPIRATION RATE: 18 BRPM | SYSTOLIC BLOOD PRESSURE: 165 MMHG | DIASTOLIC BLOOD PRESSURE: 88 MMHG | HEART RATE: 75 BPM

## 2025-02-19 DIAGNOSIS — I67.1 CEREBRAL ANEURYSM, NONRUPTURED: ICD-10-CM

## 2025-02-19 DIAGNOSIS — Z72.0 TOBACCO ABUSE: Chronic | ICD-10-CM

## 2025-02-19 DIAGNOSIS — I15.1 HYPERTENSION SECONDARY TO OTHER RENAL DISORDERS: Chronic | ICD-10-CM

## 2025-02-19 DIAGNOSIS — E11.9 CONTROLLED TYPE 2 DIABETES MELLITUS WITHOUT COMPLICATION, WITHOUT LONG-TERM CURRENT USE OF INSULIN: ICD-10-CM

## 2025-02-19 DIAGNOSIS — N18.6 ESRD (END STAGE RENAL DISEASE): Primary | ICD-10-CM

## 2025-02-19 LAB
CREAT BLDA-MCNC: 1.9 MG/DL (ref 0.6–1.3)
EGFRCR SERPLBLD CKD-EPI 2021: 39.1 ML/MIN/1.73

## 2025-02-19 PROCEDURE — 25510000001 IOPAMIDOL PER 1 ML: Performed by: RADIOLOGY

## 2025-02-19 PROCEDURE — 96365 THER/PROPH/DIAG IV INF INIT: CPT

## 2025-02-19 PROCEDURE — 82565 ASSAY OF CREATININE: CPT

## 2025-02-19 PROCEDURE — 96366 THER/PROPH/DIAG IV INF ADDON: CPT

## 2025-02-19 PROCEDURE — 70496 CT ANGIOGRAPHY HEAD: CPT

## 2025-02-19 PROCEDURE — 96361 HYDRATE IV INFUSION ADD-ON: CPT

## 2025-02-19 PROCEDURE — 25810000003 SODIUM CHLORIDE 0.9 % SOLUTION: Performed by: INTERNAL MEDICINE

## 2025-02-19 PROCEDURE — 96360 HYDRATION IV INFUSION INIT: CPT

## 2025-02-19 RX ORDER — SODIUM CHLORIDE 9 MG/ML
100 INJECTION, SOLUTION INTRAVENOUS CONTINUOUS
Status: DISPENSED | OUTPATIENT
Start: 2025-02-19 | End: 2025-02-19

## 2025-02-19 RX ORDER — SODIUM CHLORIDE 9 MG/ML
100 INJECTION, SOLUTION INTRAVENOUS CONTINUOUS
Status: CANCELLED
Start: 2025-02-19

## 2025-02-19 RX ORDER — IOPAMIDOL 755 MG/ML
100 INJECTION, SOLUTION INTRAVASCULAR
Status: COMPLETED | OUTPATIENT
Start: 2025-02-19 | End: 2025-02-19

## 2025-02-19 RX ORDER — SODIUM CHLORIDE 9 MG/ML
100 INJECTION, SOLUTION INTRAVENOUS CONTINUOUS
Status: CANCELLED | OUTPATIENT
Start: 2025-02-19

## 2025-02-19 RX ADMIN — IOPAMIDOL 100 ML: 755 INJECTION, SOLUTION INTRAVENOUS at 12:33

## 2025-02-19 RX ADMIN — SODIUM CHLORIDE 100 ML/HR: 9 INJECTION, SOLUTION INTRAVENOUS at 12:40

## 2025-02-19 RX ADMIN — SODIUM CHLORIDE 100 ML/HR: 9 INJECTION, SOLUTION INTRAVENOUS at 10:08

## 2025-02-20 NOTE — TELEPHONE ENCOUNTER
Patient is calling regarding his surgery clearance and states his surgery is tomorrow and myesha will need to have an answer today. He had his CTA yesterday. Please advise patient and Myesha. Pt number is [phone]. Thank you.

## 2025-05-01 NOTE — PROGRESS NOTES
Subjective   Patient ID: Jose Miguel Martinez is a 65 y.o. male is here today for follow-up for   Cerebral aneurysm   Completed CTA on 2-    History of Present Illness  65 y.o. male who presented to a free standing emergency room on the morning of 12/29/24 after awakening at 7am with right sided arm weakness. He reported that he was unable to grasp any thing.  He denied any headache, shortness of breath, numbness and tingling, or change of speech. In the ER, a CT of the head was completed and showed no acute intracranial abnormality. A code stroke had been activated and pt was evaluated by tele-stroke neurologist, Dr. Gracia. Per documentation, pt initially had some mild right arm weakness, but improved with NIH 0. He was outside the treatment window for IV thrombolytics with wake up symptoms and last known well being the night before.   Pt was transferred to [facility] from the free standing [facility] location for an MRI which was completed and showed a small infarct of the left frontal lobe.  An MRA was also obtained which demonstrated an intracranial aneurysm and the patient presented to discuss the natural history of aneurysms and what follow-up care he would need on 1/21/2025.  A CTA was recommended for better evaluation of the aneurysm and detection of its more accurate size which was achieved on 2/19/2025 and now the patient returns to discuss the findings.  The aneurysm measures more than was previously described in the MRA results.  Patient presents today to discuss next steps in managing the intradural lesion.    The following portions of the patient's history were reviewed and updated as appropriate: He  has a past medical history of CKD (chronic kidney disease) requiring chronic dialysis, Controlled type 2 diabetes mellitus without complication, without long-term current use of insulin (05/14/2024), COPD (chronic obstructive pulmonary disease), Diabetes mellitus type I (09/30/2023), DKA  (diabetic ketoacidosis) (09/30/2023), Hyperparathyroidism, Hypertension, Hypothyroidism, Kidney failure, Stroke (12/30/2024), and Thyroid nodule.  He does not have any pertinent problems on file.  He  has a past surgical history that includes Orchiectomy; AV fistula repair; arteriovenous fistula/shunt surgery; Lymph node biopsy (2002); Cardiac catheterization (N/A, 08/22/2019); and Cardiac catheterization (N/A, 08/22/2019).  His family history includes Diabetes in his father and mother; Heart attack in his mother; Kidney disease in his father.  He  reports that he has been smoking cigarettes. He started smoking about 55 years ago. He has a 27.7 pack-year smoking history. He has been exposed to tobacco smoke. He has never used smokeless tobacco. He reports current alcohol use. He reports that he does not use drugs.  Current Outpatient Medications   Medication Sig Dispense Refill    albuterol sulfate  (90 Base) MCG/ACT inhaler Inhale 2 puffs Every 4 (Four) Hours As Needed for Wheezing or Shortness of Air. 18 g 0    allopurinol (ZYLOPRIM) 100 MG tablet Take 1 tablet by mouth 3 (Three) Times a Week. Monday, Wednesday, Friday      aspirin 81 MG chewable tablet Chew 1 tablet Daily. 30 tablet 1    atorvastatin (LIPITOR) 20 MG tablet Take 1 tablet by mouth Every Night. 90 tablet 1    b complex-vitamin c-folic acid (NEPHRO-MARK) 0.8 MG tablet tablet Take 1 tablet by mouth Daily.      cinacalcet (SENSIPAR) 30 MG tablet Take 1 tablet by mouth Daily.      magnesium oxide (MAG-OX) 400 MG tablet Take 1 tablet by mouth 2 (Two) Times a Day.      mycophenolate (CELLCEPT) 250 MG capsule Take 2 capsules by mouth 2 (Two) Times a Day.      sodium bicarbonate 650 MG tablet Take 1 tablet by mouth Daily.      tacrolimus (PROGRAF) 1 MG capsule Take 2 capsules by mouth Every Evening.      clopidogrel (PLAVIX) 75 MG tablet Take 1 tablet by mouth Daily. (Patient not taking: Reported on 5/8/2025) 30 tablet 1    hydrALAZINE (APRESOLINE)  "50 MG tablet Take 1 tablet by mouth 3 (Three) Times a Day. (Patient not taking: Reported on 5/8/2025)       No current facility-administered medications for this visit.     Current Outpatient Medications on File Prior to Visit   Medication Sig    albuterol sulfate  (90 Base) MCG/ACT inhaler Inhale 2 puffs Every 4 (Four) Hours As Needed for Wheezing or Shortness of Air.    allopurinol (ZYLOPRIM) 100 MG tablet Take 1 tablet by mouth 3 (Three) Times a Week. Monday, Wednesday, Friday    aspirin 81 MG chewable tablet Chew 1 tablet Daily.    atorvastatin (LIPITOR) 20 MG tablet Take 1 tablet by mouth Every Night.    b complex-vitamin c-folic acid (NEPHRO-MARK) 0.8 MG tablet tablet Take 1 tablet by mouth Daily.    cinacalcet (SENSIPAR) 30 MG tablet Take 1 tablet by mouth Daily.    magnesium oxide (MAG-OX) 400 MG tablet Take 1 tablet by mouth 2 (Two) Times a Day.    mycophenolate (CELLCEPT) 250 MG capsule Take 2 capsules by mouth 2 (Two) Times a Day.    sodium bicarbonate 650 MG tablet Take 1 tablet by mouth Daily.    tacrolimus (PROGRAF) 1 MG capsule Take 2 capsules by mouth Every Evening.    clopidogrel (PLAVIX) 75 MG tablet Take 1 tablet by mouth Daily. (Patient not taking: Reported on 5/8/2025)    hydrALAZINE (APRESOLINE) 50 MG tablet Take 1 tablet by mouth 3 (Three) Times a Day. (Patient not taking: Reported on 5/8/2025)     No current facility-administered medications on file prior to visit.     He has no known allergies..    Review of Systems   All other systems reviewed and are negative.    Objective     Vitals:    05/08/25 1302   BP: 130/80   Pulse: 71   Temp: 97.8 °F (36.6 °C)   SpO2: 97%   Weight: 97.1 kg (214 lb)   Height: 182 cm (71.65\")     Body mass index is 29.3 kg/m².    Physical Exam  Vitals and nursing note reviewed.   Constitutional:       General: He is not in acute distress.     Appearance: Normal appearance.   Eyes:      Extraocular Movements: Extraocular movements intact.   Cardiovascular:      " Rate and Rhythm: Normal rate.   Pulmonary:      Effort: Pulmonary effort is normal.   Musculoskeletal:         General: Normal range of motion.      Cervical back: Normal range of motion.   Skin:     General: Skin is warm and dry.   Neurological:      General: No focal deficit present.      Mental Status: He is alert and oriented to person, place, and time.      Cranial Nerves: No cranial nerve deficit.      Sensory: No sensory deficit.      Motor: No weakness.      Coordination: Coordination normal.      Gait: Gait normal.     Neurological Exam  Mental Status  Alert. Oriented to person, place, and time.    Cranial Nerves  CN III, IV, VI: Extraocular movements intact bilaterally.    Gait   Normal gait.    Assessment & Plan   Independent Review of Radiographic Studies:      I personally reviewed the images from the following studies.    The CT arteriogram dated 2025 was reviewed with the patient and shows a broad-based 5 mm aneurysm arising from the M1 segment distally.  No additional aneurysm is seen.    Medical Decision Makin-year-old male with cerebrovascular risk factors including hypertension, COPD, chronic renal disease on dialysis, diabetes, coronary artery disease and peripheral vascular disease who is on aspirin and Plavix as well as Lipitor for medical management.  He is a smoker and was found to have a 5 mm right MCA aneurysm with a broad base and concern for subarachnoid hemorrhage potential even though he has no family history of such due to his continued smoking and hypertension.  Patient's aneurysm was discovered after a stroke workup for an infarct in the left frontal lobe.  Patient has made a good recovery from his CVA.  The risks, alternatives and procedure for endovascular treatment using a flow diverting stent were discussed with the patient expressing understanding and considering moving forward with treatment.  This will be scheduled under general anesthesia in the near  future.  Diagnoses and all orders for this visit:    1. Cerebral aneurysm, nonruptured (Primary)    2. Hypertension secondary to other renal disorders    3. Tobacco abuse    4. Controlled type 2 diabetes mellitus without complication, without long-term current use of insulin    5. Cerebrovascular accident (CVA), unspecified mechanism    6. PVD (peripheral vascular disease)    Return in about 3 weeks (around 5/29/2025) for Cerebral Angiogram (DSA) with possible embolization.     I spent 30 minutes caring for Jose Miguel on this date of service. This time includes time spent by me in the following activities: preparing for the visit, reviewing tests, performing a medically appropriate examination and/or evaluation, counseling and educating the patient/family/caregiver, documenting information in the medical record, independently interpreting results and communicating that information with the patient/family/caregiver, care coordination, ordering test(s), ordering procedure(s), and reviewing a separately obtained history.

## 2025-05-01 NOTE — H&P (VIEW-ONLY)
Subjective   Patient ID: Jose Miguel Martinez is a 65 y.o. male is here today for follow-up for   Cerebral aneurysm   Completed CTA on 2-    History of Present Illness  65 y.o. male who presented to a free standing emergency room on the morning of 12/29/24 after awakening at 7am with right sided arm weakness. He reported that he was unable to grasp any thing.  He denied any headache, shortness of breath, numbness and tingling, or change of speech. In the ER, a CT of the head was completed and showed no acute intracranial abnormality. A code stroke had been activated and pt was evaluated by tele-stroke neurologist, Dr. Gracia. Per documentation, pt initially had some mild right arm weakness, but improved with NIH 0. He was outside the treatment window for IV thrombolytics with wake up symptoms and last known well being the night before.   Pt was transferred to [facility] from the free standing [facility] location for an MRI which was completed and showed a small infarct of the left frontal lobe.  An MRA was also obtained which demonstrated an intracranial aneurysm and the patient presented to discuss the natural history of aneurysms and what follow-up care he would need on 1/21/2025.  A CTA was recommended for better evaluation of the aneurysm and detection of its more accurate size which was achieved on 2/19/2025 and now the patient returns to discuss the findings.  The aneurysm measures more than was previously described in the MRA results.  Patient presents today to discuss next steps in managing the intradural lesion.    The following portions of the patient's history were reviewed and updated as appropriate: He  has a past medical history of CKD (chronic kidney disease) requiring chronic dialysis, Controlled type 2 diabetes mellitus without complication, without long-term current use of insulin (05/14/2024), COPD (chronic obstructive pulmonary disease), Diabetes mellitus type I (09/30/2023), DKA  (diabetic ketoacidosis) (09/30/2023), Hyperparathyroidism, Hypertension, Hypothyroidism, Kidney failure, Stroke (12/30/2024), and Thyroid nodule.  He does not have any pertinent problems on file.  He  has a past surgical history that includes Orchiectomy; AV fistula repair; arteriovenous fistula/shunt surgery; Lymph node biopsy (2002); Cardiac catheterization (N/A, 08/22/2019); and Cardiac catheterization (N/A, 08/22/2019).  His family history includes Diabetes in his father and mother; Heart attack in his mother; Kidney disease in his father.  He  reports that he has been smoking cigarettes. He started smoking about 55 years ago. He has a 27.7 pack-year smoking history. He has been exposed to tobacco smoke. He has never used smokeless tobacco. He reports current alcohol use. He reports that he does not use drugs.  Current Outpatient Medications   Medication Sig Dispense Refill    albuterol sulfate  (90 Base) MCG/ACT inhaler Inhale 2 puffs Every 4 (Four) Hours As Needed for Wheezing or Shortness of Air. 18 g 0    allopurinol (ZYLOPRIM) 100 MG tablet Take 1 tablet by mouth 3 (Three) Times a Week. Monday, Wednesday, Friday      aspirin 81 MG chewable tablet Chew 1 tablet Daily. 30 tablet 1    atorvastatin (LIPITOR) 20 MG tablet Take 1 tablet by mouth Every Night. 90 tablet 1    b complex-vitamin c-folic acid (NEPHRO-MARK) 0.8 MG tablet tablet Take 1 tablet by mouth Daily.      cinacalcet (SENSIPAR) 30 MG tablet Take 1 tablet by mouth Daily.      magnesium oxide (MAG-OX) 400 MG tablet Take 1 tablet by mouth 2 (Two) Times a Day.      mycophenolate (CELLCEPT) 250 MG capsule Take 2 capsules by mouth 2 (Two) Times a Day.      sodium bicarbonate 650 MG tablet Take 1 tablet by mouth Daily.      tacrolimus (PROGRAF) 1 MG capsule Take 2 capsules by mouth Every Evening.      clopidogrel (PLAVIX) 75 MG tablet Take 1 tablet by mouth Daily. (Patient not taking: Reported on 5/8/2025) 30 tablet 1    hydrALAZINE (APRESOLINE)  "50 MG tablet Take 1 tablet by mouth 3 (Three) Times a Day. (Patient not taking: Reported on 5/8/2025)       No current facility-administered medications for this visit.     Current Outpatient Medications on File Prior to Visit   Medication Sig    albuterol sulfate  (90 Base) MCG/ACT inhaler Inhale 2 puffs Every 4 (Four) Hours As Needed for Wheezing or Shortness of Air.    allopurinol (ZYLOPRIM) 100 MG tablet Take 1 tablet by mouth 3 (Three) Times a Week. Monday, Wednesday, Friday    aspirin 81 MG chewable tablet Chew 1 tablet Daily.    atorvastatin (LIPITOR) 20 MG tablet Take 1 tablet by mouth Every Night.    b complex-vitamin c-folic acid (NEPHRO-MARK) 0.8 MG tablet tablet Take 1 tablet by mouth Daily.    cinacalcet (SENSIPAR) 30 MG tablet Take 1 tablet by mouth Daily.    magnesium oxide (MAG-OX) 400 MG tablet Take 1 tablet by mouth 2 (Two) Times a Day.    mycophenolate (CELLCEPT) 250 MG capsule Take 2 capsules by mouth 2 (Two) Times a Day.    sodium bicarbonate 650 MG tablet Take 1 tablet by mouth Daily.    tacrolimus (PROGRAF) 1 MG capsule Take 2 capsules by mouth Every Evening.    clopidogrel (PLAVIX) 75 MG tablet Take 1 tablet by mouth Daily. (Patient not taking: Reported on 5/8/2025)    hydrALAZINE (APRESOLINE) 50 MG tablet Take 1 tablet by mouth 3 (Three) Times a Day. (Patient not taking: Reported on 5/8/2025)     No current facility-administered medications on file prior to visit.     He has no known allergies..    Review of Systems   All other systems reviewed and are negative.    Objective     Vitals:    05/08/25 1302   BP: 130/80   Pulse: 71   Temp: 97.8 °F (36.6 °C)   SpO2: 97%   Weight: 97.1 kg (214 lb)   Height: 182 cm (71.65\")     Body mass index is 29.3 kg/m².    Physical Exam  Vitals and nursing note reviewed.   Constitutional:       General: He is not in acute distress.     Appearance: Normal appearance.   Eyes:      Extraocular Movements: Extraocular movements intact.   Cardiovascular:      " Rate and Rhythm: Normal rate.   Pulmonary:      Effort: Pulmonary effort is normal.   Musculoskeletal:         General: Normal range of motion.      Cervical back: Normal range of motion.   Skin:     General: Skin is warm and dry.   Neurological:      General: No focal deficit present.      Mental Status: He is alert and oriented to person, place, and time.      Cranial Nerves: No cranial nerve deficit.      Sensory: No sensory deficit.      Motor: No weakness.      Coordination: Coordination normal.      Gait: Gait normal.     Neurological Exam  Mental Status  Alert. Oriented to person, place, and time.    Cranial Nerves  CN III, IV, VI: Extraocular movements intact bilaterally.    Gait   Normal gait.    Assessment & Plan   Independent Review of Radiographic Studies:      I personally reviewed the images from the following studies.    The CT arteriogram dated 2025 was reviewed with the patient and shows a broad-based 5 mm aneurysm arising from the M1 segment distally.  No additional aneurysm is seen.    Medical Decision Makin-year-old male with cerebrovascular risk factors including hypertension, COPD, chronic renal disease on dialysis, diabetes, coronary artery disease and peripheral vascular disease who is on aspirin and Plavix as well as Lipitor for medical management.  He is a smoker and was found to have a 5 mm right MCA aneurysm with a broad base and concern for subarachnoid hemorrhage potential even though he has no family history of such due to his continued smoking and hypertension.  Patient's aneurysm was discovered after a stroke workup for an infarct in the left frontal lobe.  Patient has made a good recovery from his CVA.  The risks, alternatives and procedure for endovascular treatment using a flow diverting stent were discussed with the patient expressing understanding and considering moving forward with treatment.  This will be scheduled under general anesthesia in the near  future.  Diagnoses and all orders for this visit:    1. Cerebral aneurysm, nonruptured (Primary)    2. Hypertension secondary to other renal disorders    3. Tobacco abuse    4. Controlled type 2 diabetes mellitus without complication, without long-term current use of insulin    5. Cerebrovascular accident (CVA), unspecified mechanism    6. PVD (peripheral vascular disease)    Return in about 3 weeks (around 5/29/2025) for Cerebral Angiogram (DSA) with possible embolization.     I spent 30 minutes caring for Jose Miguel on this date of service. This time includes time spent by me in the following activities: preparing for the visit, reviewing tests, performing a medically appropriate examination and/or evaluation, counseling and educating the patient/family/caregiver, documenting information in the medical record, independently interpreting results and communicating that information with the patient/family/caregiver, care coordination, ordering test(s), ordering procedure(s), and reviewing a separately obtained history.

## 2025-05-08 ENCOUNTER — OFFICE VISIT (OUTPATIENT)
Dept: NEUROSURGERY | Facility: CLINIC | Age: 65
End: 2025-05-08
Payer: MEDICARE

## 2025-05-08 VITALS
BODY MASS INDEX: 28.99 KG/M2 | TEMPERATURE: 97.8 F | SYSTOLIC BLOOD PRESSURE: 130 MMHG | OXYGEN SATURATION: 97 % | HEART RATE: 71 BPM | WEIGHT: 214 LBS | HEIGHT: 72 IN | DIASTOLIC BLOOD PRESSURE: 80 MMHG

## 2025-05-08 DIAGNOSIS — I73.9 PVD (PERIPHERAL VASCULAR DISEASE): Chronic | ICD-10-CM

## 2025-05-08 DIAGNOSIS — I15.1 HYPERTENSION SECONDARY TO OTHER RENAL DISORDERS: Chronic | ICD-10-CM

## 2025-05-08 DIAGNOSIS — I67.1 CEREBRAL ANEURYSM, NONRUPTURED: Primary | ICD-10-CM

## 2025-05-08 DIAGNOSIS — I63.9 CEREBROVASCULAR ACCIDENT (CVA), UNSPECIFIED MECHANISM: ICD-10-CM

## 2025-05-08 DIAGNOSIS — E11.9 CONTROLLED TYPE 2 DIABETES MELLITUS WITHOUT COMPLICATION, WITHOUT LONG-TERM CURRENT USE OF INSULIN: ICD-10-CM

## 2025-05-08 DIAGNOSIS — Z72.0 TOBACCO ABUSE: Chronic | ICD-10-CM

## 2025-05-08 RX ORDER — CLOPIDOGREL BISULFATE 75 MG/1
75 TABLET ORAL DAILY
Qty: 30 TABLET | Refills: 1 | Status: SHIPPED | OUTPATIENT
Start: 2025-05-08

## 2025-05-09 DIAGNOSIS — E11.9 CONTROLLED TYPE 2 DIABETES MELLITUS WITHOUT COMPLICATION, WITHOUT LONG-TERM CURRENT USE OF INSULIN: ICD-10-CM

## 2025-05-09 DIAGNOSIS — I67.1 CEREBRAL ANEURYSM, NONRUPTURED: Primary | ICD-10-CM

## 2025-05-09 DIAGNOSIS — I10 HYPERTENSION, UNSPECIFIED TYPE: Chronic | ICD-10-CM

## 2025-05-09 DIAGNOSIS — I73.9 PVD (PERIPHERAL VASCULAR DISEASE): Chronic | ICD-10-CM

## 2025-05-09 DIAGNOSIS — Z72.0 TOBACCO ABUSE: Chronic | ICD-10-CM

## 2025-05-09 DIAGNOSIS — N18.6 ESRD (END STAGE RENAL DISEASE): Chronic | ICD-10-CM

## 2025-05-09 DIAGNOSIS — I63.9 CEREBROVASCULAR ACCIDENT (CVA), UNSPECIFIED MECHANISM: ICD-10-CM

## 2025-05-09 DIAGNOSIS — I21.3 ST ELEVATION MYOCARDIAL INFARCTION (STEMI), UNSPECIFIED ARTERY: ICD-10-CM

## 2025-05-14 ENCOUNTER — LAB (OUTPATIENT)
Dept: LAB | Facility: HOSPITAL | Age: 65
End: 2025-05-14
Payer: MEDICARE

## 2025-05-14 DIAGNOSIS — I67.1 CEREBRAL ANEURYSM, NONRUPTURED: ICD-10-CM

## 2025-05-14 DIAGNOSIS — I73.9 PVD (PERIPHERAL VASCULAR DISEASE): ICD-10-CM

## 2025-05-14 DIAGNOSIS — Z72.0 TOBACCO ABUSE: ICD-10-CM

## 2025-05-14 DIAGNOSIS — N18.6 ESRD (END STAGE RENAL DISEASE): ICD-10-CM

## 2025-05-14 DIAGNOSIS — E11.9 CONTROLLED TYPE 2 DIABETES MELLITUS WITHOUT COMPLICATION, WITHOUT LONG-TERM CURRENT USE OF INSULIN: ICD-10-CM

## 2025-05-14 DIAGNOSIS — I10 HYPERTENSION, UNSPECIFIED TYPE: ICD-10-CM

## 2025-05-14 DIAGNOSIS — I21.3 ST ELEVATION MYOCARDIAL INFARCTION (STEMI), UNSPECIFIED ARTERY: ICD-10-CM

## 2025-05-14 DIAGNOSIS — I63.9 CEREBROVASCULAR ACCIDENT (CVA), UNSPECIFIED MECHANISM: ICD-10-CM

## 2025-05-14 LAB
ANION GAP SERPL CALCULATED.3IONS-SCNC: 7.3 MMOL/L (ref 5–15)
BASOPHILS # BLD AUTO: 0.02 10*3/MM3 (ref 0–0.2)
BASOPHILS NFR BLD AUTO: 0.4 % (ref 0–1.5)
BUN SERPL-MCNC: 35 MG/DL (ref 8–23)
BUN/CREAT SERPL: 18.5 (ref 7–25)
CALCIUM SPEC-SCNC: 9.9 MG/DL (ref 8.6–10.5)
CHLORIDE SERPL-SCNC: 105 MMOL/L (ref 98–107)
CO2 SERPL-SCNC: 24.7 MMOL/L (ref 22–29)
CREAT SERPL-MCNC: 1.89 MG/DL (ref 0.76–1.27)
DEPRECATED RDW RBC AUTO: 43.4 FL (ref 37–54)
EGFRCR SERPLBLD CKD-EPI 2021: 39.4 ML/MIN/1.73
EOSINOPHIL # BLD AUTO: 0.03 10*3/MM3 (ref 0–0.4)
EOSINOPHIL NFR BLD AUTO: 0.5 % (ref 0.3–6.2)
ERYTHROCYTE [DISTWIDTH] IN BLOOD BY AUTOMATED COUNT: 13.2 % (ref 12.3–15.4)
GLUCOSE SERPL-MCNC: 175 MG/DL (ref 65–99)
HCT VFR BLD AUTO: 46 % (ref 37.5–51)
HGB BLD-MCNC: 15.1 G/DL (ref 13–17.7)
IMM GRANULOCYTES # BLD AUTO: 0.04 10*3/MM3 (ref 0–0.05)
IMM GRANULOCYTES NFR BLD AUTO: 0.7 % (ref 0–0.5)
LYMPHOCYTES # BLD AUTO: 1.71 10*3/MM3 (ref 0.7–3.1)
LYMPHOCYTES NFR BLD AUTO: 30.8 % (ref 19.6–45.3)
MCH RBC QN AUTO: 29.3 PG (ref 26.6–33)
MCHC RBC AUTO-ENTMCNC: 32.8 G/DL (ref 31.5–35.7)
MCV RBC AUTO: 89.1 FL (ref 79–97)
MONOCYTES # BLD AUTO: 0.36 10*3/MM3 (ref 0.1–0.9)
MONOCYTES NFR BLD AUTO: 6.5 % (ref 5–12)
NEUTROPHILS NFR BLD AUTO: 3.39 10*3/MM3 (ref 1.7–7)
NEUTROPHILS NFR BLD AUTO: 61.1 % (ref 42.7–76)
NRBC BLD AUTO-RTO: 0 /100 WBC (ref 0–0.2)
PA ADP PRP-ACNC: 98 PRU
PLATELET # BLD AUTO: 117 10*3/MM3 (ref 140–450)
PMV BLD AUTO: 9.5 FL (ref 6–12)
POTASSIUM SERPL-SCNC: 4.9 MMOL/L (ref 3.5–5.2)
RBC # BLD AUTO: 5.16 10*6/MM3 (ref 4.14–5.8)
SODIUM SERPL-SCNC: 137 MMOL/L (ref 136–145)
WBC NRBC COR # BLD AUTO: 5.55 10*3/MM3 (ref 3.4–10.8)

## 2025-05-14 PROCEDURE — 85576 BLOOD PLATELET AGGREGATION: CPT

## 2025-05-14 PROCEDURE — 80048 BASIC METABOLIC PNL TOTAL CA: CPT

## 2025-05-14 PROCEDURE — 85025 COMPLETE CBC W/AUTO DIFF WBC: CPT

## 2025-05-14 PROCEDURE — 36415 COLL VENOUS BLD VENIPUNCTURE: CPT

## 2025-05-16 NOTE — PROGRESS NOTES
05/19/25 0001   Pre-Procedure Phone Call   Procedure Time Verified Yes   Arrival Time 1100   Procedure Location Verified Yes   Medical History Reviewed Yes   NPO Status Reinforced Yes   Ride and Caregiver Arranged N/A   Patient Knows to Bring Current Medications Yes   Bring Outside Films Requested No

## 2025-05-19 ENCOUNTER — HOSPITAL ENCOUNTER (INPATIENT)
Dept: INTERVENTIONAL RADIOLOGY/VASCULAR | Facility: HOSPITAL | Age: 65
LOS: 1 days | Discharge: HOME | End: 2025-05-20
Attending: INTERNAL MEDICINE | Admitting: INTERNAL MEDICINE
Payer: MEDICARE

## 2025-05-19 ENCOUNTER — ANESTHESIA EVENT (OUTPATIENT)
Dept: INTERVENTIONAL RADIOLOGY/VASCULAR | Facility: HOSPITAL | Age: 65
End: 2025-05-19
Payer: MEDICARE

## 2025-05-19 DIAGNOSIS — Z72.0 TOBACCO ABUSE: Chronic | ICD-10-CM

## 2025-05-19 DIAGNOSIS — I67.1 CEREBRAL ANEURYSM, NONRUPTURED: ICD-10-CM

## 2025-05-19 DIAGNOSIS — I10 HYPERTENSION, UNSPECIFIED TYPE: Chronic | ICD-10-CM

## 2025-05-19 PROBLEM — I66.9 CEREBRAL EMBOLISM: Status: ACTIVE | Noted: 2025-05-19

## 2025-05-19 LAB
GLUCOSE BLDC GLUCOMTR-MCNC: 100 MG/DL (ref 70–130)
GLUCOSE BLDC GLUCOMTR-MCNC: 103 MG/DL (ref 70–130)
GLUCOSE BLDC GLUCOMTR-MCNC: 134 MG/DL (ref 70–130)
PA ADP PRP-ACNC: 82 PRU

## 2025-05-19 PROCEDURE — C1876 STENT, NON-COA/NON-COV W/DEL: HCPCS

## 2025-05-19 PROCEDURE — 93010 ELECTROCARDIOGRAM REPORT: CPT | Performed by: INTERNAL MEDICINE

## 2025-05-19 PROCEDURE — 25010000002 HYDROMORPHONE 1 MG/ML SOLUTION

## 2025-05-19 PROCEDURE — 85347 COAGULATION TIME ACTIVATED: CPT

## 2025-05-19 PROCEDURE — 61624 TCAT PERM OCCLS/EMBOLJ CNS: CPT

## 2025-05-19 PROCEDURE — 25810000003 LACTATED RINGERS PER 1000 ML

## 2025-05-19 PROCEDURE — 63710000001 TACROLIMUS PER 1 MG

## 2025-05-19 PROCEDURE — 63710000001 MYCOPHENOLATE MOFETIL PER 250 MG

## 2025-05-19 PROCEDURE — 25010000002 ONDANSETRON PER 1 MG

## 2025-05-19 PROCEDURE — 82948 REAGENT STRIP/BLOOD GLUCOSE: CPT

## 2025-05-19 PROCEDURE — 25010000002 LABETALOL 5 MG/ML SOLUTION

## 2025-05-19 PROCEDURE — 75898 FOLLOW-UP ANGIOGRAPHY: CPT

## 2025-05-19 PROCEDURE — C1769 GUIDE WIRE: HCPCS

## 2025-05-19 PROCEDURE — 25010000002 PROPOFOL 10 MG/ML EMULSION

## 2025-05-19 PROCEDURE — 75894 X-RAYS TRANSCATH THERAPY: CPT

## 2025-05-19 PROCEDURE — 25010000002 NICARDIPINE HCL IN NACL 20-0.9 MG/200ML-% SOLUTION: Performed by: RADIOLOGY

## 2025-05-19 PROCEDURE — C1760 CLOSURE DEV, VASC: HCPCS

## 2025-05-19 PROCEDURE — 25010000002 MIDAZOLAM PER 1 MG: Performed by: ANESTHESIOLOGY

## 2025-05-19 PROCEDURE — 03VG3HZ RESTRICTION OF INTRACRANIAL ARTERY WITH INTRALUMINAL DEVICE, FLOW DIVERTER, PERCUTANEOUS APPROACH: ICD-10-PCS | Performed by: RADIOLOGY

## 2025-05-19 PROCEDURE — 25010000002 HEPARIN (PORCINE) PER 1000 UNITS

## 2025-05-19 PROCEDURE — 36224 PLACE CATH CAROTD ART: CPT | Performed by: RADIOLOGY

## 2025-05-19 PROCEDURE — 25010000002 SUGAMMADEX 200 MG/2ML SOLUTION

## 2025-05-19 PROCEDURE — C1887 CATHETER, GUIDING: HCPCS

## 2025-05-19 PROCEDURE — 75894 X-RAYS TRANSCATH THERAPY: CPT | Performed by: RADIOLOGY

## 2025-05-19 PROCEDURE — 61624 TCAT PERM OCCLS/EMBOLJ CNS: CPT | Performed by: RADIOLOGY

## 2025-05-19 PROCEDURE — 75898 FOLLOW-UP ANGIOGRAPHY: CPT | Performed by: RADIOLOGY

## 2025-05-19 PROCEDURE — C1894 INTRO/SHEATH, NON-LASER: HCPCS

## 2025-05-19 PROCEDURE — 25010000002 HEPARIN (PORCINE) PER 1000 UNITS: Performed by: RADIOLOGY

## 2025-05-19 PROCEDURE — 85576 BLOOD PLATELET AGGREGATION: CPT | Performed by: RADIOLOGY

## 2025-05-19 PROCEDURE — 93010 ELECTROCARDIOGRAM REPORT: CPT | Performed by: STUDENT IN AN ORGANIZED HEALTH CARE EDUCATION/TRAINING PROGRAM

## 2025-05-19 PROCEDURE — 36226 PLACE CATH VERTEBRAL ART: CPT | Performed by: RADIOLOGY

## 2025-05-19 PROCEDURE — 25010000002 FENTANYL CITRATE (PF) 50 MCG/ML SOLUTION

## 2025-05-19 PROCEDURE — 25510000002 IODIXANOL PER 1 ML: Performed by: RADIOLOGY

## 2025-05-19 PROCEDURE — 93005 ELECTROCARDIOGRAM TRACING: CPT | Performed by: RADIOLOGY

## 2025-05-19 PROCEDURE — 25010000002 NICARDIPINE HCL IN NACL 20-0.9 MG/200ML-% SOLUTION: Performed by: HOSPITALIST

## 2025-05-19 PROCEDURE — 25010000002 LIDOCAINE 2% SOLUTION

## 2025-05-19 PROCEDURE — C2628 CATHETER, OCCLUSION: HCPCS

## 2025-05-19 PROCEDURE — 93005 ELECTROCARDIOGRAM TRACING: CPT | Performed by: HOSPITALIST

## 2025-05-19 RX ORDER — TACROLIMUS 1 MG/1
2 CAPSULE ORAL NIGHTLY
Status: DISCONTINUED | OUTPATIENT
Start: 2025-05-19 | End: 2025-05-20 | Stop reason: HOSPADM

## 2025-05-19 RX ORDER — ATORVASTATIN CALCIUM 20 MG/1
20 TABLET, FILM COATED ORAL NIGHTLY
Status: DISCONTINUED | OUTPATIENT
Start: 2025-05-19 | End: 2025-05-20 | Stop reason: HOSPADM

## 2025-05-19 RX ORDER — ROCURONIUM BROMIDE 10 MG/ML
INJECTION, SOLUTION INTRAVENOUS AS NEEDED
Status: DISCONTINUED | OUTPATIENT
Start: 2025-05-19 | End: 2025-05-19 | Stop reason: SURG

## 2025-05-19 RX ORDER — DROPERIDOL 2.5 MG/ML
0.62 INJECTION, SOLUTION INTRAMUSCULAR; INTRAVENOUS
Status: DISCONTINUED | OUTPATIENT
Start: 2025-05-19 | End: 2025-05-19 | Stop reason: HOSPADM

## 2025-05-19 RX ORDER — ONDANSETRON 2 MG/ML
4 INJECTION INTRAMUSCULAR; INTRAVENOUS ONCE AS NEEDED
Status: DISCONTINUED | OUTPATIENT
Start: 2025-05-19 | End: 2025-05-19 | Stop reason: HOSPADM

## 2025-05-19 RX ORDER — PROMETHAZINE HYDROCHLORIDE 25 MG/1
25 SUPPOSITORY RECTAL ONCE AS NEEDED
Status: DISCONTINUED | OUTPATIENT
Start: 2025-05-19 | End: 2025-05-19 | Stop reason: HOSPADM

## 2025-05-19 RX ORDER — EPHEDRINE SULFATE 50 MG/ML
INJECTION INTRAVENOUS AS NEEDED
Status: DISCONTINUED | OUTPATIENT
Start: 2025-05-19 | End: 2025-05-19 | Stop reason: SURG

## 2025-05-19 RX ORDER — ASPIRIN 81 MG/1
81 TABLET, CHEWABLE ORAL DAILY
Status: DISCONTINUED | OUTPATIENT
Start: 2025-05-19 | End: 2025-05-20 | Stop reason: HOSPADM

## 2025-05-19 RX ORDER — ATROPINE SULFATE 0.4 MG/ML
0.4 INJECTION, SOLUTION INTRAMUSCULAR; INTRAVENOUS; SUBCUTANEOUS ONCE AS NEEDED
Status: DISCONTINUED | OUTPATIENT
Start: 2025-05-19 | End: 2025-05-19 | Stop reason: HOSPADM

## 2025-05-19 RX ORDER — SODIUM CHLORIDE 0.9 % (FLUSH) 0.9 %
10 SYRINGE (ML) INJECTION AS NEEDED
Status: DISCONTINUED | OUTPATIENT
Start: 2025-05-19 | End: 2025-05-20 | Stop reason: HOSPADM

## 2025-05-19 RX ORDER — PROMETHAZINE HYDROCHLORIDE 25 MG/1
25 TABLET ORAL ONCE AS NEEDED
Status: DISCONTINUED | OUTPATIENT
Start: 2025-05-19 | End: 2025-05-19 | Stop reason: HOSPADM

## 2025-05-19 RX ORDER — HYDROCODONE BITARTRATE AND ACETAMINOPHEN 5; 325 MG/1; MG/1
1 TABLET ORAL EVERY 4 HOURS PRN
Status: DISCONTINUED | OUTPATIENT
Start: 2025-05-19 | End: 2025-05-20 | Stop reason: HOSPADM

## 2025-05-19 RX ORDER — CLOPIDOGREL BISULFATE 75 MG/1
75 TABLET ORAL DAILY
Status: DISCONTINUED | OUTPATIENT
Start: 2025-05-19 | End: 2025-05-20 | Stop reason: HOSPADM

## 2025-05-19 RX ORDER — LABETALOL HYDROCHLORIDE 5 MG/ML
5 INJECTION, SOLUTION INTRAVENOUS
Status: DISCONTINUED | OUTPATIENT
Start: 2025-05-19 | End: 2025-05-19 | Stop reason: HOSPADM

## 2025-05-19 RX ORDER — LIDOCAINE HYDROCHLORIDE 20 MG/ML
INJECTION, SOLUTION INFILTRATION; PERINEURAL AS NEEDED
Status: DISCONTINUED | OUTPATIENT
Start: 2025-05-19 | End: 2025-05-19 | Stop reason: SURG

## 2025-05-19 RX ORDER — SODIUM CHLORIDE 0.9 % (FLUSH) 0.9 %
10 SYRINGE (ML) INJECTION EVERY 12 HOURS SCHEDULED
Status: DISCONTINUED | OUTPATIENT
Start: 2025-05-19 | End: 2025-05-20 | Stop reason: HOSPADM

## 2025-05-19 RX ORDER — HEPARIN SODIUM 1000 [USP'U]/ML
INJECTION, SOLUTION INTRAVENOUS; SUBCUTANEOUS AS NEEDED
Status: DISCONTINUED | OUTPATIENT
Start: 2025-05-19 | End: 2025-05-19 | Stop reason: SURG

## 2025-05-19 RX ORDER — FENTANYL CITRATE 50 UG/ML
INJECTION, SOLUTION INTRAMUSCULAR; INTRAVENOUS AS NEEDED
Status: DISCONTINUED | OUTPATIENT
Start: 2025-05-19 | End: 2025-05-19 | Stop reason: SURG

## 2025-05-19 RX ORDER — ONDANSETRON 2 MG/ML
INJECTION INTRAMUSCULAR; INTRAVENOUS AS NEEDED
Status: DISCONTINUED | OUTPATIENT
Start: 2025-05-19 | End: 2025-05-19 | Stop reason: SURG

## 2025-05-19 RX ORDER — BUPIVACAINE HCL/0.9 % NACL/PF 0.125 %
PLASTIC BAG, INJECTION (ML) EPIDURAL AS NEEDED
Status: DISCONTINUED | OUTPATIENT
Start: 2025-05-19 | End: 2025-05-19 | Stop reason: SURG

## 2025-05-19 RX ORDER — ACETAMINOPHEN 325 MG/1
650 TABLET ORAL EVERY 4 HOURS PRN
Status: DISCONTINUED | OUTPATIENT
Start: 2025-05-19 | End: 2025-05-20 | Stop reason: HOSPADM

## 2025-05-19 RX ORDER — IODIXANOL 320 MG/ML
300 INJECTION, SOLUTION INTRAVASCULAR
Status: COMPLETED | OUTPATIENT
Start: 2025-05-19 | End: 2025-05-19

## 2025-05-19 RX ORDER — ACETAMINOPHEN 160 MG/5ML
650 SOLUTION ORAL EVERY 4 HOURS PRN
Status: DISCONTINUED | OUTPATIENT
Start: 2025-05-19 | End: 2025-05-20 | Stop reason: HOSPADM

## 2025-05-19 RX ORDER — FENTANYL CITRATE 50 UG/ML
25 INJECTION, SOLUTION INTRAMUSCULAR; INTRAVENOUS
Status: DISCONTINUED | OUTPATIENT
Start: 2025-05-19 | End: 2025-05-19 | Stop reason: HOSPADM

## 2025-05-19 RX ORDER — SODIUM CHLORIDE, SODIUM LACTATE, POTASSIUM CHLORIDE, CALCIUM CHLORIDE 600; 310; 30; 20 MG/100ML; MG/100ML; MG/100ML; MG/100ML
9 INJECTION, SOLUTION INTRAVENOUS CONTINUOUS PRN
Status: ACTIVE | OUTPATIENT
Start: 2025-05-19 | End: 2025-05-20

## 2025-05-19 RX ORDER — MYCOPHENOLATE MOFETIL 250 MG/1
500 CAPSULE ORAL EVERY 12 HOURS SCHEDULED
Status: DISCONTINUED | OUTPATIENT
Start: 2025-05-19 | End: 2025-05-20 | Stop reason: HOSPADM

## 2025-05-19 RX ORDER — HYDRALAZINE HYDROCHLORIDE 50 MG/1
50 TABLET, FILM COATED ORAL 3 TIMES DAILY
Status: DISCONTINUED | OUTPATIENT
Start: 2025-05-19 | End: 2025-05-20 | Stop reason: HOSPADM

## 2025-05-19 RX ORDER — TACROLIMUS 1 MG/1
3 CAPSULE ORAL EVERY 24 HOURS
Status: DISCONTINUED | OUTPATIENT
Start: 2025-05-20 | End: 2025-05-20 | Stop reason: HOSPADM

## 2025-05-19 RX ORDER — PROPOFOL 10 MG/ML
VIAL (ML) INTRAVENOUS AS NEEDED
Status: DISCONTINUED | OUTPATIENT
Start: 2025-05-19 | End: 2025-05-19 | Stop reason: SURG

## 2025-05-19 RX ORDER — SODIUM BICARBONATE 650 MG/1
650 TABLET ORAL DAILY
Status: DISCONTINUED | OUTPATIENT
Start: 2025-05-20 | End: 2025-05-20 | Stop reason: HOSPADM

## 2025-05-19 RX ORDER — SODIUM CHLORIDE, SODIUM LACTATE, POTASSIUM CHLORIDE, CALCIUM CHLORIDE 600; 310; 30; 20 MG/100ML; MG/100ML; MG/100ML; MG/100ML
INJECTION, SOLUTION INTRAVENOUS CONTINUOUS PRN
Status: DISCONTINUED | OUTPATIENT
Start: 2025-05-19 | End: 2025-05-19 | Stop reason: SURG

## 2025-05-19 RX ORDER — FLUMAZENIL 0.1 MG/ML
0.2 INJECTION INTRAVENOUS AS NEEDED
Status: DISCONTINUED | OUTPATIENT
Start: 2025-05-19 | End: 2025-05-19 | Stop reason: HOSPADM

## 2025-05-19 RX ORDER — NALOXONE HCL 0.4 MG/ML
0.2 VIAL (ML) INJECTION AS NEEDED
Status: DISCONTINUED | OUTPATIENT
Start: 2025-05-19 | End: 2025-05-19 | Stop reason: HOSPADM

## 2025-05-19 RX ORDER — LABETALOL HYDROCHLORIDE 5 MG/ML
INJECTION, SOLUTION INTRAVENOUS AS NEEDED
Status: DISCONTINUED | OUTPATIENT
Start: 2025-05-19 | End: 2025-05-19 | Stop reason: SURG

## 2025-05-19 RX ORDER — EPHEDRINE SULFATE 50 MG/ML
5 INJECTION, SOLUTION INTRAVENOUS ONCE AS NEEDED
Status: DISCONTINUED | OUTPATIENT
Start: 2025-05-19 | End: 2025-05-19 | Stop reason: HOSPADM

## 2025-05-19 RX ORDER — SODIUM CHLORIDE 9 MG/ML
40 INJECTION, SOLUTION INTRAVENOUS AS NEEDED
Status: DISCONTINUED | OUTPATIENT
Start: 2025-05-19 | End: 2025-05-20 | Stop reason: HOSPADM

## 2025-05-19 RX ORDER — MIDAZOLAM HYDROCHLORIDE 1 MG/ML
1 INJECTION, SOLUTION INTRAMUSCULAR; INTRAVENOUS ONCE
Status: COMPLETED | OUTPATIENT
Start: 2025-05-19 | End: 2025-05-19

## 2025-05-19 RX ORDER — HYDROMORPHONE HYDROCHLORIDE 1 MG/ML
0.25 INJECTION, SOLUTION INTRAMUSCULAR; INTRAVENOUS; SUBCUTANEOUS
Status: DISCONTINUED | OUTPATIENT
Start: 2025-05-19 | End: 2025-05-19 | Stop reason: HOSPADM

## 2025-05-19 RX ORDER — HYDROCODONE BITARTRATE AND ACETAMINOPHEN 7.5; 325 MG/1; MG/1
1 TABLET ORAL EVERY 4 HOURS PRN
Status: DISCONTINUED | OUTPATIENT
Start: 2025-05-19 | End: 2025-05-19 | Stop reason: HOSPADM

## 2025-05-19 RX ORDER — HYDROCODONE BITARTRATE AND ACETAMINOPHEN 5; 325 MG/1; MG/1
1 TABLET ORAL ONCE AS NEEDED
Status: DISCONTINUED | OUTPATIENT
Start: 2025-05-19 | End: 2025-05-19 | Stop reason: HOSPADM

## 2025-05-19 RX ORDER — HYDRALAZINE HYDROCHLORIDE 20 MG/ML
5 INJECTION INTRAMUSCULAR; INTRAVENOUS
Status: DISCONTINUED | OUTPATIENT
Start: 2025-05-19 | End: 2025-05-19 | Stop reason: HOSPADM

## 2025-05-19 RX ORDER — MIDAZOLAM HYDROCHLORIDE 1 MG/ML
1 INJECTION, SOLUTION INTRAMUSCULAR; INTRAVENOUS
Status: DISCONTINUED | OUTPATIENT
Start: 2025-05-19 | End: 2025-05-20 | Stop reason: HOSPADM

## 2025-05-19 RX ORDER — SODIUM CHLORIDE, SODIUM LACTATE, POTASSIUM CHLORIDE, CALCIUM CHLORIDE 600; 310; 30; 20 MG/100ML; MG/100ML; MG/100ML; MG/100ML
100 INJECTION, SOLUTION INTRAVENOUS CONTINUOUS
Status: ACTIVE | OUTPATIENT
Start: 2025-05-19 | End: 2025-05-19

## 2025-05-19 RX ORDER — ONDANSETRON 2 MG/ML
4 INJECTION INTRAMUSCULAR; INTRAVENOUS EVERY 6 HOURS PRN
Status: DISCONTINUED | OUTPATIENT
Start: 2025-05-19 | End: 2025-05-20 | Stop reason: HOSPADM

## 2025-05-19 RX ORDER — IPRATROPIUM BROMIDE AND ALBUTEROL SULFATE 2.5; .5 MG/3ML; MG/3ML
3 SOLUTION RESPIRATORY (INHALATION) ONCE AS NEEDED
Status: DISCONTINUED | OUTPATIENT
Start: 2025-05-19 | End: 2025-05-19 | Stop reason: HOSPADM

## 2025-05-19 RX ADMIN — Medication 10 ML: at 11:33

## 2025-05-19 RX ADMIN — CLOPIDOGREL BISULFATE 75 MG: 75 TABLET, FILM COATED ORAL at 20:14

## 2025-05-19 RX ADMIN — ROCURONIUM BROMIDE 20 MG: 10 INJECTION, SOLUTION INTRAVENOUS at 12:44

## 2025-05-19 RX ADMIN — HEPARIN SODIUM: 1000 INJECTION INTRAVENOUS; SUBCUTANEOUS at 12:34

## 2025-05-19 RX ADMIN — ONDANSETRON 4 MG: 2 INJECTION, SOLUTION INTRAMUSCULAR; INTRAVENOUS at 14:25

## 2025-05-19 RX ADMIN — Medication 100 MCG: at 13:32

## 2025-05-19 RX ADMIN — PROPOFOL 150 MG: 10 INJECTION, EMULSION INTRAVENOUS at 12:17

## 2025-05-19 RX ADMIN — HEPARIN SODIUM: 1000 INJECTION INTRAVENOUS; SUBCUTANEOUS at 12:35

## 2025-05-19 RX ADMIN — HYDROMORPHONE HYDROCHLORIDE 0.5 MG: 1 INJECTION, SOLUTION INTRAMUSCULAR; INTRAVENOUS; SUBCUTANEOUS at 14:23

## 2025-05-19 RX ADMIN — Medication 100 MCG: at 12:50

## 2025-05-19 RX ADMIN — LIDOCAINE HYDROCHLORIDE 100 MG: 20 INJECTION, SOLUTION INFILTRATION; PERINEURAL at 12:17

## 2025-05-19 RX ADMIN — FENTANYL CITRATE 25 MCG: 50 INJECTION, SOLUTION INTRAMUSCULAR; INTRAVENOUS at 12:17

## 2025-05-19 RX ADMIN — SUGAMMADEX 200 MG: 100 INJECTION, SOLUTION INTRAVENOUS at 14:33

## 2025-05-19 RX ADMIN — TACROLIMUS 2 MG: 1 CAPSULE ORAL at 21:52

## 2025-05-19 RX ADMIN — FENTANYL CITRATE 25 MCG: 50 INJECTION, SOLUTION INTRAMUSCULAR; INTRAVENOUS at 12:22

## 2025-05-19 RX ADMIN — HYDRALAZINE HYDROCHLORIDE 50 MG: 50 TABLET ORAL at 20:21

## 2025-05-19 RX ADMIN — Medication 100 MCG: at 14:02

## 2025-05-19 RX ADMIN — Medication 100 MCG: at 13:54

## 2025-05-19 RX ADMIN — ASPIRIN 81 MG CHEWABLE TABLET 81 MG: 81 TABLET CHEWABLE at 20:14

## 2025-05-19 RX ADMIN — SODIUM CHLORIDE, POTASSIUM CHLORIDE, SODIUM LACTATE AND CALCIUM CHLORIDE: 600; 310; 30; 20 INJECTION, SOLUTION INTRAVENOUS at 12:17

## 2025-05-19 RX ADMIN — HYDROCODONE BITARTRATE AND ACETAMINOPHEN 1 TABLET: 5; 325 TABLET ORAL at 21:59

## 2025-05-19 RX ADMIN — Medication 100 MCG: at 13:45

## 2025-05-19 RX ADMIN — ROCURONIUM BROMIDE 10 MG: 10 INJECTION, SOLUTION INTRAVENOUS at 13:15

## 2025-05-19 RX ADMIN — LABETALOL HYDROCHLORIDE 5 MG: 5 INJECTION, SOLUTION INTRAVENOUS at 14:42

## 2025-05-19 RX ADMIN — Medication 100 MCG: at 12:32

## 2025-05-19 RX ADMIN — MYCOPHENOLATE MOFETIL 500 MG: 250 CAPSULE ORAL at 21:51

## 2025-05-19 RX ADMIN — Medication 100 MCG: at 13:13

## 2025-05-19 RX ADMIN — PROPOFOL 50 MG: 10 INJECTION, EMULSION INTRAVENOUS at 14:25

## 2025-05-19 RX ADMIN — IODIXANOL 191 ML: 320 INJECTION, SOLUTION INTRAVASCULAR at 14:37

## 2025-05-19 RX ADMIN — EPHEDRINE SULFATE 5 MG: 50 INJECTION INTRAVENOUS at 14:05

## 2025-05-19 RX ADMIN — Medication 100 MCG: at 13:28

## 2025-05-19 RX ADMIN — EPHEDRINE SULFATE 2.5 MG: 50 INJECTION INTRAVENOUS at 14:17

## 2025-05-19 RX ADMIN — Medication 10 ML: at 20:21

## 2025-05-19 RX ADMIN — MIDAZOLAM 1 MG: 1 INJECTION INTRAMUSCULAR; INTRAVENOUS at 11:33

## 2025-05-19 RX ADMIN — ROCURONIUM BROMIDE 10 MG: 10 INJECTION, SOLUTION INTRAVENOUS at 14:13

## 2025-05-19 RX ADMIN — HEPARIN SODIUM 10000 UNITS: 1000 INJECTION, SOLUTION INTRAVENOUS; SUBCUTANEOUS at 13:09

## 2025-05-19 RX ADMIN — ROCURONIUM BROMIDE 10 MG: 10 INJECTION, SOLUTION INTRAVENOUS at 13:45

## 2025-05-19 RX ADMIN — NICARDIPINE HYDROCHLORIDE 5 MG/HR: 0.1 INJECTION INTRAVENOUS at 17:43

## 2025-05-19 RX ADMIN — ATORVASTATIN CALCIUM 20 MG: 20 TABLET, FILM COATED ORAL at 20:21

## 2025-05-19 RX ADMIN — Medication 100 MCG: at 13:22

## 2025-05-19 RX ADMIN — ROCURONIUM BROMIDE 50 MG: 10 INJECTION, SOLUTION INTRAVENOUS at 12:17

## 2025-05-19 RX ADMIN — NICARDIPINE HYDROCHLORIDE 10 MG/HR: 0.1 INJECTION INTRAVENOUS at 20:15

## 2025-05-19 NOTE — Clinical Note
Behavioral Health   Discharge Note  Bridge Appointment completed: Reviewed Discharge Instructions with patient. Patient verbalizes understanding and agreement with the discharge plan using the teachback method. Referral for Outpatient Tobacco Cessation Counseling, upon discharge (gregorio X if applicable and completed):    ( )  Hospital staff assisted patient to call Quit Line or faxed referral                                   during hospitalization                  ( )  Recognizing danger situations (included triggers and roadblocks), if not completed on admission                    ( )  Coping skills (new ways to manage stress, exercise, relaxation techniques, changing routine, distraction), if not completed on admission                                                           ( )  Basic information about quitting (benefits of quitting, techniques in how to quit, available resources, if not completed on admission  ( ) Referral for counseling faxed to [facility]   ( x) Patient refused referral  ( x) Patient refused counseling  ( x) Patient refused smoking cessation medication upon discharge    Vaccinations (gregorio X if applicable and completed):  ( ) Patient states already received influenza vaccine elsewhere  ( ) Patient received influenza vaccine during this hospitalization  ( x) Patient refused influenza vaccine at this time      Pt discharged with followings belongings:   Jewelry: Watch  Body Piercings Removed: N/A  Clothing: Footwear, Shirt, Pants, Sweater, [place], [address] sent home with patient. Valuables retrieved from safe and returned to patient. Patient left department with staff and wife  via  ambulatory , discharged to home . Patient education on aftercare instructions: yes  Patient verbalize understanding of AVS:  yes. Suicidal Ideations? No Risk of Suicide: No Risk AVH? Denies  HI?  Negative for homicidal ideation       Status EXAM upon discharge:  Mental Status and Behavioral Supply Documentation (free text) Exam  Normal: No  Level of Assistance: Independent/Self  Facial Expression: Brightened  Affect: Appropriate  Level of Consciousness: Alert  Frequency of Checks: 4 times per hour, close  Mood:Normal: Yes  Mood: Depressed, Anxious  Motor Activity:Normal: Yes  Motor Activity: Decreased, Agitated  Eye Contact: Good  Observed Behavior: Cooperative, Friendly  Sexual Misconduct History: Current - no  Preception: [place] to person, [place] to time, [place] to place, [place] to situation  Attention:Normal: Yes  Attention: Distractible  Thought Processes: Circumstantial  Thought Content:Normal: Yes  Thought Content: Preoccupations  Depression Symptoms: No problems reported or observed. Anxiety Symptoms: Generalized  [place]iar[place] Symptoms: No problems reported or observed. Hallucinations: None  Delusions: No  Memory:Normal: Yes  Memory: Poor recent  Insight and Judgment: No  Insight and Judgment: Poor insight (appears to be improving)    AVS/Transition Record has been discussed with patient and a copy was given to the patient. The AVS/Transition Record was faxed to the next level of care today.

## 2025-05-19 NOTE — NURSING NOTE
Pt arrived to radiology bay 16 for a cerebral angiogram with pipeline flow diversion embolization right middle cerebral artery aneurysm with Dr Allen

## 2025-05-19 NOTE — ANESTHESIA PROCEDURE NOTES
Arterial Line      Patient reassessed immediately prior to procedure    Patient location during procedure: holding area  Start time: 5/19/2025 11:30 AM  Stop Time:5/19/2025 11:32 AM       Line placed for hemodynamic monitoring, ABGs/Labs/ISTAT and MD/Surgeon request.  Performed By   Anesthesiologist: Price Taylor MD   Preanesthetic Checklist  Completed: patient identified, IV checked, site marked, risks and benefits discussed, surgical consent, monitors and equipment checked, pre-op evaluation and timeout performed  Arterial Line Prep    Sterile Tech: cap, gloves and mask  Prep: ChloraPrep  Patient monitoring: blood pressure monitoring, continuous pulse oximetry and EKG  Arterial Line Procedure   Laterality:right  Location:  radial artery  Catheter size: 20 G   Guidance: palpation technique  Number of attempts: 1  Successful placement: yes Images: still images obtained, printed/placed on the chart  Post Assessment   Dressing Type: occlusive dressing applied, secured with tape and wrist guard applied.   Complications no  Circ/Move/Sens Assessment: normal.   Patient Tolerance: patient tolerated the procedure well with no apparent complications  Additional Notes  Ultrasound guidance used for placement of needle/catheter into artery.

## 2025-05-19 NOTE — CONSULTS
Patient Identification:  Jose Miguel Martinez  65 y.o.  male  1960  [id number]          LOS 0    Requesting physician:   Pk Allen MD    Reason for Consultation:    Right MCA aneurysm status post stent placement    History of Present Illness:   65-year-old male with a history of cerebrovascular accident, peripheral vascular disease, diabetes mellitus type 2, hypertension, tobacco abuse, cerebral aneurysm who presents after cerebral angiogram and stent placement for right MCA aneurysm.    Patient initially presented to [facility] ER on 12/29/2024 for right-sided arm weakness and was transferred to [facility] where an MRI was obtained and showed a small infarct in the left frontal lobe with MRI demonstrating an intracranial aneurysm.  Patient presented today for cerebral embolization for right MCA aneurysm with pipeline stent placement meant by Dr. Allen.    On evaluation patient in the ICU, doing well without any acute complaints.  Feels tired after the procedure.  Denies any headache.  Does have some pain at insertion site.  No nausea or vomiting.    Past Medical History:  Past Medical History:   Diagnosis Date    CKD (chronic kidney disease) requiring chronic dialysis     Controlled type 2 diabetes mellitus without complication, without long-term current use of insulin 05/14/2024    COPD (chronic obstructive pulmonary disease)     Diabetes mellitus type I 09/30/2023    DKA (diabetic ketoacidosis) 09/30/2023    Hyperparathyroidism     Dr. Gonzalez    Hypertension     Hypothyroidism     Kidney failure     Stroke 12/30/2024    Thyroid nodule     Lisa       Past Surgical History:  Past Surgical History:   Procedure Laterality Date    ARTERIOVENOUS FISTULA REPAIR      ARTERIOVENOUS FISTULA/SHUNT SURGERY      CARDIAC CATHETERIZATION N/A 08/22/2019    Procedure: Left Heart Cath;  Surgeon: Juanpablo Garcia MD;  Location: [facility] INVASIVE LOCATION;  Service: Cardiology    CARDIAC  CATHETERIZATION N/A 08/22/2019    Procedure: Aortic root aortogram;  Surgeon: Juanpablo Garcia MD;  Location: [facility] CATH INVASIVE LOCATION;  Service: Cardiology    LYMPH NODE BIOPSY  2002    ORCHIECTOMY          Home Meds:  Medications Prior to Admission   Medication Sig Dispense Refill Last Dose/Taking    allopurinol (ZYLOPRIM) 100 MG tablet Take 1 tablet by mouth 3 (Three) Times a Week. Monday, Wednesday, Friday   Past Week Evening    aspirin 81 MG chewable tablet Chew 1 tablet Daily. 30 tablet 1 5/18/2025 Evening    atorvastatin (LIPITOR) 20 MG tablet Take 1 tablet by mouth Every Night. 90 tablet 1 5/18/2025 Evening    b complex-vitamin c-folic acid (NEPHRO-MARK) 0.8 MG tablet tablet Take 1 tablet by mouth Daily.   5/19/2025 Morning    cinacalcet (SENSIPAR) 30 MG tablet Take 1 tablet by mouth Daily.   5/19/2025 Morning    clopidogrel (PLAVIX) 75 MG tablet Take 1 tablet by mouth Daily. 30 tablet 1 5/18/2025 Evening    hydrALAZINE (APRESOLINE) 50 MG tablet Take 1 tablet by mouth 3 (Three) Times a Day.   5/19/2025 Morning    magnesium oxide (MAG-OX) 400 MG tablet Take 1 tablet by mouth 2 (Two) Times a Day.   5/19/2025 Morning    mycophenolate (CELLCEPT) 250 MG capsule Take 2 capsules by mouth 2 (Two) Times a Day.   5/19/2025 Morning    sodium bicarbonate 650 MG tablet Take 1 tablet by mouth Daily.   5/18/2025 Evening    tacrolimus (PROGRAF) 1 MG capsule Take 2 capsules by mouth Every Evening.   5/19/2025 Morning    albuterol sulfate  (90 Base) MCG/ACT inhaler Inhale 2 puffs Every 4 (Four) Hours As Needed for Wheezing or Shortness of Air. (Patient not taking: Reported on 5/19/2025) 18 g 0 Not Taking         Allergies:  No Known Allergies    Social History:   Social History     Socioeconomic History    Marital status:    Tobacco Use    Smoking status: Every Day     Current packs/day: 0.50     Average packs/day: 0.5 packs/day for 55.4 years (27.7 ttl pk-yrs)     Types: Cigarettes     Start date:  "1970     Passive exposure: Past    Smokeless tobacco: Never    Tobacco comments:     Patient advised to stop smoking   Vaping Use    Vaping status: Never Used   Substance and Sexual Activity    Alcohol use: Yes     Comment: rare    Drug use: No    Sexual activity: Yes     Partners: Female     Birth control/protection: None       Family History:  Family History   Problem Relation Age of Onset    Heart attack Mother     Diabetes Mother     Diabetes Father     Kidney disease Father        Review of Systems:  12 point review of systems performed and all else negative except as per HPI above.    Objective:    PHYSICAL EXAM:    /72   Pulse 63   Temp 97.2 °F (36.2 °C)   Resp 18   Ht 180.3 cm (71\")   Wt 101 kg (222 lb 10.6 oz)   SpO2 95%   BMI 31.06 kg/m²  Body mass index is 31.06 kg/m². 95% 101 kg (222 lb 10.6 oz)    General: Alert, nontoxic, NAD  HEENT: NC/AT, EOMI, MMM  Neck: Supple, trachea midline  Cardiac: RRR, no murmur, gallops, rubs  Pulmonary: Clear to auscultation bilaterally, no adventitious breath sounds, normal respiratory effort  GI: Soft, non-tender, non-distended, normal bowel sounds  Extremities: Warm, well perfused, no LE edema  Skin: no visible rash  Neuro: CN II - XII grossly intact  Psychiatry: Normal mood and affect    Lab Review:   Results from last 7 days   Lab Units 05/14/25  1043   WBC 10*3/mm3 5.55   HEMOGLOBIN g/dL 15.1   PLATELETS 10*3/mm3 117*     Results from last 7 days   Lab Units 05/14/25  1043   SODIUM mmol/L 137   POTASSIUM mmol/L 4.9   CHLORIDE mmol/L 105   CO2 mmol/L 24.7   BUN mg/dL 35*   CREATININE mg/dL 1.89*   GLUCOSE mg/dL 175*   CALCIUM mg/dL 9.9   Estimated Creatinine Clearance: 48.4 mL/min (A) (by C-G formula based on SCr of 1.89 mg/dL (H)).    Results from last 7 days   Lab Units 05/14/25  1043   PLATELETS 10*3/mm3 117*              Result Review:  I have personally reviewed the results from the time of this admission to 5/19/2025 20:04 EDT and agree with these " findings:  [x]  Laboratory list / accordion  [x]  Microbiology  [x]  Radiology  []  EKG/Telemetry   [x]  Cardiology/Vascular   []  Pathology  [x]  Old records  []  Other:         Assessment / Recommendations:    Right MCA aneurysm status post embolization and stent placement  End-stage renal disease status post renal transplant (2020)  History of cerebrovascular accident  Peripheral vascular disease  Diabetes mellitus type 2  Hypertension  Tobacco abuse    -Patient initially presented to the hospital in December 2024 with cerebrovascular accident and found to have right MCA aneurysm for which he presented today for embolization with stent placement.  - Neurochecks as per protocol  -Monitor access site  - DAPT with aspirin and Plavix  - Pain control  - Blood pressure control for SBP goal less than 160.  Currently requiring nicardipine at 10.  -Home hydralazine 50 mg 3 times daily restarted  - Repeat CT head in a.m.  -Nephrology consulted for history of renal transplant in 2020 and management of immunosuppression with CellCept and tacrolimus  - Monitor closely in the ICU for any neurochanges.    Thank you for allowing me to participate in the care of this patient.  I will continue to follow along with you.    Sherif Velasquez MD  [facility], [facility]  Pulmonary and Critical Care Medicine, Interventional Pulmonology    5/19/2025  19:20 EDT    Parts of this note may be an electronic transcription/translation of spoken language to printed text using the Dragon dictation system.

## 2025-05-19 NOTE — Clinical Note
Complete faxed form and faxed back to [facility] at [phone] Supply Documentation (free text)

## 2025-05-19 NOTE — PLAN OF CARE
Problem: Adult Inpatient Plan of Care  Goal: Plan of Care Review  Outcome: Progressing  Goal: Patient-Specific Goal (Individualized)  Outcome: Progressing  Goal: Absence of Hospital-Acquired Illness or Injury  Outcome: Progressing  Intervention: Prevent and Manage VTE (Venous Thromboembolism) Risk  Recent Flowsheet Documentation  Taken 5/19/2025 1826 by Brittani Moran, RN  VTE Prevention/Management:   bilateral   SCDs (sequential compression devices) on  Goal: Optimal Comfort and Wellbeing  Outcome: Progressing  Goal: Readiness for Transition of Care  Outcome: Progressing  Intervention: Mutually Develop Transition Plan  Recent Flowsheet Documentation  Taken 5/19/2025 1832 by Brittani Moran, RN  Transportation Anticipated: family or friend will provide  Patient/Family Anticipated Services at Transition: none  Patient/Family Anticipates Transition to: home with family  Taken 5/19/2025 1831 by Brittani Moran, RN  Equipment Currently Used at Home: bp cuff   Goal Outcome Evaluation:

## 2025-05-19 NOTE — Clinical Note
Supply Documentation (free text) Quality 130: Documentation Of Current Medications In The Medical Record: Current Medications Documented Quality 402: Tobacco Use And Help With Quitting Among Adolescents: Patient screened for tobacco and never smoked Detail Level: Detailed

## 2025-05-19 NOTE — POST-PROCEDURE NOTE
POST PROCEDURE NOTE    Procedure: Cerebral Embolization    Pre-Procedure Diagnosis: Right MCA Aneurysm    Post-procedure Diagnosis: Same    Findings: 5.3 x 4.7 x 4.7 mm R MCA bifurcation aneurysm with Superior M2 branch at neck of the aneurysm with dominant inferior M2. Successful flow diversion with Pipeline stent. Official report to follow.    Complications: None encountered    Blood loss: 150 cc    Specimen Removed: None    Disposition:   Admit to Neuro ICU

## 2025-05-19 NOTE — ANESTHESIA PROCEDURE NOTES
Airway  Reason: elective    Date/Time: 5/19/2025 12:20 PM  Airway not difficult    General Information and Staff    Patient location during procedure: OR  Anesthesiologist: Price Taylor MD  CRNA/CAA: Heidy Streeter CRNA    Indications and Patient Condition  Indications for airway management: airway protection    Preoxygenated: yes    Mask difficulty assessment: 2 - vent by mask + OA or adjuvant +/- NMBA    Final Airway Details    Final airway type: endotracheal airway      Successful airway: ETT  Cuffed: yes   Successful intubation technique: direct laryngoscopy  Adjuncts used in placement: intubating stylet  Endotracheal tube insertion site: oral  Blade: Paco  Blade size: 4  ETT size (mm): 7.5  Cormack-Lehane Classification: grade IIb - view of arytenoids or posterior of glottis only  Placement verified by: chest auscultation and capnometry   Cuff volume (mL): 7  Measured from: teeth  ETT/EBT  to teeth (cm): 23  Number of attempts at approach: 1  Assessment: lips, teeth, and gum same as pre-op and atraumatic intubation

## 2025-05-19 NOTE — ANESTHESIA PREPROCEDURE EVALUATION
Anesthesia Evaluation     Patient summary reviewed and Nursing notes reviewed   NPO Solid Status: > 8 hours  NPO Liquid Status: > 2 hours           Airway   Mallampati: II  TM distance: >3 FB  Neck ROM: full  No difficulty expected  Dental    (+) poor dentition        Pulmonary - normal exam   (+) a smoker Current, COPD mild,  Cardiovascular - normal exam  Exercise tolerance: good (4-7 METS)    (+) hypertension, PVD      Neuro/Psych  (+) CVA  GI/Hepatic/Renal/Endo    (+) renal disease-, diabetes mellitus type 2, thyroid problem hypothyroidism    Musculoskeletal (-) negative ROS    Abdominal    Substance History - negative use     OB/GYN negative ob/gyn ROS         Other                    Anesthesia Plan    ASA 3     general and Vikki     intravenous induction     Anesthetic plan, risks, benefits, and alternatives have been provided, discussed and informed consent has been obtained with: patient.    CODE STATUS:

## 2025-05-19 NOTE — ANESTHESIA POSTPROCEDURE EVALUATION
"Patient: Jose Miguel Martinez    Procedure Summary       Date: 05/19/25 Room / Location: [facility] INTERVENTIONAL    Anesthesia Start: 1210 Anesthesia Stop: 1453    Procedure: IR EMBOLIZATION Diagnosis:       Cerebral aneurysm, nonruptured      Hypertension, unspecified type      Tobacco abuse      Cerebral embolism      Cerebral aneurysm      (Right MCA Aneurysm)    Scheduled Providers:  Provider: Price Taylor MD    Anesthesia Type: general, Vikki ASA Status: 3            Anesthesia Type: general, Vikki    Vitals  Vitals Value Taken Time   /92 05/19/25 17:00   Temp 36.7 °C (98 °F) 05/19/25 14:55   Pulse 50 05/19/25 17:06   Resp 11 05/19/25 17:00   SpO2 100 % 05/19/25 17:06   Vitals shown include unfiled device data.        Post Anesthesia Care and Evaluation    Patient location during evaluation: PACU  Level of consciousness: awake  Pain management: adequate    Airway patency: patent  Anesthetic complications: No anesthetic complications  PONV Status: controlled  Cardiovascular status: acceptable  Respiratory status: acceptable  Hydration status: acceptable    Comments: /92   Pulse 53   Temp 36.7 °C (98 °F) (Oral)   Resp 11   Ht 180.3 cm (71\")   Wt 97.1 kg (214 lb)   SpO2 100%   BMI 29.85 kg/m²       "

## 2025-05-20 ENCOUNTER — APPOINTMENT (OUTPATIENT)
Dept: CT IMAGING | Facility: HOSPITAL | Age: 65
End: 2025-05-20
Payer: MEDICARE

## 2025-05-20 ENCOUNTER — READMISSION MANAGEMENT (OUTPATIENT)
Dept: CALL CENTER | Facility: HOSPITAL | Age: 65
End: 2025-05-20
Payer: MEDICARE

## 2025-05-20 VITALS
WEIGHT: 217.37 LBS | OXYGEN SATURATION: 99 % | HEIGHT: 71 IN | SYSTOLIC BLOOD PRESSURE: 117 MMHG | RESPIRATION RATE: 18 BRPM | DIASTOLIC BLOOD PRESSURE: 79 MMHG | HEART RATE: 58 BPM | BODY MASS INDEX: 30.43 KG/M2 | TEMPERATURE: 98.3 F

## 2025-05-20 LAB
ACT BLD: 135 SECONDS (ref 82–152)
ACT BLD: 250 SECONDS (ref 82–152)
ACT BLD: 279 SECONDS (ref 82–152)
ANION GAP SERPL CALCULATED.3IONS-SCNC: 7.3 MMOL/L (ref 5–15)
BUN SERPL-MCNC: 27 MG/DL (ref 8–23)
BUN/CREAT SERPL: 15.4 (ref 7–25)
CALCIUM SPEC-SCNC: 8.6 MG/DL (ref 8.6–10.5)
CHLORIDE SERPL-SCNC: 110 MMOL/L (ref 98–107)
CO2 SERPL-SCNC: 20.7 MMOL/L (ref 22–29)
CREAT SERPL-MCNC: 1.75 MG/DL (ref 0.76–1.27)
DEPRECATED RDW RBC AUTO: 43.6 FL (ref 37–54)
EGFRCR SERPLBLD CKD-EPI 2021: 43.2 ML/MIN/1.73
ERYTHROCYTE [DISTWIDTH] IN BLOOD BY AUTOMATED COUNT: 13.3 % (ref 12.3–15.4)
GLUCOSE BLDC GLUCOMTR-MCNC: 107 MG/DL (ref 70–130)
GLUCOSE BLDC GLUCOMTR-MCNC: 129 MG/DL (ref 70–130)
GLUCOSE SERPL-MCNC: 122 MG/DL (ref 65–99)
HCT VFR BLD AUTO: 37.7 % (ref 37.5–51)
HGB BLD-MCNC: 12.6 G/DL (ref 13–17.7)
MCH RBC QN AUTO: 29.9 PG (ref 26.6–33)
MCHC RBC AUTO-ENTMCNC: 33.4 G/DL (ref 31.5–35.7)
MCV RBC AUTO: 89.5 FL (ref 79–97)
PLATELET # BLD AUTO: 113 10*3/MM3 (ref 140–450)
PMV BLD AUTO: 9.6 FL (ref 6–12)
POTASSIUM SERPL-SCNC: 5 MMOL/L (ref 3.5–5.2)
QT INTERVAL: 393 MS
QT INTERVAL: 434 MS
QTC INTERVAL: 404 MS
QTC INTERVAL: 420 MS
RBC # BLD AUTO: 4.21 10*6/MM3 (ref 4.14–5.8)
SODIUM SERPL-SCNC: 138 MMOL/L (ref 136–145)
WBC NRBC COR # BLD AUTO: 4.59 10*3/MM3 (ref 3.4–10.8)

## 2025-05-20 PROCEDURE — 82948 REAGENT STRIP/BLOOD GLUCOSE: CPT

## 2025-05-20 PROCEDURE — 63710000001 TACROLIMUS PER 1 MG

## 2025-05-20 PROCEDURE — 85027 COMPLETE CBC AUTOMATED: CPT

## 2025-05-20 PROCEDURE — 99222 1ST HOSP IP/OBS MODERATE 55: CPT | Performed by: INTERNAL MEDICINE

## 2025-05-20 PROCEDURE — 80048 BASIC METABOLIC PNL TOTAL CA: CPT

## 2025-05-20 PROCEDURE — 70450 CT HEAD/BRAIN W/O DYE: CPT

## 2025-05-20 PROCEDURE — 63710000001 MYCOPHENOLATE MOFETIL PER 250 MG

## 2025-05-20 PROCEDURE — 99239 HOSP IP/OBS DSCHRG MGMT >30: CPT | Performed by: NURSE PRACTITIONER

## 2025-05-20 RX ADMIN — TACROLIMUS 3 MG: 1 CAPSULE ORAL at 08:53

## 2025-05-20 RX ADMIN — ASPIRIN 81 MG CHEWABLE TABLET 81 MG: 81 TABLET CHEWABLE at 08:53

## 2025-05-20 RX ADMIN — CLOPIDOGREL BISULFATE 75 MG: 75 TABLET, FILM COATED ORAL at 08:53

## 2025-05-20 RX ADMIN — Medication 10 ML: at 08:54

## 2025-05-20 RX ADMIN — MYCOPHENOLATE MOFETIL 500 MG: 250 CAPSULE ORAL at 08:53

## 2025-05-20 RX ADMIN — ACETAMINOPHEN 650 MG: 325 TABLET, FILM COATED ORAL at 04:28

## 2025-05-20 RX ADMIN — SODIUM BICARBONATE 650 MG: 650 TABLET ORAL at 08:53

## 2025-05-20 RX ADMIN — HYDRALAZINE HYDROCHLORIDE 50 MG: 50 TABLET ORAL at 08:53

## 2025-05-20 NOTE — PROGRESS NOTES
[facility] INPATIENT PROGRESS NOTE         [facility] INTENSIVE CARE    2025      PATIENT IDENTIFICATION:  Name: Jose Miguel Martinez ADMIT: 2025   : 1960  PCP: Razia Maldonado APRN    MRN: [id number] LOS: 1 days   AGE/SEX: 65 y.o. male  ROOM: [facility]                     LOS 1    Reason for visit: Right MCA aneurysm status post stent placement       SUBJECTIVE:      Resting comfortably.  Denies shortness of breath for repeat CT head at 8 AM.  Discussed with nursing staff at bedside. I am seeing the patient for the first time today.  All patient problems are new to me.      Objective   OBJECTIVE:    Vital Sign Min/Max for last 24 hours  Temp  Min: 97.2 °F (36.2 °C)  Max: 99 °F (37.2 °C)   BP  Min: 104/64  Max: 158/92   Pulse  Min: 52  Max: 76   Resp  Min: 11  Max: 18   SpO2  Min: 93 %  Max: 100 %   No data recorded   Weight  Min: 97.1 kg (214 lb)  Max: 101 kg (222 lb 10.6 oz)    Vitals:    25 0600 25 0615 25 0630 25 0726   BP: (!) 132/106      Pulse: 66 60 63 52   Resp:       Temp: 98.2 °F (36.8 °C) 98.2 °F (36.8 °C) 98.2 °F (36.8 °C) 98.2 °F (36.8 °C)   TempSrc:       SpO2: 94% 96% 97% 95%   Weight:       Height:                25  1050 25  1840 25  0500   Weight: 97.1 kg (214 lb) 101 kg (222 lb 10.6 oz) 98.6 kg (217 lb 6 oz)       Body mass index is 30.32 kg/m².                          Body mass index is 30.32 kg/m².    Intake/Output Summary (Last 24 hours) at 2025 0852  Last data filed at 2025 0500  Gross per 24 hour   Intake 1446 ml   Output 1350 ml   Net 96 ml         Exam:  GEN:  No distress, appears stated age  EYES:   PERRL, anicteric sclerae  ENT:    External ears/nose normal, OP clear  NECK:  No adenopathy, midline trachea  LUNGS: Normal chest on inspection, palpation and auscultation  CV:  Normal S1S2, without murmur  ABD:  Nontender, nondistended, no hepatosplenomegaly, +BS  EXT:  No edema.  No cyanosis or clubbing.  No mottling  and normal cap refill.    Assessment     Scheduled meds:  aspirin, 81 mg, Oral, Daily  atorvastatin, 20 mg, Oral, Nightly  clopidogrel, 75 mg, Oral, Daily  hydrALAZINE, 50 mg, Oral, TID  mycophenolate, 500 mg, Oral, Q12H  sodium bicarbonate, 650 mg, Oral, Daily  sodium chloride, 10 mL, Intravenous, Q12H  tacrolimus, 2 mg, Oral, Nightly  tacrolimus, 3 mg, Oral, Q24H      IV meds:                      lactated ringers, 9 mL/hr  niCARdipine in NS, 5-15 mg/hr, Last Rate: Stopped (05/19/25 2139)      Data Review:  Results from last 7 days   Lab Units 05/20/25  0425 05/14/25  1043   SODIUM mmol/L 138 137   POTASSIUM mmol/L 5.0 4.9   CHLORIDE mmol/L 110* 105   CO2 mmol/L 20.7* 24.7   BUN mg/dL 27* 35*   CREATININE mg/dL 1.75* 1.89*   GLUCOSE mg/dL 122* 175*   CALCIUM mg/dL 8.6 9.9         Estimated Creatinine Clearance: 51.7 mL/min (A) (by C-G formula based on SCr of 1.75 mg/dL (H)).  Results from last 7 days   Lab Units 05/20/25  0425 05/14/25  1043   WBC 10*3/mm3 4.59 5.55   HEMOGLOBIN g/dL 12.6* 15.1   PLATELETS 10*3/mm3 113* 117*                         Glucose   Date/Time Value Ref Range Status   05/20/2025 0709 107 70 - 130 mg/dL Final   05/19/2025 1827 100 70 - 130 mg/dL Final   05/19/2025 1451 103 70 - 130 mg/dL Final   05/19/2025 1147 134 (H) 70 - 130 mg/dL Final                   There are no hospital problems to display for this patient.        ASSESSMENT:  Right MCA aneurysm status post embolization and stent placement  End-stage renal disease status post renal transplant (2020)  History of cerebrovascular accident  Peripheral vascular disease  Diabetes mellitus type 2  Hypertension  Tobacco abuse      PLAN:  Neurochecks per protocol.  Repeat CT head this morning pending.  Aspirin, Plavix and statin.  Goal systolic blood pressure less than 160.  Off nicardipine drip.  Nephrology consulted for history of renal transplant in 2020 and management of immunosuppression with CellCept and tacrolimus.  Out of intensive  care when okay with admitting, neurosurgical service.    Discussed with multidisciplinary ICU team on rounds this morning.      Miguel Jimenez MD  Pulmonary and Critical Care Medicine  [facility], [facility]  5/20/2025    08:52 EDT

## 2025-05-20 NOTE — CONSULTS
Patient Name: Jose Miguel Martinez  :1960  65 y.o.    Date of Admission: 2025  Date of Consultation:  25  Encounter Provider: Tri Lopez MD  Place of Service: [facility] CARDIOLOGY  Referring Provider: Pk Allen MD  Patient Care Team:  Razia Maldonado APRN as PCP - General (Nurse Practitioner)  Kvng Goff MD (Infectious Diseases)  Ana Maria Thacker APRN ([facility])  Darrell Gonzalez MD as Consulting Physician (Nephrology)      Chief complaint: CVA    History of Present Illness:  This is a 65-year-old man with a history of stroke, PVD, diabetes, hypertension, tobacco use, cerebral aneurysm.  He was found to have right sided arm weakness and a small infarct in 2024 on brain MRI.  At that time he was found to have a right MCA aneurysm.  He presented for planned stenting of that aneurysm with Dr. Zayas yesterday.  Apparently an EKG was performed which was considered to be abnormal.  The patient has a history of abnormal EKG with diffuse ST elevations.  In  he underwent cardiac catheterization at [facility] with Dr. Garcia which showed normal coronary arteries for the same.  All of his EKGs since then has been similarly abnormal.  Yesterday he had some biphasic T wave inversions in the lateral leads, however those have normalized this morning.  He denies angina.          Past Medical History:   Diagnosis Date    CKD (chronic kidney disease) requiring chronic dialysis     Controlled type 2 diabetes mellitus without complication, without long-term current use of insulin 2024    COPD (chronic obstructive pulmonary disease)     Diabetes mellitus type I 2023    DKA (diabetic ketoacidosis) 2023    Hyperparathyroidism     Dr. Gonzalez    Hypertension     Hypothyroidism     Kidney failure     Stroke 2024    Thyroid nodule     Lisa       Past Surgical History:   Procedure Laterality Date     ARTERIOVENOUS FISTULA REPAIR      ARTERIOVENOUS FISTULA/SHUNT SURGERY      CARDIAC CATHETERIZATION N/A 08/22/2019    Procedure: Left Heart Cath;  Surgeon: Juanpablo Garcia MD;  Location: [facility] CATH INVASIVE LOCATION;  Service: Cardiology    CARDIAC CATHETERIZATION N/A 08/22/2019    Procedure: Aortic root aortogram;  Surgeon: Juanpablo Garcia MD;  Location: [facility] CATH INVASIVE LOCATION;  Service: Cardiology    LYMPH NODE BIOPSY  2002    ORCHIECTOMY           Prior to Admission medications    Medication Sig Start Date End Date Taking? Authorizing Provider   allopurinol (ZYLOPRIM) 100 MG tablet Take 1 tablet by mouth 3 (Three) Times a Week. Monday, Wednesday, Friday   Yes Gunnar Pineda MD   aspirin 81 MG chewable tablet Chew 1 tablet Daily. 12/31/24  Yes Erika Wing APRN   atorvastatin (LIPITOR) 20 MG tablet Take 1 tablet by mouth Every Night. 12/30/24  Yes Erika Wing APRN   b complex-vitamin c-folic acid (NEPHRO-MARK) 0.8 MG tablet tablet Take 1 tablet by mouth Daily.   Yes Gunnar Pineda MD   cinacalcet (SENSIPAR) 30 MG tablet Take 1 tablet by mouth Daily.   Yes Gunnar Pineda MD   clopidogrel (PLAVIX) 75 MG tablet Take 1 tablet by mouth Daily. 5/8/25  Yes Pk Allen MD   hydrALAZINE (APRESOLINE) 50 MG tablet Take 1 tablet by mouth 3 (Three) Times a Day.   Yes Gunnar Pineda MD   magnesium oxide (MAG-OX) 400 MG tablet Take 1 tablet by mouth 2 (Two) Times a Day.   Yes Gunnar Pineda MD   mycophenolate (CELLCEPT) 250 MG capsule Take 2 capsules by mouth 2 (Two) Times a Day.   Yes Gunnar Pineda MD   sodium bicarbonate 650 MG tablet Take 1 tablet by mouth Daily.   Yes Gunnar Pineda MD   tacrolimus (PROGRAF) 1 MG capsule Take 2 capsules by mouth Every Evening.   Yes Gunnar Pineda MD   albuterol sulfate  (90 Base) MCG/ACT inhaler Inhale 2 puffs Every 4 (Four) Hours As Needed for Wheezing or Shortness of Air.  Patient not  taking: Reported on 5/19/2025 8/12/24   Yuval Hinds III, PA       No Known Allergies    Social History     Socioeconomic History    Marital status:    Tobacco Use    Smoking status: Every Day     Current packs/day: 0.50     Average packs/day: 0.5 packs/day for 55.4 years (27.7 ttl pk-yrs)     Types: Cigarettes     Start date: 1970     Passive exposure: Past    Smokeless tobacco: Never    Tobacco comments:     Patient advised to stop smoking   Vaping Use    Vaping status: Never Used   Substance and Sexual Activity    Alcohol use: Yes     Comment: rare    Drug use: No    Sexual activity: Yes     Partners: Female     Birth control/protection: None       Family History   Problem Relation Age of Onset    Heart attack Mother     Diabetes Mother     Diabetes Father     Kidney disease Father        REVIEW OF SYSTEMS:   All systems reviewed.  Pertinent positives identified in HPI.  All other systems are negative.      Objective:     Vitals:    05/20/25 0914 05/20/25 0915 05/20/25 0932 05/20/25 0934   BP:       Pulse: 58 56 58 59   Resp:       Temp:       TempSrc:       SpO2: 97% 99% 98% 97%   Weight:       Height:         Body mass index is 30.32 kg/m².    General Appearance:    Alert, cooperative, in no acute distress   Head:    Normocephalic, without obvious abnormality, atraumatic   Eyes:            Lids and lashes normal, conjunctivae and sclerae normal, no icterus, no pallor, corneas clear, PERRLA   Ears:    Ears appear intact with no abnormalities noted   Throat:   No oral lesions, no thrush, oral mucosa moist   Neck:   No adenopathy, supple, trachea midline, no thyromegaly, no carotid bruit, no JVD   Back:     No kyphosis present, no scoliosis present, no skin lesions, erythema or scars, no tenderness to percussion or palpation, range of motion normal   Lungs:     Clear to auscultation, respirations regular, even and unlabored    Heart:    Regular rhythm and normal rate, normal S1 and S2, no murmur,  no gallop, no rub, no click   Chest Wall:    No abnormalities observed   Abdomen:     Normal bowel sounds, no masses, no organomegaly, soft, nontender, nondistended, no guarding, no rebound  tenderness   Extremities:   Moves all extremities well, no edema, no cyanosis, no redness   Pulses:   Pulses palpable and equal bilaterally. Normal radial, carotid, femoral, dorsalis pedis and posterior tibial pulses bilaterally. Normal abdominal aorta   Skin:  Psychiatric:   No bleeding, bruising or rash    Alert and oriented x 3, normal mood and affect   Lab Review:     Results from last 7 days   Lab Units 05/20/25  0425   SODIUM mmol/L 138   POTASSIUM mmol/L 5.0   CHLORIDE mmol/L 110*   CO2 mmol/L 20.7*   BUN mg/dL 27*   CREATININE mg/dL 1.75*   CALCIUM mg/dL 8.6   GLUCOSE mg/dL 122*         Results from last 7 days   Lab Units 05/20/25  0425   WBC 10*3/mm3 4.59   HEMOGLOBIN g/dL 12.6*   HEMATOCRIT % 37.7   PLATELETS 10*3/mm3 113*                           I personally viewed and interpreted the patient's EKG/Telemetry data.        Assessment and Plan:       1.  Abnormal EKG: Diffuse ST elevation.  This is unchanged from several EKGs over the years dating back to 2019.  At that time he had a cardiac catheterization showing normal coronary arteries.  I do not think he requires any further testing in light of his absence of cardiac symptoms.  No objection to discharge.    Tri Lopez MD  05/20/25  09:57 EDT

## 2025-05-20 NOTE — CASE MANAGEMENT/SOCIAL WORK
Discharge Planning Assessment  [facility]     Patient Name: Jose Miguel Martinez  MRN: [id number]  Today's Date: 5/20/2025    Admit Date: 5/19/2025    Plan: Return home via wife. Denies needs   Discharge Needs Assessment       Row Name 05/20/25 0956       Living Environment    People in Home spouse    Current Living Arrangements home    Primary Care Provided by self    Provides Primary Care For no one    Family Caregiver if Needed spouse    Quality of Family Relationships helpful;involved    Able to Return to Prior Arrangements yes       Resource/Environmental Concerns    Resource/Environmental Concerns none    Transportation Concerns none       Transition Planning    Patient/Family Anticipates Transition to home with family    Patient/Family Anticipated Services at Transition none    Transportation Anticipated family or friend will provide       Discharge Needs Assessment    Readmission Within the Last 30 Days no previous admission in last 30 days    Equipment Currently Used at Home none    Concerns to be Addressed denies needs/concerns at this time    Anticipated Changes Related to Illness none    Equipment Needed After Discharge none                   Discharge Plan       Row Name 05/20/25 0957       Plan    Plan Return home via wife. Denies needs    Patient/Family in Agreement with Plan yes    Plan Comments CCP met with patient and spouse at bedside. Introduced self and explained role. Patient lives with his wife in a single story home with 3 steps to enter. At baseline he uses no AD, drives himself to appointments, and is IADL. CCP verified PCP and pharmacy. He denies any AD/LW documents, HH or VAINNEY history. At discharge he plans to return home. His wife can provide transportation and assistance as needed, but he denies discharge planning needs at this time. CCP following clinical course for PT/OT progress notes to assist with any dc planning needs. Kita ZAMORA RN/CCP                       Demographic Summary        Row Name 05/20/25 0954       General Information    Admission Type inpatient    Arrived From home    Required Notices Provided Important Message from Medicare    Referral Source admission list    Reason for Consult discharge planning    Preferred Language English                   Functional Status       Row Name 05/20/25 0955       Functional Status    Usual Activity Tolerance good    Current Activity Tolerance moderate       Functional Status, IADL    Medications independent    Meal Preparation independent    Housekeeping independent    Laundry independent    Shopping independent                   Psychosocial    No documentation.                  Abuse/Neglect    No documentation.                  Legal    No documentation.                  Substance Abuse    No documentation.                  Patient Forms    No documentation.                     Alicia Maier

## 2025-05-20 NOTE — DISCHARGE SUMMARY
Jose Miguel Martinez  1960    Patient Care Team:  Razia Maldonado APRN as PCP - General (Nurse Practitioner)  Kvng Goff MD (Infectious Diseases)  Ana Maria Thacker APRN ([facility])  Darrell Gonzalez MD as Consulting Physician (Nephrology)    Date of Admit: 5/19/2025    Date of Discharge:  5/20/2025    Discharge Diagnosis: Successful flow diversion with pipeline stent placement of a 5 mm right MCA aneurysm      Procedures Performed  Cerebral embolization of right MCA aneurysm with successful flow diversion pipeline stent placement.       Complications: None    Consultants:   Consults       Date and Time Order Name Status Description    5/20/2025  8:17 AM Inpatient Nephrology Consult      5/19/2025  6:25 PM Inpatient Intensivist Consult Completed     5/19/2025  5:55 PM Inpatient Consult to Cardiology              Condition on Discharge: stable    Discharge disposition: home    Brief HPI: The patient is a 65-year-old male who was recently seen in clinic and found to have a 5 mm right MCA bifurcation aneurysm.  He was consented for cerebral embolization with pipeline stent placement.  The risks and benefits were discussed and he elected to proceed with the surgical intervention.  He will need to remain on aspirin and Plavix for 6 months and then transition to monotherapy with aspirin only.    Hospital Course: Patient admitted for above procedure. The procedure itself was without complication. The patient was transferred to ICU following recovery where he has done very well.  He has ambulated, voided.  He denies any headache, visual disturbance, strokelike symptoms or change in sensation or strength.  He would like to go home today and has follow-up scheduled with Dr. Allen.    Discharge Physical Exam:    Temp:  [97.2 °F (36.2 °C)-99 °F (37.2 °C)] 98.1 °F (36.7 °C)  Heart Rate:  [52-76] 62  Resp:  [11-18] 18  BP: (104-158)/() 128/92  Arterial Line BP: (123-162)/(61-76)  162/76    Current labs:  Lab Results (last 24 hours)       Procedure Component Value Units Date/Time    P2Y12 Platelet Inhibition [[id number]] Collected: 05/19/25 1058    Specimen: Blood from Cannula Updated: 05/19/25 1132     P2Y12 Platelet Inhibition 82 PRU     Narrative:      Test results are in P2Y12 reaction units (PRU). This measures the extent of platelet aggregation in the presence of P2Y12 inhibitor drugs such as clopidrogrel (Plavix), prasugrel (Effient), ticagrelor (Brilinta), and ticlopidine (Ticlid).   Pre-Drug normal reference range: 194 - 418 PRU   P2Y12 values <194 (low end of reference range) are specific evidence of a P2Y12 inhibitor effect   Patients who have been treated with Glycoprotein IIb/IIIa inhibitors should not be tested until platelet function has recovered. This time period is approximately 14 days after discontinuation of abciximab (ReoPro) and up to 48 hours after discontinuation of eptifibatide (Integrillin) and tirofiban (Aggratstat).   Results should be interpreted in conjunction with other laboratory and clinical data available to the clinician.    POC Glucose Once [[id number]]  (Abnormal) Collected: 05/19/25 1147    Specimen: Blood Updated: 05/19/25 1149     Glucose 134 mg/dL     POC Activated Clotting Time [[id number]]  (Normal) Collected: 05/19/25 1233    Specimen: Blood Updated: 05/20/25 0636     Activated Clotting Time  135 Seconds      Comment: Serial Number: [id number]:  [id number]       POC Activated Clotting Time [[id number]]  (Abnormal) Collected: 05/19/25 1321    Specimen: Blood Updated: 05/20/25 0636     Activated Clotting Time  279 Seconds      Comment: Serial Number: [id number]:  [id number]       POC Activated Clotting Time [[id number]]  (Abnormal) Collected: 05/19/25 1416    Specimen: Blood Updated: 05/20/25 0636     Activated Clotting Time  250 Seconds      Comment: Serial Number: [id number]:  [id number]       POC Glucose Once [[id number]]  (Normal) Collected: 05/19/25 4821     Specimen: Blood Updated: 05/19/25 1453     Glucose 103 mg/dL     POC Glucose Once [[id number]]  (Normal) Collected: 05/19/25 1827    Specimen: Blood Updated: 05/19/25 1829     Glucose 100 mg/dL     Basic Metabolic Panel [[id number]]  (Abnormal) Collected: 05/20/25 0425    Specimen: Blood Updated: 05/20/25 0518     Glucose 122 mg/dL      BUN 27 mg/dL      Creatinine 1.75 mg/dL      Sodium 138 mmol/L      Potassium 5.0 mmol/L      Chloride 110 mmol/L      CO2 20.7 mmol/L      Calcium 8.6 mg/dL      BUN/Creatinine Ratio 15.4     Anion Gap 7.3 mmol/L      eGFR 43.2 mL/min/1.73     Narrative:      GFR Categories in Chronic Kidney Disease (CKD)              GFR Category          GFR (mL/min/1.73)    Interpretation  G1                    90 or greater        Normal or high (1)  G2                    60-89                Mild decrease (1)  G3a                   45-59                Mild to moderate decrease  G3b                   30-44                Moderate to severe decrease  G4                    15-29                Severe decrease  G5                    14 or less           Kidney failure    (1)In the absence of evidence of kidney disease, neither GFR category G1 or G2 fulfill the criteria for CKD.    eGFR calculation 2021 CKD-EPI creatinine equation, which does not include race as a factor    CBC (No Diff) [[id number]]  (Abnormal) Collected: 05/20/25 0425    Specimen: Blood Updated: 05/20/25 0533     WBC 4.59 10*3/mm3      RBC 4.21 10*6/mm3      Hemoglobin 12.6 g/dL      Hematocrit 37.7 %      MCV 89.5 fL      MCH 29.9 pg      MCHC 33.4 g/dL      RDW 13.3 %      RDW-SD 43.6 fl      MPV 9.6 fL      Platelets 113 10*3/mm3     POC Glucose Once [[id number]]  (Normal) Collected: 05/20/25 0709    Specimen: Blood Updated: 05/20/25 0710     Glucose 107 mg/dL               General Appearance No acute distress   HEENT NC/AT;    Neurological Awake, Alert, and oriented x 3   Cranial nerves CN II-XII intact   Motor Strength 5/5  throughout, tone normal in upper and lower extremities   Sensory Intact to light touch upper and lower extremities   Gait and station Ambulating independently   Groin Right groin site flat, soft, with some bruising, pedal pulses present.  Pressure dressing is mildly saturated.   Extremities Moves all extremities well, no edema, no cyanosis, no redness         Discharge Medications  SHANNAN has been reviewed and narcotic consent is on file in the patient's chart.     Your medication list        CONTINUE taking these medications        Instructions Last Dose Given Next Dose Due   allopurinol 100 MG tablet  Commonly known as: ZYLOPRIM      Take 1 tablet by mouth 3 (Three) Times a Week. Monday, Wednesday, Friday       aspirin 81 MG chewable tablet      Chew 1 tablet Daily.       atorvastatin 20 MG tablet  Commonly known as: LIPITOR      Take 1 tablet by mouth Every Night.       b complex-vitamin c-folic acid 0.8 MG tablet tablet      Take 1 tablet by mouth Daily.       cinacalcet 30 MG tablet  Commonly known as: SENSIPAR      Take 1 tablet by mouth Daily.       clopidogrel 75 MG tablet  Commonly known as: PLAVIX      Take 1 tablet by mouth Daily.       hydrALAZINE 50 MG tablet  Commonly known as: APRESOLINE      Take 1 tablet by mouth 3 (Three) Times a Day.       magnesium oxide 400 MG tablet  Commonly known as: MAG-OX      Take 1 tablet by mouth 2 (Two) Times a Day.       mycophenolate 250 MG capsule  Commonly known as: CELLCEPT      Take 2 capsules by mouth 2 (Two) Times a Day.       sodium bicarbonate 650 MG tablet      Take 1 tablet by mouth Daily.       tacrolimus 1 MG capsule  Commonly known as: PROGRAF      Take 2 capsules by mouth Every Evening.              ASK your doctor about these medications        Instructions Last Dose Given Next Dose Due   albuterol sulfate  (90 Base) MCG/ACT inhaler  Commonly known as: PROVENTIL HFA;VENTOLIN HFA;PROAIR HFA      Inhale 2 puffs Every 4 (Four) Hours As Needed for  "Wheezing or Shortness of Air.                Discharge Diet:     Diet Order   Procedures    Diet: Cardiac; Healthy Heart (2-3 Na+); Fluid Consistency: Thin (IDDSI 0)       Activity at Discharge:       Call for: questions or concerns    Follow-up Appointments  Future Appointments   Date Time Provider Department Center   6/3/2025  1:30 PM Pk Allen MD [facility]   8/21/2025 12:30 PM Arthur Moses MD [facility]      Follow-up Information       Razia Maldonado, APRN .    Specialty: Nurse Practitioner  Contact information:  [facility] [employer] [facility] [place] [address] [phone]                               Test Results Pending at Discharge     None    I discussed the discharge instructions with patient and nursing staff    JENIFFER Simpson  05/20/25  10:47 EDT    35 min spent in reviewing records, discussion and examination of the patient and discussion with other members of the patient's medical team.    \"Dictated utilizing Dragon dictation\".      "

## 2025-05-20 NOTE — PROGRESS NOTES
Patient followed by Dr Gonzalez for kidney transplant so will change consult to him and I notified him.  Thank you.

## 2025-05-21 NOTE — CASE MANAGEMENT/SOCIAL WORK
Case Management Discharge Note      Final Note: Home via family transport         Selected Continued Care - Discharged on 5/20/2025 Admission date: 5/19/2025 - Discharge disposition: Home or Self Care      Destination    No services have been selected for the patient.                Durable Medical Equipment    No services have been selected for the patient.                Dialysis/Infusion    No services have been selected for the patient.                Home Medical Care    No services have been selected for the patient.                Therapy    No services have been selected for the patient.                Community Resources    No services have been selected for the patient.                Community & DME    No services have been selected for the patient.                    Transportation Services  Private: Car    Final Discharge Disposition Code: 01 - home or self-care

## 2025-05-21 NOTE — OUTREACH NOTE
Prep Survey      Flowsheet Row Responses   [facility] patient discharged from? [place]   Is LACE score < 7 ? No   Eligibility Readm Mgmt   Discharge diagnosis Cerebral embolization of right MCA aneurysm with successful flow diversion pipeline stent placement   Does the patient have one of the following disease processes/diagnoses(primary or secondary)? General Surgery   Prep survey completed? Yes            Sophia RAO - Registered Nurse

## 2025-05-27 NOTE — PROGRESS NOTES
Subjective   Patient ID: Jose Miguel Martinez is a 65 y.o. male is here today for follow-up for   PO, embolization, may 19  No concerns at this time.  History of Present Illness  65 y.o. male who presented to a free standing emergency room on the morning of 12/29/24 after awakening at 7am with right sided arm weakness. He reported that he was unable to grasp any thing.  He denied any headache, shortness of breath, numbness and tingling, or change of speech. In the ER, a CT of the head was completed and showed no acute intracranial abnormality. A code stroke had been activated and pt was evaluated by tele-stroke neurologist, Dr. Gracia. Per documentation, pt initially had some mild right arm weakness, but improved with NIH 0. He was outside the treatment window for IV thrombolytics with wake up symptoms and last known well being the night before.   Pt was transferred to [facility] from the free standing [facility] location for an MRI which was completed and showed a small infarct of the left frontal lobe.  An MRA was also obtained which demonstrated an intracranial aneurysm and the patient presented to discuss the natural history of aneurysms and what follow-up care he would need on 1/21/2025.  A CTA was recommended for better evaluation of the aneurysm and detection of its more accurate size which was achieved on 2/19/2025 and now the patient returns post flow diversion with a Pipeline stent on 5/19/2025 under general anesthesia for repair of the aneurysm.    The following portions of the patient's history were reviewed and updated as appropriate: He  has a past medical history of CKD (chronic kidney disease) requiring chronic dialysis, Controlled type 2 diabetes mellitus without complication, without long-term current use of insulin (05/14/2024), COPD (chronic obstructive pulmonary disease), Diabetes mellitus type I (09/30/2023), DKA (diabetic ketoacidosis) (09/30/2023), Hyperparathyroidism, Hypertension,  Hypothyroidism, Kidney failure, Stroke (12/30/2024), and Thyroid nodule.  He does not have any pertinent problems on file.  He  has a past surgical history that includes Orchiectomy; AV fistula repair; arteriovenous fistula/shunt surgery; Lymph node biopsy (2002); Cardiac catheterization (N/A, 08/22/2019); and Cardiac catheterization (N/A, 08/22/2019).  His family history includes Diabetes in his father and mother; Heart attack in his mother; Kidney disease in his father.  He  reports that he has been smoking cigarettes. He started smoking about 55 years ago. He has a 27.7 pack-year smoking history. He has been exposed to tobacco smoke. He has never used smokeless tobacco. He reports current alcohol use. He reports that he does not use drugs.  Current Outpatient Medications   Medication Sig Dispense Refill    allopurinol (ZYLOPRIM) 100 MG tablet Take 1 tablet by mouth 3 (Three) Times a Week. Monday, Wednesday, Friday      aspirin 81 MG chewable tablet Chew 1 tablet Daily. 30 tablet 1    atorvastatin (LIPITOR) 20 MG tablet Take 1 tablet by mouth Every Night. 90 tablet 1    b complex-vitamin c-folic acid (NEPHRO-MARK) 0.8 MG tablet tablet Take 1 tablet by mouth Daily.      cinacalcet (SENSIPAR) 30 MG tablet Take 1 tablet by mouth Daily.      clopidogrel (PLAVIX) 75 MG tablet Take 1 tablet by mouth Daily. 30 tablet 1    hydrALAZINE (APRESOLINE) 50 MG tablet Take 1 tablet by mouth 3 (Three) Times a Day.      magnesium oxide (MAG-OX) 400 MG tablet Take 1 tablet by mouth 2 (Two) Times a Day.      mycophenolate (CELLCEPT) 250 MG capsule Take 2 capsules by mouth 2 (Two) Times a Day.      sodium bicarbonate 650 MG tablet Take 1 tablet by mouth Daily.      tacrolimus (PROGRAF) 1 MG capsule Take 2 capsules by mouth Every Evening.      albuterol sulfate  (90 Base) MCG/ACT inhaler Inhale 2 puffs Every 4 (Four) Hours As Needed for Wheezing or Shortness of Air. (Patient not taking: Reported on 6/3/2025) 18 g 0     No  "current facility-administered medications for this visit.     Current Outpatient Medications on File Prior to Visit   Medication Sig    allopurinol (ZYLOPRIM) 100 MG tablet Take 1 tablet by mouth 3 (Three) Times a Week. Monday, Wednesday, Friday    aspirin 81 MG chewable tablet Chew 1 tablet Daily.    atorvastatin (LIPITOR) 20 MG tablet Take 1 tablet by mouth Every Night.    b complex-vitamin c-folic acid (NEPHRO-MARK) 0.8 MG tablet tablet Take 1 tablet by mouth Daily.    cinacalcet (SENSIPAR) 30 MG tablet Take 1 tablet by mouth Daily.    clopidogrel (PLAVIX) 75 MG tablet Take 1 tablet by mouth Daily.    hydrALAZINE (APRESOLINE) 50 MG tablet Take 1 tablet by mouth 3 (Three) Times a Day.    magnesium oxide (MAG-OX) 400 MG tablet Take 1 tablet by mouth 2 (Two) Times a Day.    mycophenolate (CELLCEPT) 250 MG capsule Take 2 capsules by mouth 2 (Two) Times a Day.    sodium bicarbonate 650 MG tablet Take 1 tablet by mouth Daily.    tacrolimus (PROGRAF) 1 MG capsule Take 2 capsules by mouth Every Evening.    albuterol sulfate  (90 Base) MCG/ACT inhaler Inhale 2 puffs Every 4 (Four) Hours As Needed for Wheezing or Shortness of Air. (Patient not taking: Reported on 6/3/2025)     No current facility-administered medications on file prior to visit.     He has no known allergies..    Review of Systems   Constitutional: Negative.    Neurological: Negative.    All other systems reviewed and are negative.    Objective     Vitals:    06/03/25 1331   BP: 140/80   Pulse: 66   Temp: 98.2 °F (36.8 °C)   SpO2: 98%   Weight: 97.1 kg (214 lb)   Height: 180 cm (70.87\")     Body mass index is 29.96 kg/m².    Physical Exam  Vitals and nursing note reviewed.   Constitutional:       General: He is not in acute distress.     Appearance: Normal appearance.   Eyes:      Extraocular Movements: Extraocular movements intact.   Cardiovascular:      Rate and Rhythm: Normal rate.   Pulmonary:      Effort: Pulmonary effort is normal. "   Musculoskeletal:         General: Normal range of motion.      Cervical back: Normal range of motion.   Skin:     General: Skin is warm and dry.   Neurological:      General: No focal deficit present.      Mental Status: He is alert and oriented to person, place, and time.      Cranial Nerves: No cranial nerve deficit.      Sensory: No sensory deficit.      Motor: No weakness.      Coordination: Coordination normal.      Gait: Gait normal.     Neurological Exam  Mental Status  Alert. Oriented to person, place, and time.    Cranial Nerves  CN III, IV, VI: Extraocular movements intact bilaterally.    Gait   Normal gait.    Assessment & Plan   Independent Review of Radiographic Studies:      I personally reviewed the images from the following studies.    The cerebral embolization dated 2025 was reviewed with the patient and shows successful flow diversion with a Pipeline stent for the right MCA aneurysm.  Follow-up CT of the head dated 2025 was unremarkable for any hemorrhage or infarct and shows the stent in place.    Medical Decision Makin-year-old male with a right middle cerebral artery aneurysm that underwent successful flow diversion with a Pipeline stent on 2025 with no complications.  Patient presents today for his postoperative follow-up with no current complaints and healing of his groin puncture site.  He will return in 6 months at which time DAPT will be converted to SAPT.  Patient will undergo follow-up angiography in 1 year to confirm aneurysm occlusion.  He is to notify us of any questions or concerns in the interim.  Diagnoses and all orders for this visit:    1. Cerebral aneurysm status post embolization (Primary)    2. Hypertension secondary to other renal disorders    3. Status post kidney transplant    4. PVD (peripheral vascular disease)    5. Controlled type 2 diabetes mellitus without complication, without long-term current use of insulin    6. Tobacco abuse    Return  in about 6 months (around 12/3/2025) for Next scheduled follow up.     I spent 30 minutes caring for Jose Miguel on this date of service. This time includes time spent by me in the following activities: preparing for the visit, reviewing tests, performing a medically appropriate examination and/or evaluation, counseling and educating the patient/family/caregiver, documenting information in the medical record, independently interpreting results and communicating that information with the patient/family/caregiver, care coordination, and reviewing a separately obtained history.

## 2025-06-03 ENCOUNTER — OFFICE VISIT (OUTPATIENT)
Dept: NEUROSURGERY | Facility: CLINIC | Age: 65
End: 2025-06-03
Payer: MEDICARE

## 2025-06-03 VITALS
OXYGEN SATURATION: 98 % | TEMPERATURE: 98.2 F | WEIGHT: 214 LBS | BODY MASS INDEX: 29.96 KG/M2 | HEIGHT: 71 IN | DIASTOLIC BLOOD PRESSURE: 80 MMHG | SYSTOLIC BLOOD PRESSURE: 140 MMHG | HEART RATE: 66 BPM

## 2025-06-03 DIAGNOSIS — Z94.0 STATUS POST KIDNEY TRANSPLANT: ICD-10-CM

## 2025-06-03 DIAGNOSIS — I73.9 PVD (PERIPHERAL VASCULAR DISEASE): Chronic | ICD-10-CM

## 2025-06-03 DIAGNOSIS — I67.1 CEREBRAL ANEURYSM, NONRUPTURED: Primary | ICD-10-CM

## 2025-06-03 DIAGNOSIS — Z72.0 TOBACCO ABUSE: Chronic | ICD-10-CM

## 2025-06-03 DIAGNOSIS — I15.1 HYPERTENSION SECONDARY TO OTHER RENAL DISORDERS: Chronic | ICD-10-CM

## 2025-06-03 DIAGNOSIS — E11.9 CONTROLLED TYPE 2 DIABETES MELLITUS WITHOUT COMPLICATION, WITHOUT LONG-TERM CURRENT USE OF INSULIN: ICD-10-CM

## 2025-06-06 ENCOUNTER — READMISSION MANAGEMENT (OUTPATIENT)
Dept: CALL CENTER | Facility: HOSPITAL | Age: 65
End: 2025-06-06
Payer: MEDICARE

## 2025-06-06 NOTE — OUTREACH NOTE
General Surgery Week 3 Survey      Flowsheet Row Responses   [facility], [place] patient discharged from? [place]   Does the patient have one of the following disease processes/diagnoses(primary or secondary)? General Surgery   Week 3 attempt successful? No   Unsuccessful attempts Attempt 1            Mary Anne Rosas Registered Nurse

## 2025-06-11 ENCOUNTER — READMISSION MANAGEMENT (OUTPATIENT)
Dept: CALL CENTER | Facility: HOSPITAL | Age: 65
End: 2025-06-11
Payer: MEDICARE

## 2025-06-20 ENCOUNTER — TRANSCRIBE ORDERS (OUTPATIENT)
Dept: ADMINISTRATIVE | Facility: HOSPITAL | Age: 65
End: 2025-06-20
Payer: MEDICARE

## 2025-06-20 ENCOUNTER — LAB (OUTPATIENT)
Dept: LAB | Facility: HOSPITAL | Age: 65
End: 2025-06-20
Payer: MEDICARE

## 2025-06-20 DIAGNOSIS — R80.9 PROTEINURIA, UNSPECIFIED TYPE: ICD-10-CM

## 2025-06-20 DIAGNOSIS — N25.81 SECONDARY HYPERPARATHYROIDISM OF RENAL ORIGIN: ICD-10-CM

## 2025-06-20 DIAGNOSIS — E11.22 TYPE 2 DIABETES MELLITUS WITH DIABETIC CHRONIC KIDNEY DISEASE, UNSPECIFIED CKD STAGE, UNSPECIFIED WHETHER LONG TERM INSULIN USE: ICD-10-CM

## 2025-06-20 DIAGNOSIS — N17.9 ACUTE RENAL FAILURE, UNSPECIFIED ACUTE RENAL FAILURE TYPE: Primary | ICD-10-CM

## 2025-06-20 DIAGNOSIS — Z94.0 KIDNEY REPLACED BY TRANSPLANT: ICD-10-CM

## 2025-06-20 DIAGNOSIS — N17.9 ACUTE RENAL FAILURE, UNSPECIFIED ACUTE RENAL FAILURE TYPE: ICD-10-CM

## 2025-06-20 LAB
ANION GAP SERPL CALCULATED.3IONS-SCNC: 9.3 MMOL/L (ref 5–15)
BUN SERPL-MCNC: 24.8 MG/DL (ref 8–23)
BUN/CREAT SERPL: 14.9 (ref 7–25)
CALCIUM SPEC-SCNC: 9.8 MG/DL (ref 8.6–10.5)
CHLORIDE SERPL-SCNC: 107 MMOL/L (ref 98–107)
CK SERPL-CCNC: 55 U/L (ref 20–200)
CO2 SERPL-SCNC: 22.7 MMOL/L (ref 22–29)
CREAT SERPL-MCNC: 1.66 MG/DL (ref 0.76–1.27)
EGFRCR SERPLBLD CKD-EPI 2021: 46 ML/MIN/1.73
GLUCOSE SERPL-MCNC: 93 MG/DL (ref 65–99)
POTASSIUM SERPL-SCNC: 4.7 MMOL/L (ref 3.5–5.2)
SODIUM SERPL-SCNC: 139 MMOL/L (ref 136–145)
SODIUM UR-SCNC: 79 MMOL/L
URATE SERPL-MCNC: 6.8 MG/DL (ref 3.4–7)

## 2025-06-20 PROCEDURE — 80048 BASIC METABOLIC PNL TOTAL CA: CPT

## 2025-06-20 PROCEDURE — 82550 ASSAY OF CK (CPK): CPT

## 2025-06-20 PROCEDURE — 87799 DETECT AGENT NOS DNA QUANT: CPT

## 2025-06-20 PROCEDURE — 84300 ASSAY OF URINE SODIUM: CPT

## 2025-06-20 PROCEDURE — 84550 ASSAY OF BLOOD/URIC ACID: CPT

## 2025-06-20 PROCEDURE — 36415 COLL VENOUS BLD VENIPUNCTURE: CPT

## 2025-06-20 PROCEDURE — 80197 ASSAY OF TACROLIMUS: CPT

## 2025-06-23 LAB
BKV DNA # UR NAA+PROBE: NEGATIVE IU/ML
BKV DNA SPEC NAA+PROBE-ACNC: NEGATIVE IU/ML

## 2025-06-25 LAB — TACROLIMUS BLD LC/MS/MS-MCNC: 11.5 NG/ML (ref 5–20)

## 2025-08-01 RX ORDER — CLOPIDOGREL BISULFATE 75 MG/1
75 TABLET ORAL DAILY
Qty: 30 TABLET | Refills: 1 | Status: SHIPPED | OUTPATIENT
Start: 2025-08-01

## 2025-08-07 ENCOUNTER — TELEPHONE (OUTPATIENT)
Dept: NEUROSURGERY | Facility: CLINIC | Age: 65
End: 2025-08-07
Payer: MEDICARE

## (undated) DEVICE — GUIDE CATHETER: Brand: MACH1™

## (undated) DEVICE — GW DIAG EMERALD HEPCOAT MOVE JTIP STD .035 3MM 150CM

## (undated) DEVICE — PK TRY HEART CATH 50

## (undated) DEVICE — CATH DIAG IMPULSE PIG .056 6F 110CM

## (undated) DEVICE — GC 5F 056 XB 3.5 LBT: Brand: BRITE TIP

## (undated) DEVICE — PINNACLE INTRODUCER SHEATH: Brand: PINNACLE